# Patient Record
Sex: MALE | Race: WHITE | NOT HISPANIC OR LATINO | Employment: FULL TIME | URBAN - METROPOLITAN AREA
[De-identification: names, ages, dates, MRNs, and addresses within clinical notes are randomized per-mention and may not be internally consistent; named-entity substitution may affect disease eponyms.]

---

## 2020-08-17 RX ORDER — EPINEPHRINE 0.3 MG/.3ML
0.3 INJECTION SUBCUTANEOUS
COMMUNITY
Start: 2020-07-14 | End: 2021-02-11

## 2020-08-17 RX ORDER — METHYLPHENIDATE HYDROCHLORIDE 10 MG/1
TABLET ORAL
COMMUNITY
Start: 2020-09-12 | End: 2020-09-01 | Stop reason: SDUPTHER

## 2020-08-17 RX ORDER — ZALEPLON 10 MG/1
10 CAPSULE ORAL
COMMUNITY
Start: 2020-07-17 | End: 2020-09-01 | Stop reason: SDUPTHER

## 2020-08-17 RX ORDER — CLOBETASOL PROPIONATE 0.5 MG/G
0.05 CREAM TOPICAL 2 TIMES DAILY PRN
COMMUNITY

## 2020-08-17 RX ORDER — ATORVASTATIN CALCIUM 40 MG/1
40 TABLET, FILM COATED ORAL DAILY
COMMUNITY
Start: 2020-05-28 | End: 2020-09-01 | Stop reason: SDUPTHER

## 2020-08-17 RX ORDER — ASPIRIN 81 MG/1
81 TABLET ORAL DAILY
COMMUNITY

## 2020-08-19 ENCOUNTER — OFFICE VISIT (OUTPATIENT)
Dept: INTERNAL MEDICINE CLINIC | Facility: CLINIC | Age: 60
End: 2020-08-19
Payer: COMMERCIAL

## 2020-08-19 VITALS
OXYGEN SATURATION: 96 % | TEMPERATURE: 97.5 F | WEIGHT: 186.6 LBS | HEART RATE: 93 BPM | DIASTOLIC BLOOD PRESSURE: 82 MMHG | HEIGHT: 71 IN | SYSTOLIC BLOOD PRESSURE: 144 MMHG | BODY MASS INDEX: 26.12 KG/M2

## 2020-08-19 DIAGNOSIS — E78.2 MIXED HYPERLIPIDEMIA: Primary | ICD-10-CM

## 2020-08-19 DIAGNOSIS — Z12.11 SCREENING FOR COLON CANCER: ICD-10-CM

## 2020-08-19 DIAGNOSIS — F51.01 PRIMARY INSOMNIA: ICD-10-CM

## 2020-08-19 DIAGNOSIS — M54.9 BACK PAIN, UNSPECIFIED BACK LOCATION, UNSPECIFIED BACK PAIN LATERALITY, UNSPECIFIED CHRONICITY: ICD-10-CM

## 2020-08-19 DIAGNOSIS — L40.50 PSORIATIC ARTHRITIS (HCC): ICD-10-CM

## 2020-08-19 DIAGNOSIS — Z82.49 FAMILY HISTORY OF EARLY CAD: ICD-10-CM

## 2020-08-19 DIAGNOSIS — J45.990 EXERCISE-INDUCED ASTHMA: ICD-10-CM

## 2020-08-19 DIAGNOSIS — Z11.59 NEED FOR HEPATITIS C SCREENING TEST: ICD-10-CM

## 2020-08-19 DIAGNOSIS — F90.2 ATTENTION DEFICIT HYPERACTIVITY DISORDER (ADHD), COMBINED TYPE: ICD-10-CM

## 2020-08-19 PROCEDURE — 3008F BODY MASS INDEX DOCD: CPT | Performed by: INTERNAL MEDICINE

## 2020-08-19 PROCEDURE — 99204 OFFICE O/P NEW MOD 45 MIN: CPT | Performed by: INTERNAL MEDICINE

## 2020-08-19 RX ORDER — CLOTRIMAZOLE AND BETAMETHASONE DIPROPIONATE 10; .64 MG/G; MG/G
CREAM TOPICAL 2 TIMES DAILY
COMMUNITY
End: 2020-12-16 | Stop reason: SDUPTHER

## 2020-08-19 RX ORDER — KETOCONAZOLE 20 MG/G
CREAM TOPICAL DAILY PRN
COMMUNITY
End: 2021-11-05 | Stop reason: SDUPTHER

## 2020-08-19 RX ORDER — FEXOFENADINE HYDROCHLORIDE 60 MG/1
60 TABLET, FILM COATED ORAL DAILY PRN
COMMUNITY

## 2020-08-19 RX ORDER — SECUKINUMAB 150 MG/ML
INJECTION SUBCUTANEOUS
COMMUNITY
Start: 2020-06-11 | End: 2020-12-15 | Stop reason: SDUPTHER

## 2020-08-19 RX ORDER — SECUKINUMAB 150 MG/ML
INJECTION SUBCUTANEOUS
COMMUNITY
Start: 2020-08-12 | End: 2020-12-16 | Stop reason: SDUPTHER

## 2020-08-19 RX ORDER — TOBRAMYCIN AND DEXAMETHASONE 3; 1 MG/ML; MG/ML
SUSPENSION/ DROPS OPHTHALMIC
COMMUNITY
Start: 2020-07-06 | End: 2021-02-11 | Stop reason: ALTCHOICE

## 2020-08-19 NOTE — ASSESSMENT & PLAN NOTE
Continue atorvastatin 40 mg daily, discussed that if he were to have some slight aches he could restart Co Q10 100 mg daily  Patient also understands the importance of healthy diet and exercise

## 2020-08-19 NOTE — PROGRESS NOTES
Assessment/Plan:    Mixed hyperlipidemia  Continue atorvastatin 40 mg daily, discussed that if he were to have some slight aches he could restart Co Q10 100 mg daily  Patient also understands the importance of healthy diet and exercise  Primary insomnia  Continue Sonata as needed along with good sleep hygiene    Psoriatic arthritis ([facility] )  Follow-up Rheumatology    Exercise-induced asthma  Continue inhaler as needed  Pt doing well  Attention deficit hyperactivity disorder (ADHD), combined type  I recommend eval with Ritalin  Diagnoses and all orders for this visit:    Mixed hyperlipidemia    Primary insomnia    Psoriatic arthritis ([facility] )  -     Ambulatory referral to Rheumatology; Future    Exercise-induced asthma    Need for hepatitis C screening test  -     Hepatitis C antibody; Future    Screening for colon cancer  -     Ambulatory referral to Colorectal Surgery; Future    Attention deficit hyperactivity disorder (ADHD), combined type  -     Ambulatory referral to Psychiatry; Future    Family history of early CAD  -     Ambulatory referral to Cardiology; Future    Other orders  -     zaleplon (SONATA) 10 MG capsule; Take 10 mg by mouth  -     methylphenidate (RITALIN) 10 mg tablet; Take 2 tablets by mouth three times a day  -     EPINEPHrine (EPIPEN) 0 3 mg/0 3 mL SOAJ; Inject 0 3 mg into a muscle  -     clobetasol (TEMOVATE) 0 05 % cream; Apply 9 61 application topically 2 (two) times a day  -     atorvastatin (LIPITOR) 40 mg tablet; Take 40 mg by mouth daily  -     aspirin (ECOTRIN LOW STRENGTH) 81 mg EC tablet;  Take 81 mg by mouth daily  -     Cosentyx Sensoready Pen 150 MG/ML SOAJ injection  -     Cosentyx Sensoready, 300 MG, 150 MG/ML SOAJ injection  -     tobramycin-dexamethasone (TOBRADEX) ophthalmic suspension; instill 1 drop into right eye four times a day  -     ketoconazole (NIZORAL) 2 % cream; Apply topically daily  -     clotrimazole-betamethasone (LOTRISONE) 1-0 05 % cream; Apply topically 2 (two) times a day  -     Albuterol Sulfate 108 (90 Base) MCG/ACT AEPB; Inhale  -     fexofenadine (Allegra Allergy) 60 MG tablet; Take 60 mg by mouth daily  -     diclofenac sodium (Voltaren) 1 %; Apply 2 g topically 4 (four) times a day          Subjective:      Patient ID: Rios Jones is a 63 y o  male  HTN: pt's BP has been good at home in the past, it is sometimes elevated when he goes to see the doctor  ADHD: pt on Ritalin for this  Hyperlipidemia:  Patient is on 40 mg of atorvastatin, he was titrated up to 80 mg in the past, but had muscle aches in the legs, so he went back to 40 mg daily and he is currently tolerating this dose  Psoriasic arthritis: pt is getting a good response to Consentyx with less swollen fingers, and less pain  Asthma exercised induced: pt reports having difficulty with mask at times  Family history of CAD: father had first MI age 39, pt has been seeing cardiology, and has had stress tests, and has been maximizing his cardiac risk reduction      The following portions of the patient's history were reviewed and updated as appropriate: allergies, current medications, past family history, past medical history, past social history, past surgical history and problem list     Review of Systems   Constitutional: Negative for chills, fatigue and fever  HENT: Negative for congestion, nosebleeds, postnasal drip, sore throat and trouble swallowing  Eyes: Negative for pain  Respiratory: Negative for cough, chest tightness, shortness of breath and wheezing  Cardiovascular: Negative for chest pain, palpitations and leg swelling  Gastrointestinal: Negative for abdominal pain, constipation, diarrhea, nausea and vomiting  Endocrine: Negative for polydipsia and polyuria  Genitourinary: Negative for dysuria, flank pain and hematuria  Musculoskeletal: Positive for arthralgias, back pain and neck pain  Skin: Negative for rash     Neurological: Negative for dizziness, tremors, light-headedness and headaches  Hematological: Does not bruise/bleed easily  Psychiatric/Behavioral: Negative for confusion and dysphoric mood  The patient is not nervous/anxious  Objective:      /82   Pulse 93   Temp 97 5 °F (36 4 °C)   Ht 5' 11" (1 803 m)   Wt 84 6 kg (186 lb 9 6 oz)   SpO2 96%   BMI 26 03 kg/m²          Physical Exam  Vitals signs reviewed  Constitutional:       General: He is not in acute distress  Appearance: He is well-developed  HENT:      Head: Normocephalic and atraumatic  Right Ear: External ear normal       Left Ear: External ear normal    Eyes:      General: No scleral icterus  Conjunctiva/sclera: Conjunctivae normal    Neck:      Musculoskeletal: Normal range of motion and neck supple  Thyroid: No thyromegaly  Trachea: No tracheal deviation  Cardiovascular:      Rate and Rhythm: Normal rate and regular rhythm  Heart sounds: Normal heart sounds  No murmur  Pulmonary:      Effort: Pulmonary effort is normal  No respiratory distress  Breath sounds: Normal breath sounds  No wheezing or rales  Abdominal:      General: Bowel sounds are normal       Palpations: Abdomen is soft  Tenderness: There is no abdominal tenderness  There is no guarding or rebound  Musculoskeletal:      Right lower leg: No edema  Left lower leg: No edema  Lymphadenopathy:      Cervical: No cervical adenopathy  Neurological:      Mental Status: He is alert and oriented to person, place, and time  Psychiatric:         Behavior: Behavior normal          Thought Content: Thought content normal          Judgment: Judgment normal        BMI Counseling: Body mass index is 26 03 kg/m²  The BMI is above normal  Nutrition recommendations include encouraging healthy choices of fruits and vegetables and moderation in carbohydrate intake  Exercise recommendations include exercising 3-5 times per week

## 2020-08-19 NOTE — PATIENT INSTRUCTIONS
Problem List Items Addressed This Visit        Respiratory    Exercise-induced asthma     Continue inhaler as needed  Pt doing well  Relevant Medications    Albuterol Sulfate 108 (90 Base) MCG/ACT AEPB       Musculoskeletal and Integument    Psoriatic arthritis (HCC)     Follow-up Rheumatology         Relevant Medications    clobetasol (TEMOVATE) 0 05 % cream    Cosentyx Sensoready Pen 150 MG/ML SOAJ injection    Cosentyx Sensoready, 300 MG, 150 MG/ML SOAJ injection    ketoconazole (NIZORAL) 2 % cream    clotrimazole-betamethasone (LOTRISONE) 1-0 05 % cream    diclofenac sodium (Voltaren) 1 %    Other Relevant Orders    Ambulatory referral to Rheumatology       Other    Attention deficit hyperactivity disorder (ADHD), combined type     I recommend eval with Ritalin  Relevant Medications    zaleplon (SONATA) 10 MG capsule    methylphenidate (RITALIN) 10 mg tablet (Start on 9/12/2020)    Other Relevant Orders    Ambulatory referral to Psychiatry    Mixed hyperlipidemia - Primary     Continue atorvastatin 40 mg daily, discussed that if he were to have some slight aches he could restart Co Q10 100 mg daily  Patient also understands the importance of healthy diet and exercise           Relevant Medications    atorvastatin (LIPITOR) 40 mg tablet    Primary insomnia     Continue Sonata as needed along with good sleep hygiene           Other Visit Diagnoses     Need for hepatitis C screening test        Relevant Orders    Hepatitis C antibody    Screening for colon cancer        Relevant Orders    Ambulatory referral to Colorectal Surgery    Family history of early CAD        Relevant Orders    Ambulatory referral to Cardiology

## 2020-08-20 ENCOUNTER — NURSE TRIAGE (OUTPATIENT)
Dept: PHYSICAL THERAPY | Facility: OTHER | Age: 60
End: 2020-08-20

## 2020-08-20 DIAGNOSIS — G89.29 CHRONIC BILATERAL LOW BACK PAIN WITHOUT SCIATICA: Primary | ICD-10-CM

## 2020-08-20 DIAGNOSIS — M54.50 CHRONIC BILATERAL LOW BACK PAIN WITHOUT SCIATICA: Primary | ICD-10-CM

## 2020-08-20 NOTE — TELEPHONE ENCOUNTER
Additional Information   Negative: Is this related to a work injury?  Negative: Is this related to an MVA?  Negative: Are you currently recieving homecare services?  Negative: Does the patient have a fever?  Negative: Has the patient had unexplained weight loss?  Negative: Has the patient experienced major trauma? (fall from height, high speed collision, direct blow to spine) and is also experiencing nausea, light-headedness, or loss of consciousness?  Negative: Is the patient experiencing urine retention?  Negative: Is the patient experiencing blood in sputum?  Negative: Is the patient experiencing acute drop foot or paralysis?  Affirmative: Is this a chronic condition? Background - Initial Assessment  Clinical complaint: bilateral low back pain  Date of onset: Hx of back pain for  5years- worsening over last year- Neck pain for 3 years and also worsening  Frequency of pain: constant  Quality of pain: dull ache, sharp,    Protocols used: [facility] PRO[facility] PROTOCOL    Patient seen by provider yesterday where referral for chronic back pain was entered  Nurse discussed CSP and patient would like to have evaluation at [facility] to move forward w/i  network  Triaged and NO RF s/s present  Referral entered and contact info given to him as well  Patient very appreciative of call  Patient also informed of behavioral call d/t score

## 2020-08-21 ENCOUNTER — TELEPHONE (OUTPATIENT)
Dept: PAIN MEDICINE | Facility: CLINIC | Age: 60
End: 2020-08-21

## 2020-08-21 NOTE — TELEPHONE ENCOUNTER
Patient called returning Monica's calls; please call him back with 15 minutes he requested at # [phone]

## 2020-08-27 ENCOUNTER — OFFICE VISIT (OUTPATIENT)
Dept: PAIN MEDICINE | Facility: CLINIC | Age: 60
End: 2020-08-27
Payer: COMMERCIAL

## 2020-08-27 VITALS
SYSTOLIC BLOOD PRESSURE: 140 MMHG | TEMPERATURE: 98.7 F | BODY MASS INDEX: 26.18 KG/M2 | DIASTOLIC BLOOD PRESSURE: 90 MMHG | HEIGHT: 71 IN | HEART RATE: 72 BPM | WEIGHT: 187 LBS

## 2020-08-27 DIAGNOSIS — M47.816 LUMBAR SPONDYLOSIS: ICD-10-CM

## 2020-08-27 DIAGNOSIS — G89.29 CHRONIC NECK AND BACK PAIN: Primary | ICD-10-CM

## 2020-08-27 DIAGNOSIS — M47.812 CERVICAL SPONDYLOSIS: ICD-10-CM

## 2020-08-27 DIAGNOSIS — M54.9 CHRONIC NECK AND BACK PAIN: Primary | ICD-10-CM

## 2020-08-27 DIAGNOSIS — M54.2 CHRONIC NECK AND BACK PAIN: Primary | ICD-10-CM

## 2020-08-27 PROCEDURE — 3008F BODY MASS INDEX DOCD: CPT | Performed by: PHYSICIAN ASSISTANT

## 2020-08-27 PROCEDURE — 99203 OFFICE O/P NEW LOW 30 MIN: CPT | Performed by: PHYSICIAN ASSISTANT

## 2020-08-27 NOTE — PATIENT INSTRUCTIONS
Lumbar Radiofrequency Ablation   WHAT YOU NEED TO KNOW:   What do I need to know about lumbar radiofrequency ablation? Lumbar radiofrequency ablation (RFA) is a procedure used to treat facet joint pain in your lower back  Facet joints are found at the back of each vertebra  A needle electrode is used to send electrical currents to the nerves in your facet joint  The electrical currents create heat that damages the nerve so it cannot send pain signals  How do I prepare for lumbar RFA? Your healthcare provider will talk to you about how to prepare for this procedure  He may tell you not to eat or drink anything after midnight on the day of your procedure  He will tell you what medicines to take or not take on the day of your procedure  What will happen during lumbar RFA? · You will lie on your stomach  You will be given local anesthesia to numb the area of your back where the needle electrode will be inserted  You may be given a sedative to help keep you relaxed  You may still feel pressure or pushing during the procedure, but you should not feel any pain  Your healthcare provider will use fluoroscopy (a type of x-ray) to guide the needle electrode to the nerves near your facet joint  · Your healthcare provider may touch the affected nerve to make sure the needle electrode is in the right place  You will feel tingling or pressure when he does this  He will then apply local anesthesia to the nerve to numb it  This will prevent you from feeling pain when he applies heat to the nerve  Your healthcare provider will then apply heat to the nerve using the needle electrode  He may need to apply heat to more than one nerve  He will remove the needle electrode and apply a bandage over the area  What are the risks of lumbar RFA? You may have pain, numbness, tingling, or burning in the area where the lumbar RFA was done  These normally go away within 6 weeks  The needle electrode may injure your spinal nerves   This may cause permanent leg weakness or nerve pain  CARE AGREEMENT:   You have the right to help plan your care  Learn about your health condition and how it may be treated  Discuss treatment options with your caregivers to decide what care you want to receive  You always have the right to refuse treatment  The above information is an  only  It is not intended as medical advice for individual conditions or treatments  Talk to your doctor, nurse or pharmacist before following any medical regimen to see if it is safe and effective for you  © 2017 [employer]  Information is for End User's use only and may not be sold, redistributed or otherwise used for commercial purposes  All illustrations and images included in CareNotes® are the copyrighted property of [employer]  or [employer]

## 2020-08-27 NOTE — PROGRESS NOTES
Assessment/Plan:      Diagnoses and all orders for this visit:    Chronic neck and back pain    Cervical spondylosis    Lumbar spondylosis          Discussion:  Pleasant 23-year-old male presenting for chronic neck and low back pain secondary to moderate to severe cervical and lumbar spondylosis  I had a lengthy discussion with patient today regarding his chronic pain syndrome symptoms and treatment plan options  At this time he utilizes ibuprofen as needed once to twice daily which he feels does provide him with adequate pain relief however he is concerned for long-term side effects  We did review possible interventional procedures such as facet joint injections versus medial branch blocks with possible rhizotomy however patient states that if these will not provide him with actual treatment of his arthritis but only pain relief he would like to hold for now but will consider in the future should his pain increase or worsen  He may therefore follow up with pain management office as needed  Subjective:     Patient ID: Ovidio Unger is a 63 y o  male  HPI referral from [facility] for chronic neck and low back pain secondary to spondylosis  Neck pain present for the past 3 years and low back pain present for the past 5 years  Pain does not radiate into upper lower extremities  He denies any numbness tingling or weakness  He describes this pain as an aching throbbing pain  Pain on average rated as a 5 to 7/10 on pain scale  Pain is exacerbated with physical activities such as bending and lifting  He states he has completed physical therapy in the past which he feels exacerbated his pain  He denies any previous surgeries for this issue  He was considering possible interventions through [facility] but did not complete  He is worried that in the future his ADLs may become more limited specifically his outdoor activities such as biking/hiking    Overall he is able to complete his normal activities however he does typically rely on ibuprofen 1-2 tabs prn daily after severe strenuous activity  Ibuprofen does provide him with adequate pain relief  Patient does have a past medical history of psoriatic arthritis for which he is on biologics through rheumatology  PAST MEDICAL HISTORY  History reviewed  No pertinent past medical history  PAST SURGICAL HISTORY  History reviewed  No pertinent surgical history  FAMILY HISTORY  Family History   Problem Relation Age of Onset    Coronary artery disease Father      HOME MEDICATIONS  Current Outpatient Medications   Medication Sig Dispense Refill    Albuterol Sulfate 108 (90 Base) MCG/ACT AEPB Inhale      aspirin (ECOTRIN LOW STRENGTH) 81 mg EC tablet Take 81 mg by mouth daily      atorvastatin (LIPITOR) 40 mg tablet Take 40 mg by mouth daily      clobetasol (TEMOVATE) 0 05 % cream Apply 3 73 application topically 2 (two) times a day      clotrimazole-betamethasone (LOTRISONE) 1-0 05 % cream Apply topically 2 (two) times a day      Cosentyx Sensoready Pen 150 MG/ML SOAJ injection       Cosentyx Sensoready, 300 MG, 150 MG/ML SOAJ injection       diclofenac sodium (Voltaren) 1 % Apply 2 g topically 4 (four) times a day      EPINEPHrine (EPIPEN) 0 3 mg/0 3 mL SOAJ Inject 0 3 mg into a muscle      fexofenadine (Allegra Allergy) 60 MG tablet Take 60 mg by mouth daily      ketoconazole (NIZORAL) 2 % cream Apply topically daily      [START ON 9/12/2020] methylphenidate (RITALIN) 10 mg tablet Take 2 tablets by mouth three times a day      tobramycin-dexamethasone (TOBRADEX) ophthalmic suspension instill 1 drop into right eye four times a day      zaleplon (SONATA) 10 MG capsule Take 10 mg by mouth       No current facility-administered medications for this visit           ALLERGIES  Other      SOCIAL HISTORY  Social History     Substance and Sexual Activity   Alcohol Use None     Social History     Substance and Sexual Activity   Drug Use Not on file     Social History     Tobacco Use   Smoking Status Not on file       Review of Systems   Constitutional: Positive for activity change  Negative for chills, fever and unexpected weight change  HENT: Negative for trouble swallowing  Eyes: Negative for visual disturbance  Respiratory: Negative for shortness of breath  Cardiovascular: Negative for chest pain and leg swelling  Gastrointestinal: Negative for constipation, diarrhea, nausea and vomiting  Endocrine: Negative for polydipsia, polyphagia and polyuria  Genitourinary: Negative for decreased urine volume, difficulty urinating and urgency  Musculoskeletal: Positive for back pain, neck pain and neck stiffness  Skin: Negative for color change and pallor  Allergic/Immunologic: Negative for immunocompromised state  Neurological: Positive for headaches  Negative for dizziness, syncope, weakness, light-headedness and numbness  Hematological: Does not bruise/bleed easily  Psychiatric/Behavioral: Negative for sleep disturbance  Objective:  Vitals:    08/27/20 1114   BP: 140/90   Pulse: 72   Temp: 98 7 °F (37 1 °C)       Imaging:  No images are attached to the encounter  Labs:  No results found for any previous visit  Physical Exam  Vitals signs reviewed  Constitutional:       Appearance: He is well-developed  HENT:      Head: Normocephalic and atraumatic  Eyes:      Conjunctiva/sclera: Conjunctivae normal       Pupils: Pupils are equal, round, and reactive to light  Neck:      Musculoskeletal: Neck supple  Pulmonary:      Effort: Pulmonary effort is normal  No respiratory distress  Musculoskeletal:      Cervical back: He exhibits decreased range of motion  He exhibits no tenderness and no bony tenderness  Lumbar back: He exhibits decreased range of motion and tenderness  He exhibits no bony tenderness  Skin:     General: Skin is warm and dry  Coloration: Skin is not pale        Findings: No rash    Neurological:      Mental Status: He is alert and oriented to person, place, and time  Cranial Nerves: Cranial nerves are intact  Sensory: Sensation is intact  Motor: Motor function is intact  Coordination: Coordination is intact  Deep Tendon Reflexes: Reflexes normal    Psychiatric:         Behavior: Behavior normal          Thought Content:  Thought content normal          Judgment: Judgment normal

## 2020-08-31 ENCOUNTER — TELEPHONE (OUTPATIENT)
Dept: PSYCHIATRY | Facility: CLINIC | Age: 60
End: 2020-08-31

## 2020-09-01 ENCOUNTER — TELEPHONE (OUTPATIENT)
Dept: PSYCHIATRY | Facility: CLINIC | Age: 60
End: 2020-09-01

## 2020-09-01 DIAGNOSIS — F51.01 PRIMARY INSOMNIA: ICD-10-CM

## 2020-09-01 DIAGNOSIS — E78.2 MIXED HYPERLIPIDEMIA: ICD-10-CM

## 2020-09-01 DIAGNOSIS — F90.2 ATTENTION DEFICIT HYPERACTIVITY DISORDER (ADHD), COMBINED TYPE: Primary | ICD-10-CM

## 2020-09-01 RX ORDER — ZALEPLON 10 MG/1
10 CAPSULE ORAL
Qty: 30 CAPSULE | Refills: 1 | Status: SHIPPED | OUTPATIENT
Start: 2020-09-01 | End: 2020-12-11

## 2020-09-01 RX ORDER — METHYLPHENIDATE HYDROCHLORIDE 10 MG/1
20 TABLET ORAL 3 TIMES DAILY
Qty: 180 TABLET | Refills: 0 | Status: SHIPPED | OUTPATIENT
Start: 2020-09-12 | End: 2020-10-16 | Stop reason: SDUPTHER

## 2020-09-01 RX ORDER — ATORVASTATIN CALCIUM 40 MG/1
40 TABLET, FILM COATED ORAL DAILY
Qty: 90 TABLET | Refills: 3 | Status: SHIPPED | OUTPATIENT
Start: 2020-09-01 | End: 2021-08-20 | Stop reason: SDUPTHER

## 2020-09-18 ENCOUNTER — TELEMEDICINE (OUTPATIENT)
Dept: PSYCHIATRY | Facility: CLINIC | Age: 60
End: 2020-09-18
Payer: COMMERCIAL

## 2020-09-18 VITALS — HEIGHT: 71 IN | WEIGHT: 179 LBS | BODY MASS INDEX: 25.06 KG/M2

## 2020-09-18 DIAGNOSIS — F90.2 ATTENTION DEFICIT HYPERACTIVITY DISORDER (ADHD), COMBINED TYPE: Primary | ICD-10-CM

## 2020-09-18 PROBLEM — Z91.018 FOOD ALLERGY: Status: ACTIVE | Noted: 2020-07-14

## 2020-09-18 PROBLEM — F41.1 GENERALIZED ANXIETY DISORDER: Status: ACTIVE | Noted: 2018-05-14

## 2020-09-18 PROBLEM — Z91.14 NONCOMPLIANCE WITH MEDICATION REGIMEN: Status: ACTIVE | Noted: 2019-04-28

## 2020-09-18 PROBLEM — R10.11 RIGHT UPPER QUADRANT ABDOMINAL PAIN: Status: ACTIVE | Noted: 2020-07-14

## 2020-09-18 PROBLEM — M25.522 ELBOW PAIN, LEFT: Status: ACTIVE | Noted: 2019-04-16

## 2020-09-18 PROBLEM — Z91.148 NONCOMPLIANCE WITH MEDICATION REGIMEN: Status: ACTIVE | Noted: 2019-04-28

## 2020-09-18 PROBLEM — Z79.899 CONTROLLED SUBSTANCE AGREEMENT SIGNED: Status: ACTIVE | Noted: 2020-07-19

## 2020-09-18 PROBLEM — M62.838 MUSCLE SPASMS OF BOTH LOWER EXTREMITIES: Status: ACTIVE | Noted: 2019-07-30

## 2020-09-18 PROBLEM — R05.9 COUGH IN ADULT PATIENT: Status: ACTIVE | Noted: 2020-07-14

## 2020-09-18 PROCEDURE — 96127 BRIEF EMOTIONAL/BEHAV ASSMT: CPT | Performed by: STUDENT IN AN ORGANIZED HEALTH CARE EDUCATION/TRAINING PROGRAM

## 2020-09-18 PROCEDURE — 90792 PSYCH DIAG EVAL W/MED SRVCS: CPT | Performed by: STUDENT IN AN ORGANIZED HEALTH CARE EDUCATION/TRAINING PROGRAM

## 2020-09-18 NOTE — BH TREATMENT PLAN
Treatment Plan done but not signed at time of office visit due to:  Plan reviewed by phone or in person  and verbal consent given due to COVID social distancing    TREATMENT PLAN (Medication Management Only)        [facility]    Name and Date of Birth:  Vladimir Vernon 63 y o  1960  Date of Treatment Plan: September 18, 2020  Diagnosis/Diagnoses:  No diagnosis found  Strengths/Personal Resources for Self-Care: ""supportive family, hobbies, drumming and moutain biking""  Area/Areas of need (in own words): ADHD symptoms  1  Long Term Goal: maintain improvement in ADHD symptoms  Target Date: 2 months - 11/18/2020  Person/Persons responsible for completion of goal: Nga Bustamante  2  Short Term Objective (s) - How will we reach this goal?:   A  Provider new recommended medication/dosage changes and/or continue medication(s): continue current medications as prescribed  B  Individual therapy  C  N/A  Target Date: 4 weeks - 10/16/2020  Person/Persons Responsible for Completion of Goal: Nga Bustamante  Progress Towards Goals: stable  Treatment Modality: medication management every 4 months  Review due 90 to 120 days from date of this plan: 4 months - 1/18/2021  Expected length of service: maintenance  My Physician/PA/NP and I have developed this plan together and I agree to work on the goals and objectives  I understand the treatment goals that were developed for my treatment

## 2020-09-18 NOTE — PSYCH
Virtual Regular Visit      Assessment/Plan:    Problem List Items Addressed This Visit        Other    Attention deficit hyperactivity disorder (ADHD), combined type - Primary               Reason for visit is   Chief Complaint   Patient presents with    Psychiatric Evaluation    Medication Management        Encounter provider Nallely Negro MD    Provider located at [address] [place] [address] [address]  [facility] [id number]    Recent Visits  No visits were found meeting these conditions  Showing recent visits within past 7 days and meeting all other requirements     Today's Visits  Date Type Provider Dept   09/18/20 Telemedicine Nallely Negro MD [facility] today's visits and meeting all other requirements     Future Appointments  No visits were found meeting these conditions  Showing future appointments within next 150 days and meeting all other requirements        The patient was identified by name and date of birth  La Mariscal was informed that this is a telemedicine visit and that the visit is being conducted through [facility] and patient was informed that this is a secure, HIPAA-compliant platform  He agrees to proceed     My office door was closed  No one else was in the room  He acknowledged consent and understanding of privacy and security of the video platform  The patient has agreed to participate and understands they can discontinue the visit at any time  Patient is aware this is a billable service  [address] [facility] [place]    Name and Date of Birth:  La Mariscal 63 y o  1960 MRN: [id number]    Date of Visit: September 18, 2020    Reason for visit:   Chief Complaint   Patient presents with    Psychiatric Evaluation    Medication Management       HPI:     La Mariscal is a 63 y o    male, , domiciled w/ wife, 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 exercise induced asthma, HLD, psoriatic arthritis, cervical and lumbar DDD and PPH of ADHD, insomnia and ?WALTER, no prior psychiatric admissions, no prior SA, no  h/o self-injurious behavior, currently on Ritalin 20 mg TID and Sonata 10 mg nightly prescribed by his PCP who presented to the mental health clinic for initial intake and psychiatric evaluation  The pt was visited virtually in the clinic; chart reviewed  Presented calm, cooperative and well related, casually dressed w/ good hygiene, good eye contact, euthymic mood, constricted affect, talking in normal tone, volume and amount, w/ linear thought process, good insight and judgement  He reported that he was diagnosed with ADHD in 1999 since he had very difficult time focusing and concentrating and went to [facility], and saw a psychiatrist for several sessions, and was diagnosed with ADHD and he has been on Ritalin since then  He noted that his focus and function improved significantly and dramatically  He stated that later his PCP also wanted him to be evaluated again, and Dr Med Brody in 2005 did neuropsych test, and also diagnosed him with ADHD  He mentioned that in 2016 his PCP wanted him to be re-evaluated be Dr Darcy Smith, psychiatrist to be off Ritalin due to concerns regarding the [place] of cardiac disease in father, and Dr Darcy Smith started him on a medication for a period of time which he could not recall the name, and he felt "like a Zombie" and he stopped it after a couple of weeks  He noted that he has been in theray for past two years with Ms Jane Muhammad, and it has been very helpful  He reported that he changed his insurance to [facility] and his PCP requested referral      He noted that 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, and although it has been a stressful time for him, but he has been coping well, and even less stresed out   He has been able to pursue his hobbies as mountain biking and playing drums, and noted that he is financially stable and is not worried about the business anymore, and actually has been more stable and relaxed  Endorsed good mood, appetite and energy level  Reported sleeping fine while having exercise, but may need to take Sonata 1-2 times per week, and sleeps 7-7 5h nightly; feels refreshed in the morning  Denied anhedonia, hopelessness, or worthlessness  He noted that he is an "over-thinker", but he is "able to turn off the worry"  No specific phobia or panic attacks reported  Denied A/VH  No manic sxs, paranoid ideations or fixed delusions were elicited  Vehemently denied SI/HI, intent or plan upon direct inquiry at this time  Drinks alcohol occasionally (1-2 drinks per month)  Denied smoking cigarettes or other illicit substance use  Denied any prior h/o self-injurious behavior or SA  No known FH of psychiatric problems or suicide  Denied h/o physical or sexual abuse  Denied any history of eating disorder or obsessive/compulsive sxs  The patient was educated about the diagnosis of ADHD and the course of ADHD in adults which may present with decreased intensity of sxs, onesimo after age 48, and particularly after age 72  Psycho-education regarding Ritalin indications, benefits, risks, side effects, and alternative options provided to the patient, and the importance of the compliance with psychiatric treatment reiterated  The patient was educated about the potential long-term side effects of ritalin in older age, onesimo cardiac side effects (given the FH of heart attak in his father at age 39), and was educated about the possibility of tapering off or if becoming symptomatic, cross-taping to different treatment options  He verbalized understanding and agreed to the proposed regimen  Ritalin dose was decreased from 20mg TID (60mg total), to 20/20/10mg (50 mg total); to be tapered off gradually       The patient was educated about the Bio-Psycho-Social Spiritual and Environmental Approach to the mental health  Also, was educated about the importance of sleep hygiene, meditation and breathing techniques, and recommended to use CBT-i  application for insomnia  The patient was receptive to the education         Prescriptions  Total Prescriptions: 27    Total Private Pay: 0    Fill Date ID   Written Drug Qty Days Prescriber Rx # Pharmacy Refill   Daily Dose* Pymt Type      09/12/2020  1   09/01/2020  Methylphenidate 10 MG Tablet  180 00  30 Da Sonny Manifold   [id number]   St  (4270)   [place]   09/01/2020  1   09/01/2020  Zaleplon 10 MG Capsule  30 00  30 Da Sonny Manifold   [id number]   St  (4270)   [place]   07/17/2020  1   07/17/2020  Zaleplon 10 MG Capsule  30 00  30 Ja n   [id number]   Thr (7607)   [place]   07/16/2020  1   07/14/2020  Methylphenidate 10 MG Tablet  180 00  30 Ja n   [id number]   Thr (7607)   [place]   04/08/2020  1   04/07/2020  Methylphenidate 10 MG Tablet  180 00  30 Ja n   [id number]   Thr (7607)   [place]   04/07/2020  1   04/07/2020  Zaleplon 10 MG Capsule  30 00  30 Ja n   [id number]   Thr (7607)   [place]   03/10/2020  1   03/06/2020  Methylphenidate 10 MG Tablet  180 00  30 Ja n   [id number]   Thr (7607)   [place]   01/24/2020  1   01/20/2020  Methylphenidate 10 MG Tablet  180 00  30 Ja n   [id number]   Thr (7607)   [place]   12/18/2019  1   10/03/2019  Zaleplon 10 MG Capsule  30 00  30 Ja n   [id number]   Thr (3551)   1   [place]   12/10/2019  2   10/02/2019  Methylphenidate 10 MG Tablet  180 00  30 Ja n   [id number]   Cvs (0061)   [place]   11/22/2019  2   10/02/2019  Methylphenidate 10 MG Tablet  180 00  30 Ja n   [id number]   Cvs (0061)   0   [place]   11/04/2019  1   08/09/2019  Methylphenidate 10 MG Tablet  180 00  30 Ja n   [id number]   Thr (7607)   [place]   10/07/2019  2   10/02/2019  Methylphenidate 10 MG Tablet  180 00  30 Loyde Pencil   [id number]   [facility]vs (0061)   0   [place]   10/03/2019  1   10/03/2019  Zaleplon 10 MG Capsule  30 00  30 [facility]   [id number]   Thr (8665)   [place]   08/19/2019  1   08/09/2019  Methylphenidate 10 MG Tablet  180 00  30 [facility]   [id number]   Thr (3807)   [place]   07/30/2019  1   07/30/2019  Zaleplon 10 MG Capsule  15 00  15 [facility]   [id number]   Thr (7607)   0   [place]   07/06/2019  1   07/02/2019  Methylphenidate 10 MG Tablet  180 00  30 [facility]   [id number]   Thr (7607)   0   [place]   05/30/2019  1   05/30/2019  Methylphenidate 10 MG Tablet  180 00  30 [facility]   [id number]   Thr (4373)   [place]   03/25/2019  1   01/15/2019  Methylphenidate 10 MG Tablet  180 00  30 [facility]   [id number]   Thr (3343)   [place]   02/23/2019  1   01/15/2019  Methylphenidate 10 MG Tablet  180 00  30 [facility]   [id number]   Thr (5861)   [place]   01/25/2019  1   01/15/2019  Methylphenidate 10 MG Tablet  180 00  30 [facility]   [id number]   Thr (7667)   0   [place]   12/29/2018  1   10/18/2018  Zaleplon 10 MG Capsule  30 00  300 [facility]   [id number]   Thr (2275)   1   [place]   12/26/2018  1   12/20/2018  Methylphenidate 10 MG Tablet  180 00  30 [place]   [id number]   Thr (7607)   0   [place]   12/17/2018  1   12/17/2018  Guaiatussin Ac Liquid  100 00  10 [facility]   [id number]   Thr (7607)   0  3 00 MME  [place]   11/15/2018  1   11/15/2018  Methylphenidate 10 MG Tablet  180 00  30 [place]   [id number]   Thr (4646)   [place]   [place]   10/18/2018  1   10/18/2018  Zaleplon 10 MG Capsule  30 00  300 [facility]   [id number]   Thr (0991)   [place]   10/08/2018  1   10/08/2018  Methylphenidate 10 MG Tablet  180 00  300 [facility]   [id number]   Thr (7607)   0   [place]   *Per CDC guidance, the MME conversion factors prescribed or provided as part of the medication-assisted treatment for opioid use disorder should not be used to benchmark against dosage thresholds meant for opioids prescribed for pain  Buprenorphine products have no agreed upon morphine equivalency, and as partial opioid agonists, are not expected to be associated with overdose risk in the same dose-dependent manner as doses for full agonist opioids  MME = morphine milligram equivalents  LME = Lorazepam milligram equivalents  MG = dose in milligrams  Providers  Total Providers: 4   Name Adi Barrios Phone   Jemima Guajardo [place] [address] [facility],[address], [address] [facility] [address] [facility] [address] [facility], [address] [facility] [address] [facility],  [phone] [facility] [address] [place] [address] [place] [id number]        Review Of Systems:  Pertinent items are noted in HPI; all others are negative; no recent changes in medications or health status reported  WALTER-7 Flowsheet Screening      Most Recent Value   Over the last two weeks, how often have you been bothered by the following problems? Feeling nervous, anxious, or on edge  1   Not being able to stop or control worrying  0   Worrying too much about different things  1   Trouble relaxing   1   Being so restless that it's hard to sit still  0   Becoming easily annoyed or irritable   1   Feeling afraid as if something awful might happen  0   How difficult have these problems made it for you to do your work, take care of things at home, or get along with other people?    Somewhat difficult   WALTER Score   4          Past Psychiatric History:     Past Inpatient Psychiatric Treatment:   No history of past inpatient psychiatric admissions  Past Outpatient Psychiatric Treatment:    Not in outpatient treatment recently  Past Suicide Attempts: no  Past Violent Behavior: no  Past Psychiatric Medication Trials: Ritalin and Sonata    Traumatic History:     Abuse: no history of physical or sexual abuse  Other Traumatic Events: none     Family Psychiatric History:     Family History   Problem Relation Age of Onset    Coronary artery disease Father        Substance Use History:    Social History     Substance and Sexual Activity   Alcohol Use Not on file     Social History     Substance and Sexual Activity   Drug Use Not on file       Social History:  Developmental:  Education: college graduate  Marital history:   Children: two adult children  Living arrangement, social support: wife  Occupational History: Employed  Access to firearms: yes (locked and in safe place)    Social History     Socioeconomic History    Marital status: /Civil Union     Spouse name: Not on file    Number of children: Not on file    Years of education: Not on file    Highest education level: Not on file   Occupational History    Not on file   Social Needs    Financial resource strain: Not on file    Food insecurity     Worry: Not on file     Inability: Not on file   [employer] needs     Medical: Not on file     Non-medical: Not on file   Tobacco Use    Smoking status: Never Smoker    Smokeless tobacco: Never Used   Substance and Sexual Activity    Alcohol use: Not on file    Drug use: Not on file    Sexual activity: Not on file   Lifestyle    Physical activity     Days per week: Not on file     Minutes per session: Not on file    Stress: Not on file   Relationships    Social connections     Talks on phone: Not on file     Gets together: Not on file     Attends Caodaism service: Not on file     Active member of club or organization: Not on file     Attends meetings of clubs or organizations: Not on file     Relationship status: Not on file    Intimate partner violence     Fear of current or ex partner: Not on file     Emotionally abused: Not on file     Physically abused: Not on file     Forced sexual activity: Not on file   Other Topics Concern    Not on file   Social History Narrative    Not on file       Past Medical History:    No past medical history on file  No past medical history pertinent negatives    No past surgical history on file  Allergies   Allergen Reactions    Other Anaphylaxis     Unknown food allergy       History Review: The following portions of the patient's history were reviewed and updated as appropriate: allergies, current medications, past family history, past medical history, past social history, past surgical history and problem list     OBJECTIVE:    Vital signs in last 24 hours:    Vitals:    20 0847   Weight: 81 2 kg (179 lb)   Height: 5' 11" (1 803 m)       Mental Status Evaluation:  Appearance and attitude: appeared as stated age, pleasant, cooperative and attentive, casually dressed with good hygiene  Eye contact: good  Motor Function: within normal limits, No PMA/PMR  Gait/station: Not observed  Speech: normal for rate, rhythm, volume, latency, amount  Language: Able to name objects, Able to repeat phrases and No overt abnormality  Mood/affect: euthymic / Affect was euthymic, reactive, in full range, normal intensity and mood congruent  Thought Processes: sequential and goal-directed  Thought content: denies suicidal ideation or homicidal ideation; no delusions or first rank symptoms  Associations: intact associations  Perceptual disturbances: denies Auditory/Visual/Tactile Hallucinations  Orientation: oriented to time, person, place and to the situational context  Cognitive Function: intact  Memory: intact short-term and long-term  Intellect: average  Fund of knowledge: aware of current events, Aware of past history and vocabulary Average  Impulse control: good  Insight/judgment: good/good    Pain: reported having pain in joints at times  Pain scale: could be between 5-7    Lab Results: I have personally reviewed pertinent lab results        2020: CBC, TSH, Lipid Profile (except T) and CMP unremarkable    Imaging Studies:    No orders to display       EKG, Pathology, and Other Studies: none on the chart    Suicide/Homicide Risk Assessment:    Risk of Harm to Self:  The following ratings are based on assessment at the time of the interview  Demographic risk factors include:   Historical Risk Factors include: none  Recent Specific Risk Factors include: mental illness diagnosis  Protective Factors: no current suicidal ideation  Weapons: gun  The following steps have been taken to ensure weapons are properly secured: locked  Based on today's assessment, Yariel Sarmiento presents the following risk of harm to self: low    Risk of Harm to Others: The following ratings are based on assessment at the time of the interview  Demographic Risk Factors include: male  Historical Risk Factors include: none  Recent Specific Risk Factors include: none  Protective Factors: no current homicidal ideation  Weapons: gun  The following steps have been taken to ensure weapons are properly secured: locked  Based on today's assessment, Yariel Sarmiento presents the following risk of harm to others: minimal    The following interventions are recommended: no intervention changes needed    Assessment/Plan:   In summary, the patient is a 63 y o   male, , domiciled w/ wife, 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 exercise induced asthma, HLD, psoriatic arthritis, cervical and lumbar DDD and PPH of ADHD, insomnia and ?WALTER, no prior psychiatric admissions, no prior SA, no  h/o self-injurious behavior, currently on Ritalin 20 mg TID and Sonata 10 mg nightly prescribed by his PCP who presented to the mental health clinic for initial intake and psychiatric evaluation as referred by his PCP for re-evaluation of ADHD treatment with Ritalin  The patient presented w/ h/o ADHD since 1999, being on Ritalin 60mg daily since then, w/ marked improvement of attention, concentration and his function since being treated  Denied any excessive anxiety, depressed/manic sxs, A/VH or other psychotic sxs  No h/o substance abuse  Denied SI/HI, intent or plan upon direct inquiry at this time  WALTER-7: 4  His current presentation meets criteria for ADHD, by history  Currently he is not at risk for suicide, homicide, self-injury, aggressive behaviors, self-neglect, or neglect of dependents or children  Given this presentation, the patient will benefit from further outpatient follow up for management of his symptoms  The patient was educated about the diagnosis of ADHD and the course of ADHD in adults which may present with decreased intensity of sxs, onesimo after age 48, and particularly after age 72  Psycho-education regarding Ritalin indications, benefits, risks, side effects, and alternative options provided to the patient, and the importance of the compliance with psychiatric treatment reiterated  The patient was educated about the potential long-term side effects of ritalin in older age, onesimo cardiac side effects (given the FH of heart attak in his father at age 39), and was educated about the possibility of tapering off or if becoming symptomatic, cross-taping to different treatment options  He verbalized understanding and agreed to the proposed regimen  Ritalin dose was decreased from 20mg TID (60mg total), to 20/20/10mg (50 mg total); to be tapered off gradually  Diagnoses and all orders for this visit:    Attention deficit hyperactivity disorder (ADHD), combined type    Other orders  -     secukinumab (COSENTYX) 150 mg/mL SOAJ injection; Inject 300 mg under the skin every 30 (thirty) days          PROVISIONAL DIAGNOSIS :   - ADHD by history    TREATMENT AND RECOMMENDATIONS:  - Decreased Ritaline from 20 mg TID to 20/20/10mg; to be tapered off slowly/or crass-tapered to non-stimulants if tolerated, and as clinically indicated  - Continue Sonata 10 mg PRN for insomnia  - Psychoeducation regarding medication benefits and risks, side effects, indications  and alternatives provided to the patient and the importance of compliance with psychiatric medication reiterated   The pt verbalized understanding and agreed with the plan  - The patient was educated about the [address]  and Environmental Approach to the mental health  Also, was educated about the importance of sleep hygiene, meditation and breathing techniques, and recommended to use CBT-i  application for insomnia  The patient was receptive to the education    - Continue individual psychotherapy  - The patient was educated about 24 hour and weekend coverage for urgent situations accessed by calling [facility] main practice number  - Patient was educated to call [address] [facility] ([phone] [[phone]]) for behavioral crisis at anytime or 911 for any safety concerns, or go to nearest ER if his symptoms become overwhelming or unmanageable  Medications Risks/Benefits:      Risks, Benefits And Possible Side Effects Of Medications:    Risks, benefits, and possible side effects of medications explained to Kai Sprague and he verbalizes understanding and agreement for treatment      Controlled Medication Discussion:     Kai Sprague has been filling controlled prescriptions on time as prescribed according to [place] Prescription Drug Monitoring Program    Treatment Plan:    Completed and signed during the session: Yes - with Mik Shi MD 09/18/20

## 2020-10-08 ENCOUNTER — TELEPHONE (OUTPATIENT)
Dept: CARDIOLOGY CLINIC | Facility: CLINIC | Age: 60
End: 2020-10-08

## 2020-10-09 ENCOUNTER — CONSULT (OUTPATIENT)
Dept: CARDIOLOGY CLINIC | Facility: CLINIC | Age: 60
End: 2020-10-09
Payer: COMMERCIAL

## 2020-10-09 VITALS
HEART RATE: 82 BPM | BODY MASS INDEX: 26.6 KG/M2 | HEIGHT: 71 IN | DIASTOLIC BLOOD PRESSURE: 88 MMHG | OXYGEN SATURATION: 98 % | SYSTOLIC BLOOD PRESSURE: 130 MMHG | WEIGHT: 190 LBS

## 2020-10-09 DIAGNOSIS — Z82.49 FAMILY HISTORY OF EARLY CAD: ICD-10-CM

## 2020-10-09 DIAGNOSIS — E78.2 MIXED HYPERLIPIDEMIA: Primary | ICD-10-CM

## 2020-10-09 DIAGNOSIS — I10 ESSENTIAL HYPERTENSION: ICD-10-CM

## 2020-10-09 PROCEDURE — 93000 ELECTROCARDIOGRAM COMPLETE: CPT | Performed by: INTERNAL MEDICINE

## 2020-10-09 PROCEDURE — 99243 OFF/OP CNSLTJ NEW/EST LOW 30: CPT | Performed by: INTERNAL MEDICINE

## 2020-10-16 ENCOUNTER — OFFICE VISIT (OUTPATIENT)
Dept: PSYCHIATRY | Facility: CLINIC | Age: 60
End: 2020-10-16
Payer: COMMERCIAL

## 2020-10-16 VITALS — WEIGHT: 189 LBS | BODY MASS INDEX: 26.46 KG/M2 | HEIGHT: 71 IN

## 2020-10-16 DIAGNOSIS — F90.2 ATTENTION DEFICIT HYPERACTIVITY DISORDER (ADHD), COMBINED TYPE: ICD-10-CM

## 2020-10-16 PROBLEM — Z91.14 NONCOMPLIANCE WITH MEDICATION REGIMEN: Status: RESOLVED | Noted: 2019-04-28 | Resolved: 2020-10-16

## 2020-10-16 PROBLEM — Z91.148 NONCOMPLIANCE WITH MEDICATION REGIMEN: Status: RESOLVED | Noted: 2019-04-28 | Resolved: 2020-10-16

## 2020-10-16 PROCEDURE — 99213 OFFICE O/P EST LOW 20 MIN: CPT | Performed by: STUDENT IN AN ORGANIZED HEALTH CARE EDUCATION/TRAINING PROGRAM

## 2020-10-16 PROCEDURE — 90833 PSYTX W PT W E/M 30 MIN: CPT | Performed by: STUDENT IN AN ORGANIZED HEALTH CARE EDUCATION/TRAINING PROGRAM

## 2020-10-16 RX ORDER — METHYLPHENIDATE HYDROCHLORIDE 10 MG/1
20 TABLET ORAL
Qty: 150 TABLET | Refills: 0 | Status: SHIPPED | OUTPATIENT
Start: 2020-10-16 | End: 2020-10-16 | Stop reason: SDUPTHER

## 2020-10-16 RX ORDER — METHYLPHENIDATE HYDROCHLORIDE 10 MG/1
TABLET ORAL
Qty: 150 TABLET | Refills: 0 | Status: SHIPPED | OUTPATIENT
Start: 2020-10-16 | End: 2020-11-24 | Stop reason: SDUPTHER

## 2020-10-27 ENCOUNTER — TRANSCRIBE ORDERS (OUTPATIENT)
Dept: ADMINISTRATIVE | Facility: HOSPITAL | Age: 60
End: 2020-10-27

## 2020-10-27 ENCOUNTER — LAB (OUTPATIENT)
Dept: LAB | Facility: HOSPITAL | Age: 60
End: 2020-10-27
Payer: COMMERCIAL

## 2020-10-27 DIAGNOSIS — L40.50 PSORIATIC ARTHRITIS (HCC): Primary | ICD-10-CM

## 2020-10-27 DIAGNOSIS — Z51.81 THERAPEUTIC DRUG MONITORING: ICD-10-CM

## 2020-10-27 DIAGNOSIS — Z11.59 NEED FOR HEPATITIS C SCREENING TEST: ICD-10-CM

## 2020-10-27 DIAGNOSIS — L40.50 PSORIATIC ARTHRITIS (HCC): ICD-10-CM

## 2020-10-27 LAB
ALBUMIN SERPL BCP-MCNC: 4.1 G/DL (ref 3.4–4.8)
ALP SERPL-CCNC: 53.9 U/L (ref 10–129)
ALT SERPL W P-5'-P-CCNC: 33 U/L (ref 5–63)
ANION GAP SERPL CALCULATED.3IONS-SCNC: 7 MMOL/L (ref 4–13)
AST SERPL W P-5'-P-CCNC: 25 U/L (ref 15–41)
BASOPHILS # BLD AUTO: 0.03 THOUSANDS/ΜL (ref 0–0.1)
BASOPHILS NFR BLD AUTO: 1 % (ref 0–1)
BILIRUB SERPL-MCNC: 0.79 MG/DL (ref 0.3–1.2)
BUN SERPL-MCNC: 21 MG/DL (ref 6–20)
CALCIUM SERPL-MCNC: 9.3 MG/DL (ref 8.4–10.2)
CHLORIDE SERPL-SCNC: 105 MMOL/L (ref 96–108)
CHOLEST SERPL-MCNC: 168 MG/DL
CO2 SERPL-SCNC: 29 MMOL/L (ref 22–33)
CREAT SERPL-MCNC: 0.94 MG/DL (ref 0.5–1.2)
CRP SERPL QL: <0.1 MG/L (ref 0–1)
EOSINOPHIL # BLD AUTO: 0.08 THOUSAND/ΜL (ref 0–0.61)
EOSINOPHIL NFR BLD AUTO: 2 % (ref 0–6)
ERYTHROCYTE [DISTWIDTH] IN BLOOD BY AUTOMATED COUNT: 12.5 % (ref 11.6–15.1)
ERYTHROCYTE [SEDIMENTATION RATE] IN BLOOD: 14 MM/HOUR (ref 0–20)
GFR SERPL CREATININE-BSD FRML MDRD: 87 ML/MIN/1.73SQ M
GLUCOSE P FAST SERPL-MCNC: 95 MG/DL (ref 70–105)
HCT VFR BLD AUTO: 46.7 % (ref 36.5–49.3)
HCV AB SER QL: NORMAL
HDLC SERPL-MCNC: 39 MG/DL
HGB BLD-MCNC: 16.2 G/DL (ref 12–17)
IMM GRANULOCYTES # BLD AUTO: 0.01 THOUSAND/UL (ref 0–0.2)
IMM GRANULOCYTES NFR BLD AUTO: 0 % (ref 0–2)
LDLC SERPL CALC-MCNC: 95 MG/DL (ref 0–100)
LYMPHOCYTES # BLD AUTO: 2.06 THOUSANDS/ΜL (ref 0.6–4.47)
LYMPHOCYTES NFR BLD AUTO: 41 % (ref 14–44)
MCH RBC QN AUTO: 30.9 PG (ref 26.8–34.3)
MCHC RBC AUTO-ENTMCNC: 34.7 G/DL (ref 31.4–37.4)
MCV RBC AUTO: 89 FL (ref 82–98)
MONOCYTES # BLD AUTO: 0.46 THOUSAND/ΜL (ref 0.17–1.22)
MONOCYTES NFR BLD AUTO: 9 % (ref 4–12)
NEUTROPHILS # BLD AUTO: 2.45 THOUSANDS/ΜL (ref 1.85–7.62)
NEUTS SEG NFR BLD AUTO: 47 % (ref 43–75)
NONHDLC SERPL-MCNC: 129 MG/DL
PLATELET # BLD AUTO: 220 THOUSANDS/UL (ref 149–390)
PMV BLD AUTO: 10.2 FL (ref 8.9–12.7)
POTASSIUM SERPL-SCNC: 4.4 MMOL/L (ref 3.5–5)
PROT SERPL-MCNC: 6.9 G/DL (ref 6.4–8.3)
RBC # BLD AUTO: 5.24 MILLION/UL (ref 3.88–5.62)
SODIUM SERPL-SCNC: 141 MMOL/L (ref 133–145)
TRIGL SERPL-MCNC: 169.3 MG/DL
WBC # BLD AUTO: 5.09 THOUSAND/UL (ref 4.31–10.16)

## 2020-10-27 PROCEDURE — 85652 RBC SED RATE AUTOMATED: CPT

## 2020-10-27 PROCEDURE — 86140 C-REACTIVE PROTEIN: CPT

## 2020-10-27 PROCEDURE — 86803 HEPATITIS C AB TEST: CPT

## 2020-10-27 PROCEDURE — 80061 LIPID PANEL: CPT | Performed by: INTERNAL MEDICINE

## 2020-10-27 PROCEDURE — 36415 COLL VENOUS BLD VENIPUNCTURE: CPT

## 2020-10-27 PROCEDURE — 85025 COMPLETE CBC W/AUTO DIFF WBC: CPT

## 2020-10-27 PROCEDURE — 80053 COMPREHEN METABOLIC PANEL: CPT

## 2020-11-24 ENCOUNTER — OFFICE VISIT (OUTPATIENT)
Dept: PSYCHIATRY | Facility: CLINIC | Age: 60
End: 2020-11-24
Payer: COMMERCIAL

## 2020-11-24 DIAGNOSIS — F90.2 ATTENTION DEFICIT HYPERACTIVITY DISORDER (ADHD), COMBINED TYPE: ICD-10-CM

## 2020-11-24 PROCEDURE — 99213 OFFICE O/P EST LOW 20 MIN: CPT | Performed by: STUDENT IN AN ORGANIZED HEALTH CARE EDUCATION/TRAINING PROGRAM

## 2020-11-24 PROCEDURE — 90833 PSYTX W PT W E/M 30 MIN: CPT | Performed by: STUDENT IN AN ORGANIZED HEALTH CARE EDUCATION/TRAINING PROGRAM

## 2020-11-24 RX ORDER — EPINEPHRINE 0.3 MG/.3ML
0.3 INJECTION SUBCUTANEOUS
COMMUNITY
Start: 2020-07-14 | End: 2021-11-05 | Stop reason: SDUPTHER

## 2020-11-24 RX ORDER — PENICILLIN V POTASSIUM 500 MG/1
TABLET ORAL
COMMUNITY
Start: 2020-10-28 | End: 2020-12-16 | Stop reason: ALTCHOICE

## 2020-11-24 RX ORDER — METHYLPHENIDATE HYDROCHLORIDE 10 MG/1
TABLET ORAL
Qty: 150 TABLET | Refills: 0 | Status: SHIPPED | OUTPATIENT
Start: 2020-11-24 | End: 2020-12-23 | Stop reason: SDUPTHER

## 2020-12-11 DIAGNOSIS — F51.01 PRIMARY INSOMNIA: ICD-10-CM

## 2020-12-11 RX ORDER — ZALEPLON 10 MG/1
CAPSULE ORAL
Qty: 30 CAPSULE | Refills: 1 | Status: SHIPPED | OUTPATIENT
Start: 2020-12-11 | End: 2021-08-20 | Stop reason: SDUPTHER

## 2020-12-16 ENCOUNTER — TELEMEDICINE (OUTPATIENT)
Dept: INTERNAL MEDICINE CLINIC | Facility: CLINIC | Age: 60
End: 2020-12-16
Payer: COMMERCIAL

## 2020-12-16 DIAGNOSIS — F51.01 PRIMARY INSOMNIA: ICD-10-CM

## 2020-12-16 DIAGNOSIS — J32.0 CHRONIC MAXILLARY SINUSITIS: Primary | ICD-10-CM

## 2020-12-16 DIAGNOSIS — J45.990 EXERCISE-INDUCED ASTHMA: ICD-10-CM

## 2020-12-16 DIAGNOSIS — F90.2 ATTENTION DEFICIT HYPERACTIVITY DISORDER (ADHD), COMBINED TYPE: ICD-10-CM

## 2020-12-16 DIAGNOSIS — L40.50 PSORIATIC ARTHRITIS (HCC): ICD-10-CM

## 2020-12-16 PROCEDURE — 99214 OFFICE O/P EST MOD 30 MIN: CPT | Performed by: INTERNAL MEDICINE

## 2020-12-16 RX ORDER — CLOTRIMAZOLE AND BETAMETHASONE DIPROPIONATE 10; .64 MG/G; MG/G
CREAM TOPICAL 2 TIMES DAILY
Qty: 30 G | Refills: 5 | Status: SHIPPED | OUTPATIENT
Start: 2020-12-16 | End: 2021-11-05 | Stop reason: SDUPTHER

## 2020-12-23 ENCOUNTER — TELEMEDICINE (OUTPATIENT)
Dept: PSYCHIATRY | Facility: CLINIC | Age: 60
End: 2020-12-23
Payer: COMMERCIAL

## 2020-12-23 DIAGNOSIS — F90.2 ATTENTION DEFICIT HYPERACTIVITY DISORDER (ADHD), COMBINED TYPE: Primary | ICD-10-CM

## 2020-12-23 PROCEDURE — 90833 PSYTX W PT W E/M 30 MIN: CPT | Performed by: STUDENT IN AN ORGANIZED HEALTH CARE EDUCATION/TRAINING PROGRAM

## 2020-12-23 PROCEDURE — 99213 OFFICE O/P EST LOW 20 MIN: CPT | Performed by: STUDENT IN AN ORGANIZED HEALTH CARE EDUCATION/TRAINING PROGRAM

## 2020-12-23 RX ORDER — METHYLPHENIDATE HYDROCHLORIDE 10 MG/1
TABLET ORAL
Qty: 150 TABLET | Refills: 0 | Status: SHIPPED | OUTPATIENT
Start: 2020-12-23 | End: 2021-01-12 | Stop reason: SDUPTHER

## 2021-01-11 DIAGNOSIS — F90.2 ATTENTION DEFICIT HYPERACTIVITY DISORDER (ADHD), COMBINED TYPE: ICD-10-CM

## 2021-01-12 RX ORDER — METHYLPHENIDATE HYDROCHLORIDE 10 MG/1
TABLET ORAL
Qty: 150 TABLET | Refills: 0 | Status: SHIPPED | OUTPATIENT
Start: 2021-01-12 | End: 2021-02-18 | Stop reason: SDUPTHER

## 2021-01-12 NOTE — PROGRESS NOTES
I re-sent the generic to the [facility] and verified with the pharmacist  I called the patient and notified him

## 2021-01-29 ENCOUNTER — TRANSCRIBE ORDERS (OUTPATIENT)
Dept: ADMINISTRATIVE | Facility: HOSPITAL | Age: 61
End: 2021-01-29

## 2021-01-29 ENCOUNTER — APPOINTMENT (OUTPATIENT)
Dept: LAB | Facility: HOSPITAL | Age: 61
End: 2021-01-29
Payer: COMMERCIAL

## 2021-01-29 DIAGNOSIS — M35.00 SICCA SYNDROME (HCC): Primary | ICD-10-CM

## 2021-01-29 DIAGNOSIS — M35.00 SICCA SYNDROME (HCC): ICD-10-CM

## 2021-01-29 LAB
ALBUMIN SERPL BCP-MCNC: 4.3 G/DL (ref 3.4–4.8)
ALP SERPL-CCNC: 76.7 U/L (ref 10–129)
ALT SERPL W P-5'-P-CCNC: 29 U/L (ref 5–63)
ANION GAP SERPL CALCULATED.3IONS-SCNC: 5 MMOL/L (ref 4–13)
AST SERPL W P-5'-P-CCNC: 24 U/L (ref 15–41)
BASOPHILS # BLD AUTO: 0.03 THOUSANDS/ΜL (ref 0–0.1)
BASOPHILS NFR BLD AUTO: 1 % (ref 0–1)
BILIRUB SERPL-MCNC: 0.68 MG/DL (ref 0.3–1.2)
BILIRUB UR QL STRIP: NEGATIVE
BUN SERPL-MCNC: 15 MG/DL (ref 6–20)
CALCIUM SERPL-MCNC: 9.4 MG/DL (ref 8.4–10.2)
CHLORIDE SERPL-SCNC: 104 MMOL/L (ref 96–108)
CLARITY UR: CLEAR
CO2 SERPL-SCNC: 29 MMOL/L (ref 22–33)
COLOR UR: YELLOW
CREAT SERPL-MCNC: 0.91 MG/DL (ref 0.5–1.2)
CRP SERPL QL: 0.3 MG/L (ref 0–1)
EOSINOPHIL # BLD AUTO: 0.18 THOUSAND/ΜL (ref 0–0.61)
EOSINOPHIL NFR BLD AUTO: 3 % (ref 0–6)
ERYTHROCYTE [DISTWIDTH] IN BLOOD BY AUTOMATED COUNT: 12.7 % (ref 11.6–15.1)
ERYTHROCYTE [SEDIMENTATION RATE] IN BLOOD: 18 MM/HOUR (ref 0–20)
GFR SERPL CREATININE-BSD FRML MDRD: 91 ML/MIN/1.73SQ M
GLUCOSE P FAST SERPL-MCNC: 99 MG/DL (ref 70–105)
GLUCOSE UR STRIP-MCNC: NEGATIVE MG/DL
HCT VFR BLD AUTO: 48.6 % (ref 36.5–49.3)
HGB BLD-MCNC: 16.6 G/DL (ref 12–17)
HGB UR QL STRIP.AUTO: NEGATIVE
IMM GRANULOCYTES # BLD AUTO: 0.02 THOUSAND/UL (ref 0–0.2)
IMM GRANULOCYTES NFR BLD AUTO: 0 % (ref 0–2)
KETONES UR STRIP-MCNC: NEGATIVE MG/DL
LEUKOCYTE ESTERASE UR QL STRIP: NEGATIVE
LYMPHOCYTES # BLD AUTO: 1.62 THOUSANDS/ΜL (ref 0.6–4.47)
LYMPHOCYTES NFR BLD AUTO: 26 % (ref 14–44)
MCH RBC QN AUTO: 30.3 PG (ref 26.8–34.3)
MCHC RBC AUTO-ENTMCNC: 34.2 G/DL (ref 31.4–37.4)
MCV RBC AUTO: 89 FL (ref 82–98)
MONOCYTES # BLD AUTO: 0.67 THOUSAND/ΜL (ref 0.17–1.22)
MONOCYTES NFR BLD AUTO: 11 % (ref 4–12)
NEUTROPHILS # BLD AUTO: 3.78 THOUSANDS/ΜL (ref 1.85–7.62)
NEUTS SEG NFR BLD AUTO: 59 % (ref 43–75)
NITRITE UR QL STRIP: NEGATIVE
PH UR STRIP.AUTO: 6 [PH]
PLATELET # BLD AUTO: 247 THOUSANDS/UL (ref 149–390)
PMV BLD AUTO: 10.1 FL (ref 8.9–12.7)
POTASSIUM SERPL-SCNC: 4.5 MMOL/L (ref 3.5–5)
PROT SERPL-MCNC: 7.9 G/DL (ref 6.4–8.3)
PROT UR STRIP-MCNC: NEGATIVE MG/DL
RBC # BLD AUTO: 5.48 MILLION/UL (ref 3.88–5.62)
SODIUM SERPL-SCNC: 138 MMOL/L (ref 133–145)
SP GR UR STRIP.AUTO: 1.02 (ref 1–1.03)
UROBILINOGEN UR QL STRIP.AUTO: 0.2 E.U./DL
WBC # BLD AUTO: 6.3 THOUSAND/UL (ref 4.31–10.16)

## 2021-01-29 PROCEDURE — 80053 COMPREHEN METABOLIC PANEL: CPT

## 2021-01-29 PROCEDURE — 86038 ANTINUCLEAR ANTIBODIES: CPT

## 2021-01-29 PROCEDURE — 84156 ASSAY OF PROTEIN URINE: CPT | Performed by: INTERNAL MEDICINE

## 2021-01-29 PROCEDURE — 82570 ASSAY OF URINE CREATININE: CPT | Performed by: INTERNAL MEDICINE

## 2021-01-29 PROCEDURE — 85025 COMPLETE CBC W/AUTO DIFF WBC: CPT

## 2021-01-29 PROCEDURE — 81003 URINALYSIS AUTO W/O SCOPE: CPT | Performed by: INTERNAL MEDICINE

## 2021-01-29 PROCEDURE — 36415 COLL VENOUS BLD VENIPUNCTURE: CPT

## 2021-01-29 PROCEDURE — 85652 RBC SED RATE AUTOMATED: CPT

## 2021-01-29 PROCEDURE — 86140 C-REACTIVE PROTEIN: CPT

## 2021-01-30 LAB
CREAT UR-MCNC: 187 MG/DL
PROT UR-MCNC: 12 MG/DL
PROT/CREAT UR: 0.06 MG/G{CREAT} (ref 0–0.1)

## 2021-02-02 LAB — RYE IGE QN: NEGATIVE

## 2021-02-11 ENCOUNTER — TELEMEDICINE (OUTPATIENT)
Dept: INTERNAL MEDICINE CLINIC | Facility: CLINIC | Age: 61
End: 2021-02-11
Payer: COMMERCIAL

## 2021-02-11 DIAGNOSIS — R68.2 DRY MOUTH: Primary | ICD-10-CM

## 2021-02-11 DIAGNOSIS — F90.2 ATTENTION DEFICIT HYPERACTIVITY DISORDER (ADHD), COMBINED TYPE: ICD-10-CM

## 2021-02-11 DIAGNOSIS — R10.11 RIGHT UPPER QUADRANT ABDOMINAL PAIN: ICD-10-CM

## 2021-02-11 DIAGNOSIS — R41.3 MEMORY DIFFICULTIES: ICD-10-CM

## 2021-02-11 DIAGNOSIS — L40.50 PSORIATIC ARTHRITIS (HCC): ICD-10-CM

## 2021-02-11 DIAGNOSIS — R45.89 DYSPHORIC MOOD: ICD-10-CM

## 2021-02-11 DIAGNOSIS — H04.129 DRY EYE: ICD-10-CM

## 2021-02-11 PROCEDURE — 99214 OFFICE O/P EST MOD 30 MIN: CPT | Performed by: INTERNAL MEDICINE

## 2021-02-11 NOTE — ASSESSMENT & PLAN NOTE
Discussed that this could be a side effect of ritalin, but he has been on this for a while    Will check for Sjogrens

## 2021-02-11 NOTE — PATIENT INSTRUCTIONS
Problem List Items Addressed This Visit        Digestive    Dry mouth - Primary     Discussed that this could be a side effect of ritalin, but he has been on this for a while  Will check for Sjogrens         Relevant Orders    Sjogren's Antibodies       Musculoskeletal and Integument    Psoriatic arthritis ([facility] [address] )     Follow up rheum, labs from Jan 29 were ok  Other    Right upper quadrant abdominal pain      Liver function test January 29th were normal   With patient having symptoms associated with nausea and right upper quadrant pain after fatty meals, I recommend ultrasound of the right upper quadrant  If ultrasound is normal, can consider HIDA scan         Relevant Orders    US right upper quadrant    Dysphoric mood     Depression Screening Follow-up Plan: Patient's depression screening was positive with a PHQ-2 score of 4  Their PHQ-9 score was 13  Clinically patient does not have depression  No treatment is required  Dry eye     Will get Sjogren antibodies  Relevant Orders    Sjogren's Antibodies    Memory difficulties      Discussed keeping active mentally, physically, and socially    Discussed doing a trial off atorvastatin for a few months to see if it could potentially be related to this medication

## 2021-02-11 NOTE — ASSESSMENT & PLAN NOTE
Liver function test January 29th were normal   With patient having symptoms associated with nausea and right upper quadrant pain after fatty meals, I recommend ultrasound of the right upper quadrant    If ultrasound is normal, can consider HIDA scan

## 2021-02-11 NOTE — ASSESSMENT & PLAN NOTE
Depression Screening Follow-up Plan: Patient's depression screening was positive with a PHQ-2 score of 4  Their PHQ-9 score was 13  Clinically patient does not have depression  No treatment is required

## 2021-02-11 NOTE — ASSESSMENT & PLAN NOTE
Discussed keeping active mentally, physically, and socially    Discussed doing a trial off atorvastatin for a few months to see if it could potentially be related to this medication

## 2021-02-11 NOTE — PROGRESS NOTES
Virtual Regular Visit      Assessment/Plan:    Problem List Items Addressed This Visit        Digestive    Dry mouth - Primary     Discussed that this could be a side effect of ritalin, but he has been on this for a while  Will check for Sjogrens         Relevant Orders    Sjogren's Antibodies       Musculoskeletal and Integument    Psoriatic arthritis ([facility] [address] )     Follow up rheum, labs from Jan 29 were ok  Other    Attention deficit hyperactivity disorder (ADHD), combined type      Follow-up Psychiatry         Right upper quadrant abdominal pain      Liver function test January 29th were normal   With patient having symptoms associated with nausea and right upper quadrant pain after fatty meals, I recommend ultrasound of the right upper quadrant  If ultrasound is normal, can consider HIDA scan         Relevant Orders    US right upper quadrant    Dysphoric mood     Depression Screening Follow-up Plan: Patient's depression screening was positive with a PHQ-2 score of 4  Their PHQ-9 score was 13  Clinically patient does not have depression  No treatment is required  Dry eye     Will get Sjogren antibodies  Relevant Orders    Sjogren's Antibodies    Memory difficulties      Discussed keeping active mentally, physically, and socially  Discussed doing a trial off atorvastatin for a few months to see if it could potentially be related to this medication               BMI Counseling: There is no height or weight on file to calculate BMI  The BMI is above normal  Nutrition recommendations include encouraging healthy choices of fruits and vegetables and moderation in carbohydrate intake  Exercise recommendations include exercising 3-5 times per week             Reason for visit is   Chief Complaint   Patient presents with    Virtual Regular Visit        Encounter provider Reina Ames MD    Provider located at [address] [facility]  [address] [facility] [address]      Recent Visits  No visits were found meeting these conditions  Showing recent visits within past 7 days and meeting all other requirements     Today's Visits  Date Type Provider Dept   02/11/21 Telemedicine MD Les Richardson Diego today's visits and meeting all other requirements     Future Appointments  No visits were found meeting these conditions  Showing future appointments within next 150 days and meeting all other requirements        The patient was identified by name and date of birth  Daisy Liang was informed that this is a telemedicine visit and that the visit is being conducted through [employer] and patient was informed that this is a secure, HIPAA-compliant platform  He agrees to proceed     My office door was closed  No one else was in the room  He acknowledged consent and understanding of privacy and security of the video platform  The patient has agreed to participate and understands they can discontinue the visit at any time  Patient is aware this is a billable service  Subjective  Daisy Liang is a 63 y o  male    Onset 4-6 weeks ago of dry mouth and dry eyes  He started using biotene  Psoriatic arthritis: pt reports this has been flaring a lot lately    Pt reports having gallbladder issues since his mid 25s  These come on after fatty foods  When this happens he drinks a large glass of water mixed with apple cider vinegar    Hypercholesterolemia: pt tolerating statin  History reviewed  No pertinent past medical history  History reviewed  No pertinent surgical history      Current Outpatient Medications   Medication Sig Dispense Refill    Albuterol Sulfate 108 (90 Base) MCG/ACT AEPB Inhale      aspirin (ECOTRIN LOW STRENGTH) 81 mg EC tablet Take 81 mg by mouth daily      atorvastatin (LIPITOR) 40 mg tablet Take 1 tablet (40 mg total) by mouth daily 90 tablet 3    clobetasol (TEMOVATE) 0 05 % cream Apply 2 76 application topically 2 (two) times a day      clotrimazole-betamethasone (LOTRISONE) 1-0 05 % cream Apply topically 2 (two) times a day 30 g 5    EPINEPHrine (EPIPEN) 0 3 mg/0 3 mL SOAJ Inject 0 3 mg into a muscle      fexofenadine (Allegra Allergy) 60 MG tablet Take 60 mg by mouth daily      ketoconazole (NIZORAL) 2 % cream Apply topically daily      methylphenidate (RITALIN) 10 mg tablet Take 20 mg in the morning, 20 mg mid-day and 10 mg in the evening 150 tablet 0    secukinumab (COSENTYX) 150 mg/mL SOAJ injection Inject 300 mg under the skin every 30 (thirty) days      zaleplon (SONATA) 10 MG capsule TAKE ONE CAPSULE BY MOUTH AT BEDTIME 30 capsule 1     No current facility-administered medications for this visit  Allergies   Allergen Reactions    Other Anaphylaxis     Unknown food allergy       Review of Systems   Constitutional: Negative for chills, fatigue and fever  HENT: Negative for congestion, nosebleeds, postnasal drip, sore throat and trouble swallowing  Dry mouth   Eyes: Negative for pain  Dry eyes   Respiratory: Negative for cough, chest tightness, shortness of breath and wheezing  Cardiovascular: Negative for chest pain, palpitations and leg swelling  Gastrointestinal: Negative for abdominal pain, constipation, diarrhea, nausea and vomiting  Endocrine: Negative for polydipsia and polyuria  Genitourinary: Negative for dysuria, flank pain and hematuria  Musculoskeletal: Positive for arthralgias  Skin: Negative for rash  Neurological: Negative for dizziness, tremors and headaches  Hematological: Does not bruise/bleed easily  Psychiatric/Behavioral: Negative for confusion (but some memory problems) and dysphoric mood  The patient is not nervous/anxious  Video Exam    There were no vitals filed for this visit  Physical Exam  Constitutional:       General: He is not in acute distress  Appearance: Normal appearance   He is well-developed  He is not diaphoretic  HENT:      Head: Normocephalic and atraumatic  Right Ear: External ear normal       Left Ear: External ear normal       Nose: Nose normal    Eyes:      General: No scleral icterus  Right eye: No discharge  Left eye: No discharge  Conjunctiva/sclera: Conjunctivae normal    Neck:      Musculoskeletal: Normal range of motion and neck supple  Trachea: No tracheal deviation  Pulmonary:      Effort: Pulmonary effort is normal  No respiratory distress  Abdominal:      Palpations: Abdomen is soft  Tenderness: There is no abdominal tenderness  Musculoskeletal: Normal range of motion  Right lower leg: No edema  Left lower leg: No edema  Skin:     Coloration: Skin is not pale  Findings: No erythema  Neurological:      Mental Status: He is alert and oriented to person, place, and time  Cranial Nerves: No cranial nerve deficit  Coordination: Coordination normal    Psychiatric:         Behavior: Behavior normal          Thought Content: Thought content normal          Judgment: Judgment normal           I spent 25 minutes with patient today in which greater than 50% of the time was spent in counseling/coordination of care regarding     chronic conditions, acute problems, and diagnostic workup plan      VIRTUAL VISIT DISCLAIMER    Clovis Sifuentes acknowledges that he has consented to an online visit or consultation  He understands that the online visit is based solely on information provided by him, and that, in the absence of a face-to-face physical evaluation by the physician, the diagnosis he receives is both limited and provisional in terms of accuracy and completeness  This is not intended to replace a full medical face-to-face evaluation by the physician  Clovis Sifuentes understands and accepts these terms

## 2021-02-18 ENCOUNTER — TELEMEDICINE (OUTPATIENT)
Dept: PSYCHIATRY | Facility: CLINIC | Age: 61
End: 2021-02-18
Payer: COMMERCIAL

## 2021-02-18 DIAGNOSIS — F90.2 ATTENTION DEFICIT HYPERACTIVITY DISORDER (ADHD), COMBINED TYPE: ICD-10-CM

## 2021-02-18 PROCEDURE — 99213 OFFICE O/P EST LOW 20 MIN: CPT | Performed by: STUDENT IN AN ORGANIZED HEALTH CARE EDUCATION/TRAINING PROGRAM

## 2021-02-18 PROCEDURE — 90833 PSYTX W PT W E/M 30 MIN: CPT | Performed by: STUDENT IN AN ORGANIZED HEALTH CARE EDUCATION/TRAINING PROGRAM

## 2021-02-18 RX ORDER — PILOCARPINE HYDROCHLORIDE 5 MG/1
5 TABLET, FILM COATED ORAL 3 TIMES DAILY
COMMUNITY
Start: 2021-02-03 | End: 2021-06-21 | Stop reason: ALTCHOICE

## 2021-02-18 RX ORDER — METHYLPHENIDATE HYDROCHLORIDE 10 MG/1
TABLET ORAL
Qty: 150 TABLET | Refills: 0 | Status: SHIPPED | OUTPATIENT
Start: 2021-02-18 | End: 2021-05-13 | Stop reason: SDUPTHER

## 2021-02-18 NOTE — PSYCH
Virtual Regular Visit    Assessment/Plan:    Problem List Items Addressed This Visit        Other    Attention deficit hyperactivity disorder (ADHD), combined type    Relevant Medications    methylphenidate (RITALIN) 10 mg tablet               Reason for visit is   Chief Complaint   Patient presents with    Medication Management    ADHD        Encounter provider Amy Sky MD    Provider located at: [address]    Recent Visits  Date Type Provider Dept   02/11/21 Telemedicine Alexi Barroso MD [address] [place] [address] recent visits within past 7 days and meeting all other requirements     Today's Visits  Date Type Provider Dept   02/18/21 Telemedicine Amy Sky MD [facility] today's visits and meeting all other requirements     Future Appointments  No visits were found meeting these conditions  Showing future appointments within next 150 days and meeting all other requirements        The patient was identified by name and date of birth  Jeison Fischer was informed that this is a telemedicine visit and that the visit is being conducted through [employer] and patient was informed that this is a secure, HIPAA-compliant platform  He agrees to proceed     My office door was closed  No one else was in the room  He acknowledged consent and understanding of privacy and security of the video platform  The patient has agreed to participate and understands they can discontinue the visit at any time  Patient is aware this is a billable service       MEDICATION MANAGEMENT NOTE        [facility]      Name and Date of Birth:  Jeison Fischer 63 y o  1960 MRN: [id number]    Date of Visit: February 18, 2021    Reason for Visit:   Chief Complaint   Patient presents with    Medication Management    ADHD SUBJECTIVE:  The patient was visited virtually for medication management and follow up visit for ADHD  Presented calm, and cooperative  He stated that he wish he was in a warmer place, and noted that he really miss outdoor activities  Endorsed feeling stable in general despite increased work-load and having more responsibilities at home  Denied any changes in sleep, appetite, concentration, energy level, or daily activities  Denied feelings of anhedonia, hopelessness, helplessness, worthlessness or guilt and appeared to be future oriented  There was no thought constriction related to death  Denied SI/HI, intent or plan upon direct inquiry at this time  Denied AV/H  No anxiety sxs, specific phobia or panic attacks reported  No manic sxs, paranoid ideations or fixed delusions were elicited  Endorsed good compliance with the medications and denied any side effects  Denied smoking cigarettes, drinking alcohol or other illicit substance use  Given this presentation, medications are maintained at the same dosage, recommended to consider tapering down the dose of his Ritalin as tolerated  Will continue individual therapy  The patient was educated to call 911 or go to the nearest emergency room if the symptoms become overwhelming or unable to remain in control  Verbalized understanding and agreed to seek help in case of distress or concern for safety  Review Of Systems:  Pertinent items are noted in HPI; all others are negative; no recent changes in medications or health status reported  Past Psychiatric History Update:   - No inpatient psychiatric admission since last encounter  - No SA or SIB since last encounter  - No incidence of violent behavior since last encounter    Past Trauma History Update:    - No new onset of abuse or traumatic events since last encounter     Past Medical History:    No past medical history on file  No past medical history pertinent negatives    No past surgical history on file   Allergies   Allergen Reactions    Other Anaphylaxis     Unknown food allergy       Substance Abuse History:    Social History     Substance and Sexual Activity   Alcohol Use Not on file     Social History     Substance and Sexual Activity   Drug Use Not on file       Social History:    Social History     Socioeconomic History    Marital status: /Civil Union     Spouse name: Not on file    Number of children: Not on file    Years of education: Not on file    Highest education level: Not on file   Occupational History    Not on file   Social Needs    Financial resource strain: Not on file    Food insecurity     Worry: Not on file     Inability: Not on file   [employer] needs     Medical: Not on file     Non-medical: Not on file   Tobacco Use    Smoking status: Never Smoker    Smokeless tobacco: Never Used   Substance and Sexual Activity    Alcohol use: Not on file    Drug use: Not on file    Sexual activity: Not on file   Lifestyle    Physical activity     Days per week: Not on file     Minutes per session: Not on file    Stress: Not on file   Relationships    Social connections     Talks on phone: Not on file     Gets together: Not on file     Attends Religion service: Not on file     Active member of club or organization: Not on file     Attends meetings of clubs or organizations: Not on file     Relationship status: Not on file    Intimate partner violence     Fear of current or ex partner: Not on file     Emotionally abused: Not on file     Physically abused: Not on file     Forced sexual activity: Not on file   Other Topics Concern    Not on file   Social History Narrative    Not on file       Family Psychiatric History:     Family History   Problem Relation Age of Onset    Coronary artery disease Father        History Review:  The following portions of the patient's history were reviewed and updated as appropriate: allergies, current medications, past family history, past medical history, past social history, past surgical history and problem list        OBJECTIVE:     Vital signs in last 24 hours:    Vitals:       Mental Status Evaluation:  Appearance and attitude: appeared as stated age, cooperative and attentive, casually dressed with good hygiene  Eye contact: good  Motor Function: within normal limits, No PMA/PMR  Gait/station: Not observed  Speech: normal for rate, rhythm, volume, latency, amount  Language: No overt abnormality  Mood/affect: euthymic / Affect was euthymic, reactive, in full range, normal intensity and mood congruent  Thought Processes: sequential and goal-directed  Thought content: denies suicidal ideation or homicidal ideation; no delusions or first rank symptoms  Associations: intact associations  Perceptual disturbances: denies Auditory/Visual/Tactile Hallucinations  Orientation: oriented to time, person, place and to the situational context  Cognitive Function: intact  Memory: intact short-term and long-term  Intellect: average  Fund of knowledge: aware of current events, aware of past history and vocabulary average  Impulse control: good  Insight/judgment: good/good    Laboratory Results: I have personally reviewed all pertinent laboratory/tests results    Recent Labs (last 2 months):   Appointment on 01/29/2021   Component Date Value    CHERYL 01/29/2021 Negative     WBC 01/29/2021 6 30     RBC 01/29/2021 5 48     Hemoglobin 01/29/2021 16 6     Hematocrit 01/29/2021 48 6     MCV 01/29/2021 89     MCH 01/29/2021 30 3     MCHC 01/29/2021 34 2     RDW 01/29/2021 12 7     MPV 01/29/2021 10 1     Platelets 61/28/2943 247     Neutrophils Relative 01/29/2021 59     Immat GRANS % 01/29/2021 0     Lymphocytes Relative 01/29/2021 26     Monocytes Relative 01/29/2021 11     Eosinophils Relative 01/29/2021 3     Basophils Relative 01/29/2021 1     Neutrophils Absolute 01/29/2021 3 78     Immature Grans Absolute 01/29/2021 0 02     Lymphocytes Absolute 01/29/2021 1 62     Monocytes Absolute 01/29/2021 0 67     Eosinophils Absolute 01/29/2021 0 18     Basophils Absolute 01/29/2021 0 03     Sodium 01/29/2021 138     Potassium 01/29/2021 4 5     Chloride 01/29/2021 104     CO2 01/29/2021 29     ANION GAP 01/29/2021 5     BUN 01/29/2021 15     Creatinine 01/29/2021 0 91     Glucose, Fasting 01/29/2021 99     Calcium 01/29/2021 9 4     AST 01/29/2021 24     ALT 01/29/2021 29     Alkaline Phosphatase 01/29/2021 76 7     Total Protein 01/29/2021 7 9     Albumin 01/29/2021 4 3     Total Bilirubin 01/29/2021 0 68     eGFR 01/29/2021 91     Sed Rate 01/29/2021 18     CRP 01/29/2021 0 3    Transcribe Orders on 01/29/2021   Component Date Value    Color, UA 01/29/2021 Yellow     Clarity, UA 01/29/2021 Clear     Specific Gravity, UA 01/29/2021 1 025     pH, UA 01/29/2021 6 0     Leukocytes, UA 01/29/2021 Negative     Nitrite, UA 01/29/2021 Negative     Protein, UA 01/29/2021 Negative     Glucose, UA 01/29/2021 Negative     Ketones, UA 01/29/2021 Negative     Urobilinogen, UA 01/29/2021 0 2     Bilirubin, UA 01/29/2021 Negative     Blood, UA 01/29/2021 Negative     Creatinine, Ur 01/29/2021 187 0     Protein Urine Random 01/29/2021 12     Prot/Creat Ratio, Ur 01/29/2021 0 06          Assessment/Plan:   A 60 y o   male, , domiciled w/ wife, 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 w/ PMH of exercise induced asthma, HLD, psoriatic arthritis, cervical and lumbar DDD and PPH of ADHD, insomnia and ?WALTER, no prior psychiatric admissions, no prior SA, no  h/o self-injurious behavior, currently on Ritalin 20 mg TID and Sonata 10 mg nightly prescribed by his Gina Degroot presented to the mental health clinic for initial intake and psychiatric evaluation as referred by his PCP for re-evaluation of ADHD treatment with Ritalin on 9/18/2020   The patient presented w/ h/o ADHD since 1999, being on Ritalin 60mg daily since then, w/ marked improvement of attention, concentration and his function since being treated  Denied any excessive anxiety, depressed/manic sxs, A/VH or other psychotic sxs  No h/o substance abuse  Denied SI/HI, intent or plan upon direct inquiry at this time   WALTER-7: 4  His current presentation meets criteria for ADHD, by history  The patient was educated about the diagnosis of ADHD and the course of ADHD in adults which may present with decreased intensity of sxs, onesimo after age 48, and particularly after age 65  Psycho-education regarding Ritalin indications, benefits, risks, side effects, and alternative options provided to the patient, and the importance of the compliance with psychiatric treatment reiterated  The patient was educated about the potential long-term side effects of ritalin in older age, onesimo cardiac side effects (given the FH of heart attack in his father at age 39), and was educated about the possibility of tapering off or if becoming symptomatic, cross-tapering to different treatment options  He verbalized understanding and agreed to the proposed regimen  Ritalin dose was decreased from 20mg TID (60mg total), to 20/20/10mg (50 mg total); to be tapered off gradually  Diagnoses and all orders for this visit:    Attention deficit hyperactivity disorder (ADHD), combined type  -     methylphenidate (RITALIN) 10 mg tablet; Take 20 mg in the morning, 20 mg mid-day and 10 mg in the evening    Other orders  -     pilocarpine (SALAGEN) 5 mg tablet; Take 5 mg by mouth Three times a day          Impression:  1   Attention deficit hyperactivity disorder (ADHD), combined type  methylphenidate (RITALIN) 10 mg tablet       Treatment Recommendations/Precautions:  - Continue Ritalin 20/20/10 mg for ADHD; dose to be tapered down as tolerated  - Continue Sonata 10 mg po nightly PRN for insomnia as per PCP  - Medications sent to patient's pharmacy for 30 day supply  - RTC in 12 weeks  - Continue individual therapy  - Psychoeducation provided to the patient and benefits, potential risks and side effects discussed; importance of compliance with the psychiatric treatment reiterated, and the patient verbalized understanding of the matter     - Educated about healthy life style, risk of falls/sedation and addiction  Patient was receptive to education   - The patient was educated about 24 hour and weekend coverage for urgent situations accessed by calling [address] main practice number  - Patient was educated to [facility] ([phone] [[phone]]) for behavioral crisis at anytime or 911 for any safety concerns, or go to nearest ER if his symptoms become overwhelming or unmanageable  Current Outpatient Medications   Medication Sig Dispense Refill    pilocarpine (SALAGEN) 5 mg tablet Take 5 mg by mouth Three times a day      Albuterol Sulfate 108 (90 Base) MCG/ACT AEPB Inhale      aspirin (ECOTRIN LOW STRENGTH) 81 mg EC tablet Take 81 mg by mouth daily      atorvastatin (LIPITOR) 40 mg tablet Take 1 tablet (40 mg total) by mouth daily 90 tablet 3    clobetasol (TEMOVATE) 0 05 % cream Apply 9 96 application topically 2 (two) times a day      clotrimazole-betamethasone (LOTRISONE) 1-0 05 % cream Apply topically 2 (two) times a day 30 g 5    EPINEPHrine (EPIPEN) 0 3 mg/0 3 mL SOAJ Inject 0 3 mg into a muscle      fexofenadine (Allegra Allergy) 60 MG tablet Take 60 mg by mouth daily      ketoconazole (NIZORAL) 2 % cream Apply topically daily      methylphenidate (RITALIN) 10 mg tablet Take 20 mg in the morning, 20 mg mid-day and 10 mg in the evening 150 tablet 0    secukinumab (COSENTYX) 150 mg/mL SOAJ injection Inject 300 mg under the skin every 30 (thirty) days      zaleplon (SONATA) 10 MG capsule TAKE ONE CAPSULE BY MOUTH AT BEDTIME 30 capsule 1     No current facility-administered medications for this visit            Medications Risks/Benefits      Risks, Benefits And Possible Side Effects Of Medications:    Risks, benefits, and possible side effects of medications explained to Juana Botello and he verbalizes understanding and agreement for treatment  Controlled Medication Discussion:     Juana Botello has been filling controlled prescriptions on time as prescribed according to [place] Prescription Drug Monitoring Program    Psychotherapy Provided:     Individual psychotherapy provided: Yes  Counseling was provided during the session today for 16 minutes  Psychoeducation provided to the patient and was educated about the importance of compliance with the medications and psychiatric treatment  Supportive psychotherapy provided to the patient  Patient's emotions were validated and specific labeled praise provided  [place] suggestions were offered in a supportive non-critical way       Treatment Plan:    Completed and signed during the session: Yes - with Valeriy Butler MD 02/18/21

## 2021-02-18 NOTE — BH TREATMENT PLAN
Treatment Plan done but not signed at time of office visit due to:  Plan reviewed by phone or in person  and verbal consent given due to [place] social distancing / VIRTUAL VISIT    TREATMENT PLAN (Medication Management Only)        Mikael Gray    Name and Date of Birth:  Daisy Liang 63 y o  1960  Date of Treatment Plan: February 18, 2021  Diagnosis/Diagnoses:    1  Attention deficit hyperactivity disorder (ADHD), combined type      Strengths/Personal Resources for Self-Care: "supportive wife and friends; hobbies (outdoor activities)"  Area/Areas of need (in own words): ADHD symptoms  1  Long Term Goal: maintain control of ADHD symptoms  Target Date:4 months - 6/18/2021  Person/Persons responsible for completion of goal: Jessica Mallory  2  Short Term Objective (s) - How will we reach this goal?:   A  Provider new recommended medication/dosage changes and/or continue medication(s): continue current medications as prescribed  B  continue individual therapy  C  N/A  Target Date:4 months - 6/18/2021  Person/Persons Responsible for Completion of Goal: Jessica Mallory  Progress Towards Goals: continuing treatment  Treatment Modality: medication management every 4 months, continue psychotherapy with own therapist  Review due 180 days from date of this plan: 4 months - 6/18/2021  Expected length of service: maintenance  My Physician/PA/NP and I have developed this plan together and I agree to work on the goals and objectives  I understand the treatment goals that were developed for my treatment

## 2021-02-25 ENCOUNTER — LAB (OUTPATIENT)
Dept: LAB | Facility: HOSPITAL | Age: 61
End: 2021-02-25
Payer: COMMERCIAL

## 2021-02-25 DIAGNOSIS — R68.2 DRY MOUTH: ICD-10-CM

## 2021-02-25 DIAGNOSIS — H04.129 DRY EYE: ICD-10-CM

## 2021-02-25 PROCEDURE — 86235 NUCLEAR ANTIGEN ANTIBODY: CPT

## 2021-02-25 PROCEDURE — 36415 COLL VENOUS BLD VENIPUNCTURE: CPT

## 2021-02-26 LAB
ENA SS-A AB SER-ACNC: <0.2 AI (ref 0–0.9)
ENA SS-B AB SER-ACNC: <0.2 AI (ref 0–0.9)

## 2021-03-10 DIAGNOSIS — Z23 ENCOUNTER FOR IMMUNIZATION: ICD-10-CM

## 2021-05-12 NOTE — PSYCH
Virtual Regular Visit    Assessment/Plan:    Problem List Items Addressed This Visit     None               Reason for visit is   Chief Complaint   Patient presents with    Medication Management    ADHD        Encounter provider Brunilda Bloch, MD    Provider located at: [address]  Danis Roberts 3    Recent Visits  No visits were found meeting these conditions  Showing recent visits within past 7 days and meeting all other requirements     Today's Visits  Date Type Provider Dept   05/13/21 Telemedicine Brunilda Bloch, MD [facility] 18 today's visits and meeting all other requirements     Future Appointments  No visits were found meeting these conditions  Showing future appointments within next 150 days and meeting all other requirements        The patient was identified by name and date of birth  Jose Rojas was informed that this is a telemedicine visit and that the visit is being conducted through [address] [place] [address] Road [facility] and patient was informed that this is a secure, HIPAA-compliant platform  He agrees to proceed  My office door was closed  No one else was in the room  He acknowledged consent and understanding of privacy and security of the video platform  The patient has agreed to participate and understands they can discontinue the visit at any time  Patient is aware this is a billable service  MEDICATION MANAGEMENT NOTE        [facility]      Name and Date of Birth:  Jose Rojas 63 y o  1960 MRN: [id number]    Date of Visit: May 13, 2021    Reason for Visit:   Chief Complaint   Patient presents with    Medication Management    ADHD       SUBJECTIVE:  The patient was visited virtually for medication management and follow up visit for ADHD  Presented calm, and cooperative  Reported feeling better    He has 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, and took the position at [employer], and has been working from home; however, his responsibilities increased as the person who recruited him left for a better position, and his workload has been more, and he is considering if he wanted to stay with the company or not, onesimo "since the rules of the game has changed" and they wanted him to come to the company two days per week, and feels resentful about the position  He is going to visit his son for a trip next week and then with his wife in October  He gained 10 lbs since last time he visited his son, and he wanted to lose weight before going to his trip to [facility], and has started exercise regularly, and has lost 5 lbs recently  Her daughter is due to deliver in a couple of weeks and will move out  Denied any changes in sleep, appetite, concentration, energy level, or daily activities  Denied feelings of anhedonia, hopelessness, helplessness, worthlessness or guilt and appeared to be future oriented  There was no thought constriction related to death  Denied SI/HI, intent or plan upon direct inquiry at this time  Denied AV/H  No anxiety sxs, specific phobia or panic attacks reported  No manic sxs, paranoid ideations or fixed delusions were elicited  Endorsed good compliance with the medications and denied any side effects  Denied smoking cigarettes, drinking alcohol or other illicit substance use  Given this presentation, medications are maintained at the same dosage  Will continue individual therapy w/ Ms Ozzy Denson  The patient was educated to call 911 or go to the nearest emergency room if the symptoms become overwhelming or unable to remain in control  Verbalized understanding and agreed to seek help in case of distress or concern for safety  Review Of Systems:  Pertinent items are noted in HPI; all others are negative; no recent changes in medications or health status reported        Past Psychiatric History Update:   - No inpatient psychiatric admission since last encounter  - No SA or SIB since last encounter  - No incidence of violent behavior since last encounter    Past Trauma History Update:    - No new onset of abuse or traumatic events since last encounter     Past Medical History:    No past medical history on file  No past medical history pertinent negatives  No past surgical history on file    Allergies   Allergen Reactions    Other Anaphylaxis     Unknown food allergy       Substance Abuse History:    Social History     Substance and Sexual Activity   Alcohol Use Not on file     Social History     Substance and Sexual Activity   Drug Use Not on file       Social History:    Social History     Socioeconomic History    Marital status: /Civil Union     Spouse name: Not on file    Number of children: Not on file    Years of education: Not on file    Highest education level: Not on file   Occupational History    Not on file   Social Needs    Financial resource strain: Not on file    Food insecurity     Worry: Not on file     Inability: Not on file   [employer] needs     Medical: Not on file     Non-medical: Not on file   Tobacco Use    Smoking status: Never Smoker    Smokeless tobacco: Never Used   Substance and Sexual Activity    Alcohol use: Not on file    Drug use: Not on file    Sexual activity: Not on file   Lifestyle    Physical activity     Days per week: Not on file     Minutes per session: Not on file    Stress: Not on file   Relationships    Social connections     Talks on phone: Not on file     Gets together: Not on file     Attends Yarsanism service: Not on file     Active member of club or organization: Not on file     Attends meetings of clubs or organizations: Not on file     Relationship status: Not on file    Intimate partner violence     Fear of current or ex partner: Not on file     Emotionally abused: Not on file     Physically abused: Not on file     Forced sexual activity: Not on file   Other Topics Concern    Not on file   Social History Narrative    Not on file       Family Psychiatric History:     Family History   Problem Relation Age of Onset    Coronary artery disease Father        History Review: The following portions of the patient's history were reviewed and updated as appropriate: allergies, current medications, past family history, past medical history, past social history, past surgical history and problem list        OBJECTIVE:     Vital signs in last 24 hours: There were no vitals filed for this visit  Mental Status Evaluation:  Appearance and attitude: appeared as stated age, cooperative and attentive, casually dressed with good hygiene  Eye contact: good  Motor Function: within normal limits, No PMA/PMR  Gait/station: Not observed  Speech: normal for rate, rhythm, volume, latency, amount  Language: No overt abnormality  Mood/affect: euthymic / Affect was euthymic, reactive, in full range, normal intensity and mood congruent  Thought Processes: sequential and goal-directed  Thought content: denies suicidal ideation or homicidal ideation; no delusions or first rank symptoms  Associations: intact associations  Perceptual disturbances: denies Auditory/Visual/Tactile Hallucinations  Orientation: oriented to time, person, place and to the situational context  Cognitive Function: intact  Memory: intact short-term and long-term  Intellect: average  Fund of knowledge: aware of current events, aware of past history and vocabulary average  Impulse control: good  Insight/judgment: good/good    Pain: denied  Pain scale: 0    Laboratory Results: I have personally reviewed all pertinent laboratory/tests results    Recent Labs (last 2 months):   No visits with results within 2 Month(s) from this visit     Latest known visit with results is:   Lab on 02/25/2021   Component Date Value    SS-A (RO) Ab 02/25/2021 <0 2     SS-B (LA) Ab 02/25/2021 <0 2 Assessment/Plan:   A 60 y o   male, , domiciled w/ wife, 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 w/ PMH of exercise induced asthma, HLD, psoriatic arthritis, cervical and lumbar DDD and PPH of ADHD, insomnia and ?WALTER, no prior psychiatric admissions, no prior SA, no  h/o self-injurious behavior, currently on Ritalin 20 mg TID and Sonata 10 mg nightly prescribed by his Yong Perdue presented to the mental health clinic for initial intake and psychiatric evaluation as referred by his PCP for re-evaluation of ADHD treatment with Ritalin on 9/18/2020  The patient presented w/ h/o ADHD since 1999, being on Ritalin 60mg daily since then, w/ marked improvement of attention, concentration and his function since being treated  Denied any excessive anxiety, depressed/manic sxs, A/VH or other psychotic sxs  No h/o substance abuse  Denied SI/HI, intent or plan upon direct inquiry at this time   WALTER-7: 4  His current presentation meets criteria for ADHD, by history  The patient was educated about the diagnosis of ADHD and the course of ADHD in adults which may present with decreased intensity of sxs, onesimo after age 48, and particularly after age 65  Psycho-education regarding Ritalin indications, benefits, risks, side effects, and alternative options provided to the patient, and the importance of the compliance with psychiatric treatment reiterated  The patient was educated about the potential long-term side effects of ritalin in older age, onesimo cardiac side effects (given the FH of heart attack in his father at age 39), and was educated about the possibility of tapering off or if becoming symptomatic, cross-tapering to different treatment options  He verbalized understanding and agreed to the proposed regimen  Ritalin dose was decreased from 20mg TID (60mg total), to 20/20/10mg (50 mg total); to be tapered off gradually  There are no diagnoses linked to this encounter        Impression:  No diagnosis found  Treatment Recommendations/Precautions:  - Continue Ritalin 20/20/10 mg for ADHD; dose to be tapered down as tolerated  - Continue Sonata 10 mg po nightly PRN for insomnia as per PCP  - Continue individual therapy  - Medications sent to patient's pharmacy for 30 day supply    - Psychoeducation provided to the patient and benefits, potential risks and side effects discussed; importance of compliance with the psychiatric treatment reiterated, and the patient verbalized understanding of the matter     - RTC in 12 weeks  - Educated about healthy life style, risk of falls/sedation and addiction  Patient was receptive to education   - The patient was educated about 24 hour and weekend coverage for urgent situations accessed by calling [address] main practice number  - Patient was educated to [facility] [address] [facility] ([phone] [[phone]]) for behavioral crisis at anytime or [phone] for any safety concerns, or go to nearest ER if his symptoms become overwhelming or unmanageable      Current Outpatient Medications   Medication Sig Dispense Refill    Albuterol Sulfate 108 (90 Base) MCG/ACT AEPB Inhale      aspirin (ECOTRIN LOW STRENGTH) 81 mg EC tablet Take 81 mg by mouth daily      atorvastatin (LIPITOR) 40 mg tablet Take 1 tablet (40 mg total) by mouth daily 90 tablet 3    clobetasol (TEMOVATE) 0 05 % cream Apply 2 84 application topically 2 (two) times a day      clotrimazole-betamethasone (LOTRISONE) 1-0 05 % cream Apply topically 2 (two) times a day 30 g 5    EPINEPHrine (EPIPEN) 0 3 mg/0 3 mL SOAJ Inject 0 3 mg into a muscle      fexofenadine (Allegra Allergy) 60 MG tablet Take 60 mg by mouth daily      ketoconazole (NIZORAL) 2 % cream Apply topically daily      methylphenidate (RITALIN) 10 mg tablet Take 20 mg in the morning, 20 mg mid-day and 10 mg in the evening 150 tablet 0    pilocarpine (SALAGEN) 5 mg tablet Take 5 mg by mouth Three times a day  secukinumab (COSENTYX) 150 mg/mL SOAJ injection Inject 300 mg under the skin every 30 (thirty) days      zaleplon (SONATA) 10 MG capsule TAKE ONE CAPSULE BY MOUTH AT BEDTIME 30 capsule 1     No current facility-administered medications for this visit  Medications Risks/Benefits      Risks, Benefits And Possible Side Effects Of Medications:    Risks, benefits, and possible side effects of medications explained to Alfred Nicole and he verbalizes understanding and agreement for treatment  Controlled Medication Discussion:     Alfred Nicole has been filling controlled prescriptions on time as prescribed according to [place] Prescription Drug Monitoring Program    Psychotherapy Provided:     Individual psychotherapy provided: Yes  Counseling was provided during the session today for 16 minutes  Psychoeducation provided to the patient and was educated about the importance of compliance with the medications and psychiatric treatment  Supportive psychotherapy provided to the patient  Solution Focused Brief Therapy (SFBT) provided  Patient's emotions were validated and specific labeled praise provided  [place] suggestions were offered in a supportive non-critical way       Treatment Plan:    Completed and signed during the session: Yes - with Mack Su MD 05/13/21

## 2021-05-13 ENCOUNTER — TELEMEDICINE (OUTPATIENT)
Dept: PSYCHIATRY | Facility: CLINIC | Age: 61
End: 2021-05-13
Payer: COMMERCIAL

## 2021-05-13 DIAGNOSIS — F90.2 ATTENTION DEFICIT HYPERACTIVITY DISORDER (ADHD), COMBINED TYPE: ICD-10-CM

## 2021-05-13 PROCEDURE — 90833 PSYTX W PT W E/M 30 MIN: CPT | Performed by: STUDENT IN AN ORGANIZED HEALTH CARE EDUCATION/TRAINING PROGRAM

## 2021-05-13 PROCEDURE — 99213 OFFICE O/P EST LOW 20 MIN: CPT | Performed by: STUDENT IN AN ORGANIZED HEALTH CARE EDUCATION/TRAINING PROGRAM

## 2021-05-13 RX ORDER — METHYLPHENIDATE HYDROCHLORIDE 10 MG/1
TABLET ORAL
Qty: 150 TABLET | Refills: 0 | Status: SHIPPED | OUTPATIENT
Start: 2021-05-13 | End: 2021-06-09 | Stop reason: SDUPTHER

## 2021-05-13 NOTE — BH TREATMENT PLAN
Treatment Plan done but not signed at time of office visit due to:  Plan reviewed by phone or in person  and verbal consent given due to COVID social distancing    TREATMENT PLAN (Medication Management Only)        [facility]    Name and Date of Birth:  Sandy Norman 63 y o  1960  Date of Treatment Plan: May 13, 2021  Diagnosis/Diagnoses:    1  Attention deficit hyperactivity disorder (ADHD), combined type      Strengths/Personal Resources for Self-Care: supportive family  Area/Areas of need (in own words): ADHD symptoms  1  Long Term Goal: maintain control of ADHD symptoms  Target Date:4 months - 9/13/2021  Person/Persons responsible for completion of goal: Shannon Ramirez  2  Short Term Objective (s) - How will we reach this goal?:   A  Provider new recommended medication/dosage changes and/or continue medication(s): continue current medications as prescribed  B  Continue individual therapy  C  N/A  Target Date:4 months - 9/13/2021  Person/Persons Responsible for Completion of Goal: Shannon Ramirez  Progress Towards Goals: continuing treatment  Treatment Modality: medication management every 4 month, continue psychotherapy with own therapist  Review due 180 days from date of this plan: 4 months - 9/13/2021  Expected length of service: maintenance  My Physician/PA/NP and I have developed this plan together and I agree to work on the goals and objectives  I understand the treatment goals that were developed for my treatment

## 2021-05-23 ENCOUNTER — OFFICE VISIT (OUTPATIENT)
Dept: URGENT CARE | Age: 61
End: 2021-05-23
Payer: COMMERCIAL

## 2021-05-23 VITALS
HEART RATE: 91 BPM | OXYGEN SATURATION: 96 % | RESPIRATION RATE: 20 BRPM | SYSTOLIC BLOOD PRESSURE: 135 MMHG | DIASTOLIC BLOOD PRESSURE: 89 MMHG | TEMPERATURE: 97.5 F

## 2021-05-23 DIAGNOSIS — K40.90 REDUCIBLE RIGHT INGUINAL HERNIA: Primary | ICD-10-CM

## 2021-05-23 PROCEDURE — G0382 LEV 3 HOSP TYPE B ED VISIT: HCPCS | Performed by: PHYSICIAN ASSISTANT

## 2021-05-23 NOTE — PATIENT INSTRUCTIONS
Use a hernia truss as directed     avoid lifting over 20 lb     follow-up with general surgeon for further evaluation and definitive treatment     go to emergency room for any worsening symptoms like increasing pain and inability to reduce

## 2021-05-23 NOTE — PROGRESS NOTES
[facility]        NAME: Wero Colunga is a 63 y o  male  : 1960    MRN: [id number]  DATE: May 23, 2021  TIME: 4:13 PM    Assessment and Plan   Reducible right inguinal hernia [K40 90]  1  Reducible right inguinal hernia           Patient Instructions       Follow up with PCP in 3-5 days  Proceed to  ER if symptoms worsen  Chief Complaint     Chief Complaint   Patient presents with    Inguinal Hernia     pt states noticed last night swelling right groin, states he can push it back in and it goes flat when he lies down, hx of inguinal hernia repair in  but he can't remember which side it was on  History of Present Illness         Patient for evaluation of swelling in his right groin  Patient was moving equipment last night and when he got home showering notice swelling in the right inguinal region  He states he did feel some discomfort earlier that night  He does have a history of prior hernia on the left  Review of Systems   Review of Systems   Constitutional: Negative  Gastrointestinal: Negative for abdominal pain, constipation, diarrhea, nausea and vomiting           Current Medications       Current Outpatient Medications:     Albuterol Sulfate 108 (90 Base) MCG/ACT AEPB, Inhale, Disp: , Rfl:     aspirin (ECOTRIN LOW STRENGTH) 81 mg EC tablet, Take 81 mg by mouth daily, Disp: , Rfl:     atorvastatin (LIPITOR) 40 mg tablet, Take 1 tablet (40 mg total) by mouth daily, Disp: 90 tablet, Rfl: 3    clobetasol (TEMOVATE) 0 05 % cream, Apply 7 36 application topically 2 (two) times a day, Disp: , Rfl:     clotrimazole-betamethasone (LOTRISONE) 1-0 05 % cream, Apply topically 2 (two) times a day, Disp: 30 g, Rfl: 5    EPINEPHrine (EPIPEN) 0 3 mg/0 3 mL SOAJ, Inject 0 3 mg into a muscle, Disp: , Rfl:     fexofenadine (Allegra Allergy) 60 MG tablet, Take 60 mg by mouth daily, Disp: , Rfl:     ketoconazole (NIZORAL) 2 % cream, Apply topically daily, Disp: , Rfl:    methylphenidate (RITALIN) 10 mg tablet, Take 20 mg in the morning, 20 mg mid-day and 10 mg in the evening, Disp: 150 tablet, Rfl: 0    pilocarpine (SALAGEN) 5 mg tablet, Take 5 mg by mouth Three times a day, Disp: , Rfl:     secukinumab (COSENTYX) 150 mg/mL SOAJ injection, Inject 300 mg under the skin every 30 (thirty) days, Disp: , Rfl:     zaleplon (SONATA) 10 MG capsule, TAKE ONE CAPSULE BY MOUTH AT BEDTIME, Disp: 30 capsule, Rfl: 1    Current Allergies     Allergies as of 05/23/2021 - Reviewed 05/23/2021   Allergen Reaction Noted    Other Anaphylaxis 04/23/2019            The following portions of the patient's history were reviewed and updated as appropriate: allergies, current medications, past family history, past medical history, past social history, past surgical history and problem list      History reviewed  No pertinent past medical history  History reviewed  No pertinent surgical history  Family History   Problem Relation Age of Onset    Coronary artery disease Father          Medications have been verified  Objective   /89   Pulse 91   Temp 97 5 °F (36 4 °C)   Resp 20   SpO2 96%   No LMP for male patient  Physical Exam     Physical Exam  Vitals signs and nursing note reviewed  Constitutional:       General: He is not in acute distress  Appearance: Normal appearance  He is well-developed  He is not ill-appearing, toxic-appearing or diaphoretic  HENT:      Head: Normocephalic and atraumatic  Eyes:      Extraocular Movements: Extraocular movements intact  Conjunctiva/sclera: Conjunctivae normal       Pupils: Pupils are equal, round, and reactive to light  Abdominal:      General: Abdomen is flat  Bowel sounds are normal  There is no distension  Tenderness: There is no abdominal tenderness  There is no guarding or rebound  Hernia: A hernia (Reducible right inguinal   No significant tenderness ) is present     Skin:     General: Skin is warm and dry    Neurological:      General: No focal deficit present  Mental Status: He is alert and oriented to person, place, and time  Psychiatric:         Mood and Affect: Mood normal          Behavior: Behavior normal          Thought Content: Thought content normal          Judgment: Judgment normal        Patient to limit extremities activity and avoid lifting over 20 lb an use a hernia truss until evaluated by general surgery

## 2021-06-03 ENCOUNTER — CONSULT (OUTPATIENT)
Dept: SURGERY | Facility: CLINIC | Age: 61
End: 2021-06-03
Payer: COMMERCIAL

## 2021-06-03 VITALS
SYSTOLIC BLOOD PRESSURE: 132 MMHG | HEIGHT: 71 IN | WEIGHT: 187 LBS | BODY MASS INDEX: 26.18 KG/M2 | HEART RATE: 86 BPM | TEMPERATURE: 97 F | RESPIRATION RATE: 18 BRPM | DIASTOLIC BLOOD PRESSURE: 80 MMHG

## 2021-06-03 DIAGNOSIS — K40.90 RIGHT INGUINAL HERNIA: Primary | ICD-10-CM

## 2021-06-03 PROCEDURE — 3008F BODY MASS INDEX DOCD: CPT | Performed by: SURGERY

## 2021-06-03 PROCEDURE — 99204 OFFICE O/P NEW MOD 45 MIN: CPT | Performed by: SURGERY

## 2021-06-03 PROCEDURE — 1036F TOBACCO NON-USER: CPT | Performed by: SURGERY

## 2021-06-03 NOTE — H&P (VIEW-ONLY)
Assessment/Plan: symptomatic right inguinal hernia  Repair is scheduled for the 22nd to fit into his schedule    No problem-specific Assessment & Plan notes found for this encounter  Diagnoses and all orders for this visit:    Right inguinal hernia  -     Comprehensive metabolic panel; Future  -     CBC and Platelet; Future  -     EKG 12 lead; Future  -     Case request operating room: REPAIR HERNIA INGUINAL; Standing  -     Case request operating room: REPAIR HERNIA INGUINAL    Other orders  -     Incentive spirometry; Standing  -     Insert and maintain IV line; Standing  -     Void On-Call to O R ; Standing  -     Clip surgical area; Standing  -     ceFAZolin (ANCEF) 2,000 mg in dextrose 5 % 100 mL IVPB          Subjective:      Patient ID: Nathan Foreman is a 57 y o  male  58-year-old male who noticed a protrusion in his right groin which is somewhat painful  He has a prior history of a left inguinal hernia so he was relatively sure that was the problem here  He is also asking about a cholecystectomy given the fact that he has had intermittent right upper quadrant pain for over 30 years and known gallstones  He also is due for a surveillance colonoscopy      The following portions of the patient's history were reviewed and updated as appropriate: allergies, current medications, past family history, past medical history, past social history, past surgical history and problem list     Review of Systems   Constitutional: Negative  HENT:        High frequency hearing loss   Eyes:        Styes   Respiratory:        Exercise induced asthma   Cardiovascular:        Hyperlipidemia   Gastrointestinal:        Known gallstones for 30+ years  No hx colon ca in family  DU in 19's plus colitis   Endocrine: Negative  Genitourinary: Negative  Nocturia x many   Musculoskeletal: Positive for arthralgias and back pain  Psoriatric and osteo   Skin:        psoriasis   Neurological: Negative  Carpal tunnel   Hematological: Negative  Psychiatric/Behavioral: Negative  Adhd         Objective:      /80 (BP Location: Right arm, Patient Position: Sitting, Cuff Size: Adult)   Pulse 86   Temp (!) 97 °F (36 1 °C)   Resp 18   Ht 5' 11" (1 803 m)   Wt 84 8 kg (187 lb)   BMI 26 08 kg/m²          Physical Exam  Constitutional:       General: He is not in acute distress  Appearance: Normal appearance  He is normal weight  He is not ill-appearing  HENT:      Head: Normocephalic and atraumatic  Eyes:      General: No scleral icterus  Conjunctiva/sclera: Conjunctivae normal    Neck:      Musculoskeletal: Normal range of motion and neck supple  No neck rigidity  Cardiovascular:      Rate and Rhythm: Normal rate and regular rhythm  Heart sounds: Normal heart sounds  No murmur  Pulmonary:      Effort: Pulmonary effort is normal  No respiratory distress  Breath sounds: Normal breath sounds  No stridor  No wheezing, rhonchi or rales  Abdominal:      General: Abdomen is flat  There is no distension  Palpations: Abdomen is soft  There is no mass  Hernia: A hernia is present  Comments: He has a palpable and reducible right inguinal hernia  He has a scar from a left-sided repair without recurrence   Genitourinary:     Penis: Normal        Scrotum/Testes: Normal    Musculoskeletal: Normal range of motion  Lymphadenopathy:      Cervical: No cervical adenopathy  Skin:     General: Skin is warm and dry  Coloration: Skin is not jaundiced  Neurological:      Mental Status: He is alert and oriented to person, place, and time  Psychiatric:         Mood and Affect: Mood normal          Behavior: Behavior normal          Thought Content:  Thought content normal          Judgment: Judgment normal

## 2021-06-03 NOTE — PROGRESS NOTES
Assessment/Plan: symptomatic right inguinal hernia  Repair is scheduled for the 22nd to fit into his schedule    No problem-specific Assessment & Plan notes found for this encounter  Diagnoses and all orders for this visit:    Right inguinal hernia  -     Comprehensive metabolic panel; Future  -     CBC and Platelet; Future  -     EKG 12 lead; Future  -     Case request operating room: REPAIR HERNIA INGUINAL; Standing  -     Case request operating room: REPAIR HERNIA INGUINAL    Other orders  -     Incentive spirometry; Standing  -     Insert and maintain IV line; Standing  -     Void On-Call to O R ; Standing  -     Clip surgical area; Standing  -     ceFAZolin (ANCEF) 2,000 mg in dextrose 5 % 100 mL IVPB          Subjective:      Patient ID: Charlynn Mcburney is a 57 y o  male  19-year-old male who noticed a protrusion in his right groin which is somewhat painful  He has a prior history of a left inguinal hernia so he was relatively sure that was the problem here  He is also asking about a cholecystectomy given the fact that he has had intermittent right upper quadrant pain for over 30 years and known gallstones  He also is due for a surveillance colonoscopy      The following portions of the patient's history were reviewed and updated as appropriate: allergies, current medications, past family history, past medical history, past social history, past surgical history and problem list     Review of Systems   Constitutional: Negative  HENT:        High frequency hearing loss   Eyes:        Styes   Respiratory:        Exercise induced asthma   Cardiovascular:        Hyperlipidemia   Gastrointestinal:        Known gallstones for 30+ years  No hx colon ca in family  DU in 19's plus colitis   Endocrine: Negative  Genitourinary: Negative  Nocturia x many   Musculoskeletal: Positive for arthralgias and back pain  Psoriatric and osteo   Skin:        psoriasis   Neurological: Negative  Carpal tunnel   Hematological: Negative  Psychiatric/Behavioral: Negative  Adhd         Objective:      /80 (BP Location: Right arm, Patient Position: Sitting, Cuff Size: Adult)   Pulse 86   Temp (!) 97 °F (36 1 °C)   Resp 18   Ht 5' 11" (1 803 m)   Wt 84 8 kg (187 lb)   BMI 26 08 kg/m²          Physical Exam  Constitutional:       General: He is not in acute distress  Appearance: Normal appearance  He is normal weight  He is not ill-appearing  HENT:      Head: Normocephalic and atraumatic  Eyes:      General: No scleral icterus  Conjunctiva/sclera: Conjunctivae normal    Neck:      Musculoskeletal: Normal range of motion and neck supple  No neck rigidity  Cardiovascular:      Rate and Rhythm: Normal rate and regular rhythm  Heart sounds: Normal heart sounds  No murmur  Pulmonary:      Effort: Pulmonary effort is normal  No respiratory distress  Breath sounds: Normal breath sounds  No stridor  No wheezing, rhonchi or rales  Abdominal:      General: Abdomen is flat  There is no distension  Palpations: Abdomen is soft  There is no mass  Hernia: A hernia is present  Comments: He has a palpable and reducible right inguinal hernia  He has a scar from a left-sided repair without recurrence   Genitourinary:     Penis: Normal        Scrotum/Testes: Normal    Musculoskeletal: Normal range of motion  Lymphadenopathy:      Cervical: No cervical adenopathy  Skin:     General: Skin is warm and dry  Coloration: Skin is not jaundiced  Neurological:      Mental Status: He is alert and oriented to person, place, and time  Psychiatric:         Mood and Affect: Mood normal          Behavior: Behavior normal          Thought Content:  Thought content normal          Judgment: Judgment normal

## 2021-06-09 DIAGNOSIS — F90.2 ATTENTION DEFICIT HYPERACTIVITY DISORDER (ADHD), COMBINED TYPE: ICD-10-CM

## 2021-06-09 RX ORDER — METHYLPHENIDATE HYDROCHLORIDE 10 MG/1
TABLET ORAL
Qty: 150 TABLET | Refills: 0 | Status: SHIPPED | OUTPATIENT
Start: 2021-06-09 | End: 2021-06-15 | Stop reason: ALTCHOICE

## 2021-06-09 NOTE — TELEPHONE ENCOUNTER
6/9 @ 9:22  Returned patients call and l/m  to inquire about which medication he is speaking of and what pharmacy

## 2021-06-09 NOTE — TELEPHONE ENCOUNTER
PMDP reviewed  30 day supply of Ritalin was sent to the patient's preferred pharmacy ([address] [place] [address])

## 2021-06-15 ENCOUNTER — TELEPHONE (OUTPATIENT)
Dept: PSYCHIATRY | Facility: CLINIC | Age: 61
End: 2021-06-15

## 2021-06-15 ENCOUNTER — LAB (OUTPATIENT)
Dept: LAB | Facility: HOSPITAL | Age: 61
End: 2021-06-15
Attending: SURGERY
Payer: COMMERCIAL

## 2021-06-15 DIAGNOSIS — K40.90 RIGHT INGUINAL HERNIA: ICD-10-CM

## 2021-06-15 DIAGNOSIS — F90.2 ATTENTION DEFICIT HYPERACTIVITY DISORDER (ADHD), COMBINED TYPE: Primary | ICD-10-CM

## 2021-06-15 LAB
ALBUMIN SERPL BCP-MCNC: 4 G/DL (ref 3.4–4.8)
ALP SERPL-CCNC: 64.1 U/L (ref 10–129)
ALT SERPL W P-5'-P-CCNC: 22 U/L (ref 5–63)
ANION GAP SERPL CALCULATED.3IONS-SCNC: 9 MMOL/L (ref 4–13)
AST SERPL W P-5'-P-CCNC: 19 U/L (ref 15–41)
BILIRUB SERPL-MCNC: 0.68 MG/DL (ref 0.3–1.2)
BUN SERPL-MCNC: 16 MG/DL (ref 6–20)
CALCIUM SERPL-MCNC: 9.4 MG/DL (ref 8.4–10.2)
CHLORIDE SERPL-SCNC: 104 MMOL/L (ref 96–108)
CO2 SERPL-SCNC: 27 MMOL/L (ref 22–33)
CREAT SERPL-MCNC: 0.9 MG/DL (ref 0.5–1.2)
ERYTHROCYTE [DISTWIDTH] IN BLOOD BY AUTOMATED COUNT: 12.6 % (ref 11.6–15.1)
GFR SERPL CREATININE-BSD FRML MDRD: 91 ML/MIN/1.73SQ M
GLUCOSE P FAST SERPL-MCNC: 94 MG/DL (ref 70–105)
HCT VFR BLD AUTO: 46.9 % (ref 36.5–49.3)
HGB BLD-MCNC: 16.1 G/DL (ref 12–17)
MCH RBC QN AUTO: 30.4 PG (ref 26.8–34.3)
MCHC RBC AUTO-ENTMCNC: 34.3 G/DL (ref 31.4–37.4)
MCV RBC AUTO: 89 FL (ref 82–98)
PLATELET # BLD AUTO: 222 THOUSANDS/UL (ref 149–390)
PMV BLD AUTO: 10 FL (ref 8.9–12.7)
POTASSIUM SERPL-SCNC: 3.7 MMOL/L (ref 3.5–5)
PROT SERPL-MCNC: 7.5 G/DL (ref 6.4–8.3)
RBC # BLD AUTO: 5.3 MILLION/UL (ref 3.88–5.62)
SODIUM SERPL-SCNC: 140 MMOL/L (ref 133–145)
WBC # BLD AUTO: 5.66 THOUSAND/UL (ref 4.31–10.16)

## 2021-06-15 PROCEDURE — 85027 COMPLETE CBC AUTOMATED: CPT

## 2021-06-15 PROCEDURE — 80053 COMPREHEN METABOLIC PANEL: CPT

## 2021-06-15 PROCEDURE — 93005 ELECTROCARDIOGRAM TRACING: CPT

## 2021-06-15 PROCEDURE — 36415 COLL VENOUS BLD VENIPUNCTURE: CPT

## 2021-06-15 RX ORDER — METHYLPHENIDATE HYDROCHLORIDE 20 MG/1
TABLET ORAL
Qty: 75 TABLET | Refills: 0 | Status: SHIPPED | OUTPATIENT
Start: 2021-06-15 | End: 2021-08-05 | Stop reason: ALTCHOICE

## 2021-06-15 NOTE — TELEPHONE ENCOUNTER
Ritalin needs a PA  Insurance will only cover a 90 day supply of 10 mg tab   Will cover 20 mg and patient would have to split evening dose for 10mg

## 2021-06-15 NOTE — TELEPHONE ENCOUNTER
I called the pharmacy and spoke with the pharmacist; PMDP checked  The medication was re-sent as 75 tablets of Ritalin 20 mg to be taken as 1 tablet in the morning, 1 tablet mid-day and 1/2 tablet evening  I called the patient and left him a VM about the new changes with detailed instructions

## 2021-06-16 LAB
ATRIAL RATE: 91 BPM
P AXIS: 54 DEGREES
PR INTERVAL: 168 MS
QRS AXIS: 13 DEGREES
QRSD INTERVAL: 102 MS
QT INTERVAL: 384 MS
QTC INTERVAL: 472 MS
T WAVE AXIS: 60 DEGREES
VENTRICULAR RATE: 91 BPM

## 2021-06-16 PROCEDURE — 93010 ELECTROCARDIOGRAM REPORT: CPT | Performed by: INTERNAL MEDICINE

## 2021-06-21 ENCOUNTER — ANESTHESIA EVENT (OUTPATIENT)
Dept: PERIOP | Facility: HOSPITAL | Age: 61
End: 2021-06-21
Payer: COMMERCIAL

## 2021-06-21 NOTE — PRE-PROCEDURE INSTRUCTIONS
Pre-Surgery Instructions:   Medication Instructions    Albuterol Sulfate 108 (90 Base) MCG/ACT AEPB Instructed patient per Anesthesia Guidelines  prn, may take    atorvastatin (LIPITOR) 40 mg tablet Instructed patient per Anesthesia Guidelines  takes in pm    clobetasol (TEMOVATE) 0 05 % cream Instructed patient per Anesthesia Guidelines  na    clotrimazole-betamethasone (LOTRISONE) 1-0 05 % cream Instructed patient per Anesthesia Guidelines  na    fexofenadine (Allegra Allergy) 60 MG tablet Instructed patient per Anesthesia Guidelines  prn    ketoconazole (NIZORAL) 2 % cream Instructed patient per Anesthesia Guidelines  na    methylphenidate (RITALIN) 20 MG tablet Instructed patient per Anesthesia Guidelines  hold am of sx    secukinumab (COSENTYX) 150 mg/mL SOAJ injection Instructed patient per Anesthesia Guidelines  q 2 weeks    zaleplon (SONATA) 10 MG capsule Instructed patient per Anesthesia Guidelines  takes pm    You will receive a phone call from hospital for arrival time  Please call surgeons office if any changes in your condition  Wear easy on/off clothing; consider type of surgery;  Valuables, jewelry, piercing's please keep at home  **COVID-19  education/surgical guidelines      Please: No contact lenses or eye make up, artificial eyelashes    Please secure transportation     Follow pre surgery showering or cleaning instructions as  Reviewed by nurse or surgeons office      Questions answered and concerns addressed

## 2021-06-22 ENCOUNTER — HOSPITAL ENCOUNTER (OUTPATIENT)
Facility: HOSPITAL | Age: 61
Setting detail: OUTPATIENT SURGERY
Discharge: HOME | End: 2021-06-22
Attending: SURGERY | Admitting: SURGERY
Payer: COMMERCIAL

## 2021-06-22 ENCOUNTER — ANESTHESIA (OUTPATIENT)
Dept: PERIOP | Facility: HOSPITAL | Age: 61
End: 2021-06-22
Payer: COMMERCIAL

## 2021-06-22 VITALS
RESPIRATION RATE: 20 BRPM | TEMPERATURE: 97.2 F | BODY MASS INDEX: 26.18 KG/M2 | WEIGHT: 187 LBS | HEART RATE: 76 BPM | SYSTOLIC BLOOD PRESSURE: 137 MMHG | HEIGHT: 71 IN | DIASTOLIC BLOOD PRESSURE: 84 MMHG | OXYGEN SATURATION: 98 %

## 2021-06-22 DIAGNOSIS — K40.90 RIGHT INGUINAL HERNIA: Primary | ICD-10-CM

## 2021-06-22 PROCEDURE — C1781 MESH (IMPLANTABLE): HCPCS | Performed by: SURGERY

## 2021-06-22 PROCEDURE — 49505 PRP I/HERN INIT REDUC >5 YR: CPT | Performed by: SURGERY

## 2021-06-22 PROCEDURE — 49505 PRP I/HERN INIT REDUC >5 YR: CPT | Performed by: PHYSICIAN ASSISTANT

## 2021-06-22 DEVICE — POLYPROPYLENE NONABSORBABLE SYNTHETIC SURGICAL MESH
Type: IMPLANTABLE DEVICE | Site: INGUINAL | Status: FUNCTIONAL
Brand: PROLENE

## 2021-06-22 RX ORDER — OXYCODONE HYDROCHLORIDE AND ACETAMINOPHEN 5; 325 MG/1; MG/1
1 TABLET ORAL EVERY 4 HOURS PRN
Qty: 15 TABLET | Refills: 0 | Status: SHIPPED | OUTPATIENT
Start: 2021-06-22 | End: 2022-08-04

## 2021-06-22 RX ORDER — FENTANYL CITRATE 50 UG/ML
INJECTION, SOLUTION INTRAMUSCULAR; INTRAVENOUS AS NEEDED
Status: DISCONTINUED | OUTPATIENT
Start: 2021-06-22 | End: 2021-06-22

## 2021-06-22 RX ORDER — PROPOFOL 10 MG/ML
INJECTION, EMULSION INTRAVENOUS AS NEEDED
Status: DISCONTINUED | OUTPATIENT
Start: 2021-06-22 | End: 2021-06-22

## 2021-06-22 RX ORDER — BUPIVACAINE HYDROCHLORIDE AND EPINEPHRINE 2.5; 5 MG/ML; UG/ML
INJECTION, SOLUTION EPIDURAL; INFILTRATION; INTRACAUDAL; PERINEURAL AS NEEDED
Status: DISCONTINUED | OUTPATIENT
Start: 2021-06-22 | End: 2021-06-22 | Stop reason: HOSPADM

## 2021-06-22 RX ORDER — DEXAMETHASONE SODIUM PHOSPHATE 4 MG/ML
INJECTION, SOLUTION INTRA-ARTICULAR; INTRALESIONAL; INTRAMUSCULAR; INTRAVENOUS; SOFT TISSUE AS NEEDED
Status: DISCONTINUED | OUTPATIENT
Start: 2021-06-22 | End: 2021-06-22

## 2021-06-22 RX ORDER — OXYCODONE HYDROCHLORIDE AND ACETAMINOPHEN 5; 325 MG/1; MG/1
1 TABLET ORAL ONCE
Status: COMPLETED | OUTPATIENT
Start: 2021-06-22 | End: 2021-06-22

## 2021-06-22 RX ORDER — ONDANSETRON 2 MG/ML
4 INJECTION INTRAMUSCULAR; INTRAVENOUS EVERY 6 HOURS PRN
Status: DISCONTINUED | OUTPATIENT
Start: 2021-06-22 | End: 2021-06-22 | Stop reason: HOSPADM

## 2021-06-22 RX ORDER — SODIUM CHLORIDE 9 MG/ML
125 INJECTION, SOLUTION INTRAVENOUS CONTINUOUS
Status: DISCONTINUED | OUTPATIENT
Start: 2021-06-22 | End: 2021-06-22 | Stop reason: HOSPADM

## 2021-06-22 RX ORDER — SODIUM CHLORIDE, SODIUM LACTATE, POTASSIUM CHLORIDE, CALCIUM CHLORIDE 600; 310; 30; 20 MG/100ML; MG/100ML; MG/100ML; MG/100ML
INJECTION, SOLUTION INTRAVENOUS CONTINUOUS PRN
Status: DISCONTINUED | OUTPATIENT
Start: 2021-06-22 | End: 2021-06-22

## 2021-06-22 RX ORDER — LIDOCAINE HYDROCHLORIDE 10 MG/ML
INJECTION, SOLUTION EPIDURAL; INFILTRATION; INTRACAUDAL; PERINEURAL AS NEEDED
Status: DISCONTINUED | OUTPATIENT
Start: 2021-06-22 | End: 2021-06-22

## 2021-06-22 RX ORDER — SODIUM CHLORIDE, SODIUM LACTATE, POTASSIUM CHLORIDE, CALCIUM CHLORIDE 600; 310; 30; 20 MG/100ML; MG/100ML; MG/100ML; MG/100ML
125 INJECTION, SOLUTION INTRAVENOUS CONTINUOUS
Status: DISCONTINUED | OUTPATIENT
Start: 2021-06-22 | End: 2021-06-22 | Stop reason: HOSPADM

## 2021-06-22 RX ORDER — MORPHINE SULFATE 4 MG/ML
2 INJECTION, SOLUTION INTRAMUSCULAR; INTRAVENOUS
Status: DISCONTINUED | OUTPATIENT
Start: 2021-06-22 | End: 2021-06-22 | Stop reason: HOSPADM

## 2021-06-22 RX ORDER — CEFAZOLIN SODIUM 2 G/50ML
2000 SOLUTION INTRAVENOUS ONCE
Status: COMPLETED | OUTPATIENT
Start: 2021-06-22 | End: 2021-06-22

## 2021-06-22 RX ORDER — ONDANSETRON 2 MG/ML
INJECTION INTRAMUSCULAR; INTRAVENOUS AS NEEDED
Status: DISCONTINUED | OUTPATIENT
Start: 2021-06-22 | End: 2021-06-22

## 2021-06-22 RX ORDER — OXYCODONE HYDROCHLORIDE AND ACETAMINOPHEN 5; 325 MG/1; MG/1
1 TABLET ORAL EVERY 4 HOURS PRN
Status: CANCELLED | OUTPATIENT
Start: 2021-06-22

## 2021-06-22 RX ORDER — ONDANSETRON 2 MG/ML
4 INJECTION INTRAMUSCULAR; INTRAVENOUS ONCE AS NEEDED
Status: COMPLETED | OUTPATIENT
Start: 2021-06-22 | End: 2021-06-22

## 2021-06-22 RX ORDER — DIPHENHYDRAMINE HYDROCHLORIDE 50 MG/ML
12.5 INJECTION INTRAMUSCULAR; INTRAVENOUS ONCE AS NEEDED
Status: DISCONTINUED | OUTPATIENT
Start: 2021-06-22 | End: 2021-06-22 | Stop reason: HOSPADM

## 2021-06-22 RX ORDER — FENTANYL CITRATE/PF 50 MCG/ML
50 SYRINGE (ML) INJECTION
Status: DISCONTINUED | OUTPATIENT
Start: 2021-06-22 | End: 2021-06-22 | Stop reason: HOSPADM

## 2021-06-22 RX ADMIN — FENTANYL CITRATE 50 MCG: 50 INJECTION, SOLUTION INTRAMUSCULAR; INTRAVENOUS at 13:04

## 2021-06-22 RX ADMIN — OXYCODONE HYDROCHLORIDE AND ACETAMINOPHEN 1 TABLET: 5; 325 TABLET ORAL at 14:25

## 2021-06-22 RX ADMIN — ONDANSETRON 4 MG: 2 INJECTION INTRAMUSCULAR; INTRAVENOUS at 11:56

## 2021-06-22 RX ADMIN — DEXAMETHASONE SODIUM PHOSPHATE 4 MG: 4 INJECTION, SOLUTION INTRAMUSCULAR; INTRAVENOUS at 11:54

## 2021-06-22 RX ADMIN — CEFAZOLIN SODIUM 2000 MG: 2 SOLUTION INTRAVENOUS at 11:51

## 2021-06-22 RX ADMIN — FENTANYL CITRATE 25 MCG: 50 INJECTION, SOLUTION INTRAMUSCULAR; INTRAVENOUS at 12:14

## 2021-06-22 RX ADMIN — PROPOFOL 200 MG: 10 INJECTION, EMULSION INTRAVENOUS at 11:54

## 2021-06-22 RX ADMIN — ONDANSETRON 4 MG: 2 INJECTION INTRAMUSCULAR; INTRAVENOUS at 13:09

## 2021-06-22 RX ADMIN — SODIUM CHLORIDE, SODIUM LACTATE, POTASSIUM CHLORIDE, AND CALCIUM CHLORIDE: .6; .31; .03; .02 INJECTION, SOLUTION INTRAVENOUS at 11:51

## 2021-06-22 RX ADMIN — FENTANYL CITRATE 25 MCG: 50 INJECTION, SOLUTION INTRAMUSCULAR; INTRAVENOUS at 12:07

## 2021-06-22 RX ADMIN — FENTANYL CITRATE 50 MCG: 50 INJECTION, SOLUTION INTRAMUSCULAR; INTRAVENOUS at 11:56

## 2021-06-22 RX ADMIN — LIDOCAINE HYDROCHLORIDE 50 MG: 10 INJECTION, SOLUTION EPIDURAL; INFILTRATION; INTRACAUDAL; PERINEURAL at 11:54

## 2021-06-22 RX ADMIN — FENTANYL CITRATE 50 MCG: 50 INJECTION, SOLUTION INTRAMUSCULAR; INTRAVENOUS at 13:18

## 2021-06-22 NOTE — ANESTHESIA PREPROCEDURE EVALUATION
Procedure:  Inguinal hernia repair (Right Groin)    Relevant Problems   CARDIO   (+) Essential hypertension   (+) Mixed hyperlipidemia      MUSCULOSKELETAL   (+) Cervical spondylosis   (+) DDD (degenerative disc disease), lumbar   (+) Degenerative disc disease, cervical   (+) Psoriatic arthritis (HCC)      PULMONARY   (+) Exercise-induced asthma        Physical Exam    Airway    Mallampati score: II  TM Distance: >3 FB  Neck ROM: full     Dental   No notable dental hx     Cardiovascular  Rhythm: regular, Rate: normal, Cardiovascular exam normal    Pulmonary  Pulmonary exam normal Breath sounds clear to auscultation,     Other Findings        Anesthesia Plan  ASA Score- 2     Anesthesia Type- general with ASA Monitors  Additional Monitors:   Airway Plan: LMA  Plan Factors-Exercise tolerance (METS): >4 METS  Chart reviewed  EKG reviewed  Existing labs reviewed  Patient is not a current smoker  Patient instructed to abstain from smoking on day of procedure  Patient did not smoke on day of surgery  There is medical exclusion for perioperative obstructive sleep apnea risk education  Induction- intravenous  Postoperative Plan- Plan for postoperative opioid use  Planned trial extubation    Informed Consent- Anesthetic plan and risks discussed with patient  I personally reviewed this patient with the CRNA  Discussed and agreed on the Anesthesia Plan with the CRNA  Aurelio Díaz

## 2021-06-22 NOTE — INTERVAL H&P NOTE
H&P reviewed  After examining the patient I find no changes in the patients condition since the H&P had been written      Vitals:    06/22/21 1033   BP: 148/90   Pulse: 80   Resp: 20   Temp: 98 °F (36 7 °C)   SpO2: 99%

## 2021-06-22 NOTE — ANESTHESIA POSTPROCEDURE EVALUATION
Post-Op Assessment Note    CV Status:  Stable  Pain Score: 0    Pain management: adequate     Mental Status:  Arousable   Hydration Status:  Stable   PONV Controlled:  None   Airway Patency:  Patent      Post Op Vitals Reviewed: Yes      Staff: Anesthesiologist, CRNA         No complications documented      /76 (06/22/21 1253)    Temp 98 °F (36 7 °C) (06/22/21 1253)    Pulse 86 (06/22/21 1253)   Resp 16 (06/22/21 1253)    SpO2 98 % (06/22/21 1253)

## 2021-06-22 NOTE — OP NOTE
OPERATIVE REPORT  PATIENT NAME: Minor Argueta    :  1960  MRN: [id number]  Pt Location: [facility] OR ROOM 01    SURGERY DATE: 2021    Surgeon(s) and Role:     * Becky Navarro MD - Primary     * Leila Ross PA-C - Assisting    Preop Diagnosis:  Right inguinal hernia [K40 90]    Post-Op Diagnosis Codes:     * Right inguinal hernia [K40 90]    Procedure(s) (LRB):  Inguinal hernia repair (Right)    Specimen(s):  * No specimens in log *    Estimated Blood Loss:   Minimal    Drains:  * No LDAs found *    Anesthesia Type:   General    Operative Indications:  Right inguinal hernia [K40 90]      Operative Findings:  Same    Complications:   None    Procedure and Technique:  The patient was identified by me and placed in the supine position upon the operating room table  General anesthesia was induced and an LMA was placed  Patient's lower abdomen and groin were prepped and draped in a normal surgical manner  Time-out was done  The incision as well as the pubic tubercle and the anterior superior iliac spine were marked  Local block is infiltrated with 1/4 Marcaine with epinephrine  I also did a deep block of the ilioinguinal nerve  Slightly oblique skin incision about 5 cm is made and carried down through subcutaneous tissue with the Bovie  Subcu and superficial fascia are cleaned down to the external oblique aponeurosis  Local was infiltrated underneath the external oblique and a nick was made  This was extended superior medially and inferior laterally and a wheat Loco Robbie was introduced  Dissection around the cord was now done and a Penrose drain was placed around the cord  There was minimal weakness of the floor noted  Dissection now commenced in the cord and I freed a fairly large lipoma of the cord  This was now pushed back underneath the internal ring and I put my finger in and freed enough to get a hernia systems mesh plug in place    The plug was soaked in antibiotics and then placed  The larger portion was opened underneath and the midportion was at the internal ring  The outer portion was opened and a nick placed laterally  This was now sewn in to the pubic tubercle to the Poupart's ligament and to the conjoined tendon/transversus  The slit was closed with 1 of the sutures  Wounds were irrigated and then closed external oblique was closed with running Vicryl followed by a few interrupted Vicryl in subcu followed by Monocryl in the skin  Exofin was then applied  Patient tolerated the procedure well    There was no qualified resident available, hence Shirlene VELA was the 1st assistant this was necessary for help in the dissection and retraction   I was present for the entire procedure    Patient Disposition:  PACU     SIGNATURE: Parmjit Jaeger MD  DATE: June 22, 2021  TIME: 12:41 PM

## 2021-06-29 RX ORDER — TOBRAMYCIN AND DEXAMETHASONE 3; 1 MG/ML; MG/ML
SUSPENSION/ DROPS OPHTHALMIC
COMMUNITY
Start: 2021-06-08 | End: 2022-08-04

## 2021-07-01 ENCOUNTER — OFFICE VISIT (OUTPATIENT)
Dept: SURGERY | Facility: CLINIC | Age: 61
End: 2021-07-01

## 2021-07-01 VITALS — WEIGHT: 185 LBS | BODY MASS INDEX: 25.9 KG/M2 | HEIGHT: 71 IN | RESPIRATION RATE: 18 BRPM

## 2021-07-01 DIAGNOSIS — K40.90 RIGHT INGUINAL HERNIA: ICD-10-CM

## 2021-07-01 DIAGNOSIS — Z12.11 ENCOUNTER FOR SCREENING COLONOSCOPY: Primary | ICD-10-CM

## 2021-07-01 PROCEDURE — 3008F BODY MASS INDEX DOCD: CPT | Performed by: SURGERY

## 2021-07-01 PROCEDURE — 99024 POSTOP FOLLOW-UP VISIT: CPT | Performed by: SURGERY

## 2021-07-01 NOTE — PATIENT INSTRUCTIONS
He is to increase his activity over the next month    He will then return and I will schedule colonoscopy and possibly a laparoscopic cholecystectomy

## 2021-07-01 NOTE — PROGRESS NOTES
First postoperative visit from a right inguinal hernia repair  The wound looks great and has no evidence of hematoma, seroma, infection or reherniation    I gave him the okay for increased activity

## 2021-07-22 ENCOUNTER — OFFICE VISIT (OUTPATIENT)
Dept: SURGERY | Facility: CLINIC | Age: 61
End: 2021-07-22

## 2021-07-22 VITALS
SYSTOLIC BLOOD PRESSURE: 138 MMHG | WEIGHT: 185.4 LBS | BODY MASS INDEX: 25.96 KG/M2 | HEIGHT: 71 IN | TEMPERATURE: 98.5 F | DIASTOLIC BLOOD PRESSURE: 90 MMHG

## 2021-07-22 DIAGNOSIS — K40.90 RIGHT INGUINAL HERNIA: Primary | ICD-10-CM

## 2021-07-22 PROCEDURE — 99024 POSTOP FOLLOW-UP VISIT: CPT | Performed by: SURGERY

## 2021-07-22 PROCEDURE — 3008F BODY MASS INDEX DOCD: CPT | Performed by: STUDENT IN AN ORGANIZED HEALTH CARE EDUCATION/TRAINING PROGRAM

## 2021-07-22 NOTE — PROGRESS NOTES
The patient is a 57-year-old male who has about a month out from a right inguinal hernia repair  He states that he gets occasional pain right underlying his scar  Of some note is the fact that he has been working out quite a bit since within a week after surgery  On exam the hernia repair is completely intact  There is no recurrence, hematoma, seroma or infection    I told him to take it a little easy for the next few weeks and then get back in touch with me so I can schedule his colonoscopy

## 2021-07-22 NOTE — PATIENT INSTRUCTIONS
Decreased activity for couple weeks to 1/2 to 1/3 the normal amount of exercise    Return in about 4 weeks to schedule colonoscopy
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
## 2021-08-05 ENCOUNTER — TELEMEDICINE (OUTPATIENT)
Dept: PSYCHIATRY | Facility: CLINIC | Age: 61
End: 2021-08-05
Payer: COMMERCIAL

## 2021-08-05 DIAGNOSIS — F90.2 ATTENTION DEFICIT HYPERACTIVITY DISORDER (ADHD), COMBINED TYPE: Primary | ICD-10-CM

## 2021-08-05 PROCEDURE — 1036F TOBACCO NON-USER: CPT | Performed by: STUDENT IN AN ORGANIZED HEALTH CARE EDUCATION/TRAINING PROGRAM

## 2021-08-05 PROCEDURE — 90833 PSYTX W PT W E/M 30 MIN: CPT | Performed by: STUDENT IN AN ORGANIZED HEALTH CARE EDUCATION/TRAINING PROGRAM

## 2021-08-05 PROCEDURE — 99214 OFFICE O/P EST MOD 30 MIN: CPT | Performed by: STUDENT IN AN ORGANIZED HEALTH CARE EDUCATION/TRAINING PROGRAM

## 2021-08-05 RX ORDER — METHYLPHENIDATE HYDROCHLORIDE 18 MG/1
18 TABLET ORAL DAILY
Qty: 30 TABLET | Refills: 0 | Status: SHIPPED | OUTPATIENT
Start: 2021-08-05 | End: 2021-11-05 | Stop reason: ALTCHOICE

## 2021-08-05 RX ORDER — METHYLPHENIDATE HYDROCHLORIDE 54 MG/1
54 TABLET ORAL DAILY
Qty: 30 TABLET | Refills: 0 | Status: SHIPPED | OUTPATIENT
Start: 2021-08-05 | End: 2021-11-05 | Stop reason: ALTCHOICE

## 2021-08-05 NOTE — BH TREATMENT PLAN
Treatment Plan done but not signed at time of office visit due to:  Plan reviewed with the patient during the virtual visit / in person and verbal consent given due to [address] distancing  TREATMENT PLAN (Medication Management Only)        Adi Monserrat MINOR    Name and Date of Birth:  Kimebrlee Baker 57 y o  1960  Date of Treatment Plan: August 5, 2021  Diagnosis/Diagnoses:    1  Attention deficit hyperactivity disorder (ADHD), combined type      Strengths/Personal Resources for Self-Care: supportive family  Area/Areas of need (in own words): ADHD symptoms  1  Long Term Goal: maintain control of ADHD symptoms  Target Date:4 months - 12/5/2021  Person/Persons responsible for completion of goal: Brando Vela  2  Short Term Objective (s) - How will we reach this goal?:   A  Provider new recommended medication/dosage changes and/or continue medication(s): continue current medications as prescribed  B  individual therapy  C  N/A  Target Date:4 months - 12/5/2021  Person/Persons Responsible for Completion of Goal: Brando Vela  Progress Towards Goals: continuing treatment  Treatment Modality: medication management every 4 months, continue psychotherapy with own therapist  Review due 180 days from date of this plan: 4 months - 12/5/2021  Expected length of service: maintenance  My Physician/PA/NP and I have developed this plan together and I agree to work on the goals and objectives  I understand the treatment goals that were developed for my treatment

## 2021-08-19 ENCOUNTER — TELEPHONE (OUTPATIENT)
Dept: PSYCHIATRY | Facility: CLINIC | Age: 61
End: 2021-08-19

## 2021-08-19 NOTE — TELEPHONE ENCOUNTER
8/19 @ 9:40  L/M for patient that I sent pre auth request to nurses along with his request of prior medication refill be sent so he does not run out

## 2021-08-19 NOTE — TELEPHONE ENCOUNTER
Initiated and sent P  A  request for Methylphenidate ER 18 mg (concerta) via navinet to [facility]      Will await outcome

## 2021-08-19 NOTE — TELEPHONE ENCOUNTER
8/19 @ 9:25 Patient called to inform pre- auth is needed for his  CONCERTA 18 MG and  CONCERTA 54 MG  Patient is requesting provider send another script for the medication he was on prior to the change so he does not run out of medication    Please advise

## 2021-08-19 NOTE — TELEPHONE ENCOUNTER
Initiated and sent P  A  request for Methylphenidate ER 54 mg (Concerta) via [employer] to [facility]      Received instant approval through Cover my meds        On hold with pharmacy for 20 minutes  Ended call  Luis Del Rosario aware

## 2021-08-19 NOTE — TELEPHONE ENCOUNTER
Received APPROVAL of Methylphenidate ER 18 mg in Cover my meds  Will await other dosage determination        On hold with pharmacy for 20 minutes  Ended call  Marcela Murillo aware

## 2021-08-20 DIAGNOSIS — F51.01 PRIMARY INSOMNIA: ICD-10-CM

## 2021-08-20 DIAGNOSIS — E78.2 MIXED HYPERLIPIDEMIA: ICD-10-CM

## 2021-08-20 RX ORDER — ZALEPLON 10 MG/1
10 CAPSULE ORAL
Qty: 30 CAPSULE | Refills: 1 | Status: SHIPPED | OUTPATIENT
Start: 2021-08-20 | End: 2021-11-05 | Stop reason: SDUPTHER

## 2021-08-20 RX ORDER — ATORVASTATIN CALCIUM 40 MG/1
40 TABLET, FILM COATED ORAL DAILY
Qty: 90 TABLET | Refills: 3 | Status: SHIPPED | OUTPATIENT
Start: 2021-08-20 | End: 2022-08-08 | Stop reason: SDUPTHER
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
## 2021-09-21 ENCOUNTER — TELEMEDICINE (OUTPATIENT)
Dept: PSYCHIATRY | Facility: CLINIC | Age: 61
End: 2021-09-21
Payer: COMMERCIAL

## 2021-09-21 DIAGNOSIS — F90.2 ATTENTION DEFICIT HYPERACTIVITY DISORDER (ADHD), COMBINED TYPE: Primary | ICD-10-CM

## 2021-09-21 PROCEDURE — 99213 OFFICE O/P EST LOW 20 MIN: CPT | Performed by: STUDENT IN AN ORGANIZED HEALTH CARE EDUCATION/TRAINING PROGRAM

## 2021-09-21 PROCEDURE — 1036F TOBACCO NON-USER: CPT | Performed by: STUDENT IN AN ORGANIZED HEALTH CARE EDUCATION/TRAINING PROGRAM

## 2021-09-21 PROCEDURE — 90833 PSYTX W PT W E/M 30 MIN: CPT | Performed by: STUDENT IN AN ORGANIZED HEALTH CARE EDUCATION/TRAINING PROGRAM

## 2021-10-04 ENCOUNTER — TELEPHONE (OUTPATIENT)
Dept: PSYCHIATRY | Facility: CLINIC | Age: 61
End: 2021-10-04

## 2021-11-05 ENCOUNTER — PATIENT MESSAGE (OUTPATIENT)
Dept: INTERNAL MEDICINE CLINIC | Facility: CLINIC | Age: 61
End: 2021-11-05

## 2021-11-05 ENCOUNTER — TELEPHONE (OUTPATIENT)
Dept: PSYCHIATRY | Facility: CLINIC | Age: 61
End: 2021-11-05

## 2021-11-05 DIAGNOSIS — Z12.11 SCREENING FOR COLON CANCER: Primary | ICD-10-CM

## 2021-11-05 DIAGNOSIS — L40.50 PSORIATIC ARTHRITIS (HCC): ICD-10-CM

## 2021-11-05 DIAGNOSIS — F51.01 PRIMARY INSOMNIA: ICD-10-CM

## 2021-11-05 DIAGNOSIS — F90.2 ATTENTION DEFICIT HYPERACTIVITY DISORDER (ADHD), COMBINED TYPE: Primary | ICD-10-CM

## 2021-11-05 DIAGNOSIS — R21 RASH: Primary | ICD-10-CM

## 2021-11-05 DIAGNOSIS — T78.40XS ALLERGY, SEQUELA: ICD-10-CM

## 2021-11-05 RX ORDER — EPINEPHRINE 0.3 MG/.3ML
0.3 INJECTION SUBCUTANEOUS ONCE
Qty: 1 EACH | Refills: 1 | Status: SHIPPED | OUTPATIENT
Start: 2021-11-05 | End: 2021-11-05

## 2021-11-05 RX ORDER — METHYLPHENIDATE HYDROCHLORIDE 20 MG/1
TABLET ORAL
Qty: 75 TABLET | Refills: 0 | Status: SHIPPED | OUTPATIENT
Start: 2021-11-05 | End: 2021-12-02 | Stop reason: SDUPTHER

## 2021-11-05 RX ORDER — ZALEPLON 10 MG/1
10 CAPSULE ORAL
Qty: 30 CAPSULE | Refills: 1 | Status: SHIPPED | OUTPATIENT
Start: 2021-11-05 | End: 2022-06-14 | Stop reason: SDUPTHER

## 2021-11-05 RX ORDER — CLOTRIMAZOLE AND BETAMETHASONE DIPROPIONATE 10; .64 MG/G; MG/G
CREAM TOPICAL 2 TIMES DAILY
Qty: 30 G | Refills: 5 | Status: SHIPPED | OUTPATIENT
Start: 2021-11-05

## 2021-11-05 RX ORDER — KETOCONAZOLE 20 MG/G
CREAM TOPICAL DAILY PRN
Qty: 15 G | Refills: 5 | Status: SHIPPED | OUTPATIENT
Start: 2021-11-05

## 2021-12-02 DIAGNOSIS — F90.2 ATTENTION DEFICIT HYPERACTIVITY DISORDER (ADHD), COMBINED TYPE: ICD-10-CM

## 2021-12-02 RX ORDER — METHYLPHENIDATE HYDROCHLORIDE 20 MG/1
TABLET ORAL
Qty: 75 TABLET | Refills: 0 | Status: SHIPPED | OUTPATIENT
Start: 2021-12-07 | End: 2021-12-28 | Stop reason: SDUPTHER

## 2021-12-28 ENCOUNTER — TELEMEDICINE (OUTPATIENT)
Dept: PSYCHIATRY | Facility: CLINIC | Age: 61
End: 2021-12-28
Payer: COMMERCIAL

## 2021-12-28 DIAGNOSIS — F90.2 ATTENTION DEFICIT HYPERACTIVITY DISORDER (ADHD), COMBINED TYPE: Primary | ICD-10-CM

## 2021-12-28 PROCEDURE — 1036F TOBACCO NON-USER: CPT | Performed by: STUDENT IN AN ORGANIZED HEALTH CARE EDUCATION/TRAINING PROGRAM

## 2021-12-28 PROCEDURE — 99213 OFFICE O/P EST LOW 20 MIN: CPT | Performed by: STUDENT IN AN ORGANIZED HEALTH CARE EDUCATION/TRAINING PROGRAM

## 2021-12-28 PROCEDURE — 90833 PSYTX W PT W E/M 30 MIN: CPT | Performed by: STUDENT IN AN ORGANIZED HEALTH CARE EDUCATION/TRAINING PROGRAM

## 2021-12-28 RX ORDER — METHYLPHENIDATE HYDROCHLORIDE 20 MG/1
TABLET ORAL
Qty: 75 TABLET | Refills: 0 | Status: SHIPPED | OUTPATIENT
Start: 2022-01-14 | End: 2022-02-28 | Stop reason: SDUPTHER

## 2022-02-28 ENCOUNTER — TELEPHONE (OUTPATIENT)
Dept: PSYCHIATRY | Facility: CLINIC | Age: 62
End: 2022-02-28

## 2022-02-28 DIAGNOSIS — F90.2 ATTENTION DEFICIT HYPERACTIVITY DISORDER (ADHD), COMBINED TYPE: ICD-10-CM

## 2022-02-28 RX ORDER — METHYLPHENIDATE HYDROCHLORIDE 20 MG/1
TABLET ORAL
Qty: 75 TABLET | Refills: 0 | Status: SHIPPED | OUTPATIENT
Start: 2022-02-28 | End: 2022-03-01 | Stop reason: ALTCHOICE

## 2022-02-28 NOTE — TELEPHONE ENCOUNTER
Patient called and lvm regarding medication change  Patient is requesting his "old prescription," which he indicated was a 10 mg tablet 5 times a day  Patient stated that 20 mg tablets are not breaking cleanly resulting in different dosages each time

## 2022-02-28 NOTE — TELEPHONE ENCOUNTER
Patient called and left a message regarding medication refilled but didn't clarify medication   Writerreturned Pt Phone called and phone went to [facility]

## 2022-02-28 NOTE — TELEPHONE ENCOUNTER
PMDP reviewed  30 day supply of Ritalin was sent to the patient's preferred pharmacy [facility]  on [address], [facility], [address])

## 2022-03-01 ENCOUNTER — TELEPHONE (OUTPATIENT)
Dept: PSYCHIATRY | Facility: CLINIC | Age: 62
End: 2022-03-01

## 2022-03-01 RX ORDER — METHYLPHENIDATE HYDROCHLORIDE 10 MG/1
TABLET ORAL
Qty: 150 TABLET | Refills: 0 | Status: SHIPPED | OUTPATIENT
Start: 2022-03-01 | End: 2022-05-02 | Stop reason: SDUPTHER

## 2022-03-01 NOTE — TELEPHONE ENCOUNTER
PMDP reviewed   30 day supply of Ritalin was sent to the preferred pharmacy to take 2 tablet (20 mg) in the morning, 2 tablet (20 mg) mid-day and 1 tablet (10 mg) in the afternoon

## 2022-05-02 DIAGNOSIS — F90.2 ATTENTION DEFICIT HYPERACTIVITY DISORDER (ADHD), COMBINED TYPE: ICD-10-CM

## 2022-05-03 RX ORDER — METHYLPHENIDATE HYDROCHLORIDE 10 MG/1
TABLET ORAL
Qty: 150 TABLET | Refills: 0 | Status: SHIPPED | OUTPATIENT
Start: 2022-05-03 | End: 2022-06-02 | Stop reason: SDUPTHER

## 2022-06-02 DIAGNOSIS — F90.2 ATTENTION DEFICIT HYPERACTIVITY DISORDER (ADHD), COMBINED TYPE: ICD-10-CM

## 2022-06-02 RX ORDER — METHYLPHENIDATE HYDROCHLORIDE 10 MG/1
TABLET ORAL
Qty: 150 TABLET | Refills: 0 | Status: SHIPPED | OUTPATIENT
Start: 2022-06-02 | End: 2022-06-14 | Stop reason: SDUPTHER
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
## 2022-06-14 ENCOUNTER — TELEMEDICINE (OUTPATIENT)
Dept: PSYCHIATRY | Facility: CLINIC | Age: 62
End: 2022-06-14
Payer: COMMERCIAL

## 2022-06-14 DIAGNOSIS — F90.2 ATTENTION DEFICIT HYPERACTIVITY DISORDER (ADHD), COMBINED TYPE: Primary | ICD-10-CM

## 2022-06-14 DIAGNOSIS — F51.01 PRIMARY INSOMNIA: ICD-10-CM

## 2022-06-14 PROCEDURE — 90833 PSYTX W PT W E/M 30 MIN: CPT | Performed by: STUDENT IN AN ORGANIZED HEALTH CARE EDUCATION/TRAINING PROGRAM

## 2022-06-14 PROCEDURE — 99214 OFFICE O/P EST MOD 30 MIN: CPT | Performed by: STUDENT IN AN ORGANIZED HEALTH CARE EDUCATION/TRAINING PROGRAM

## 2022-06-14 PROCEDURE — 1036F TOBACCO NON-USER: CPT | Performed by: STUDENT IN AN ORGANIZED HEALTH CARE EDUCATION/TRAINING PROGRAM

## 2022-06-14 RX ORDER — ZALEPLON 10 MG/1
10 CAPSULE ORAL
Qty: 30 CAPSULE | Refills: 1 | Status: SHIPPED | OUTPATIENT
Start: 2022-06-14

## 2022-06-14 RX ORDER — GUSELKUMAB 100 MG/ML
100 INJECTION SUBCUTANEOUS
COMMUNITY
Start: 2022-03-15

## 2022-06-14 RX ORDER — METHYLPHENIDATE HYDROCHLORIDE 10 MG/1
TABLET ORAL
Qty: 150 TABLET | Refills: 0 | Status: SHIPPED | OUTPATIENT
Start: 2022-06-14 | End: 2022-06-28 | Stop reason: SDUPTHER

## 2022-06-14 NOTE — BH TREATMENT PLAN
Treatment Plan done but not signed at time of office visit due to:  Plan reviewed with the patient during the virtual visit and verbal consent given  TREATMENT PLAN (Medication Management Only)        Adi Monserrat [facility]    Name and Date of Birth:  Reese Fam 60 y o  1960  Date of Treatment Plan: June 14, 2022  Diagnosis/Diagnoses:    1  Attention deficit hyperactivity disorder (ADHD), combined type    2  Primary insomnia      Strengths/Personal Resources for Self-Care: "supportive family, hobbies and access to therapist"  Area/Areas of need (in own words): "ADHD sxs and insomnia"  1  Long Term Goal: maintain control of ADHD symptoms and insomnia  Target Date:6 months - 12/14/2022  Person/Persons responsible for completion of goal: Shad Mcneil  2  Short Term Objective (s) - How will we reach this goal?:   A  Provider new recommended medication/dosage changes and/or continue medication(s): continue current medications as prescribed  B  individual therapy  C  Attend psychotherapy regularly  Target Date:6 months - 12/14/2022  Person/Persons Responsible for Completion of Goal: Shad Mcneil  Progress Towards Goals: continuing treatment  Treatment Modality: medication management every 6 months, continue psychotherapy with own therapist  Review due 180 days from date of this plan: 6 months - 12/14/2022  Expected length of service: maintenance  My Physician/PA/NP and I have developed this plan together and I agree to work on the goals and objectives  I understand the treatment goals that were developed for my treatment

## 2022-06-20 ENCOUNTER — APPOINTMENT (OUTPATIENT)
Dept: LAB | Facility: HOSPITAL | Age: 62
End: 2022-06-20
Payer: COMMERCIAL

## 2022-06-20 DIAGNOSIS — L40.50 PSORIATIC ARTHROPATHY (HCC): ICD-10-CM

## 2022-06-20 DIAGNOSIS — Z51.81 ENCOUNTER FOR THERAPEUTIC DRUG MONITORING: ICD-10-CM

## 2022-06-20 LAB
ALBUMIN SERPL BCP-MCNC: 4.6 G/DL (ref 3.5–5)
ALP SERPL-CCNC: 58 U/L (ref 34–104)
ALT SERPL W P-5'-P-CCNC: 27 U/L (ref 7–52)
ANION GAP SERPL CALCULATED.3IONS-SCNC: 6 MMOL/L (ref 4–13)
AST SERPL W P-5'-P-CCNC: 21 U/L (ref 13–39)
BILIRUB SERPL-MCNC: 0.9 MG/DL (ref 0.2–1)
BUN SERPL-MCNC: 16 MG/DL (ref 5–25)
CALCIUM SERPL-MCNC: 9.5 MG/DL (ref 8.4–10.2)
CHLORIDE SERPL-SCNC: 103 MMOL/L (ref 96–108)
CO2 SERPL-SCNC: 29 MMOL/L (ref 21–32)
CREAT SERPL-MCNC: 0.91 MG/DL (ref 0.6–1.3)
CRP SERPL QL: <1 MG/L
ERYTHROCYTE [SEDIMENTATION RATE] IN BLOOD: 13 MM/HOUR (ref 0–20)
GFR SERPL CREATININE-BSD FRML MDRD: 89 ML/MIN/1.73SQ M
GLUCOSE P FAST SERPL-MCNC: 89 MG/DL (ref 65–99)
POTASSIUM SERPL-SCNC: 4.2 MMOL/L (ref 3.5–5.3)
PROT SERPL-MCNC: 7.7 G/DL (ref 6.4–8.4)
SODIUM SERPL-SCNC: 138 MMOL/L (ref 135–147)

## 2022-06-20 PROCEDURE — 85652 RBC SED RATE AUTOMATED: CPT

## 2022-06-20 PROCEDURE — 80053 COMPREHEN METABOLIC PANEL: CPT

## 2022-06-20 PROCEDURE — 86480 TB TEST CELL IMMUN MEASURE: CPT

## 2022-06-20 PROCEDURE — 36415 COLL VENOUS BLD VENIPUNCTURE: CPT

## 2022-06-20 PROCEDURE — 86140 C-REACTIVE PROTEIN: CPT

## 2022-06-21 LAB
GAMMA INTERFERON BACKGROUND BLD IA-ACNC: 0.05 IU/ML
M TB IFN-G BLD-IMP: NEGATIVE
M TB IFN-G CD4+ BCKGRND COR BLD-ACNC: -0.02 IU/ML
M TB IFN-G CD4+ BCKGRND COR BLD-ACNC: -0.02 IU/ML
MITOGEN IGNF BCKGRD COR BLD-ACNC: >10 IU/ML

## 2022-06-28 DIAGNOSIS — F90.2 ATTENTION DEFICIT HYPERACTIVITY DISORDER (ADHD), COMBINED TYPE: ICD-10-CM

## 2022-06-28 RX ORDER — METHYLPHENIDATE HYDROCHLORIDE 10 MG/1
TABLET ORAL
Qty: 150 TABLET | Refills: 0 | Status: SHIPPED | OUTPATIENT
Start: 2022-06-28 | End: 2022-07-28 | Stop reason: SDUPTHER

## 2022-06-28 NOTE — TELEPHONE ENCOUNTER
Patient called and requested to re sent the script for this medication  He states the pharmacy did not able to provide him the medication last refill request because the pharmacy run out of that medication  Pharmacy told patient that they should able to have that medication starting  today 6/28  Patient added he has been skipping taking the medication to make it last until yesterday

## 2022-06-30 LAB — COLOGUARD RESULT REPORTABLE: NEGATIVE

## 2022-07-26 ENCOUNTER — TELEPHONE (OUTPATIENT)
Dept: PSYCHIATRY | Facility: CLINIC | Age: 62
End: 2022-07-26

## 2022-07-26 NOTE — TELEPHONE ENCOUNTER
LVM  Dr Kristan Hebert will be in a meeting on 11/30 at 4:00  Moved patient's appt to 12/1 at 4:00  If this is not acceptable to patient, please transfer the call to me

## 2022-07-28 DIAGNOSIS — F90.2 ATTENTION DEFICIT HYPERACTIVITY DISORDER (ADHD), COMBINED TYPE: ICD-10-CM

## 2022-07-28 RX ORDER — METHYLPHENIDATE HYDROCHLORIDE 10 MG/1
TABLET ORAL
Qty: 150 TABLET | Refills: 0 | Status: SHIPPED | OUTPATIENT
Start: 2022-07-29 | End: 2022-08-29 | Stop reason: SDUPTHER

## 2022-07-29 ENCOUNTER — TELEPHONE (OUTPATIENT)
Dept: PSYCHIATRY | Facility: CLINIC | Age: 62
End: 2022-07-29

## 2022-07-29 NOTE — TELEPHONE ENCOUNTER
Completed the already initiated Methylphenidate 10 mg tab PA via navinet, utilizing the given key, for [facility], marking it as "EXPEDITED "    Spoke to Rolf Lorenz to notify him of the PA request and Rolf Lorenz states that he utilizes [facility] for this medication, as the insurance never approves it  Rolf Lorenz asks that Dr Deedee Ríos send the prescription to the [address] at [facility] in [place]  Dr Deedee Ríos, please advise

## 2022-07-29 NOTE — TELEPHONE ENCOUNTER
Received fax from pharmacy via [employer] requesting completion of an already initiated PA for Methylphenidate 10 mg    KEY [id number]

## 2022-07-29 NOTE — TELEPHONE ENCOUNTER
Noticed in coverMyMeds that this was approved, with the message:    Approvedtoday   Request Reference Number: [id number]  METHYLPHENID TAB 10MG is approved through 07/29/2023  Your patient may now fill this prescription and it will be covered  Printed the approval letter  Spoke to the [facility] pharmacist who states that the approval will be cheaper than what Manpreet Ordonez was paying for this while utilizing [facility]  Spoke to Manpreet Ordonez to inform him of the approval and to really the information from the pharmacist   Manpreet Ordonez was very appreciative  For your review  Will scan to Media

## 2022-08-04 ENCOUNTER — OFFICE VISIT (OUTPATIENT)
Dept: INTERNAL MEDICINE CLINIC | Facility: CLINIC | Age: 62
End: 2022-08-04
Payer: COMMERCIAL

## 2022-08-04 VITALS
HEART RATE: 81 BPM | HEIGHT: 71 IN | BODY MASS INDEX: 25.51 KG/M2 | OXYGEN SATURATION: 98 % | WEIGHT: 182.2 LBS | DIASTOLIC BLOOD PRESSURE: 86 MMHG | SYSTOLIC BLOOD PRESSURE: 142 MMHG

## 2022-08-04 DIAGNOSIS — L40.50 PSORIATIC ARTHRITIS (HCC): ICD-10-CM

## 2022-08-04 DIAGNOSIS — Z71.84 ENCOUNTER FOR COUNSELING FOR TRAVEL: ICD-10-CM

## 2022-08-04 DIAGNOSIS — Z00.00 WELLNESS EXAMINATION: ICD-10-CM

## 2022-08-04 DIAGNOSIS — J45.990 EXERCISE-INDUCED ASTHMA: ICD-10-CM

## 2022-08-04 DIAGNOSIS — F90.2 ATTENTION DEFICIT HYPERACTIVITY DISORDER (ADHD), COMBINED TYPE: ICD-10-CM

## 2022-08-04 DIAGNOSIS — H91.90 HEARING LOSS, UNSPECIFIED HEARING LOSS TYPE, UNSPECIFIED LATERALITY: ICD-10-CM

## 2022-08-04 DIAGNOSIS — R25.2 MUSCLE CRAMPS: ICD-10-CM

## 2022-08-04 DIAGNOSIS — E78.2 MIXED HYPERLIPIDEMIA: Primary | ICD-10-CM

## 2022-08-04 DIAGNOSIS — Z12.5 SCREENING FOR PROSTATE CANCER: ICD-10-CM

## 2022-08-04 DIAGNOSIS — E55.9 VITAMIN D DEFICIENCY: ICD-10-CM

## 2022-08-04 DIAGNOSIS — H93.13 TINNITUS OF BOTH EARS: ICD-10-CM

## 2022-08-04 DIAGNOSIS — Z11.4 SCREENING FOR HIV (HUMAN IMMUNODEFICIENCY VIRUS): ICD-10-CM

## 2022-08-04 DIAGNOSIS — R10.11 RIGHT UPPER QUADRANT ABDOMINAL PAIN: ICD-10-CM

## 2022-08-04 DIAGNOSIS — I10 ESSENTIAL HYPERTENSION: ICD-10-CM

## 2022-08-04 PROCEDURE — 99396 PREV VISIT EST AGE 40-64: CPT | Performed by: INTERNAL MEDICINE

## 2022-08-04 PROCEDURE — 99214 OFFICE O/P EST MOD 30 MIN: CPT | Performed by: INTERNAL MEDICINE

## 2022-08-04 PROCEDURE — 3725F SCREEN DEPRESSION PERFORMED: CPT | Performed by: INTERNAL MEDICINE

## 2022-08-04 RX ORDER — AZITHROMYCIN 250 MG/1
250 TABLET, FILM COATED ORAL EVERY 24 HOURS
Qty: 6 TABLET | Refills: 0 | Status: SHIPPED | OUTPATIENT
Start: 2022-08-04 | End: 2022-08-08 | Stop reason: SDUPTHER

## 2022-08-04 NOTE — PATIENT INSTRUCTIONS
Problem List Items Addressed This Visit          Respiratory    Exercise-induced asthma     Albuterol refilled           Relevant Medications    Albuterol Sulfate 108 (90 Base) MCG/ACT AEPB       Cardiovascular and Mediastinum    Essential hypertension     Slightly elevated, continue monitoring, pt does have a lot of stress              Musculoskeletal and Integument    Psoriatic arthritis [facility])     Patient follows with Rheumatology              Other    Attention deficit hyperactivity disorder (ADHD), combined type     Follow up psych           Mixed hyperlipidemia - Primary     Discussed with patient the possibility of a trial off atorvastatin to see if his muscle cramps could be related to this medication  He will 1st try adding coenzyme Q10 100 mg daily which he was on in the past           Relevant Orders    CBC and differential    Lipid Panel with Direct LDL reflex    TSH, 3rd generation with Free T4 reflex    Right upper quadrant abdominal pain     Will get ultrasound of the abdomen which was ordered in the past, will reorder  Discussed with patient that if this is normal, we can do further workup with a HIDA scan, or recheck ultrasound during 1 of his episodes           Relevant Orders    US abdomen complete    Vitamin D deficiency    Relevant Orders    Vitamin D 25 hydroxy    Muscle cramps     Check muscle enzyme and electrolytes  Patient is careful to stay hydrated and take electrolytes when he is undergoing a lot of exertion  Discussed stretching after exertion  I also recommend patient take coenzyme Q10 to see if he gets relief    Also discussed doing a trial off atorvastatin if not improving to see if he is just having muscle cramps and aches as an adverse side effect to this medication           Relevant Orders    CK    Tinnitus of both ears     I recommend eval with ENT to include hearing test            Relevant Orders    Ambulatory Referral to Otolaryngology    Ambulatory Referral to Audiology Wellness examination     Discussed preventative health, cancer screening, immunizations, and safety issues  Patient up-to-date with colorectal cancer screening with Cologuard done June 4, 2022 with recommendations to recheck again in 3 years  Patient has had the Shingrix vaccines  Patient up-to-date with Tdap vaccination June 26, 2017  Patient's last PSA was in 2019, recommend rechecking                     Other Visit Diagnoses       Screening for HIV (human immunodeficiency virus)        Relevant Orders    HIV 1/2 Antigen/Antibody (4th Generation) w Reflex [facility]    Screening for prostate cancer        Relevant Orders    PSA, Total Screen    Hearing loss, unspecified hearing loss type, unspecified laterality        Relevant Orders    Ambulatory Referral to Otolaryngology    Ambulatory Referral to Audiology    Encounter for counseling for travel        Relevant Medications    azithromycin (ZITHROMAX) 250 mg tablet

## 2022-08-04 NOTE — ASSESSMENT & PLAN NOTE
Check muscle enzyme and electrolytes  Patient is careful to stay hydrated and take electrolytes when he is undergoing a lot of exertion  Discussed stretching after exertion  I also recommend patient take coenzyme Q10 to see if he gets relief    Also discussed doing a trial off atorvastatin if not improving to see if he is just having muscle cramps and aches as an adverse side effect to this medication

## 2022-08-04 NOTE — ASSESSMENT & PLAN NOTE
Discussed with patient the possibility of a trial off atorvastatin to see if his muscle cramps could be related to this medication    He will 1st try adding coenzyme Q10 100 mg daily which he was on in the past

## 2022-08-04 NOTE — PROGRESS NOTES
Assessment/Plan:    Muscle cramps  Check muscle enzyme and electrolytes  Patient is careful to stay hydrated and take electrolytes when he is undergoing a lot of exertion  Discussed stretching after exertion  I also recommend patient take coenzyme Q10 to see if he gets relief  Also discussed doing a trial off atorvastatin if not improving to see if he is just having muscle cramps and aches as an adverse side effect to this medication    Mixed hyperlipidemia  Discussed with patient the possibility of a trial off atorvastatin to see if his muscle cramps could be related to this medication  He will 1st try adding coenzyme Q10 100 mg daily which he was on in the past    Essential hypertension  Slightly elevated, continue monitoring, pt does have a lot of stress    Psoriatic arthritis [facility])  Patient follows with Rheumatology    Tinnitus of both ears  I recommend eval with ENT to include hearing test     Exercise-induced asthma  Albuterol refilled    Attention deficit hyperactivity disorder (ADHD), combined type  Follow up psych    Right upper quadrant abdominal pain  Will get ultrasound of the abdomen which was ordered in the past, will reorder  Discussed with patient that if this is normal, we can do further workup with a HIDA scan, or recheck ultrasound during 1 of his episodes    Wellness examination  Discussed preventative health, cancer screening, immunizations, and safety issues  Patient up-to-date with colorectal cancer screening with Cologuard done June 4, 2022 with recommendations to recheck again in 3 years  Patient has had the Shingrix vaccines  Patient up-to-date with Tdap vaccination June 26, 2017  Patient's last PSA was in 2019, recommend rechecking  BMI Counseling: Body mass index is 25 41 kg/m²  The BMI is above normal  Nutrition recommendations include encouraging healthy choices of fruits and vegetables and moderation in carbohydrate intake   Exercise recommendations include exercising 3-5 times per week  Rationale for BMI follow-up plan is due to patient being overweight or obese  Depression Screening and Follow-up Plan: Patient was screened for depression during today's encounter  They screened negative with a PHQ-2 score of 2  Diagnoses and all orders for this visit:    Mixed hyperlipidemia  -     CBC and differential; Future  -     Lipid Panel with Direct LDL reflex; Future  -     TSH, 3rd generation with Free T4 reflex; Future    Screening for HIV (human immunodeficiency virus)  -     HIV 1/2 Antigen/Antibody (4th Generation) w Reflex [facility]; Future    Muscle cramps  -     CK; Future    Essential hypertension    Psoriatic arthritis (HCC)    Tinnitus of both ears  -     Ambulatory Referral to Otolaryngology; Future  -     Ambulatory Referral to Audiology; Future    Screening for prostate cancer  -     PSA, Total Screen; Future    Vitamin D deficiency  -     Vitamin D 25 hydroxy; Future    Hearing loss, unspecified hearing loss type, unspecified laterality  -     Ambulatory Referral to Otolaryngology; Future  -     Ambulatory Referral to Audiology; Future    Right upper quadrant abdominal pain  -     US abdomen complete; Future    Exercise-induced asthma  -     Albuterol Sulfate 108 (90 Base) MCG/ACT AEPB; Inhale 1 puff every 8 (eight) hours as needed (wheezing)    Attention deficit hyperactivity disorder (ADHD), combined type    Encounter for counseling for travel  -     azithromycin (ZITHROMAX) 250 mg tablet; Take 1 tablet (250 mg total) by mouth every 24 hours for 5 days 2 tabs first day, then one tab daily    Wellness examination        Subjective:      Patient ID: Edmundo Holbrook is a 60 y o  male  Wellness:  Patient sees a dentist regularly, no smoking cigarettes, he is active, eats healthy diet    Abdominal Pain  Associated symptoms include myalgias   Pertinent negatives include no arthralgias, constipation, diarrhea, dysuria, fever, headaches, hematuria, nausea or vomiting  Elbow Pain  Associated symptoms include abdominal pain and myalgias  Pertinent negatives include no arthralgias, chest pain, chills, congestion, coughing, fatigue, fever, headaches, nausea, rash, sore throat or vomiting  The following portions of the patient's history were reviewed and updated as appropriate: allergies, current medications, past family history, past medical history, past social history, past surgical history and problem list     Review of Systems   Constitutional: Negative for chills, fatigue and fever  HENT: Positive for hearing loss and tinnitus  Negative for congestion, nosebleeds, postnasal drip, sore throat and trouble swallowing  Eyes: Negative for pain  Respiratory: Negative for cough, chest tightness, shortness of breath and wheezing  Cardiovascular: Negative for chest pain, palpitations and leg swelling  Gastrointestinal: Positive for abdominal pain  Negative for constipation, diarrhea, nausea and vomiting  Endocrine: Negative for polydipsia and polyuria  Genitourinary: Negative for dysuria, flank pain and hematuria  Musculoskeletal: Positive for myalgias  Negative for arthralgias  Skin: Negative for rash  Neurological: Negative for dizziness, tremors, light-headedness and headaches  Hematological: Does not bruise/bleed easily  Psychiatric/Behavioral: Negative for confusion and dysphoric mood  The patient is not nervous/anxious  Objective:      /86 (BP Location: Left arm, Patient Position: Sitting, Cuff Size: Standard)   Pulse 81   Ht 5' 11" (1 803 m)   Wt 82 6 kg (182 lb 3 2 oz)   SpO2 98%   BMI 25 41 kg/m²          Physical Exam  Vitals reviewed  Constitutional:       General: He is not in acute distress  Appearance: Normal appearance  He is well-developed  HENT:      Head: Normocephalic and atraumatic  Right Ear: External ear normal       Left Ear: External ear normal    Eyes:      General: No scleral icterus  Conjunctiva/sclera: Conjunctivae normal    Neck:      Thyroid: No thyromegaly  Trachea: No tracheal deviation  Cardiovascular:      Rate and Rhythm: Normal rate and regular rhythm  Heart sounds: Normal heart sounds  No murmur heard  Pulmonary:      Effort: Pulmonary effort is normal  No respiratory distress  Breath sounds: Normal breath sounds  No wheezing or rales  Abdominal:      General: Bowel sounds are normal       Palpations: Abdomen is soft  Tenderness: There is no abdominal tenderness  There is no guarding or rebound  Hernia: There is no hernia in the left inguinal area or right inguinal area  Genitourinary:     Testes: Normal    Musculoskeletal:      Cervical back: Normal range of motion and neck supple  Right lower leg: No edema  Left lower leg: No edema  Lymphadenopathy:      Cervical: No cervical adenopathy  Skin:     Coloration: Skin is not jaundiced or pale  Neurological:      General: No focal deficit present  Mental Status: He is alert and oriented to person, place, and time  Psychiatric:         Mood and Affect: Mood normal          Behavior: Behavior normal          Thought Content:  Thought content normal          Judgment: Judgment normal

## 2022-08-04 NOTE — ASSESSMENT & PLAN NOTE
Will get ultrasound of the abdomen which was ordered in the past, will reorder    Discussed with patient that if this is normal, we can do further workup with a HIDA scan, or recheck ultrasound during 1 of his episodes

## 2022-08-04 NOTE — ASSESSMENT & PLAN NOTE
Discussed preventative health, cancer screening, immunizations, and safety issues  Patient up-to-date with colorectal cancer screening with Cologuard done June 4, 2022 with recommendations to recheck again in 3 years  Patient has had the Shingrix vaccines  Patient up-to-date with Tdap vaccination June 26, 2017  Patient's last PSA was in 2019, recommend rechecking

## 2022-08-04 NOTE — PROGRESS NOTES
Assessment/Plan:    Muscle cramps  Check muscle enzyme and electrolytes  Patient is careful to stay hydrated and take electrolytes when he is undergoing a lot of exertion  Discussed stretching after exertion  I also recommend patient take coenzyme Q10 to see if he gets relief  Also discussed doing a trial off atorvastatin if not improving to see if he is just having muscle cramps and aches as an adverse side effect to this medication    Mixed hyperlipidemia  Discussed with patient the possibility of a trial off atorvastatin to see if his muscle cramps could be related to this medication  He will 1st try adding coenzyme Q10 100 mg daily which he was on in the past    Essential hypertension  Slightly elevated, continue monitoring, pt does have a lot of stress    Psoriatic arthritis [facility])  Patient follows with Rheumatology    Tinnitus of both ears  I recommend eval with ENT to include hearing test     Exercise-induced asthma  Albuterol refilled    Attention deficit hyperactivity disorder (ADHD), combined type  Follow up psych    Right upper quadrant abdominal pain  Will get ultrasound of the abdomen which was ordered in the past, will reorder  Discussed with patient that if this is normal, we can do further workup with a HIDA scan, or recheck ultrasound during 1 of his episodes    Wellness examination  Discussed preventative health, cancer screening, immunizations, and safety issues  Patient up-to-date with colorectal cancer screening with Cologuard done June 4, 2022 with recommendations to recheck again in 3 years  Patient has had the Shingrix vaccines  Patient up-to-date with Tdap vaccination June 26, 2017  Patient's last PSA was in 2019, recommend rechecking  Diagnoses and all orders for this visit:    Mixed hyperlipidemia  -     CBC and differential; Future  -     Lipid Panel with Direct LDL reflex; Future  -     TSH, 3rd generation with Free T4 reflex;  Future    Screening for HIV (human immunodeficiency virus)  -     HIV 1/2 Antigen/Antibody (4th Generation) w Reflex [facility]; Future    Muscle cramps  -     CK; Future    Essential hypertension    Psoriatic arthritis (HCC)    Tinnitus of both ears  -     Ambulatory Referral to Otolaryngology; Future  -     Ambulatory Referral to Audiology; Future    Screening for prostate cancer  -     PSA, Total Screen; Future    Vitamin D deficiency  -     Vitamin D 25 hydroxy; Future    Hearing loss, unspecified hearing loss type, unspecified laterality  -     Ambulatory Referral to Otolaryngology; Future  -     Ambulatory Referral to Audiology; Future    Right upper quadrant abdominal pain  -     US abdomen complete; Future    Exercise-induced asthma  -     Albuterol Sulfate 108 (90 Base) MCG/ACT AEPB; Inhale 1 puff every 8 (eight) hours as needed (wheezing)    Attention deficit hyperactivity disorder (ADHD), combined type    Encounter for counseling for travel  -     azithromycin (ZITHROMAX) 250 mg tablet; Take 1 tablet (250 mg total) by mouth every 24 hours for 5 days 2 tabs first day, then one tab daily    Wellness examination        Subjective:      Patient ID: Yesika Ulloa is a 60 y o  male  Pt here for multiple concerns and wellness  Pt has been having abd pain since his late 25s, located right upper quadrant, intermittent, they seem to come on after a fatty meal       Pt also has problems with cramping after he does a lot of exertion and sweating  He is very careful about hydrating and taking electrolytes with these spasms  Pt has ringing in ears  He has played in bands since age 14  Abdominal Pain  Associated symptoms include myalgias  Pertinent negatives include no arthralgias, constipation, diarrhea, dysuria, fever, headaches, hematuria, nausea or vomiting  Elbow Pain  Associated symptoms include abdominal pain and myalgias   Pertinent negatives include no arthralgias, chest pain, chills, congestion, coughing, fatigue, fever, headaches, nausea, rash, sore throat or vomiting  The following portions of the patient's history were reviewed and updated as appropriate: allergies, current medications, past family history, past medical history, past social history, past surgical history and problem list     Review of Systems   Constitutional: Negative for chills, fatigue and fever  HENT: Positive for hearing loss and tinnitus  Negative for congestion, nosebleeds, postnasal drip, sore throat and trouble swallowing  Eyes: Negative for pain  Respiratory: Negative for cough, chest tightness, shortness of breath and wheezing  Cardiovascular: Negative for chest pain, palpitations and leg swelling  Gastrointestinal: Positive for abdominal pain  Negative for constipation, diarrhea, nausea and vomiting  Endocrine: Negative for polydipsia and polyuria  Genitourinary: Negative for dysuria, flank pain and hematuria  Musculoskeletal: Positive for myalgias  Negative for arthralgias  Skin: Negative for rash  Neurological: Negative for dizziness, tremors, light-headedness and headaches  Hematological: Does not bruise/bleed easily  Psychiatric/Behavioral: Negative for confusion and dysphoric mood  The patient is not nervous/anxious  Objective:      /86 (BP Location: Left arm, Patient Position: Sitting, Cuff Size: Standard)   Pulse 81   Ht 5' 11" (1 803 m)   Wt 82 6 kg (182 lb 3 2 oz)   SpO2 98%   BMI 25 41 kg/m²          Physical Exam  Vitals reviewed  Constitutional:       General: He is not in acute distress  Appearance: Normal appearance  He is well-developed  HENT:      Head: Normocephalic and atraumatic  Right Ear: External ear normal       Left Ear: External ear normal       Nose: Nose normal    Eyes:      General: No scleral icterus  Conjunctiva/sclera: Conjunctivae normal    Neck:      Thyroid: No thyromegaly  Trachea: No tracheal deviation     Cardiovascular:      Rate and Rhythm: Normal rate and regular rhythm  Heart sounds: Normal heart sounds  No murmur heard  Pulmonary:      Effort: Pulmonary effort is normal  No respiratory distress  Breath sounds: Normal breath sounds  No wheezing or rales  Abdominal:      General: Bowel sounds are normal       Palpations: Abdomen is soft  Tenderness: There is no abdominal tenderness  There is no guarding or rebound  Hernia: There is no hernia in the left inguinal area or right inguinal area  Genitourinary:     Testes: Normal    Musculoskeletal:      Cervical back: Normal range of motion and neck supple  Right lower leg: No edema  Left lower leg: No edema  Lymphadenopathy:      Cervical: No cervical adenopathy  Skin:     Coloration: Skin is not jaundiced or pale  Neurological:      General: No focal deficit present  Mental Status: He is alert and oriented to person, place, and time  Psychiatric:         Mood and Affect: Mood normal          Behavior: Behavior normal          Thought Content:  Thought content normal          Judgment: Judgment normal

## 2022-08-05 ENCOUNTER — TELEPHONE (OUTPATIENT)
Dept: INTERNAL MEDICINE CLINIC | Facility: CLINIC | Age: 62
End: 2022-08-05

## 2022-08-05 DIAGNOSIS — J45.990 EXERCISE-INDUCED ASTHMA: Primary | ICD-10-CM

## 2022-08-05 RX ORDER — ALBUTEROL SULFATE 90 UG/1
2 AEROSOL, METERED RESPIRATORY (INHALATION) EVERY 6 HOURS PRN
Qty: 18 G | Refills: 5 | Status: SHIPPED | OUTPATIENT
Start: 2022-08-05 | End: 2022-08-08 | Stop reason: SDUPTHER

## 2022-08-05 NOTE — TELEPHONE ENCOUNTER
Prior authorization request for ProAir RespiClick [id number] Aerosol Powder received from [employer] and started

## 2022-08-08 DIAGNOSIS — J45.990 EXERCISE-INDUCED ASTHMA: ICD-10-CM

## 2022-08-08 DIAGNOSIS — Z71.84 ENCOUNTER FOR COUNSELING FOR TRAVEL: ICD-10-CM

## 2022-08-08 DIAGNOSIS — E78.2 MIXED HYPERLIPIDEMIA: ICD-10-CM

## 2022-08-08 RX ORDER — ATORVASTATIN CALCIUM 40 MG/1
40 TABLET, FILM COATED ORAL DAILY
Qty: 90 TABLET | Refills: 3 | Status: SHIPPED | OUTPATIENT
Start: 2022-08-08

## 2022-08-08 RX ORDER — AZITHROMYCIN 250 MG/1
250 TABLET, FILM COATED ORAL EVERY 24 HOURS
Qty: 6 TABLET | Refills: 0 | Status: SHIPPED | OUTPATIENT
Start: 2022-08-08 | End: 2022-08-13

## 2022-08-08 RX ORDER — ALBUTEROL SULFATE 90 UG/1
2 AEROSOL, METERED RESPIRATORY (INHALATION) EVERY 6 HOURS PRN
Qty: 18 G | Refills: 5 | Status: SHIPPED | OUTPATIENT
Start: 2022-08-08 | End: 2022-09-07

## 2022-08-13 ENCOUNTER — APPOINTMENT (OUTPATIENT)
Dept: LAB | Facility: HOSPITAL | Age: 62
End: 2022-08-13
Payer: COMMERCIAL

## 2022-08-13 ENCOUNTER — HOSPITAL ENCOUNTER (OUTPATIENT)
Dept: ULTRASOUND IMAGING | Facility: HOSPITAL | Age: 62
Discharge: HOME | End: 2022-08-13
Payer: COMMERCIAL

## 2022-08-13 DIAGNOSIS — Z51.81 ENCOUNTER FOR THERAPEUTIC DRUG MONITORING: ICD-10-CM

## 2022-08-13 DIAGNOSIS — R10.11 RIGHT UPPER QUADRANT ABDOMINAL PAIN: ICD-10-CM

## 2022-08-13 DIAGNOSIS — E78.2 MIXED HYPERLIPIDEMIA: ICD-10-CM

## 2022-08-13 DIAGNOSIS — Z11.4 SCREENING FOR HIV (HUMAN IMMUNODEFICIENCY VIRUS): ICD-10-CM

## 2022-08-13 DIAGNOSIS — E55.9 VITAMIN D DEFICIENCY: ICD-10-CM

## 2022-08-13 DIAGNOSIS — L40.50 PSORIATIC ARTHROPATHY (HCC): ICD-10-CM

## 2022-08-13 DIAGNOSIS — Z12.5 SCREENING FOR PROSTATE CANCER: ICD-10-CM

## 2022-08-13 DIAGNOSIS — R25.2 MUSCLE CRAMPS: ICD-10-CM

## 2022-08-13 LAB
25(OH)D3 SERPL-MCNC: 27.9 NG/ML (ref 30–100)
ALBUMIN SERPL BCP-MCNC: 4.1 G/DL (ref 3.5–5)
ALP SERPL-CCNC: 58 U/L (ref 34–104)
ALT SERPL W P-5'-P-CCNC: 23 U/L (ref 7–52)
ANION GAP SERPL CALCULATED.3IONS-SCNC: 8 MMOL/L (ref 4–13)
AST SERPL W P-5'-P-CCNC: 21 U/L (ref 13–39)
BASOPHILS # BLD AUTO: 0.02 THOUSANDS/ΜL (ref 0–0.1)
BASOPHILS NFR BLD AUTO: 0 % (ref 0–1)
BILIRUB SERPL-MCNC: 0.73 MG/DL (ref 0.2–1)
BUN SERPL-MCNC: 17 MG/DL (ref 5–25)
CALCIUM SERPL-MCNC: 9.2 MG/DL (ref 8.4–10.2)
CHLORIDE SERPL-SCNC: 104 MMOL/L (ref 96–108)
CHOLEST SERPL-MCNC: 176 MG/DL
CK SERPL-CCNC: 160 U/L (ref 39–308)
CO2 SERPL-SCNC: 28 MMOL/L (ref 21–32)
CREAT SERPL-MCNC: 0.93 MG/DL (ref 0.6–1.3)
CRP SERPL QL: 2.8 MG/L
EOSINOPHIL # BLD AUTO: 0.05 THOUSAND/ΜL (ref 0–0.61)
EOSINOPHIL NFR BLD AUTO: 1 % (ref 0–6)
ERYTHROCYTE [DISTWIDTH] IN BLOOD BY AUTOMATED COUNT: 12.4 % (ref 11.6–15.1)
ERYTHROCYTE [SEDIMENTATION RATE] IN BLOOD: 15 MM/HOUR (ref 0–20)
GFR SERPL CREATININE-BSD FRML MDRD: 87 ML/MIN/1.73SQ M
GLUCOSE P FAST SERPL-MCNC: 90 MG/DL (ref 65–99)
HCT VFR BLD AUTO: 46.3 % (ref 36.5–49.3)
HDLC SERPL-MCNC: 38 MG/DL
HGB BLD-MCNC: 16 G/DL (ref 12–17)
IMM GRANULOCYTES # BLD AUTO: 0.01 THOUSAND/UL (ref 0–0.2)
IMM GRANULOCYTES NFR BLD AUTO: 0 % (ref 0–2)
LDLC SERPL CALC-MCNC: 111 MG/DL (ref 0–100)
LYMPHOCYTES # BLD AUTO: 1.92 THOUSANDS/ΜL (ref 0.6–4.47)
LYMPHOCYTES NFR BLD AUTO: 34 % (ref 14–44)
MCH RBC QN AUTO: 31.2 PG (ref 26.8–34.3)
MCHC RBC AUTO-ENTMCNC: 34.6 G/DL (ref 31.4–37.4)
MCV RBC AUTO: 90 FL (ref 82–98)
MONOCYTES # BLD AUTO: 0.51 THOUSAND/ΜL (ref 0.17–1.22)
MONOCYTES NFR BLD AUTO: 9 % (ref 4–12)
NEUTROPHILS # BLD AUTO: 3.13 THOUSANDS/ΜL (ref 1.85–7.62)
NEUTS SEG NFR BLD AUTO: 56 % (ref 43–75)
NRBC BLD AUTO-RTO: 0 /100 WBCS
PLATELET # BLD AUTO: 215 THOUSANDS/UL (ref 149–390)
PMV BLD AUTO: 10.3 FL (ref 8.9–12.7)
POTASSIUM SERPL-SCNC: 4.1 MMOL/L (ref 3.5–5.3)
PROT SERPL-MCNC: 7.2 G/DL (ref 6.4–8.4)
PSA SERPL-MCNC: 0.6 NG/ML (ref 0–4)
RBC # BLD AUTO: 5.13 MILLION/UL (ref 3.88–5.62)
SODIUM SERPL-SCNC: 140 MMOL/L (ref 135–147)
TRIGL SERPL-MCNC: 136 MG/DL
TSH SERPL DL<=0.05 MIU/L-ACNC: 2.11 UIU/ML (ref 0.45–4.5)
WBC # BLD AUTO: 5.64 THOUSAND/UL (ref 4.31–10.16)

## 2022-08-13 PROCEDURE — G0103 PSA SCREENING: HCPCS

## 2022-08-13 PROCEDURE — 82306 VITAMIN D 25 HYDROXY: CPT

## 2022-08-13 PROCEDURE — 80061 LIPID PANEL: CPT

## 2022-08-13 PROCEDURE — 85025 COMPLETE CBC W/AUTO DIFF WBC: CPT

## 2022-08-13 PROCEDURE — 84443 ASSAY THYROID STIM HORMONE: CPT

## 2022-08-13 PROCEDURE — 86235 NUCLEAR ANTIGEN ANTIBODY: CPT

## 2022-08-13 PROCEDURE — 87389 HIV-1 AG W/HIV-1&-2 AB AG IA: CPT

## 2022-08-13 PROCEDURE — 76700 US EXAM ABDOM COMPLETE: CPT

## 2022-08-13 PROCEDURE — 82550 ASSAY OF CK (CPK): CPT

## 2022-08-13 PROCEDURE — 80053 COMPREHEN METABOLIC PANEL: CPT

## 2022-08-13 PROCEDURE — 86140 C-REACTIVE PROTEIN: CPT

## 2022-08-13 PROCEDURE — 85652 RBC SED RATE AUTOMATED: CPT

## 2022-08-13 PROCEDURE — 36415 COLL VENOUS BLD VENIPUNCTURE: CPT

## 2022-08-14 LAB — HIV 1+2 AB+HIV1 P24 AG SERPL QL IA: NORMAL

## 2022-08-15 LAB — ENA JO1 AB SER-ACNC: <0.2 AI (ref 0–0.9)

## 2022-08-17 DIAGNOSIS — N28.1 CYST OF LEFT KIDNEY: Primary | ICD-10-CM

## 2022-08-26 DIAGNOSIS — M54.9 BACK PAIN, UNSPECIFIED BACK LOCATION, UNSPECIFIED BACK PAIN LATERALITY, UNSPECIFIED CHRONICITY: Primary | ICD-10-CM

## 2022-08-29 ENCOUNTER — TELEPHONE (OUTPATIENT)
Dept: PSYCHIATRY | Facility: CLINIC | Age: 62
End: 2022-08-29

## 2022-08-29 DIAGNOSIS — F90.2 ATTENTION DEFICIT HYPERACTIVITY DISORDER (ADHD), COMBINED TYPE: ICD-10-CM

## 2022-08-29 RX ORDER — METHYLPHENIDATE HYDROCHLORIDE 10 MG/1
TABLET ORAL
Qty: 150 TABLET | Refills: 0 | Status: SHIPPED | OUTPATIENT
Start: 2022-08-29 | End: 2022-08-29 | Stop reason: SDUPTHER

## 2022-08-29 RX ORDER — METHYLPHENIDATE HYDROCHLORIDE 10 MG/1
TABLET ORAL
Qty: 150 TABLET | Refills: 0 | Status: SHIPPED | OUTPATIENT
Start: 2022-08-29 | End: 2022-09-26 | Stop reason: SDUPTHER

## 2022-08-29 NOTE — TELEPHONE ENCOUNTER
Daylin Coyne called and requested a refill of his Methylphenidate 10 mg be sent to [facility] on [address] in [place]  Will ask covering provider to review in Dr Kem Rodrigues  absence   Next appointment 12/2022

## 2022-08-29 NOTE — TELEPHONE ENCOUNTER
Elie Almeida called the clinic today and appropriately requested refill of controlled psychotropic medications (Ritalin)  Review of PDMP website reveals no concerns for abuse or misuse  As such, will refill script today for 30-days as I am covering for patient's primary provider  Rio Ley has a scheduled follow-up visit for medication management in 12/2022

## 2022-09-12 DIAGNOSIS — J06.9 UPPER RESPIRATORY TRACT INFECTION, UNSPECIFIED TYPE: Primary | ICD-10-CM

## 2022-09-12 RX ORDER — AZITHROMYCIN 250 MG/1
250 TABLET, FILM COATED ORAL EVERY 24 HOURS
Qty: 6 TABLET | Refills: 0 | Status: SHIPPED | OUTPATIENT
Start: 2022-09-12 | End: 2022-09-15 | Stop reason: SDUPTHER

## 2022-09-14 ENCOUNTER — HOSPITAL ENCOUNTER (OUTPATIENT)
Dept: MRI IMAGING | Facility: HOSPITAL | Age: 62
Discharge: HOME | End: 2022-09-14
Payer: COMMERCIAL

## 2022-09-14 DIAGNOSIS — N28.1 CYST OF LEFT KIDNEY: ICD-10-CM

## 2022-09-14 PROCEDURE — A9585 GADOBUTROL INJECTION: HCPCS | Performed by: INTERNAL MEDICINE

## 2022-09-14 PROCEDURE — 74183 MRI ABD W/O CNTR FLWD CNTR: CPT

## 2022-09-14 PROCEDURE — G1004 CDSM NDSC: HCPCS

## 2022-09-14 RX ADMIN — GADOBUTROL 8 ML: 604.72 INJECTION INTRAVENOUS at 12:03

## 2022-09-15 DIAGNOSIS — J06.9 UPPER RESPIRATORY TRACT INFECTION, UNSPECIFIED TYPE: ICD-10-CM

## 2022-09-15 RX ORDER — AZITHROMYCIN 250 MG/1
250 TABLET, FILM COATED ORAL EVERY 24 HOURS
Qty: 6 TABLET | Refills: 0 | Status: SHIPPED | OUTPATIENT
Start: 2022-09-15 | End: 2022-09-20

## 2022-09-19 ENCOUNTER — TELEPHONE (OUTPATIENT)
Dept: INTERNAL MEDICINE CLINIC | Facility: CLINIC | Age: 62
End: 2022-09-19

## 2022-09-19 ENCOUNTER — TELEPHONE (OUTPATIENT)
Dept: UROLOGY | Facility: AMBULATORY SURGERY CENTER | Age: 62
End: 2022-09-19

## 2022-09-19 NOTE — TELEPHONE ENCOUNTER
Patient called stating he read his MRI results and stated there is a growth on his kidneys  He has made an appointment with urology    No need to call him  This is a FYI

## 2022-09-19 NOTE — TELEPHONE ENCOUNTER
Called and spoke with patient at this time  Reviewed we have availability for 9/23 at 730am  He was very grateful for this appt  He knows to arrive 15 mins early and be able to give a urine sample if needed

## 2022-09-19 NOTE — TELEPHONE ENCOUNTER
New Patient    What is the reason for the patients appointment? What office location does the patient prefer? 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 Insurance Provider: Yes [facility]   Plan Type/Number:  Member ID#: Has the patient had any previous Urologist(s)? No    Have patient records been requested? If not are records showing in Epic:     Has the patient had any outside testing done? MRI and PSA    Does the patient have a personal history of cancer? No      Please advise for appropriate time frame since there are no upcoming appointments   Also if MD or AP    Patient can be reached at [phone]

## 2022-09-21 PROBLEM — Z71.2 ENCOUNTER TO DISCUSS TEST RESULTS: Status: ACTIVE | Noted: 2022-09-21

## 2022-09-21 PROBLEM — N28.89 LEFT RENAL MASS: Status: ACTIVE | Noted: 2022-09-21

## 2022-09-21 NOTE — PROGRESS NOTES
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  This is mostly exophytic, does appear to have some neovascularity around the cystic renal lesion  I do recommend partial nephrectomy with robot assistance, possible open edema possible radical nephrectomy  We talked about this major urologic surgery with risk with the risks being delayed pseudo aneurysm, arteriovenous malformation, delayed urine leak, and the risks of infection, bleeding, pain, damage to surrounding structures, need for additional procedures, risk of failure of the procedure, risk of anesthesia, risk of positioning complications including neurapraxia, chronic pain, and paralysis as well as risk of rhabdomyolysis and compartment syndrome  Risk of deep venous thrombosis and venous thromboembolism as well as pneumonia and other potential unpredictable complications were also discussed with the patient  He is unusually healthy and functional for his age, he appears roughly 8 years younger than his stated age  He has taken biologic agents for psoriasis previously  I did tell him I have seen some patients develop renal cell carcinoma in this setting  He does have secondhand smoke exposure as 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  He denies a family history of urologic malignancy and malady  His performance status is good as stated above, accomplishing all activities of daily living      We reviewed his imaging in realtime, we talked about the pre, clarke, postoperative care for robot assisted laparoscopic partial nephrectomy, possible open, and the likely outcome of hospitalization overnight with a postop day 1 trial of void, advancement of diet, as well as mobilization  Postoperatively we will see him 2-3 weeks after surgery to ensure that he is healing and to review his pathology  My prognosis for him is good after complete surgical removal of this lesion  I did tell him he may require additional therapies pending final pathology  We did talk about the alternative of active surveillance, this is not a good option for him given his excellent performance status and his anxiety regarding his new diagnosis  Informed consent was signed for left partial nephrectomy  He has had some inguinal hernia repairs before without easy bruising or bleeding  He denies surgery within the left upper quadrant  Specific to his anatomy there is some prominence of the pancreas over the upper pole of the kidney and some pain down of his spleen over the area of the renal mass  I did tell him that this may result in damage to the structures although great care will be taken, of course, to avoid damage to any surrounding structures  He will see me back for surgery  Assessment and plan:       Please see problem oriented charting for the assessment plan of today's urological complaints    Shankar Whittaker MD      Chief Complaint     Chief Complaint   Patient presents with    renal mass         History of Present Illness     Rd Guzman is a 60 y o  man referred to me in consultation after undergoing workup for right upper quadrant pain having a incidental left renal mass be found  Extensive discussion with him as above  Lots of anxiety regarding his diagnosis  He does not have a urologic history  He does have some frequency and nocturia  His LUTS can be addressed at a later date      The following portions of the patient's history were reviewed and updated as appropriate: allergies, current medications, past family history, past medical history, past social history, past surgical history and problem list     Detailed Urologic History     - please refer to HPI    Review of Systems     Review of Systems   Constitutional: Negative  HENT: Negative  Eyes: Negative  Respiratory: Negative  Cardiovascular: Negative  Gastrointestinal: Positive for abdominal pain (Right upper quadrant)  Endocrine: Negative  Genitourinary: Negative  Musculoskeletal: Negative  Skin: Negative  Allergic/Immunologic: Negative  Neurological: Negative  Hematological: Negative  Psychiatric/Behavioral: The patient is nervous/anxious  AUA SYMPTOM SCORE    Flowsheet Row Most Recent Value   AUA SYMPTOM SCORE    How often have you had a sensation of not emptying your bladder completely after you finished urinating? 5 (P)     How often have you had to urinate again less than two hours after you finished urinating? 4 (P)     How often have you found you stopped and started again several times when you urinate? 4 (P)     How often have you found it difficult to postpone urination? 5 (P)     How often have you had a weak urinary stream? 4 (P)     How often have you had to push or strain to begin urination? 1 (P)     How many times did you most typically get up to urinate from the time you went to bed at night until the time you got up in the morning? 5 (P)     Quality of Life: If you were to spend the rest of your life with your urinary condition just the way it is now, how would you feel about that? 3 (P)     AUA SYMPTOM SCORE 28 (P)             Allergies     Allergies   Allergen Reactions    Other Anaphylaxis     Unknown food allergy    Erythromycin GI Intolerance       Physical Exam     Physical Exam  Vitals reviewed  Constitutional:       General: He is not in acute distress  Appearance: Normal appearance  He is not ill-appearing, toxic-appearing or diaphoretic  HENT:      Head: Normocephalic and atraumatic     Eyes: General: No scleral icterus  Right eye: No discharge  Left eye: No discharge  Cardiovascular:      Pulses: Normal pulses  Pulmonary:      Effort: Pulmonary effort is normal    Abdominal:      General: There is no distension  Palpations: There is no mass  Musculoskeletal:         General: No swelling  Skin:     General: Skin is warm  Neurological:      General: No focal deficit present  Mental Status: He is alert and oriented to person, place, and time  Cranial Nerves: No cranial nerve deficit  Psychiatric:         Mood and Affect: Mood normal          Behavior: Behavior normal          Thought Content:  Thought content normal          Judgment: Judgment normal              Vital Signs  Vitals:    09/23/22 0726   BP: 162/90   Pulse: (!) 107   SpO2: 97%   Weight: 83 9 kg (185 lb)   Height: 5' 11" (1 803 m)         Current Medications       Current Outpatient Medications:     aspirin (ECOTRIN LOW STRENGTH) 81 mg EC tablet, Take 81 mg by mouth daily, Disp: , Rfl:     atorvastatin (LIPITOR) 40 mg tablet, Take 1 tablet (40 mg total) by mouth daily, Disp: 90 tablet, Rfl: 3    clobetasol (TEMOVATE) 0 05 % cream, Apply 5 35 application topically 2 (two) times a day as needed , Disp: , Rfl:     clotrimazole-betamethasone (LOTRISONE) 1-0 05 % cream, Apply topically 2 (two) times a day, Disp: 30 g, Rfl: 5    fexofenadine (ALLEGRA) 60 MG tablet, Take 60 mg by mouth daily as needed , Disp: , Rfl:     ketoconazole (NIZORAL) 2 % cream, Apply topically daily as needed for rash, Disp: 15 g, Rfl: 5    methylphenidate (Ritalin) 10 mg tablet, Take 2 tablet (20 mg) in the morning, 2 tablet (20 mg) mid-day and 1 tablet (10 mg) late afternoon, Disp: 150 tablet, Rfl: 0    Taltz 80 MG/ML SOAJ, , Disp: , Rfl:     zaleplon (SONATA) 10 MG capsule, Take 1 capsule (10 mg total) by mouth daily at bedtime, Disp: 30 capsule, Rfl: 1    EPINEPHrine (EPIPEN) 0 3 mg/0 3 mL SOAJ, Inject 0 3 mL (0 3 mg total) into a muscle once for 1 dose, Disp: 1 each, Rfl: 1      Active Problems     Patient Active Problem List   Diagnosis    Exercise-induced asthma    Attention deficit hyperactivity disorder (ADHD), combined type    Psoriatic arthritis ([address] )    Mixed hyperlipidemia    Primary insomnia    Cervical spondylosis    Controlled substance agreement signed    Cough in adult patient    DDD (degenerative disc disease), lumbar    Degenerative disc disease, cervical    Elbow pain, left    Food allergy    Muscle spasms of both lower extremities    Right upper quadrant abdominal pain    Vitamin D deficiency    Essential hypertension    Chronic maxillary sinusitis    Dysphoric mood    Dry eye    Dry mouth    Memory difficulties    Right inguinal hernia    Muscle cramps    Tinnitus of both ears    Wellness examination    Left renal mass    Encounter to discuss test results         Past Medical History     Past Medical History:   Diagnosis Date    Arthritis     Asthma     High blood pressure     Psoriatic arthritis ([address] )     Septicemia ([address]     Stomach problems          Surgical History     Past Surgical History:   Procedure Laterality Date    COLONOSCOPY      DENTAL IMPLANT      FRACTURE SURGERY      HERNIA REPAIR      INCISION AND DRAINAGE ABSCESS / HEMATOMA OF BURSA / KNEE / THIGH Left     thigh    SC REPAIR ING HERNIA,5+Y/O,REDUCIBL Right 06/22/2021    Procedure: Inguinal hernia repair with mesh;  Surgeon: Estrella Del Toro MD;  Location:  MAIN OR;  Service: General         Family History     Family History   Problem Relation Age of Onset    Coronary artery disease Father          Social History     Social History     Social History     Tobacco Use   Smoking Status Never Smoker   Smokeless Tobacco Never Used         Pertinent Lab Values     Lab Results   Component Value Date    CREATININE 0 93 08/13/2022       Lab Results   Component Value Date    PSA 0 6 08/13/2022 Pertinent Imaging       The patient's images were reviewed by me personally and also in real time with them in the examination room using our PACS imaging system  The imaging findings are significant for a left renal lesion, Bosniak 4, amenable to partial nephrectomy

## 2022-09-23 ENCOUNTER — OFFICE VISIT (OUTPATIENT)
Dept: UROLOGY | Facility: CLINIC | Age: 62
End: 2022-09-23
Payer: COMMERCIAL

## 2022-09-23 VITALS
HEART RATE: 107 BPM | OXYGEN SATURATION: 97 % | SYSTOLIC BLOOD PRESSURE: 162 MMHG | DIASTOLIC BLOOD PRESSURE: 90 MMHG | HEIGHT: 71 IN | WEIGHT: 185 LBS | BODY MASS INDEX: 25.9 KG/M2

## 2022-09-23 DIAGNOSIS — N28.89 LEFT RENAL MASS: Primary | ICD-10-CM

## 2022-09-23 DIAGNOSIS — Z71.2 ENCOUNTER TO DISCUSS TEST RESULTS: ICD-10-CM

## 2022-09-23 LAB
SL AMB  POCT GLUCOSE, UA: NORMAL
SL AMB LEUKOCYTE ESTERASE,UA: NORMAL
SL AMB POCT BILIRUBIN,UA: NORMAL
SL AMB POCT BLOOD,UA: NORMAL
SL AMB POCT CLARITY,UA: CLEAR
SL AMB POCT COLOR,UA: YELLOW
SL AMB POCT KETONES,UA: NORMAL
SL AMB POCT NITRITE,UA: NORMAL
SL AMB POCT PH,UA: 5
SL AMB POCT SPECIFIC GRAVITY,UA: 1.02
SL AMB POCT URINE PROTEIN: NORMAL
SL AMB POCT UROBILINOGEN: 0.2

## 2022-09-23 PROCEDURE — 81002 URINALYSIS NONAUTO W/O SCOPE: CPT | Performed by: UROLOGY

## 2022-09-23 PROCEDURE — 99205 OFFICE O/P NEW HI 60 MIN: CPT | Performed by: UROLOGY

## 2022-09-23 RX ORDER — GABAPENTIN 100 MG/1
300 CAPSULE ORAL ONCE
OUTPATIENT
Start: 2022-09-23 | End: 2022-09-23

## 2022-09-23 RX ORDER — IXEKIZUMAB 80 MG/ML
INJECTION, SOLUTION SUBCUTANEOUS
COMMUNITY
Start: 2022-09-16

## 2022-09-23 RX ORDER — ACETAMINOPHEN 325 MG/1
975 TABLET ORAL ONCE
OUTPATIENT
Start: 2022-09-23 | End: 2022-09-23

## 2022-09-26 ENCOUNTER — TELEPHONE (OUTPATIENT)
Dept: PSYCHIATRY | Facility: CLINIC | Age: 62
End: 2022-09-26

## 2022-09-26 ENCOUNTER — TELEPHONE (OUTPATIENT)
Dept: UROLOGY | Facility: MEDICAL CENTER | Age: 62
End: 2022-09-26

## 2022-09-26 DIAGNOSIS — F90.2 ATTENTION DEFICIT HYPERACTIVITY DISORDER (ADHD), COMBINED TYPE: ICD-10-CM

## 2022-09-26 RX ORDER — METHYLPHENIDATE HYDROCHLORIDE 10 MG/1
TABLET ORAL
Qty: 150 TABLET | Refills: 0 | Status: SHIPPED | OUTPATIENT
Start: 2022-09-26

## 2022-09-26 NOTE — TELEPHONE ENCOUNTER
Rios Jones called the clinic today and appropriately requested refill of controlled psychotropic medications (Ritalin)  Review of PDMP website reveals no concerns for abuse or misuse  As such, will refill script today for 30-days as I am covering for patient's primary provider  Shelby Huang has a scheduled follow-up visit for medication management in 12/2022

## 2022-09-26 NOTE — TELEPHONE ENCOUNTER
I spoke to the patient to schedule his Robotic partial Nephrectomy for 10/31/2022 at [facility] with Dr Vern Cordero  Patient has questions regarding a planned trip and recovery time  Returned Email to Dr Saeid Moran to check

## 2022-09-26 NOTE — TELEPHONE ENCOUNTER
Shannon Ramirez called and lm requesting refill of Methylphenidate be sent to [facility]  Will ask covering provider to review in Dr Victoria's  absence   Next appointment 12/1/22      Shannon Ramirez- [phone]

## 2022-09-27 NOTE — TELEPHONE ENCOUNTER
Confirmed surgery date 11/28/22 at [facility] with Patient by Email this morning  Confirmed Dr Rachael Solis assist by Email this afternoon  Reviewed instruction with patient       11/28/2022 at [facility] Dr Barbosa/Dr Rachael Solis  -instructions mailed  -CBC, CMP, T&S, T&C , PT/PTT, Urine C&S and EKG  3 weeks prior  -patient will avoid any potentially blood thinning medications 7 days prior  -[employer] - On auth tracker 9/27/2022

## 2022-10-10 DIAGNOSIS — F51.01 PRIMARY INSOMNIA: ICD-10-CM

## 2022-10-10 RX ORDER — ZALEPLON 10 MG/1
10 CAPSULE ORAL
Qty: 30 CAPSULE | Refills: 1 | Status: SHIPPED | OUTPATIENT
Start: 2022-10-10

## 2022-10-17 ENCOUNTER — TELEPHONE (OUTPATIENT)
Dept: PSYCHIATRY | Facility: CLINIC | Age: 62
End: 2022-10-17

## 2022-10-17 NOTE — TELEPHONE ENCOUNTER
Received a VM from Shannon Ramirez  He said he left a message earlier to day, but calling nursing as he didn't hear back  Mica Ty has concerns about the methylphenidate - his surgeon recommended he speak with Dr Brenden Oleary regarding his medication in relation to an upcoming surgical procedure he is having  He is requesting a virtual appointment as soon as possible or even another provider is he is not available  Spoke with Mica Ty  Reviewed Dr Top[facility] and that his message was received  Mica Ty will be having surgery for renal cancer in November and his blood pressure is elevated  He would like to review with Dr Brenden Oleary

## 2022-10-18 NOTE — PSYCH
Virtual Regular Visit    Verification of [place] location: [place]    Patient is located in the following state in which I hold an active license: [place]    Assessment/Plan:    Problem List Items Addressed This Visit        Other    Attention deficit hyperactivity disorder (ADHD), combined type - Primary    Primary insomnia               Reason for visit is   Chief Complaint   Patient presents with   • Medication Management   • ADHD   • Insomnia        Visit Time    Visit Start Time: 12:54 PM  Visit Stop Time: 1:23 PM  Total Visit Duration: 29 minutes    Encounter provider Ania Yañez MD    Provider located at: [place] [address]  Danis Garcia [address]    Recent Visits  Date Type Provider Dept   10/17/22 Telephone Peggy Smith [place] recent visits within past 7 days and meeting all other requirements  Today's Visits  Date Type Provider Dept   10/19/22 Telemedicine Ania Yañez  [place] today's visits and meeting all other requirements  Future Appointments  No visits were found meeting these conditions  Showing future appointments within next 150 days and meeting all other requirements       The patient was identified by name and date of birth  Minor Argueta was informed that this is a telemedicine visit and that the visit is being conducted through [address] [place] [address] Now and patient was informed that this is a secure, HIPAA-compliant platform  He agrees to proceed  My office door was closed  No one else was in the room  He acknowledged consent and understanding of privacy and security of the video platform  The patient has agreed to participate and understands they can discontinue the visit at any time  Patient is aware this is a billable service         MEDICATION MANAGEMENT NOTE        [facility]      Name and Date of Birth:  Cory Diane Sarwat 60 y o  1960 MRN: [id number]    Date of Visit: October 19, 2022    Reason for Visit:   Chief Complaint   Patient presents with   • Medication Management   • ADHD   • Insomnia       SUBJECTIVE:  The patient was visited virtually for medication management and follow up visit for ADHD and anxiety sxs  The patient called the clinic and requested to discuss alternative treatment options for his ADHD given the upcoming surgery for renal mass on 11/28 and elevated BP  A complex cyst was found in L kidney and recommended nephrectomy concerning for possible malignancy  He noted that her BP has been elevated and his average BP was elevated as he has been monitoring  He noted that his internist recommended to be off Ritalin onesimo prior the surgery and also preventing cardiac side effects  Presented calm, and cooperative  Reported he has been able to process the information about his mass as well as HTN and psoriatic arthritis  His daughter and grandson are living with them, and calls him "dad" and he has been busy with family  He has been adjusting to his diagnosis and acknowledge the moments with his family even more  He is going on a trip with his family to [facility], and will come back prior to surgery  Denied any changes in sleep, appetite, concentration, energy level, or daily activities  Denied feelings of anhedonia, hopelessness, helplessness, worthlessness or guilt and appeared to be future oriented  There was no thought constriction related to death  Denied SI/HI, intent or plan upon direct inquiry at this time  Denied AV/H  No intense anxiety sxs, specific phobia or panic attacks reported  No manic sxs, paranoid ideations or fixed delusions were elicited  Endorsed good compliance with the medications and denied any side effects  Denied smoking cigarettes, binge drinking alcohol or other illicit substance use      The patient presented w/ a persistent pattern of impaired concentration and difficulty sustaining attention, failure to finish duties on time, having difficulty organizing tasks and activities, losing things, and getting easily distracted by external stimuli, figeting with hands, tapping feet or squirming in seat, and talking fast, interrupting others during the conversation and at times moving around as well as having difficulties to wait, but his sxs has been well controlled by Ritalin (total dose: 40-50 mg daily)  Given this presentation and high risk for HTN and cardiac issues based on FH, and also patient's preference, Ritalin would be tapered 0ff gradually by decreasing 10 mg weekly  The patient was started on Strattera 40 mg po daily for 1 week and then 40 mg BID for ADHD sxs; doses may require further adjustments  Psycho-education regarding indications, benefits, risks, side effects, and alternative options provided to the patient, and the importance of the compliance with psychiatric treatment reiterated  The patient verbalized understanding and agreed to the proposed regimen  The patient was educated to call 911 or go to the nearest emergency room if the symptoms become overwhelming or unable to remain in control  Verbalized understanding and agreed to seek help in case of distress or concern for safety  Review Of Systems:  Pertinent items are noted in HPI; all others are negative; no recent changes in medications or health status reported        Past Psychiatric History Update:   - No inpatient psychiatric admission since last encounter  - No SA or SIB since last encounter  - No incidence of violent behavior since last encounter    Past Trauma History Update:    - No new onset of abuse or traumatic events since last encounter     Past Medical History:    Past Medical History:   Diagnosis Date   • Arthritis    • Asthma    • High blood pressure    • Psoriatic arthritis ([facility] [address] )    • [facility] )    • Stomach problems         Past Surgical History:   Procedure Laterality Date   • COLONOSCOPY     • DENTAL IMPLANT     • FRACTURE SURGERY     • HERNIA REPAIR     • INCISION AND DRAINAGE ABSCESS / HEMATOMA OF BURSA / KNEE / THIGH Left     thigh   • ND REPAIR ING HERNIA,5+Y/O,REDUCIBL Right 06/22/2021    Procedure: Inguinal hernia repair with mesh;  Surgeon: Jem Trejo MD;  Location:  MAIN OR;  Service: General     Allergies   Allergen Reactions   • Other Anaphylaxis     Unknown food allergy   • Erythromycin GI Intolerance       Substance Abuse History:    Social History     Substance and Sexual Activity   Alcohol Use Yes   • Alcohol/week: 1 0 standard drink   • Types: 1 Standard drinks or equivalent per week     Social History     Substance and Sexual Activity   Drug Use Not Currently       Social History:    Social History     Socioeconomic History   • Marital status: /Civil Union     Spouse name: Not on file   • Number of children: Not on file   • Years of education: Not on file   • Highest education level: Not on file   Occupational History   • Not on file   Tobacco Use   • Smoking status: Never Smoker   • Smokeless tobacco: Never Used   Vaping Use   • Vaping Use: Never used   Substance and Sexual Activity   • Alcohol use: Yes     Alcohol/week: 1 0 standard drink     Types: 1 Standard drinks or equivalent per week   • Drug use: Not Currently   • Sexual activity: Not on file   Other Topics Concern   • Not on file   Social History Narrative   • Not on file     Social Determinants of Health     Financial Resource Strain: Not on file   Food Insecurity: Not on file   Transportation Needs: Not on file   Physical Activity: Not on file   Stress: Not on file   Social Connections: Not on file   Intimate Partner Violence: Not on file   Housing Stability: Not on file       Family Psychiatric History:     Family History   Problem Relation Age of Onset   • Coronary artery disease Father        History Review:  The following portions of the patient's history were reviewed and updated as appropriate: allergies, current medications, past family history, past medical history, past social history, past surgical history and problem list        OBJECTIVE:     Vital signs in last 24 hours:    Vitals:       Mental Status Evaluation:  Appearance and attitude: appeared as stated age, cooperative and attentive, bearded, wearing eyeglasses, casually dressed with good hygiene  Eye contact: good  Motor Function: within normal limits, No PMA/PMR  Gait/station: Not observed  Speech: normal for rate, rhythm, volume, latency, amount  Language: No overt abnormality  Mood/affect: euthymic / Affect was euthymic, reactive, in full range, normal intensity and mood congruent  Thought Processes: sequential and goal-directed  Thought content: denies suicidal ideation or homicidal ideation; no delusions or first rank symptoms  Associations: intact associations  Perceptual disturbances: denies Auditory/Visual/Tactile Hallucinations  Orientation: oriented to time, person, place and to the situational context  Cognitive Function: intact  Memory: recent and remote memory grossly intact  Intellect: average  Fund of knowledge: aware of current events, aware of past history and vocabulary average  Impulse control: good  Insight/judgment: good/good    Laboratory Results: I have personally reviewed all pertinent laboratory/tests results    Recent Labs (last 2 months):   Office Visit on 09/23/2022   Component Date Value   • LEUKOCYTE ESTERASE,UA 09/23/2022 -    • Nida Brill 09/23/2022 -    • SL AMB POCT UROBILINOGEN 09/23/2022 0 2    • POCT URINE PROTEIN 09/23/2022 -    •  PH,UA 09/23/2022 5 0    • BLOOD,UA 09/23/2022 -    • SPECIFIC GRAVITY,UA 09/23/2022 1 025    • Allen Pereyrav 09/23/2022 -    • BILIRUBIN,UA 09/23/2022 -    • GLUCOSE, UA 09/23/2022 -    •  COLOR,UA 09/23/2022 yellow    • CLARITY,UA 09/23/2022 clear          Assessment/Plan:   A 62 y o   male, , domiciled w/ wife, 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 w/ PMH of exercise induced asthma, HLD, psoriatic arthritis, cervical and lumbar DDD and PPH of ADHD, insomnia and ?WALTER, no prior psychiatric admissions, no prior SA, no  h/o self-injurious behavior, currently on Ritalin 20 mg TID and Sonata 10 mg nightly prescribed by his PCP who presented to the mental health clinic for initial intake and psychiatric evaluation as referred by his PCP for re-evaluation of ADHD treatment with Ritalin on 9/18/2020  The patient presented w/ h/o ADHD since 1999, being on Ritalin 60mg daily since then, w/ marked improvement of attention, concentration and his function since being treated  Denied any excessive anxiety, depressed/manic sxs, A/VH or other psychotic sxs  No h/o substance abuse  Denied SI/HI, intent or plan upon direct inquiry at this time   WALTER-7: 4  His current presentation meets criteria for ADHD, by history  The patient was educated about the diagnosis of ADHD and the course of ADHD in adults which may present with decreased intensity of sxs, onesimo after age 48, and particularly after age 65  Psycho-education regarding Ritalin indications, benefits, risks, side effects, and alternative options provided to the patient, and the importance of the compliance with psychiatric treatment reiterated  The patient was educated about the potential long-term side effects of ritalin in older age, onesimo cardiac side effects (given the FH of heart attack in his father at age 39), and was educated about the possibility of tapering off or if becoming symptomatic, cross-tapering to different treatment options   He verbalized understanding and agreed to the proposed regimen  Ritalin dose was decreased from 20mg TID (60mg total), to 20/20/10mg (50 mg total); and then switched to equivalent dose of Concerta 72 mg po daily to be tapered down as tolerated  The patient did not tolerate switching Ritalin to Concerta due to high BP, and on 11/5/2021 requested to be switched back to Ritalin 20mg/20mg/10mg as last prescribed (tapered down from 20 mg TID)  Upon f/u on 12/28/2021, presented w/ well-controlled ADHD sxs, and stable mood and anxiety  The patient was diagnosed with complex cyst in L kidney and is going to have partial nephrectomy concerning for possible malignancy on 11/28/22, and also his internist recommended to discontinue Ritalin for HTN and high risk of cardia events given the positive FH and age  Ritalin is being tapered off by 10 mg weekly and was started on Strattera 40 mg po daily for 1 week and then 40 mg po BID for ADHD; dose to be adjusted as indicated  Will continue individual therapy        Diagnoses and all orders for this visit:    Attention deficit hyperactivity disorder (ADHD), combined type    Primary insomnia          Impression:  1  Attention deficit hyperactivity disorder (ADHD), combined type     2  Primary insomnia         Treatment Recommendations/Precautions:  - Taper off Ritalin 20mg/20mg/10mg by 10 mg weekly due to elevated BP and as recommended prior to renal surgery  - Start Strattera 40 mg po daily for 1 week and then continue 40 mg BID for ADHD; doses to be adjusted as indicated  - Continue Sonata 10 mg po nightly PRN for insomnia    - Continue individual therapy     - Medications sent to patient's pharmacy for 30 day supply     - Psychoeducation provided to the patient and benefits, potential risks and side effects discussed; importance of compliance with the psychiatric treatment reiterated, and the patient verbalized understanding of the matter     - RTC in 4 weeks  - Educated about healthy life style, risk of falls/sedation and addiction   Patient was receptive to education   - The patient was educated about 24 hour and weekend coverage for urgent situations accessed by calling [facility] [address] main practice number  - Patient was educated to call [address] [facility] ([phone] [[phone]]) for behavioral crisis at anytime or 911 for any safety concerns, or go to nearest ER if his symptoms become overwhelming or unmanageable  Current Outpatient Medications   Medication Sig Dispense Refill   • aspirin (ECOTRIN LOW STRENGTH) 81 mg EC tablet Take 81 mg by mouth daily     • atorvastatin (LIPITOR) 40 mg tablet Take 1 tablet (40 mg total) by mouth daily 90 tablet 3   • clobetasol (TEMOVATE) 0 05 % cream Apply 2 36 application topically 2 (two) times a day as needed      • clotrimazole-betamethasone (LOTRISONE) 1-0 05 % cream Apply topically 2 (two) times a day 30 g 5   • EPINEPHrine (EPIPEN) 0 3 mg/0 3 mL SOAJ Inject 0 3 mL (0 3 mg total) into a muscle once for 1 dose 1 each 1   • fexofenadine (ALLEGRA) 60 MG tablet Take 60 mg by mouth daily as needed      • ketoconazole (NIZORAL) 2 % cream Apply topically daily as needed for rash 15 g 5   • methylphenidate (Ritalin) 10 mg tablet Take 2 tablet (20 mg) in the morning, 2 tablet (20 mg) mid-day and 1 tablet (10 mg) late afternoon 150 tablet 0   • Taltz 80 MG/ML SOAJ      • zaleplon (SONATA) 10 MG capsule Take 1 capsule (10 mg total) by mouth daily at bedtime 30 capsule 1     No current facility-administered medications for this visit  Medications Risks/Benefits      Risks, Benefits And Possible Side Effects Of Medications:    Risks, benefits, and possible side effects of medications explained to Rosalee Michaels and he verbalizes understanding and agreement for treatment  Controlled Medication Discussion:     Rosalee Michaels has been filling controlled prescriptions on time as prescribed according to [address] reviewed    Psychotherapy Provided:     Individual psychotherapy provided: Yes  Counseling was provided during the session today for 16 minutes     Psychoeducation provided to the patient and was educated about the importance of compliance with the medications and psychiatric treatment  Supportive psychotherapy provided to the patient  Solution Focused Brief Therapy (SFBT) provided  Patient's emotions were validated and specific labeled praise provided  Anthon suggestions were offered in a supportive non-critical way       Treatment Plan:    Completed and signed during the session: Not applicable - Treatment Plan not due at this session    Laura Cary MD 10/19/22

## 2022-10-18 NOTE — TELEPHONE ENCOUNTER
LVM for patient relaying Dr Emi Duvall  Requested that patient call me back to schedule an appt tomorrow  Please transfer the call to me

## 2022-10-18 NOTE — TELEPHONE ENCOUNTER
Elie Almeida requested a call back to discuss potentially starting to take Strattera  Patient stated that this would be a safe medication to take with his upcoming cancer surgery per another provider  Patient would like to discuss this with provider if possible  Patient stressed that this is time sensitive  They can be reached at P# [phone]  Thank you

## 2022-10-18 NOTE — TELEPHONE ENCOUNTER
I called the patient and left him a voicemail  The patient was advised to taper off his Ritalin by 10 mg every 7 days to be discontinued prior to his upcoming surgery on 11/28  The patient was recommended to call the clinic and schedule an earlier appointment if needed, and to call 911 or go to the ED in case of crisis or safety concerns

## 2022-10-19 ENCOUNTER — TELEMEDICINE (OUTPATIENT)
Dept: PSYCHIATRY | Facility: CLINIC | Age: 62
End: 2022-10-19
Payer: COMMERCIAL

## 2022-10-19 ENCOUNTER — TELEPHONE (OUTPATIENT)
Dept: OTHER | Facility: OTHER | Age: 62
End: 2022-10-19

## 2022-10-19 DIAGNOSIS — F90.2 ATTENTION DEFICIT HYPERACTIVITY DISORDER (ADHD), COMBINED TYPE: Primary | ICD-10-CM

## 2022-10-19 DIAGNOSIS — F51.01 PRIMARY INSOMNIA: ICD-10-CM

## 2022-10-19 PROBLEM — E78.9 LIPID DISORDER: Status: ACTIVE | Noted: 2022-09-07

## 2022-10-19 PROBLEM — I25.10 CORONARY ARTERY DISEASE INVOLVING NATIVE CORONARY ARTERY OF NATIVE HEART WITHOUT ANGINA PECTORIS: Status: ACTIVE | Noted: 2022-09-07

## 2022-10-19 PROCEDURE — 90833 PSYTX W PT W E/M 30 MIN: CPT | Performed by: STUDENT IN AN ORGANIZED HEALTH CARE EDUCATION/TRAINING PROGRAM

## 2022-10-19 PROCEDURE — 99214 OFFICE O/P EST MOD 30 MIN: CPT | Performed by: STUDENT IN AN ORGANIZED HEALTH CARE EDUCATION/TRAINING PROGRAM

## 2022-10-19 RX ORDER — ATOMOXETINE 40 MG/1
CAPSULE ORAL
Qty: 60 CAPSULE | Refills: 0 | Status: SHIPPED | OUTPATIENT
Start: 2022-10-19

## 2022-10-19 NOTE — TELEPHONE ENCOUNTER
I called the patient today at his request by Email  I reached his voice mail but advised he can call back or send his list of questions by Email

## 2022-10-19 NOTE — TELEPHONE ENCOUNTER
I spoke to the patient, answered his pre surgical questions  Patient will reach out by phone or Email if he thinks of any other questions prior to surgery

## 2022-10-21 ENCOUNTER — TELEPHONE (OUTPATIENT)
Dept: PSYCHIATRY | Facility: CLINIC | Age: 62
End: 2022-10-21

## 2022-10-21 NOTE — TELEPHONE ENCOUNTER
Received a fax from [employer] approving the Atomoxetine 40 mg cap from 10/21/22-10/21/23  Reviewed the PA approval with the [facility] pharmacist and he received a paid claim  [facility]  Nurse line number provided  For your review  Will scan to Media

## 2022-10-27 ENCOUNTER — NURSE TRIAGE (OUTPATIENT)
Dept: OTHER | Facility: OTHER | Age: 62
End: 2022-10-27

## 2022-10-27 ENCOUNTER — TELEMEDICINE (OUTPATIENT)
Dept: INTERNAL MEDICINE CLINIC | Facility: CLINIC | Age: 62
End: 2022-10-27

## 2022-10-27 DIAGNOSIS — C64.2 RENAL CELL CARCINOMA OF LEFT KIDNEY (HCC): ICD-10-CM

## 2022-10-27 DIAGNOSIS — U07.1 COVID-19: Primary | ICD-10-CM

## 2022-10-27 DIAGNOSIS — D84.9 IMMUNOCOMPROMISED (HCC): ICD-10-CM

## 2022-10-27 DIAGNOSIS — I25.10 CORONARY ARTERY DISEASE INVOLVING NATIVE CORONARY ARTERY OF NATIVE HEART WITHOUT ANGINA PECTORIS: ICD-10-CM

## 2022-10-27 RX ORDER — BEBTELOVIMAB 87.5 MG/ML
175 INJECTION, SOLUTION INTRAVENOUS ONCE
Status: CANCELLED | OUTPATIENT
Start: 2022-10-29

## 2022-10-27 RX ORDER — ACETAMINOPHEN 325 MG/1
650 TABLET ORAL ONCE AS NEEDED
Status: CANCELLED | OUTPATIENT
Start: 2022-10-29

## 2022-10-27 RX ORDER — ONDANSETRON 2 MG/ML
4 INJECTION INTRAMUSCULAR; INTRAVENOUS ONCE AS NEEDED
Status: CANCELLED | OUTPATIENT
Start: 2022-10-29

## 2022-10-27 RX ORDER — SODIUM CHLORIDE 9 MG/ML
20 INJECTION, SOLUTION INTRAVENOUS ONCE
Status: CANCELLED | OUTPATIENT
Start: 2022-10-29

## 2022-10-27 RX ORDER — ALBUTEROL SULFATE 90 UG/1
3 AEROSOL, METERED RESPIRATORY (INHALATION) ONCE AS NEEDED
Status: CANCELLED | OUTPATIENT
Start: 2022-10-29

## 2022-10-27 NOTE — TELEPHONE ENCOUNTER
Regarding: Positive for Covid  ----- Message from Yesica Ramos sent at 10/27/2022  9:40 AM EDT -----  "I tested positive for Covid last night and I feel increasingly worse   I feel very tired as well "

## 2022-10-27 NOTE — PROGRESS NOTES
COVID-19 Outpatient Progress Note    Assessment/Plan:    Problem List Items Addressed This Visit        Cardiovascular and Mediastinum    Coronary artery disease involving native coronary artery of native heart without angina pectoris       Genitourinary    Renal cell carcinoma of left kidney ([facility] [address] )       Other    COVID-19 - Primary    Immunocompromised ([facility] [address] )         Disposition:     Discussed symptom directed medication options with patient  Discussed vitamin D, vitamin C, and/or zinc supplementation with patient  60 with new onset covid  Newly diagnosed kidney cancer-planned for nephrectomy 11/2022  Has CAD  Arranged for monoclonal antibodies  Will follow up Monday  Advised ED for worsening s/s chest pain, shortness of breath      Patient meets criteria for Bebtelovimab infusion  They were counseled in regards to risks, benefits, and side effects of this infusion  Budd  is an investigational medicine used to treat mild-to-moderate symptoms of COVID-19 in adults and children (15years of age and older weighing at least 80 pounds (40 kg)) with positive results of direct SARS-CoV-2 viral testing, and who are at high risk of progression to severe COVID-19, including hospitalization or death, and for whom other COVID-19 treatment options approved or authorized by FDA are not available or clinically appropriate  Bebtelovimab is investigational because it is still being studied  There is limited information about the safety and effectiveness of using bebtelovimab to treat people with mild-to-moderate COVID-19  The FDA has authorized the emergency use of bebtelovimab for the treatment of COVID-19 under an Emergency Use Authorization (EUA)       Budd  is not authorized for use in people who:  - are likely to be infected with a SARS-CoV-2 variant that is not able to be treated by bebtelovimab based on the circulating variants in your area (ask your health care provider about FDA and CDC’s latest information on circulating variants by geographic area), or  - are hospitalized due to COVID-19, or  - require oxygen therapy and/or respiratory support due to COVID-19, or  - require an increase in baseline oxygen flow rate and/or respiratory support due to [address] and are on chronic oxygen therapy and/or respiratory support due to underlying nonCOVID-19 related comorbidity  How will I receive Bebtelovimab? Arvid Amos will be given as an injection through a vein (intravenously or IV) over at least 30 seconds  You will be observed by your healthcare provider for at least 1 hour after you receive bebtelovimab  Possible side effects of Bebtelovimab: Allergic reactions can happen during and after infusion with bebtelovimab  Possible reactions include: fever, chills, nausea, headache, shortness of breath, low or high blood pressure, rapid or slow heart rate, chest discomfort or pain, weakness, confusion, feeling tired, wheezing, swelling of your lips, face, or throat, rash including hives, itching, muscle aches, dizziness, and sweating  These reactions may be severe or life threatening  Worsening symptoms after treatment: You may experience new or worsening symptoms after infusion, including fever, difficulty breathing, rapid or slow heart rate, tiredness, weakness or confusion  If these occur, contact your healthcare provider or seek immediate medical attention as some of these events have required hospitalization  It is unknown if these events are related to treatment or are due to the progression of COVID19  The side effects of getting any medicine by vein may include brief pain, bleeding, bruising of the skin, soreness, swelling, and possible infection at the infusion site  These are not all the possible side effects of bebtelovimab  Not a lot of people have been given bebtelovimab  Serious and unexpected side effects may happen   Bebtelovimab are still being studied so it is possible that all of the risks are not known at this time  It is possible that bebtelovimab could interfere with your body's own ability to fight off a future infection of SARS-CoV-2  Similarly, bebtelovimab may reduce your body’s immune response to a vaccine for SARS-CoV-2  Specific studies have not been conducted to address these possible risks  Talk to your healthcare provider if you have any questions  Emergency Use Authorization:    The [employer] [facility] FDA has made bebtelovimab available under an emergency access mechanism called an EUA  The EUA is supported by a  of Health and Human Service ([facility]) declaration that circumstances exist to justify the emergency use of drugs and biological products during the COVID-19 pandemic  Bebtelovimab have not undergone the same type of review as an FDA-approved or cleared product  The FDA may issue an EUA when certain criteria are met, which includes that there are no adequate, approved, and available alternatives  In addition, the FDA decision is based on the totality of scientific evidence available showing that it is reasonable to believe that the product meets certain criteria for safety, performance, and labeling and may be effective in treatment of patients during the COVID-19 pandemic  All of these criteria must be met to allow for the product to be used in the treatment of patients during the COVID-19 pandemic  The EUA for bebtelovimab together is in effect for the duration of the COVID-19 declaration justifying emergency use of these products, unless terminated or revoked (after which the product may no longer be used)  What if I am pregnant or breastfeeding? There is no experience treating pregnant women or breastfeeding mothers with bebtelovimab  For a mother and unborn baby, the benefit of receiving bebtelovimab may be greater than the risk from the treatment   If you are pregnant or breastfeeding, discuss your options and specific situation with your healthcare provider  How do I report side effects with Bebtelovimab? Contact your healthcare provider if you have any side effects that bother you or do not go away  Report side effects to FDA MedWatch at www fda gov/medwatch, or call 8-165-ZNG-9943 or to [address]  as shown below  Email: [email or web address] [email or web address]  com   Fax number: [phone]   Telephone number: 0-508-PVCXLB69 ([phone])    Full fact sheet document for patients can be found at: http://[employer]/    The patient consents to proceed with bebtelovimab infusion  I have spent 10 minutes directly with the patient  Greater than 50% of this time was spent in counseling/coordination of care regarding: diagnostic results, prognosis, risks and benefits of treatment options, instructions for management, patient and family education, importance of treatment compliance, risk factor reductions and impressions  Encounter provider: Emma Her     Provider located at: [address] [place], [place] [address]     Recent Visits  No visits were found meeting these conditions  Showing recent visits within past 7 days and meeting all other requirements  Today's Visits  Date Type Provider Dept   10/27/22 Telemedicine Jefferson Her today's visits and meeting all other requirements  Future Appointments  No visits were found meeting these conditions  Showing future appointments within next 150 days and meeting all other requirements     This virtual check-in was done via [employer] and patient was informed that this is a secure, HIPAA-compliant platform  He agrees to proceed  Patient agrees to participate in a virtual check in via telephone or video visit instead of presenting to the office to address urgent/immediate medical needs  Patient is aware this is a billable service   He acknowledged consent and understanding of privacy and security of the video platform  The patient has agreed to participate and understands they can discontinue the visit at any time  After connecting through [facility], the patient was identified by name and date of birth  Griselda Bence was informed that this was a telemedicine visit and that the exam was being conducted confidentially over secure lines  My office door was closed  No one else was in the room  Griselda Bence acknowledged consent and understanding of privacy and security of the telemedicine visit  I informed the patient that I have reviewed his record in Epic and presented the opportunity for him to ask any questions regarding the visit today  The patient agreed to participate  Verification of patient location:  Patient is located in the following state in which I hold an active license: [place]    Subjective:   Griselda Bence is a 60 y o  male who is concerned about COVID-19  Patient's symptoms include fever, chills, fatigue, malaise, nasal congestion, rhinorrhea, sore throat, cough, myalgias and headache  Patient denies shortness of breath, chest tightness, abdominal pain and diarrhea       - Date of symptom onset: 10/26/2022      COVID-19 vaccination status: Partially vaccinated    Exposure:   Contact with a person who is under investigation (PUI) for or who is positive for COVID-19 within the last 14 days?: Yes    Hospitalized recently for fever and/or lower respiratory symptoms?: No      Currently a healthcare worker that is involved in direct patient care?: No      Works in a special setting where the risk of COVID-19 transmission may be high? (this may include long-term care, correctional and half-way facilities; homeless shelters; assisted-living facilities and group homes ): No      Resident in a special setting where the risk of COVID-19 transmission may be high? (this may include long-term care, correctional and half-way facilities; homeless shelters; assisted-living facilities and group homes ): No      Lab Results   Component Value Date    SARSCOV2 NOT DETECTED 05/23/2021       Review of Systems   Constitutional: Positive for chills, fatigue and fever  HENT: Positive for congestion, rhinorrhea and sore throat  Respiratory: Positive for cough  Negative for chest tightness and shortness of breath  Gastrointestinal: Negative for abdominal pain and diarrhea  Musculoskeletal: Positive for myalgias  Neurological: Positive for weakness and headaches  Negative for dizziness  Current Outpatient Medications on File Prior to Visit   Medication Sig   • aspirin (ECOTRIN LOW STRENGTH) 81 mg EC tablet Take 81 mg by mouth daily   • atorvastatin (LIPITOR) 40 mg tablet Take 1 tablet (40 mg total) by mouth daily   • clobetasol (TEMOVATE) 0 05 % cream Apply 9 23 application topically 2 (two) times a day as needed    • clotrimazole-betamethasone (LOTRISONE) 1-0 05 % cream Apply topically 2 (two) times a day   • fexofenadine (ALLEGRA) 60 MG tablet Take 60 mg by mouth daily as needed    • ketoconazole (NIZORAL) 2 % cream Apply topically daily as needed for rash   • methylphenidate (Ritalin) 10 mg tablet Take 2 tablet (20 mg) in the morning, 2 tablet (20 mg) mid-day and 1 tablet (10 mg) late afternoon   • Taltz 80 MG/ML SOAJ    • zaleplon (SONATA) 10 MG capsule Take 1 capsule (10 mg total) by mouth daily at bedtime   • atoMOXetine (STRATTERA) 40 mg capsule Start 40 mg daily for one week and then increase to 40 mg two times a day (Patient not taking: Reported on 10/27/2022)   • EPINEPHrine (EPIPEN) 0 3 mg/0 3 mL SOAJ Inject 0 3 mL (0 3 mg total) into a muscle once for 1 dose (Patient not taking: Reported on 10/27/2022)       Objective: There were no vitals taken for this visit  Physical Exam  Constitutional:       General: He is not in acute distress  Appearance: Normal appearance  He is ill-appearing     Neurological:      Mental Status: He is alert         David Morales, [address]

## 2022-10-27 NOTE — TELEPHONE ENCOUNTER
Patient called in to report home test positive for Covid 10/26/22  Symptoms include fatigue, cough, headaches, body aches, fever, and chills  Medical history of asthma, psoriatic arthritis, and kidney cancer with upcoming surgery 11/28  Patient is interested in [address] or anti-viral medication  PCP not available today  VV scheduled with KIESHA at 4:00 PM today  Please follow up with patient prior to VV to inform patient how office will connect  Reason for Disposition  • [1] COVID-19 diagnosed by positive lab test (e g , PCR, rapid self-test kit) AND [2] mild symptoms (e g , cough, fever, others) AND [5] no complications or SOB    Answer Assessment - Initial Assessment Questions  Were you within 6 feet or less, for up to 15 minutes or more with a person that has a confirmed COVID-19 test? Unknown    What was the date of your exposure? Home test positive 10/26/22    Are you experiencing any symptoms attributed to the virus?  (Assess for SOB, cough, fever, difficulty breathing) Cough, headache, fever, chills, fatigue, body aches, no taste    HIGH RISK: Do you have any history heart or lung conditions, weakened immune system, diabetes, Asthma, CHF, HIV, COPD, Chemo, renal failure, sickle cell, etc? Kidney cancer with partial nephrectomy; elevated BP, psoriatic arthritis, Asthma    PREGNANCY: Are you pregnant or did you recently give birth?  N/a    VACCINE: "Have you gotten the COVID-19 vaccine?" If Yes ask: "Which one, how many shots, when did you get it?" J&J and Moderna booster    Protocols used: CORONAVIRUS (COVID-19) DIAGNOSED OR SUSPECTED-ADULT-[place]

## 2022-10-29 ENCOUNTER — HOSPITAL ENCOUNTER (OUTPATIENT)
Dept: INFUSION CENTER | Facility: HOSPITAL | Age: 62
Discharge: HOME | End: 2022-10-29

## 2022-10-29 VITALS
SYSTOLIC BLOOD PRESSURE: 132 MMHG | OXYGEN SATURATION: 99 % | RESPIRATION RATE: 18 BRPM | HEART RATE: 80 BPM | DIASTOLIC BLOOD PRESSURE: 80 MMHG | TEMPERATURE: 97.8 F

## 2022-10-29 DIAGNOSIS — C64.2 RENAL CELL CARCINOMA OF LEFT KIDNEY (HCC): ICD-10-CM

## 2022-10-29 DIAGNOSIS — U07.1 COVID-19: ICD-10-CM

## 2022-10-29 DIAGNOSIS — I25.10 CORONARY ARTERY DISEASE INVOLVING NATIVE CORONARY ARTERY OF NATIVE HEART WITHOUT ANGINA PECTORIS: ICD-10-CM

## 2022-10-29 DIAGNOSIS — D84.9 IMMUNOCOMPROMISED (HCC): Primary | ICD-10-CM

## 2022-10-29 RX ORDER — ONDANSETRON 2 MG/ML
4 INJECTION INTRAMUSCULAR; INTRAVENOUS ONCE AS NEEDED
Status: CANCELLED | OUTPATIENT
Start: 2022-10-29

## 2022-10-29 RX ORDER — ACETAMINOPHEN 325 MG/1
650 TABLET ORAL ONCE AS NEEDED
Status: DISCONTINUED | OUTPATIENT
Start: 2022-10-29 | End: 2022-11-01 | Stop reason: HOSPADM

## 2022-10-29 RX ORDER — ALBUTEROL SULFATE 90 UG/1
3 AEROSOL, METERED RESPIRATORY (INHALATION) ONCE AS NEEDED
Status: DISCONTINUED | OUTPATIENT
Start: 2022-10-29 | End: 2022-11-01 | Stop reason: HOSPADM

## 2022-10-29 RX ORDER — ONDANSETRON 2 MG/ML
4 INJECTION INTRAMUSCULAR; INTRAVENOUS ONCE AS NEEDED
Status: DISCONTINUED | OUTPATIENT
Start: 2022-10-29 | End: 2022-11-01 | Stop reason: HOSPADM

## 2022-10-29 RX ORDER — BEBTELOVIMAB 87.5 MG/ML
175 INJECTION, SOLUTION INTRAVENOUS ONCE
Status: CANCELLED | OUTPATIENT
Start: 2022-10-29

## 2022-10-29 RX ORDER — SODIUM CHLORIDE 9 MG/ML
20 INJECTION, SOLUTION INTRAVENOUS ONCE
Status: CANCELLED | OUTPATIENT
Start: 2022-10-29

## 2022-10-29 RX ORDER — BEBTELOVIMAB 87.5 MG/ML
175 INJECTION, SOLUTION INTRAVENOUS ONCE
Status: COMPLETED | OUTPATIENT
Start: 2022-10-29 | End: 2022-10-29

## 2022-10-29 RX ORDER — ALBUTEROL SULFATE 90 UG/1
3 AEROSOL, METERED RESPIRATORY (INHALATION) ONCE AS NEEDED
Status: CANCELLED | OUTPATIENT
Start: 2022-10-29

## 2022-10-29 RX ORDER — SODIUM CHLORIDE 9 MG/ML
20 INJECTION, SOLUTION INTRAVENOUS ONCE
Status: DISCONTINUED | OUTPATIENT
Start: 2022-10-29 | End: 2022-11-01 | Stop reason: HOSPADM

## 2022-10-29 RX ORDER — ACETAMINOPHEN 325 MG/1
650 TABLET ORAL ONCE AS NEEDED
Status: CANCELLED | OUTPATIENT
Start: 2022-10-29

## 2022-10-29 RX ADMIN — BEBTELOVIMAB 175 MG: 87.5 INJECTION, SOLUTION INTRAVENOUS at 08:02

## 2022-10-29 NOTE — NURSING NOTE
Pt here for Bebtelovimab  EUA reviewed  Pt gives verbal consent to proceed with treatment  IV started with no issue  Vital signs stable  Pt resting comfortably and has no further questions or concerns  Call bell with in reach

## 2022-11-01 ENCOUNTER — APPOINTMENT (EMERGENCY)
Dept: CT IMAGING | Facility: HOSPITAL | Age: 62
End: 2022-11-01

## 2022-11-01 ENCOUNTER — NURSE TRIAGE (OUTPATIENT)
Dept: OTHER | Facility: OTHER | Age: 62
End: 2022-11-01

## 2022-11-01 ENCOUNTER — HOSPITAL ENCOUNTER (EMERGENCY)
Facility: HOSPITAL | Age: 62
Discharge: HOME | End: 2022-11-01
Attending: INTERNAL MEDICINE

## 2022-11-01 ENCOUNTER — TELEPHONE (OUTPATIENT)
Dept: UROLOGY | Facility: MEDICAL CENTER | Age: 62
End: 2022-11-01

## 2022-11-01 VITALS
TEMPERATURE: 98.1 F | WEIGHT: 178 LBS | HEIGHT: 71 IN | HEART RATE: 81 BPM | RESPIRATION RATE: 18 BRPM | OXYGEN SATURATION: 100 % | DIASTOLIC BLOOD PRESSURE: 119 MMHG | BODY MASS INDEX: 24.92 KG/M2 | SYSTOLIC BLOOD PRESSURE: 155 MMHG

## 2022-11-01 DIAGNOSIS — R51.9 NONINTRACTABLE HEADACHE, UNSPECIFIED CHRONICITY PATTERN, UNSPECIFIED HEADACHE TYPE: Primary | ICD-10-CM

## 2022-11-01 RX ORDER — SODIUM CHLORIDE, SODIUM LACTATE, POTASSIUM CHLORIDE, CALCIUM CHLORIDE 600; 310; 30; 20 MG/100ML; MG/100ML; MG/100ML; MG/100ML
125 INJECTION, SOLUTION INTRAVENOUS CONTINUOUS
Status: DISCONTINUED | OUTPATIENT
Start: 2022-11-01 | End: 2022-11-01 | Stop reason: HOSPADM

## 2022-11-01 RX ORDER — DIPHENHYDRAMINE HYDROCHLORIDE 50 MG/ML
50 INJECTION INTRAMUSCULAR; INTRAVENOUS ONCE
Status: COMPLETED | OUTPATIENT
Start: 2022-11-01 | End: 2022-11-01

## 2022-11-01 RX ORDER — METOCLOPRAMIDE HYDROCHLORIDE 5 MG/ML
10 INJECTION INTRAMUSCULAR; INTRAVENOUS ONCE
Status: COMPLETED | OUTPATIENT
Start: 2022-11-01 | End: 2022-11-01

## 2022-11-01 RX ORDER — BUTALBITAL, ASPIRIN, AND CAFFEINE 325; 50; 40 MG/1; MG/1; MG/1
1 CAPSULE ORAL EVERY 8 HOURS PRN
Qty: 30 CAPSULE | Refills: 0 | Status: SHIPPED | OUTPATIENT
Start: 2022-11-01 | End: 2022-11-11

## 2022-11-01 RX ADMIN — SODIUM CHLORIDE, SODIUM LACTATE, POTASSIUM CHLORIDE, AND CALCIUM CHLORIDE 125 ML/HR: .6; .31; .03; .02 INJECTION, SOLUTION INTRAVENOUS at 16:00

## 2022-11-01 RX ADMIN — METOCLOPRAMIDE 10 MG: 5 INJECTION, SOLUTION INTRAMUSCULAR; INTRAVENOUS at 16:01

## 2022-11-01 RX ADMIN — DIPHENHYDRAMINE HYDROCHLORIDE 50 MG: 50 INJECTION, SOLUTION INTRAMUSCULAR; INTRAVENOUS at 16:01

## 2022-11-01 NOTE — TELEPHONE ENCOUNTER
Reason for Disposition  • SEVERE headache, states 'worst headache' of life    Answer Assessment - Initial Assessment Questions  1  LOCATION: "Where does it hurt?"       Back of neck to front of head  2  ONSET: "When did the headache start?" (Minutes, hours or days)       Started on Sunday   3  PATTERN: "Does the pain come and go, or has it been constant since it started?"      Constant   4  SEVERITY: "How bad is the pain?" and "What does it keep you from doing?"  (e g , Scale 1-10; mild, moderate, or severe)    - MILD (1-3): doesn't interfere with normal activities     - MODERATE (4-7): interferes with normal activities or awakens from sleep     - SEVERE (8-10): excruciating pain, unable to do any normal activities        9/10  5  RECURRENT SYMPTOM: "Have you ever had headaches before?" If Yes, ask: "When was the last time?" and "What happened that time?"       never  6  CAUSE: "What do you think is causing the headache?"      Unsure if its covid or monoclonal antibody treatment  7  MIGRAINE: "Have you been diagnosed with migraine headaches?" If Yes, ask: "Is this headache similar?"       No   8  HEAD INJURY: "Has there been any recent injury to the head?"       No   9  OTHER SYMPTOMS: "Do you have any other symptoms?" (fever, stiff neck, eye pain, sore throat, cold symptoms)      Chills   10   PREGNANCY: "Is there any chance you are pregnant?" "When was your last menstrual period?"        No    Protocols used: HEADACHE-ADULT-[place]

## 2022-11-01 NOTE — DISCHARGE INSTRUCTIONS
Take medications as prescribed  Follow up with your cardiologist  Follow up with your urologist   CT head wo contrast   Final Result      No acute intracranial abnormality                    Workstation performed: [id number]

## 2022-11-01 NOTE — ED PROVIDER NOTES
History  Chief Complaint   Patient presents with   • Headache     + COVID 10/26, had MAB infusion on Sunday  Has had SEVERE headache since and has been unable to get relief from OTC meds  Pt has renal CA and is scheduled for partial nephrectomy 11/28  Known HTN, as per pt, his nephrologist does not want him on any meds for HTN at this time     A this is a 60years old came for having headache since 2 days  Patient recently have COVID 19 on 10/26 and on 10/29 he received monoclonal antibodies  Patient next day he started to feel better by bed the night of some day he started to have headache the headache did not go away from Sunday until now  Patient keep take acetaminophen 1000 mg with ibuprofen 400 mg but this does not relieve the headache  Patient denies any dizziness, blurred vision, vomiting  Patient denies history of migraine headache  Patient has no neck pain or neck stiffness  Patient has history of left kidney cancer with supposed to be removed on 11/28  Patient does not need chemo or radiation for it  Patient went to his cardiologist and he told him because of the surgery he need he does not want to keep him medication for hypertension  On the arrival to the ER patient have /119  Patient denies any CP, SOB, abdominal pain  Patient has history of ADHD and he was on Ritalin which was tab out by his psychiatrist from 8 mg, down to 6 down to for on currently take 2 mg daily  The psychiatrist advised him to start on Strettera once he reached to 2 mg of Ritalin  Patient did not started yet  Patient has a no other complaints        History provided by:  Patient   used: No    Headache  Pain location:  Generalized  Radiates to:  Does not radiate  Severity currently:  6/10  Severity at highest:  9/10  Onset quality:  Sudden  Timing:  Constant  Progression:  Worsening  Similar to prior headaches: no    Context: not activity    Worsened by:  Nothing  Ineffective treatments:  None tried  Associated symptoms: no abdominal pain, no back pain, no cough, no diarrhea, no dizziness, no eye pain, no fatigue, no fever, no nausea, no neck pain, no neck stiffness, no photophobia, no seizures, no sinus pressure, no sore throat, no vomiting and no weakness        Prior to Admission Medications   Prescriptions Last Dose Informant Patient Reported? Taking?    EPINEPHrine (EPIPEN) 0 3 mg/0 3 mL SOAJ   No No   Sig: Inject 0 3 mL (0 3 mg total) into a muscle once for 1 dose   Patient not taking: Reported on 10/27/2022   Taltz 80 MG/ML SOAJ   Yes No   aspirin (ECOTRIN LOW STRENGTH) 81 mg EC tablet  Self Yes No   Sig: Take 81 mg by mouth daily   atoMOXetine (STRATTERA) 40 mg capsule   No No   Sig: Start 40 mg daily for one week and then increase to 40 mg two times a day   Patient not taking: Reported on 10/27/2022   atorvastatin (LIPITOR) 40 mg tablet   No No   Sig: Take 1 tablet (40 mg total) by mouth daily   clobetasol (TEMOVATE) 0 05 % cream  Self Yes No   Sig: Apply 4 19 application topically 2 (two) times a day as needed    clotrimazole-betamethasone (LOTRISONE) 1-0 05 % cream   No No   Sig: Apply topically 2 (two) times a day   fexofenadine (ALLEGRA) 60 MG tablet  Self Yes No   Sig: Take 60 mg by mouth daily as needed    ketoconazole (NIZORAL) 2 % cream   No No   Sig: Apply topically daily as needed for rash   methylphenidate (Ritalin) 10 mg tablet   No No   Sig: Take 2 tablet (20 mg) in the morning, 2 tablet (20 mg) mid-day and 1 tablet (10 mg) late afternoon   zaleplon (SONATA) 10 MG capsule   No No   Sig: Take 1 capsule (10 mg total) by mouth daily at bedtime      Facility-Administered Medications: None       Past Medical History:   Diagnosis Date   • Arthritis    • Asthma    • COPD (chronic obstructive pulmonary disease) ([facility] [address] ) 1990   • Headache(784 0) 2019    Significant osteoarthrities in neck   • High blood pressure    • HL (hearing loss) 2019    mild   • Hypertension 2020   • Pneumonia 2019   • Psoriatic arthritis ([facility] [address] )    • Septicemia ([facility] )    • Stomach problems        Past Surgical History:   Procedure Laterality Date   • COLONOSCOPY     • DENTAL IMPLANT     • FRACTURE SURGERY     • HERNIA REPAIR     • INCISION AND DRAINAGE ABSCESS / HEMATOMA OF BURSA / KNEE / THIGH Left     thigh   • TN REPAIR ING HERNIA,5+Y/O,REDUCIBL Right 06/22/2021    Procedure: Inguinal hernia repair with mesh;  Surgeon: Kayley Tate MD;  Location:  MAIN OR;  Service: General       Family History   Problem Relation Age of Onset   • Coronary artery disease Father    • Heart disease Father    • Arthritis Mother    • Hearing loss Mother    • Heart disease Maternal Grandfather    • Dementia Maternal Grandmother    • Heart disease Paternal Grandfather    • Dementia Maternal Aunt      I have reviewed and agree with the history as documented  E-Cigarette/Vaping   • E-Cigarette Use Never User      E-Cigarette/Vaping Substances     Social History     Tobacco Use   • Smoking status: Never Smoker   • Smokeless tobacco: Never Used   Vaping Use   • Vaping Use: Never used   Substance Use Topics   • Alcohol use: Yes     Alcohol/week: 1 0 standard drink     Types: 1 Glasses of wine per week     Comment: More like one glass per month   • Drug use: Never       Review of Systems   Constitutional: Negative for diaphoresis, fatigue and fever  HENT: Negative for sinus pressure, sinus pain, sneezing, sore throat, tinnitus and trouble swallowing  Eyes: Negative for photophobia, pain, discharge, redness, itching and visual disturbance  Respiratory: Negative for cough, chest tightness, shortness of breath and wheezing  Cardiovascular: Negative for chest pain, palpitations and leg swelling  Gastrointestinal: Negative for abdominal pain, diarrhea, nausea and vomiting  Endocrine: Negative for polydipsia, polyphagia and polyuria  Genitourinary: Negative for difficulty urinating, dysuria, flank pain and hematuria     Musculoskeletal: Negative for arthralgias, back pain, gait problem, neck pain and neck stiffness  Skin: Negative for color change, pallor and rash  Neurological: Positive for headaches  Negative for dizziness, tremors, seizures, syncope, speech difficulty, weakness and light-headedness  Hematological: Negative for adenopathy  Does not bruise/bleed easily  Psychiatric/Behavioral: Negative for agitation and behavioral problems  Physical Exam  Physical Exam  Vitals and nursing note reviewed  Constitutional:       General: He is not in acute distress  Appearance: He is well-developed  He is not ill-appearing, toxic-appearing or diaphoretic  HENT:      Head: Normocephalic and atraumatic  Right Ear: Ear canal normal       Left Ear: Ear canal normal       Nose: Nose normal  No congestion or rhinorrhea  Mouth/Throat:      Pharynx: No oropharyngeal exudate or posterior oropharyngeal erythema  Eyes:      General: No scleral icterus  Right eye: No discharge  Left eye: No discharge  Extraocular Movements: Extraocular movements intact  Conjunctiva/sclera: Conjunctivae normal       Pupils: Pupils are equal, round, and reactive to light  Neck:      Vascular: No carotid bruit  Cardiovascular:      Rate and Rhythm: Normal rate and regular rhythm  Heart sounds: Normal heart sounds  No murmur heard  No friction rub  Pulmonary:      Effort: Pulmonary effort is normal  No respiratory distress  Breath sounds: Normal breath sounds  No wheezing, rhonchi or rales  Chest:      Chest wall: No tenderness  Abdominal:      General: Bowel sounds are normal  There is no distension  Palpations: Abdomen is soft  There is no mass  Tenderness: There is no abdominal tenderness  There is no guarding  Musculoskeletal:         General: No swelling, tenderness, deformity or signs of injury  Normal range of motion  Cervical back: Normal range of motion and neck supple   No rigidity or tenderness  Right lower leg: No edema  Left lower leg: No edema  Lymphadenopathy:      Cervical: No cervical adenopathy  Skin:     General: Skin is warm and dry  Capillary Refill: Capillary refill takes less than 2 seconds  Coloration: Skin is not jaundiced or pale  Findings: No bruising, erythema, lesion or rash  Neurological:      General: No focal deficit present  Mental Status: He is alert and oriented to person, place, and time  Cranial Nerves: No cranial nerve deficit  Sensory: No sensory deficit  Motor: No weakness  Coordination: Coordination normal       Deep Tendon Reflexes: Reflexes normal    Psychiatric:         Mood and Affect: Mood normal          Behavior: Behavior normal          Vital Signs  ED Triage Vitals [11/01/22 1504]   Temperature Pulse Respirations Blood Pressure SpO2   98 1 °F (36 7 °C) 81 18 (!) 155/119 100 %      Temp Source Heart Rate Source Patient Position - Orthostatic VS BP Location FiO2 (%)   Oral -- -- -- --      Pain Score       9           Vitals:    11/01/22 1504   BP: (!) 155/119   Pulse: 81         Visual Acuity  Visual Acuity    Flowsheet Row Most Recent Value   L Pupil Size (mm) 3   R Pupil Size (mm) 3          ED Medications  Medications   diphenhydrAMINE (BENADRYL) injection 50 mg (50 mg Intravenous Given 11/1/22 1601)   metoclopramide (REGLAN) injection 10 mg (10 mg Intravenous Given 11/1/22 1601)       Diagnostic Studies  Results Reviewed     None                 CT head wo contrast   Final Result by Karlo Burton MD (11/01 1603)      No acute intracranial abnormality  Workstation performed: [id number]                    Procedures  Procedures         ED Course                                             MDM  Number of Diagnoses or Management Options  Diagnosis management comments: This is a 60years old came for having headache  Patient was tested positive for COVID-19 on 10/26  Then patient went of for monoclonal antibodies patient felt better on that night Sunday which was 2 days ago it started have headache the head continue until today  Patient stated headache is 5/10 at the ER at home it was 9/10  Patient giving IV fluids Benadryl and reglan and the headache is gone  Pt CT head is negative , pt felt better  Will discharge home and follow up with his cardiologist and urologist       Amount and/or Complexity of Data Reviewed  Tests in the radiology section of CPT®: ordered and reviewed    Risk of Complications, Morbidity, and/or Mortality  Presenting problems: moderate  Diagnostic procedures: moderate  Management options: moderate    Patient Progress  Patient progress: improved      Disposition  Final diagnoses:   Nonintractable headache, unspecified chronicity pattern, unspecified headache type     Time reflects when diagnosis was documented in both MDM as applicable and the Disposition within this note     Time User Action Codes Description Comment    11/1/2022  4:33 PM Gabi Mendez Add [R51 9] Nonintractable headache, unspecified chronicity pattern, unspecified headache type       ED Disposition     ED Disposition   Discharge    Condition   Stable    Date/Time   Tue Nov 1, 2022  4:33 PM    Comment   [address] discharge to home/self care                 Follow-up Information     Follow up With Specialties Details Why Contact Info    Josue Mcclellan MD Internal Medicine In 3 days  [address]  [phone]            Discharge Medication List as of 11/1/2022  4:36 PM      START taking these medications    Details   butalbital-aspirin-caffeine [facility]) -40 mg capsule Take 1 capsule by mouth every 8 (eight) hours as needed for headaches for up to 10 days, Starting Tue 11/1/2022, Until Fri 11/11/2022 at 2359, Normal         CONTINUE these medications which have NOT CHANGED    Details   aspirin (ECOTRIN LOW STRENGTH) 81 mg EC tablet Take 81 mg by mouth daily, Historical Med      atoMOXetine (STRATTERA) 40 mg capsule Start 40 mg daily for one week and then increase to 40 mg two times a day, Normal      atorvastatin (LIPITOR) 40 mg tablet Take 1 tablet (40 mg total) by mouth daily, Starting Mon 8/8/2022, Normal      clobetasol (TEMOVATE) 0 05 % cream Apply 9 90 application topically 2 (two) times a day as needed , Historical Med      clotrimazole-betamethasone (LOTRISONE) 1-0 05 % cream Apply topically 2 (two) times a day, Starting Fri 11/5/2021, Normal      EPINEPHrine (EPIPEN) 0 3 mg/0 3 mL SOAJ Inject 0 3 mL (0 3 mg total) into a muscle once for 1 dose, Starting Fri 11/5/2021, Normal      fexofenadine (ALLEGRA) 60 MG tablet Take 60 mg by mouth daily as needed , Historical Med      ketoconazole (NIZORAL) 2 % cream Apply topically daily as needed for rash, Starting Fri 11/5/2021, Normal      methylphenidate (Ritalin) 10 mg tablet Take 2 tablet (20 mg) in the morning, 2 tablet (20 mg) mid-day and 1 tablet (10 mg) late afternoon, Normal      Taltz 80 MG/ML SOAJ Starting Fri 9/16/2022, Historical Med      zaleplon (SONATA) 10 MG capsule Take 1 capsule (10 mg total) by mouth daily at bedtime, Starting Mon 10/10/2022, Normal             No discharge procedures on file      PDMP Review       Value Time User    PDMP Reviewed  Yes 10/10/2022 10:55 AM Crissy Toure MD          ED Provider  Electronically Signed by           Madhav Rivas MD  11/02/22 1257

## 2022-11-01 NOTE — TELEPHONE ENCOUNTER
Pt states he has the worst headache of his life, since Sunday  He is unable to get any relief despite taking medication  Patient recently was given monoclonal antibodies advised to go to ED

## 2022-11-01 NOTE — TELEPHONE ENCOUNTER
LM for pt advising we agree with the healthcall nurse and he should go to the ER  Advised to CB to confirm he is going

## 2022-11-01 NOTE — TELEPHONE ENCOUNTER
Regarding: Severe headache  ----- Message from Olya Mims sent at 11/1/2022  1:38 PM EDT -----  "I was covid positive and I had the infusion done on 10/29  Since Sunday night I've had a relentless headache/migraine  It's almost incapacitating  Nothing is working  I can barely walk   Is this a normal side effect from the infusion or covid?"

## 2022-11-09 ENCOUNTER — APPOINTMENT (OUTPATIENT)
Dept: LAB | Facility: HOSPITAL | Age: 62
End: 2022-11-09
Attending: UROLOGY

## 2022-11-09 ENCOUNTER — LAB (OUTPATIENT)
Dept: LAB | Facility: HOSPITAL | Age: 62
End: 2022-11-09
Attending: UROLOGY

## 2022-11-09 DIAGNOSIS — N28.89 LEFT RENAL MASS: ICD-10-CM

## 2022-11-09 LAB
ABO GROUP BLD: NORMAL
ALBUMIN SERPL BCP-MCNC: 3.8 G/DL (ref 3.5–5)
ALP SERPL-CCNC: 49 U/L (ref 34–104)
ALT SERPL W P-5'-P-CCNC: 26 U/L (ref 7–52)
ANION GAP SERPL CALCULATED.3IONS-SCNC: 8 MMOL/L (ref 4–13)
APTT PPP: 25 SECONDS (ref 23–37)
AST SERPL W P-5'-P-CCNC: 19 U/L (ref 13–39)
BASOPHILS # BLD AUTO: 0.01 THOUSANDS/ÂΜL (ref 0–0.1)
BASOPHILS NFR BLD AUTO: 0 % (ref 0–1)
BILIRUB SERPL-MCNC: 0.56 MG/DL (ref 0.2–1)
BLD GP AB SCN SERPL QL: NEGATIVE
BUN SERPL-MCNC: 18 MG/DL (ref 5–25)
CALCIUM SERPL-MCNC: 8.9 MG/DL (ref 8.4–10.2)
CHLORIDE SERPL-SCNC: 104 MMOL/L (ref 96–108)
CO2 SERPL-SCNC: 27 MMOL/L (ref 21–32)
CREAT SERPL-MCNC: 0.93 MG/DL (ref 0.6–1.3)
EOSINOPHIL # BLD AUTO: 0.01 THOUSAND/ÂΜL (ref 0–0.61)
EOSINOPHIL NFR BLD AUTO: 0 % (ref 0–6)
ERYTHROCYTE [DISTWIDTH] IN BLOOD BY AUTOMATED COUNT: 12.6 % (ref 11.6–15.1)
GFR SERPL CREATININE-BSD FRML MDRD: 87 ML/MIN/1.73SQ M
GLUCOSE P FAST SERPL-MCNC: 88 MG/DL (ref 65–99)
HCT VFR BLD AUTO: 42.2 % (ref 36.5–49.3)
HGB BLD-MCNC: 14.6 G/DL (ref 12–17)
IMM GRANULOCYTES # BLD AUTO: 0.02 THOUSAND/UL (ref 0–0.2)
IMM GRANULOCYTES NFR BLD AUTO: 0 % (ref 0–2)
INR PPP: 0.99 (ref 0.84–1.19)
LYMPHOCYTES # BLD AUTO: 2.23 THOUSANDS/ÂΜL (ref 0.6–4.47)
LYMPHOCYTES NFR BLD AUTO: 26 % (ref 14–44)
MCH RBC QN AUTO: 31.6 PG (ref 26.8–34.3)
MCHC RBC AUTO-ENTMCNC: 34.6 G/DL (ref 31.4–37.4)
MCV RBC AUTO: 91 FL (ref 82–98)
MONOCYTES # BLD AUTO: 0.92 THOUSAND/ÂΜL (ref 0.17–1.22)
MONOCYTES NFR BLD AUTO: 11 % (ref 4–12)
NEUTROPHILS # BLD AUTO: 5.48 THOUSANDS/ÂΜL (ref 1.85–7.62)
NEUTS SEG NFR BLD AUTO: 63 % (ref 43–75)
NRBC BLD AUTO-RTO: 0 /100 WBCS
PLATELET # BLD AUTO: 225 THOUSANDS/UL (ref 149–390)
PMV BLD AUTO: 9.6 FL (ref 8.9–12.7)
POTASSIUM SERPL-SCNC: 3.9 MMOL/L (ref 3.5–5.3)
PROT SERPL-MCNC: 6.6 G/DL (ref 6.4–8.4)
PROTHROMBIN TIME: 13.4 SECONDS (ref 11.6–14.5)
RBC # BLD AUTO: 4.62 MILLION/UL (ref 3.88–5.62)
RH BLD: POSITIVE
SODIUM SERPL-SCNC: 139 MMOL/L (ref 135–147)
SPECIMEN EXPIRATION DATE: NORMAL
WBC # BLD AUTO: 8.67 THOUSAND/UL (ref 4.31–10.16)

## 2022-11-10 LAB — BACTERIA UR CULT: NORMAL

## 2022-11-11 LAB
ATRIAL RATE: 79 BPM
P AXIS: 63 DEGREES
PR INTERVAL: 168 MS
QRS AXIS: 22 DEGREES
QRSD INTERVAL: 108 MS
QT INTERVAL: 400 MS
QTC INTERVAL: 458 MS
T WAVE AXIS: 52 DEGREES
VENTRICULAR RATE: 79 BPM

## 2022-11-20 NOTE — PSYCH
Virtual Regular Visit    Verification of [place] location: [place]    Patient is located in the following state in which I hold an active license: [place]    Assessment/Plan:    Problem List Items Addressed This Visit        Other    Attention deficit hyperactivity disorder (ADHD), combined type - Primary            Reason for visit is   Chief Complaint   Patient presents with   • Medication Management   • ADHD        Visit Time  Visit Start Time: 8:25 AM  Visit Stop Time: 8:54 AM  Total Visit Duration: 29 minutes    Encounter provider Jose L Cardoso MD    Provider located at: [address]  [facility] 3    Recent Visits  No visits were found meeting these conditions  Showing recent visits within past 7 days and meeting all other requirements  Today's Visits  Date Type Provider Dept   11/21/22 Telemedicine Jose L Cardoso MD [facility] 18 today's visits and meeting all other requirements  Future Appointments  No visits were found meeting these conditions  Showing future appointments within next 150 days and meeting all other requirements       The patient was identified by name and date of birth  Halley Damon was informed that this is a telemedicine visit and that the visit is being conducted through the [address] [place] [address] Now platform  He agrees to proceed  My office door was closed  No one else was in the room  He acknowledged consent and understanding of privacy and security of the video platform  The patient has agreed to participate and understands they can discontinue the visit at any time  Patient is aware this is a billable service         MEDICATION MANAGEMENT NOTE        [facility]      Name and Date of Birth:  Erma Fam 60 y o  1960 MRN: [id number]    Date of Visit: November 21, 2022    Reason for Visit:   Chief Complaint   Patient presents with   • Medication Management   • ADHD       SUBJECTIVE:  The patient was visited virtually for medication management and follow up visit for ADHD  Presented calm, and cooperative  He stated that he got COVID-19 last month and received IVIg and then had severe headache subsequently, and has been very sick over past couple of months with uncontrolled BP  He just came back from [facility] a couple of days ago, and he is concerned about his upcoming surgery  He reported having brain fog after the COVID with fatigue  He tapered off Ritalin and has been off completely for past three weeks, and started Strattera, and has been taking 40 mg daily as he started taking the medication around 11/7  He noted that he first had some worsening of ADHD sxs, but his ADHD has been relatively well controlled and is going to increase Strattera to 40 mg BID as instructed  Denied any changes in sleep, appetite, or daily activities  Denied feelings of anhedonia, hopelessness, helplessness, worthlessness or guilt and appeared to be future oriented  There was no thought constriction related to death  Denied SI/HI, intent or plan upon direct inquiry at this time  Denied AV/H  No intense anxiety sxs, specific phobia or panic attacks reported  No manic sxs, paranoid ideations or fixed delusions were elicited  Endorsed good compliance with the medications and denied any side effects  Denied smoking cigarettes, binge drinking alcohol or other illicit substance use  Given this presentation, medications are maintained at the same dosage  Will continue individual therapy  The patient was educated to call 911 or go to the nearest emergency room if the symptoms become overwhelming or unable to remain in control  Verbalized understanding and agreed to seek help in case of distress or concern for safety  Review Of Systems:  Pertinent items are noted in HPI; all others are negative; no recent changes in medications or health status reported        Past Psychiatric History Update:   - No inpatient psychiatric admission since last encounter  - No SA or SIB since last encounter  - No incidence of violent behavior since last encounter    Past Trauma History Update:    - No new onset of abuse or traumatic events since last encounter     Past Medical History:    Past Medical History:   Diagnosis Date   • Arthritis    • Asthma    • COPD (chronic obstructive pulmonary disease) ([facility] ) 1990   • Headache(784 0) 2019    Significant osteoarthrities in neck   • High blood pressure    • HL (hearing loss) 2019    mild   • Hypertension 2020   • Pneumonia 2019   • Psoriatic arthritis ([facility] )    • Septicemia ([facility] )    • Stomach problems         Past Surgical History:   Procedure Laterality Date   • COLONOSCOPY     • DENTAL IMPLANT     • FRACTURE SURGERY     • HERNIA REPAIR     • INCISION AND DRAINAGE ABSCESS / HEMATOMA OF BURSA / KNEE / THIGH Left     thigh   • OH REPAIR ING HERNIA,5+Y/O,REDUCIBL Right 06/22/2021    Procedure: Inguinal hernia repair with mesh;  Surgeon: Vilma Ng MD;  Location:  MAIN OR;  Service: General     Allergies   Allergen Reactions   • Other Anaphylaxis     Unknown food allergy   • Erythromycin GI Intolerance       Substance Abuse History:    Social History     Substance and Sexual Activity   Alcohol Use Yes   • Alcohol/week: 1 0 standard drink   • Types: 1 Glasses of wine per week    Comment: More like one glass per month     Social History     Substance and Sexual Activity   Drug Use Never       Social History:    Social History     Socioeconomic History   • Marital status: /Civil Union     Spouse name: Not on file   • Number of children: Not on file   • Years of education: Not on file   • Highest education level: Not on file   Occupational History   • Not on file   Tobacco Use   • Smoking status: Never   • Smokeless tobacco: Never   Vaping Use   • Vaping Use: Never used   Substance and Sexual Activity   • Alcohol use:  Yes     Alcohol/week: 1 0 standard drink     Types: 1 Glasses of wine per week     Comment: More like one glass per month   • Drug use: Never   • Sexual activity: Yes     Partners: Female     Birth control/protection: None     Comment: Monogamous,     Other Topics Concern   • Not on file   Social History Narrative   • Not on file     Social Determinants of Health     Financial Resource Strain: Not on file   Food Insecurity: Not on file   Transportation Needs: Not on file   Physical Activity: Not on file   Stress: Not on file   Social Connections: Not on file   Intimate Partner Violence: Not on file   Housing Stability: Not on file       Family Psychiatric History:     Family History   Problem Relation Age of Onset   • Coronary artery disease Father    • Heart disease Father    • Arthritis Mother    • Hearing loss Mother    • Heart disease Maternal Grandfather    • Dementia Maternal Grandmother    • Heart disease Paternal Grandfather    • Dementia Maternal Aunt        History Review:  The following portions of the patient's history were reviewed and updated as appropriate: allergies, current medications, past family history, past medical history, past social history, past surgical history and problem list        OBJECTIVE:     Vital signs in last 24 hours:    Vitals:       Mental Status Evaluation:  Appearance and attitude: appeared as stated age, cooperative and attentive, casually dressed with good hygiene  Eye contact: good  Motor Function: within normal limits, No PMA/PMR  Gait/station: Not observed  Speech: normal for rate, rhythm, volume, latency, amount  Language: No overt abnormality  Mood/affect: euthymic / Affect was euthymic, reactive, in full range, normal intensity and mood congruent  Thought Processes: sequential and goal-directed  Thought content: denies suicidal ideation or homicidal ideation; no delusions or first rank symptoms  Associations: intact associations  Perceptual disturbances: denies Auditory/Visual/Tactile Hallucinations  Orientation: oriented to time, person, place and to the situational context  Cognitive Function: intact  Memory: recent and remote memory grossly intact  Intellect: average  Fund of knowledge: aware of current events, aware of past history and vocabulary average  Impulse control: good  Insight/judgment: good/good    Laboratory Results: I have personally reviewed all pertinent laboratory/tests results    Recent Labs (last 2 months):   Lab on 11/09/2022   Component Date Value   • Urine Culture 11/09/2022 No Growth <1000 cfu/mL    • ABO Grouping 11/09/2022 A    • Rh Factor 11/09/2022 Positive    • Antibody Screen 11/09/2022 Negative    • Specimen Expiration Date 11/09/2022 [id number]    • Sodium 11/09/2022 139    • Potassium 11/09/2022 3 9    • Chloride 11/09/2022 104    • CO2 11/09/2022 27    • ANION GAP 11/09/2022 8    • BUN 11/09/2022 18    • Creatinine 11/09/2022 0 93    • Glucose, Fasting 11/09/2022 88    • Calcium 11/09/2022 8 9    • AST 11/09/2022 19    • ALT 11/09/2022 26    • Alkaline Phosphatase 11/09/2022 49    • Total Protein 11/09/2022 6 6    • Albumin 11/09/2022 3 8    • Total Bilirubin 11/09/2022 0 56    • eGFR 11/09/2022 87    • WBC 11/09/2022 8 67    • RBC 11/09/2022 4 62    • Hemoglobin 11/09/2022 14 6    • Hematocrit 11/09/2022 42 2    • MCV 11/09/2022 91    • MCH 11/09/2022 31 6    • MCHC 11/09/2022 34 6    • RDW 11/09/2022 12 6    • MPV 11/09/2022 9 6    • Platelets 77/67/0844 225    • nRBC 11/09/2022 0    • Neutrophils Relative 11/09/2022 63    • Immat GRANS % 11/09/2022 0    • Lymphocytes Relative 11/09/2022 26    • Monocytes Relative 11/09/2022 11    • Eosinophils Relative 11/09/2022 0    • Basophils Relative 11/09/2022 0    • Neutrophils Absolute 11/09/2022 5 48    • Immature Grans Absolute 11/09/2022 0 02    • Lymphocytes Absolute 11/09/2022 2 23    • Monocytes Absolute 11/09/2022 0 92    • Eosinophils Absolute 11/09/2022 0 01    • Basophils Absolute 11/09/2022 0 01    • PTT 11/09/2022 25    • Protime 11/09/2022 13 4    • INR 11/09/2022 0 99    • Ventricular Rate 11/09/2022 79    • Atrial Rate 11/09/2022 79    • WY Interval 11/09/2022 168    • QRSD Interval 11/09/2022 108    • QT Interval 11/09/2022 400    • QTC Interval 11/09/2022 458    • P Axis 11/09/2022 63    • QRS Axis 11/09/2022 22    • T Wave Axis 11/09/2022 52    Office Visit on 09/23/2022   Component Date Value   • LEUKOCYTE ESTERASE,UA 09/23/2022 -    • Therisa Miyamoto 09/23/2022 -    • SL AMB POCT UROBILINOGEN 09/23/2022 0 2    • POCT URINE PROTEIN 09/23/2022 -    •  PH,UA 09/23/2022 5 0    • BLOOD,UA 09/23/2022 -    • SPECIFIC GRAVITY,UA 09/23/2022 1 025    • KETONES,UA 09/23/2022 -    • BILIRUBIN,UA 09/23/2022 -    • GLUCOSE, UA 09/23/2022 -    •  COLOR,UA 09/23/2022 yellow    • CLARITY,UA 09/23/2022 clear          Assessment/Plan:   A 62 y o   male, , domiciled w/ wife, employed for [employer] as [employer] w/ PMH of HTN, exercise induced asthma, HLD, psoriatic arthritis, cervical and lumbar DDD, renal cancer and COVID-19 infection and PPH of ADHD, insomnia and ?WALTER, no prior psychiatric admissions, no prior SA, no  h/o self-injurious behavior, on Ritalin 20 mg TID and Sonata 10 mg nightly prescribed by his PCP who presented to the mental health clinic for initial intake and psychiatric evaluation as referred by his PCP for re-evaluation of ADHD treatment with Ritalin on 9/18/2020  The patient presented w/ h/o ADHD since 1999, being on Ritalin 60mg daily since then, w/ marked improvement of attention, concentration and his function since being treated  Denied any excessive anxiety, depressed/manic sxs, A/VH or other psychotic sxs  No h/o substance abuse  Denied SI/HI, intent or plan upon direct inquiry at this time   WALTER-7: 4  His current presentation meets criteria for ADHD, by history   The patient was educated about the diagnosis of ADHD and the course of ADHD in adults which may present with decreased intensity of sxs, onesimo after age 48, and particularly after age 65  Psycho-education regarding Ritalin indications, benefits, risks, side effects, and alternative options provided to the patient, and the importance of the compliance with psychiatric treatment reiterated  The patient was educated about the potential long-term side effects of ritalin in older age, onesimo cardiac side effects (given the FH of heart attack in his father at age 39), and was educated about the possibility of tapering off or if becoming symptomatic, cross-tapering to different treatment options  He verbalized understanding and agreed to the proposed regimen  Ritalin dose was decreased from 20mg TID (60mg total), to 20/20/10mg (50 mg total); and then switched to equivalent dose of Concerta 72 mg po daily to be tapered down as tolerated  The patient did not tolerate switching Ritalin to Concerta due to high BP, and on 11/5/2021 requested to be switched back to Ritalin 20mg/20mg/10mg as last prescribed (tapered down from 20 mg TID)  Upon f/u on 12/28/2021, presented w/ well-controlled ADHD sxs, and stable mood and anxiety  The patient was diagnosed with complex cyst in L kidney and is going to have partial nephrectomy concerning for possible malignancy on 11/28/22, and also his internist recommended to discontinue Ritalin for HTN and high risk of cardia events given the positive FH and age  Ritalin is being tapered off by 10 mg weekly and was started on Strattera 40 mg po daily for 1 week and then 40 mg po BID for ADHD; dose to be adjusted as indicated  Will continue individual therapy        Diagnoses and all orders for this visit:    Attention deficit hyperactivity disorder (ADHD), combined type          Impression:  1   Attention deficit hyperactivity disorder (ADHD), combined type            Treatment Recommendations/Precautions:  - Ritalin tapered off successfully  - Continue Strattera 40 mg BID for ADHD; doses to be adjusted as indicated  - Continue Sonata 10 mg po nightly PRN for insomnia    - Continue individual therapy     - Medications sent to patient's pharmacy for 30 day supply x2 refills     - Psychoeducation provided to the patient and benefits, potential risks and side effects discussed; importance of compliance with the psychiatric treatment reiterated, and the patient verbalized understanding of the matter     - RTC in 7 weeks  - Educated about healthy life style, risk of falls/sedation and addiction  Patient was receptive to education   - The patient was educated about 24 hour and weekend coverage for urgent situations accessed by calling [facility] [address] main practice number  - Patient was educated to call [address] [facility] ([phone] [[phone]]) for behavioral crisis at anytime or 911 for any safety concerns, or go to nearest ER if his symptoms become overwhelming or unmanageable      Current Outpatient Medications   Medication Sig Dispense Refill   • aspirin (ECOTRIN LOW STRENGTH) 81 mg EC tablet Take 81 mg by mouth daily     • atoMOXetine (STRATTERA) 40 mg capsule Start 40 mg daily for one week and then increase to 40 mg two times a day (Patient not taking: Reported on 10/27/2022) 60 capsule 0   • atorvastatin (LIPITOR) 40 mg tablet Take 1 tablet (40 mg total) by mouth daily 90 tablet 3   • clobetasol (TEMOVATE) 0 05 % cream Apply 4 53 application topically 2 (two) times a day as needed      • clotrimazole-betamethasone (LOTRISONE) 1-0 05 % cream Apply topically 2 (two) times a day 30 g 5   • EPINEPHrine (EPIPEN) 0 3 mg/0 3 mL SOAJ Inject 0 3 mL (0 3 mg total) into a muscle once for 1 dose (Patient not taking: Reported on 10/27/2022) 1 each 1   • fexofenadine (ALLEGRA) 60 MG tablet Take 60 mg by mouth daily as needed      • ketoconazole (NIZORAL) 2 % cream Apply topically daily as needed for rash 15 g 5   • methylphenidate (Ritalin) 10 mg tablet Take 2 tablet (20 mg) in the morning, 2 tablet (20 mg) mid-day and 1 tablet (10 mg) late afternoon 150 tablet 0   • Taltz 80 MG/ML SOAJ      • zaleplon (SONATA) 10 MG capsule Take 1 capsule (10 mg total) by mouth daily at bedtime 30 capsule 1     No current facility-administered medications for this visit  Medications Risks/Benefits      Risks, Benefits And Possible Side Effects Of Medications:    Risks, benefits, and possible side effects of medications explained to Filomena Mari and he verbalizes understanding and agreement for treatment  Controlled Medication Discussion:     Not applicable    Psychotherapy Provided:     Individual psychotherapy provided: Yes  Counseling was provided during the session today for 16 minutes  Psychoeducation provided to the patient and was educated about the importance of compliance with the medications and psychiatric treatment  Supportive psychotherapy provided to the patient  Solution Focused Brief Therapy (SFBT) provided  Patient's emotions were validated and specific labeled praise provided  [place] suggestions were offered in a supportive non-critical way       Treatment Plan:    Completed and signed during the session: Yes - with Mg Patton MD 11/21/22

## 2022-11-21 ENCOUNTER — TELEMEDICINE (OUTPATIENT)
Dept: PSYCHIATRY | Facility: CLINIC | Age: 62
End: 2022-11-21

## 2022-11-21 DIAGNOSIS — F90.2 ATTENTION DEFICIT HYPERACTIVITY DISORDER (ADHD), COMBINED TYPE: Primary | ICD-10-CM

## 2022-11-21 RX ORDER — ALBUTEROL SULFATE 90 UG/1
2 AEROSOL, METERED RESPIRATORY (INHALATION) EVERY 6 HOURS PRN
COMMUNITY

## 2022-11-21 RX ORDER — ATOMOXETINE 40 MG/1
40 CAPSULE ORAL 2 TIMES DAILY
Qty: 60 CAPSULE | Refills: 2 | Status: SHIPPED | OUTPATIENT
Start: 2022-11-21 | End: 2023-02-19

## 2022-11-21 NOTE — BH TREATMENT PLAN
Treatment Plan done but not signed at time of office visit due to:  Plan reviewed with the patient during the virtual visit and verbal consent given  TREATMENT PLAN (Medication Management Only)        [facility]    Name and Date of Birth:  Daisy Liang 60 y o  1960  Date of Treatment Plan: November 21, 2022  Diagnosis/Diagnoses:    1  Attention deficit hyperactivity disorder (ADHD), combined type      Strengths/Personal Resources for Self-Care: "supportive family, access to therapy and his hobbies"  Area/Areas of need (in own words): ADHD symptoms  1  Long Term Goal: maintain control of ADHD symptoms  Target Date:6 months - 5/21/2023  Person/Persons responsible for completion of goal: Jessica Medicodette  2  Short Term Objective (s) - How will we reach this goal?:   A  Provider new recommended medication/dosage changes and/or continue medication(s): continue current medications as prescribed  B  Attend psychotherapy regularly  C  N/A  Target Date:6 months - 5/21/2023  Person/Persons Responsible for Completion of Goal: Jessica Medicodette  Progress Towards Goals: continuing treatment  Treatment Modality: medication management every 6 months, continue psychotherapy with own therapist  Review due 180 days from date of this plan: 6 months - 5/21/2023  Expected length of service: maintenance  My Physician/PA/NP and I have developed this plan together and I agree to work on the goals and objectives  I understand the treatment goals that were developed for my treatment

## 2022-11-21 NOTE — PRE-PROCEDURE INSTRUCTIONS
Pre-Surgery Instructions:   Medication Instructions   • albuterol (PROVENTIL HFA,VENTOLIN HFA) 90 mcg/act inhaler Uses PRN- OK to take day of surgery   • aspirin (ECOTRIN LOW STRENGTH) 81 mg EC tablet Stop taking 7 days prior to surgery  • atoMOXetine (STRATTERA) 40 mg capsule Take day of surgery  • atorvastatin (LIPITOR) 40 mg tablet Take night before surgery   • clobetasol (TEMOVATE) 0 05 % cream Hold day of surgery  • clotrimazole-betamethasone (LOTRISONE) 1-0 05 % cream Hold day of surgery  • EPINEPHrine (EPIPEN) 0 3 mg/0 3 mL SOAJ Uses PRN- OK to take day of surgery   • fexofenadine (ALLEGRA) 60 MG tablet Uses PRN- OK to take day of surgery   • ketoconazole (NIZORAL) 2 % cream Hold day of surgery  • zaleplon (SONATA) 10 MG capsule Take night before surgery    Pre op instructions per My Surgical Experience booklet, medications per anesthesia guidelines and showering instructions per Viky Damon protocol reviewed  Patient getting CHG  Pt  Verbalized understanding of current visitor restrictions  Instructed to call surgeon with any illness,change in health or covid exposure now till DOS  All medications instructed to take DOS are with sips water only  No Ensure drinks given by surgeon's office  Bowel prep reviewed per Dr Pancho Marcus to avoid all ASA and OTC Vit/Supp 1 week prior to surgery and to avoid NSAIDs 3 days prior to surgery per anesthesia instructions  Tylenol ok to take prn  Pt  Verbalized an understanding of all instructions reviewed and offers no concerns at this time

## 2022-11-28 ENCOUNTER — ANESTHESIA EVENT (OUTPATIENT)
Dept: PERIOP | Facility: HOSPITAL | Age: 62
End: 2022-11-28

## 2022-11-28 ENCOUNTER — ANESTHESIA (OUTPATIENT)
Dept: PERIOP | Facility: HOSPITAL | Age: 62
End: 2022-11-28

## 2022-11-28 ENCOUNTER — HOSPITAL ENCOUNTER (OUTPATIENT)
Facility: HOSPITAL | Age: 62
Setting detail: OUTPATIENT SURGERY
Discharge: HOME | End: 2022-11-29
Attending: UROLOGY | Admitting: UROLOGY

## 2022-11-28 DIAGNOSIS — I10 HYPERTENSION, UNSPECIFIED TYPE: ICD-10-CM

## 2022-11-28 DIAGNOSIS — R06.02 SHORTNESS OF BREATH: ICD-10-CM

## 2022-11-28 DIAGNOSIS — N28.89 LEFT RENAL MASS: ICD-10-CM

## 2022-11-28 DIAGNOSIS — C64.2 RENAL CELL CARCINOMA OF LEFT KIDNEY (HCC): Primary | ICD-10-CM

## 2022-11-28 LAB
ABO GROUP BLD: NORMAL
ANION GAP SERPL CALCULATED.3IONS-SCNC: 6 MMOL/L (ref 4–13)
BUN SERPL-MCNC: 12 MG/DL (ref 5–25)
CALCIUM SERPL-MCNC: 7.8 MG/DL (ref 8.4–10.2)
CHLORIDE SERPL-SCNC: 108 MMOL/L (ref 96–108)
CO2 SERPL-SCNC: 23 MMOL/L (ref 21–32)
CREAT SERPL-MCNC: 0.98 MG/DL (ref 0.6–1.3)
ERYTHROCYTE [DISTWIDTH] IN BLOOD BY AUTOMATED COUNT: 12.8 % (ref 11.6–15.1)
GFR SERPL CREATININE-BSD FRML MDRD: 81 ML/MIN/1.73SQ M
GLUCOSE P FAST SERPL-MCNC: 143 MG/DL (ref 65–99)
GLUCOSE SERPL-MCNC: 143 MG/DL (ref 65–140)
GLUCOSE SERPL-MCNC: 75 MG/DL (ref 65–140)
HCT VFR BLD AUTO: 43.3 % (ref 36.5–49.3)
HGB BLD-MCNC: 14.8 G/DL (ref 12–17)
MCH RBC QN AUTO: 31.6 PG (ref 26.8–34.3)
MCHC RBC AUTO-ENTMCNC: 34.2 G/DL (ref 31.4–37.4)
MCV RBC AUTO: 93 FL (ref 82–98)
PLATELET # BLD AUTO: 168 THOUSANDS/UL (ref 149–390)
PMV BLD AUTO: 9.8 FL (ref 8.9–12.7)
POTASSIUM SERPL-SCNC: 4.5 MMOL/L (ref 3.5–5.3)
RBC # BLD AUTO: 4.68 MILLION/UL (ref 3.88–5.62)
RH BLD: POSITIVE
SODIUM SERPL-SCNC: 137 MMOL/L (ref 135–147)
WBC # BLD AUTO: 11.35 THOUSAND/UL (ref 4.31–10.16)

## 2022-11-28 RX ORDER — DIPHENHYDRAMINE HCL 25 MG
12.5 TABLET ORAL EVERY 6 HOURS PRN
Status: DISCONTINUED | OUTPATIENT
Start: 2022-11-28 | End: 2022-11-29 | Stop reason: HOSPADM

## 2022-11-28 RX ORDER — ATORVASTATIN CALCIUM 40 MG/1
40 TABLET, FILM COATED ORAL
Status: DISCONTINUED | OUTPATIENT
Start: 2022-11-28 | End: 2022-11-29 | Stop reason: HOSPADM

## 2022-11-28 RX ORDER — LIDOCAINE HYDROCHLORIDE 10 MG/ML
INJECTION, SOLUTION EPIDURAL; INFILTRATION; INTRACAUDAL; PERINEURAL AS NEEDED
Status: DISCONTINUED | OUTPATIENT
Start: 2022-11-28 | End: 2022-11-28

## 2022-11-28 RX ORDER — OXYCODONE HYDROCHLORIDE 5 MG/1
5 TABLET ORAL EVERY 4 HOURS PRN
Qty: 8 TABLET | Refills: 0 | Status: SHIPPED | OUTPATIENT
Start: 2022-11-28 | End: 2022-12-01 | Stop reason: SDUPTHER

## 2022-11-28 RX ORDER — SODIUM CHLORIDE 9 MG/ML
INJECTION, SOLUTION INTRAVENOUS CONTINUOUS PRN
Status: DISCONTINUED | OUTPATIENT
Start: 2022-11-28 | End: 2022-11-28

## 2022-11-28 RX ORDER — DEXAMETHASONE SODIUM PHOSPHATE 10 MG/ML
INJECTION, SOLUTION INTRAMUSCULAR; INTRAVENOUS AS NEEDED
Status: DISCONTINUED | OUTPATIENT
Start: 2022-11-28 | End: 2022-11-28

## 2022-11-28 RX ORDER — DICLOFENAC POTASSIUM 50 MG/1
50 TABLET, FILM COATED ORAL 2 TIMES DAILY
Qty: 10 TABLET | Refills: 0 | Status: SHIPPED | OUTPATIENT
Start: 2022-11-28 | End: 2022-12-12

## 2022-11-28 RX ORDER — NEOSTIGMINE METHYLSULFATE 1 MG/ML
INJECTION INTRAVENOUS AS NEEDED
Status: DISCONTINUED | OUTPATIENT
Start: 2022-11-28 | End: 2022-11-28

## 2022-11-28 RX ORDER — GABAPENTIN 300 MG/1
300 CAPSULE ORAL ONCE
Status: COMPLETED | OUTPATIENT
Start: 2022-11-28 | End: 2022-11-28

## 2022-11-28 RX ORDER — HYDROMORPHONE HCL/PF 1 MG/ML
0.5 SYRINGE (ML) INJECTION
Status: DISCONTINUED | OUTPATIENT
Start: 2022-11-28 | End: 2022-11-28 | Stop reason: HOSPADM

## 2022-11-28 RX ORDER — ACETAMINOPHEN 325 MG/1
975 TABLET ORAL ONCE
Status: COMPLETED | OUTPATIENT
Start: 2022-11-28 | End: 2022-11-28

## 2022-11-28 RX ORDER — HEPARIN SODIUM 5000 [USP'U]/ML
5000 INJECTION, SOLUTION INTRAVENOUS; SUBCUTANEOUS EVERY 8 HOURS SCHEDULED
Status: DISCONTINUED | OUTPATIENT
Start: 2022-11-29 | End: 2022-11-29 | Stop reason: HOSPADM

## 2022-11-28 RX ORDER — OXYCODONE HYDROCHLORIDE 10 MG/1
10 TABLET ORAL EVERY 4 HOURS PRN
Status: DISCONTINUED | OUTPATIENT
Start: 2022-11-28 | End: 2022-11-29 | Stop reason: HOSPADM

## 2022-11-28 RX ORDER — CEFAZOLIN SODIUM 2 G/50ML
2000 SOLUTION INTRAVENOUS ONCE
Status: COMPLETED | OUTPATIENT
Start: 2022-11-28 | End: 2022-11-28

## 2022-11-28 RX ORDER — HYDROMORPHONE HCL/PF 1 MG/ML
0.5 SYRINGE (ML) INJECTION EVERY 2 HOUR PRN
Status: DISCONTINUED | OUTPATIENT
Start: 2022-11-28 | End: 2022-11-29 | Stop reason: HOSPADM

## 2022-11-28 RX ORDER — FENTANYL CITRATE/PF 50 MCG/ML
50 SYRINGE (ML) INJECTION
Status: DISCONTINUED | OUTPATIENT
Start: 2022-11-28 | End: 2022-11-28 | Stop reason: HOSPADM

## 2022-11-28 RX ORDER — ACETAMINOPHEN 325 MG/1
650 TABLET ORAL EVERY 6 HOURS SCHEDULED
Status: DISCONTINUED | OUTPATIENT
Start: 2022-11-28 | End: 2022-11-29 | Stop reason: HOSPADM

## 2022-11-28 RX ORDER — PROPOFOL 10 MG/ML
INJECTION, EMULSION INTRAVENOUS AS NEEDED
Status: DISCONTINUED | OUTPATIENT
Start: 2022-11-28 | End: 2022-11-28

## 2022-11-28 RX ORDER — SODIUM CHLORIDE, SODIUM LACTATE, POTASSIUM CHLORIDE, CALCIUM CHLORIDE 600; 310; 30; 20 MG/100ML; MG/100ML; MG/100ML; MG/100ML
50 INJECTION, SOLUTION INTRAVENOUS CONTINUOUS
Status: CANCELLED | OUTPATIENT
Start: 2022-11-28

## 2022-11-28 RX ORDER — MAGNESIUM HYDROXIDE/ALUMINUM HYDROXICE/SIMETHICONE 120; 1200; 1200 MG/30ML; MG/30ML; MG/30ML
30 SUSPENSION ORAL EVERY 6 HOURS PRN
Status: DISCONTINUED | OUTPATIENT
Start: 2022-11-28 | End: 2022-11-29 | Stop reason: HOSPADM

## 2022-11-28 RX ORDER — HYDROMORPHONE HCL/PF 1 MG/ML
SYRINGE (ML) INJECTION AS NEEDED
Status: DISCONTINUED | OUTPATIENT
Start: 2022-11-28 | End: 2022-11-28

## 2022-11-28 RX ORDER — ONDANSETRON 2 MG/ML
4 INJECTION INTRAMUSCULAR; INTRAVENOUS ONCE AS NEEDED
Status: DISCONTINUED | OUTPATIENT
Start: 2022-11-28 | End: 2022-11-28 | Stop reason: HOSPADM

## 2022-11-28 RX ORDER — CEFAZOLIN SODIUM 1 G/50ML
1000 SOLUTION INTRAVENOUS EVERY 8 HOURS
Status: COMPLETED | OUTPATIENT
Start: 2022-11-28 | End: 2022-11-28

## 2022-11-28 RX ORDER — OXYCODONE HYDROCHLORIDE 5 MG/1
5 TABLET ORAL EVERY 4 HOURS PRN
Status: DISCONTINUED | OUTPATIENT
Start: 2022-11-28 | End: 2022-11-29 | Stop reason: HOSPADM

## 2022-11-28 RX ORDER — ONDANSETRON 2 MG/ML
4 INJECTION INTRAMUSCULAR; INTRAVENOUS EVERY 6 HOURS PRN
Status: DISCONTINUED | OUTPATIENT
Start: 2022-11-28 | End: 2022-11-29 | Stop reason: HOSPADM

## 2022-11-28 RX ORDER — ZOLPIDEM TARTRATE 5 MG/1
10 TABLET ORAL
Status: DISCONTINUED | OUTPATIENT
Start: 2022-11-28 | End: 2022-11-29 | Stop reason: HOSPADM

## 2022-11-28 RX ORDER — SODIUM CHLORIDE, SODIUM LACTATE, POTASSIUM CHLORIDE, CALCIUM CHLORIDE 600; 310; 30; 20 MG/100ML; MG/100ML; MG/100ML; MG/100ML
INJECTION, SOLUTION INTRAVENOUS CONTINUOUS PRN
Status: DISCONTINUED | OUTPATIENT
Start: 2022-11-28 | End: 2022-11-28

## 2022-11-28 RX ORDER — SENNOSIDES 8.6 MG
1 TABLET ORAL DAILY
Status: DISCONTINUED | OUTPATIENT
Start: 2022-11-28 | End: 2022-11-29 | Stop reason: HOSPADM

## 2022-11-28 RX ORDER — FENTANYL CITRATE 50 UG/ML
INJECTION, SOLUTION INTRAMUSCULAR; INTRAVENOUS AS NEEDED
Status: DISCONTINUED | OUTPATIENT
Start: 2022-11-28 | End: 2022-11-28

## 2022-11-28 RX ORDER — ONDANSETRON 2 MG/ML
INJECTION INTRAMUSCULAR; INTRAVENOUS AS NEEDED
Status: DISCONTINUED | OUTPATIENT
Start: 2022-11-28 | End: 2022-11-28

## 2022-11-28 RX ORDER — ALBUTEROL SULFATE 90 UG/1
2 AEROSOL, METERED RESPIRATORY (INHALATION) EVERY 6 HOURS PRN
Status: DISCONTINUED | OUTPATIENT
Start: 2022-11-28 | End: 2022-11-29 | Stop reason: HOSPADM

## 2022-11-28 RX ORDER — MIDAZOLAM HYDROCHLORIDE 2 MG/2ML
INJECTION, SOLUTION INTRAMUSCULAR; INTRAVENOUS AS NEEDED
Status: DISCONTINUED | OUTPATIENT
Start: 2022-11-28 | End: 2022-11-28

## 2022-11-28 RX ORDER — MAGNESIUM HYDROXIDE 1200 MG/15ML
LIQUID ORAL AS NEEDED
Status: DISCONTINUED | OUTPATIENT
Start: 2022-11-28 | End: 2022-11-28 | Stop reason: HOSPADM

## 2022-11-28 RX ORDER — LABETALOL HYDROCHLORIDE 5 MG/ML
INJECTION, SOLUTION INTRAVENOUS AS NEEDED
Status: DISCONTINUED | OUTPATIENT
Start: 2022-11-28 | End: 2022-11-28

## 2022-11-28 RX ORDER — ATOMOXETINE 40 MG/1
40 CAPSULE ORAL EVERY 12 HOURS SCHEDULED
Status: DISCONTINUED | OUTPATIENT
Start: 2022-11-28 | End: 2022-11-29 | Stop reason: HOSPADM

## 2022-11-28 RX ORDER — ROCURONIUM BROMIDE 10 MG/ML
INJECTION, SOLUTION INTRAVENOUS AS NEEDED
Status: DISCONTINUED | OUTPATIENT
Start: 2022-11-28 | End: 2022-11-28

## 2022-11-28 RX ORDER — ESMOLOL HYDROCHLORIDE 10 MG/ML
INJECTION INTRAVENOUS AS NEEDED
Status: DISCONTINUED | OUTPATIENT
Start: 2022-11-28 | End: 2022-11-28

## 2022-11-28 RX ORDER — BUPIVACAINE HYDROCHLORIDE 2.5 MG/ML
INJECTION, SOLUTION EPIDURAL; INFILTRATION; INTRACAUDAL AS NEEDED
Status: DISCONTINUED | OUTPATIENT
Start: 2022-11-28 | End: 2022-11-28 | Stop reason: HOSPADM

## 2022-11-28 RX ORDER — DOCUSATE SODIUM 100 MG/1
100 CAPSULE, LIQUID FILLED ORAL 2 TIMES DAILY
Status: DISCONTINUED | OUTPATIENT
Start: 2022-11-28 | End: 2022-11-29 | Stop reason: HOSPADM

## 2022-11-28 RX ORDER — SENNOSIDES 8.6 MG
8.6 TABLET ORAL
Qty: 5 TABLET | Refills: 0 | Status: SHIPPED | OUTPATIENT
Start: 2022-11-28 | End: 2022-12-03

## 2022-11-28 RX ORDER — GLYCOPYRROLATE 0.2 MG/ML
INJECTION INTRAMUSCULAR; INTRAVENOUS AS NEEDED
Status: DISCONTINUED | OUTPATIENT
Start: 2022-11-28 | End: 2022-11-28

## 2022-11-28 RX ORDER — ACETAMINOPHEN 500 MG
500 TABLET ORAL EVERY 6 HOURS
Qty: 20 TABLET | Refills: 0 | Status: SHIPPED | OUTPATIENT
Start: 2022-11-28 | End: 2022-12-03

## 2022-11-28 RX ORDER — SODIUM CHLORIDE, SODIUM LACTATE, POTASSIUM CHLORIDE, CALCIUM CHLORIDE 600; 310; 30; 20 MG/100ML; MG/100ML; MG/100ML; MG/100ML
125 INJECTION, SOLUTION INTRAVENOUS CONTINUOUS
Status: DISCONTINUED | OUTPATIENT
Start: 2022-11-28 | End: 2022-11-29 | Stop reason: HOSPADM

## 2022-11-28 RX ADMIN — OXYCODONE HYDROCHLORIDE 5 MG: 5 TABLET ORAL at 12:57

## 2022-11-28 RX ADMIN — LABETALOL HYDROCHLORIDE 5 MG: 5 INJECTION, SOLUTION INTRAVENOUS at 09:13

## 2022-11-28 RX ADMIN — CEFAZOLIN SODIUM 2000 MG: 2 SOLUTION INTRAVENOUS at 08:10

## 2022-11-28 RX ADMIN — FENTANYL CITRATE 50 MCG: 50 INJECTION, SOLUTION INTRAMUSCULAR; INTRAVENOUS at 07:45

## 2022-11-28 RX ADMIN — DOCUSATE SODIUM 100 MG: 100 CAPSULE, LIQUID FILLED ORAL at 18:03

## 2022-11-28 RX ADMIN — ESMOLOL HYDROCHLORIDE 20 MG: 100 INJECTION, SOLUTION INTRAVENOUS at 08:48

## 2022-11-28 RX ADMIN — CEFAZOLIN SODIUM 1000 MG: 1 SOLUTION INTRAVENOUS at 17:02

## 2022-11-28 RX ADMIN — NEOSTIGMINE METHYLSULFATE 3 MG: 1 INJECTION INTRAVENOUS at 09:59

## 2022-11-28 RX ADMIN — ATOMOXETINE 40 MG: 40 CAPSULE ORAL at 21:15

## 2022-11-28 RX ADMIN — HYDROMORPHONE HYDROCHLORIDE 0.5 MG: 1 INJECTION, SOLUTION INTRAMUSCULAR; INTRAVENOUS; SUBCUTANEOUS at 18:02

## 2022-11-28 RX ADMIN — HYDROMORPHONE HYDROCHLORIDE 0.5 MG: 1 INJECTION, SOLUTION INTRAMUSCULAR; INTRAVENOUS; SUBCUTANEOUS at 20:16

## 2022-11-28 RX ADMIN — ATORVASTATIN CALCIUM 40 MG: 40 TABLET, FILM COATED ORAL at 21:15

## 2022-11-28 RX ADMIN — PROPOFOL 160 MG: 10 INJECTION, EMULSION INTRAVENOUS at 07:38

## 2022-11-28 RX ADMIN — ONDANSETRON 4 MG: 2 INJECTION INTRAMUSCULAR; INTRAVENOUS at 20:19

## 2022-11-28 RX ADMIN — ACETAMINOPHEN 975 MG: 325 TABLET ORAL at 07:16

## 2022-11-28 RX ADMIN — ALUMINUM HYDROXIDE, MAGNESIUM HYDROXIDE, AND DIMETHICONE 30 ML: 200; 20; 200 SUSPENSION ORAL at 23:48

## 2022-11-28 RX ADMIN — MIDAZOLAM HYDROCHLORIDE 2 MG: 1 INJECTION, SOLUTION INTRAMUSCULAR; INTRAVENOUS at 07:29

## 2022-11-28 RX ADMIN — HYDROMORPHONE HYDROCHLORIDE 0.5 MG: 1 INJECTION, SOLUTION INTRAMUSCULAR; INTRAVENOUS; SUBCUTANEOUS at 09:58

## 2022-11-28 RX ADMIN — OXYCODONE HYDROCHLORIDE 10 MG: 10 TABLET ORAL at 21:53

## 2022-11-28 RX ADMIN — ROCURONIUM BROMIDE 10 MG: 10 INJECTION, SOLUTION INTRAVENOUS at 08:37

## 2022-11-28 RX ADMIN — GLYCOPYRROLATE 0.4 MG: 0.2 INJECTION, SOLUTION INTRAMUSCULAR; INTRAVENOUS at 09:59

## 2022-11-28 RX ADMIN — HYDROMORPHONE HYDROCHLORIDE 0.5 MG: 1 INJECTION, SOLUTION INTRAMUSCULAR; INTRAVENOUS; SUBCUTANEOUS at 16:00

## 2022-11-28 RX ADMIN — LIDOCAINE HYDROCHLORIDE 50 MG: 10 INJECTION, SOLUTION EPIDURAL; INFILTRATION; INTRACAUDAL at 07:37

## 2022-11-28 RX ADMIN — CEFAZOLIN SODIUM 1000 MG: 1 SOLUTION INTRAVENOUS at 23:23

## 2022-11-28 RX ADMIN — PROPOFOL 40 MG: 10 INJECTION, EMULSION INTRAVENOUS at 07:40

## 2022-11-28 RX ADMIN — ACETAMINOPHEN 650 MG: 325 TABLET, FILM COATED ORAL at 23:23

## 2022-11-28 RX ADMIN — HYDROMORPHONE HYDROCHLORIDE 0.5 MG: 1 INJECTION, SOLUTION INTRAMUSCULAR; INTRAVENOUS; SUBCUTANEOUS at 13:55

## 2022-11-28 RX ADMIN — HYDROMORPHONE HYDROCHLORIDE 0.5 MG: 1 INJECTION, SOLUTION INTRAMUSCULAR; INTRAVENOUS; SUBCUTANEOUS at 08:37

## 2022-11-28 RX ADMIN — FENTANYL CITRATE 50 MCG: 50 INJECTION, SOLUTION INTRAMUSCULAR; INTRAVENOUS at 07:37

## 2022-11-28 RX ADMIN — ESMOLOL HYDROCHLORIDE 20 MG: 100 INJECTION, SOLUTION INTRAVENOUS at 07:56

## 2022-11-28 RX ADMIN — ROCURONIUM BROMIDE 10 MG: 10 INJECTION, SOLUTION INTRAVENOUS at 08:58

## 2022-11-28 RX ADMIN — DEXAMETHASONE SODIUM PHOSPHATE 10 MG: 10 INJECTION, SOLUTION INTRAMUSCULAR; INTRAVENOUS at 07:46

## 2022-11-28 RX ADMIN — OXYCODONE HYDROCHLORIDE 10 MG: 10 TABLET ORAL at 17:01

## 2022-11-28 RX ADMIN — ROCURONIUM BROMIDE 50 MG: 10 INJECTION, SOLUTION INTRAVENOUS at 07:41

## 2022-11-28 RX ADMIN — SODIUM CHLORIDE, SODIUM LACTATE, POTASSIUM CHLORIDE, AND CALCIUM CHLORIDE 125 ML/HR: .6; .31; .03; .02 INJECTION, SOLUTION INTRAVENOUS at 19:41

## 2022-11-28 RX ADMIN — Medication 20 MG: at 09:02

## 2022-11-28 RX ADMIN — SODIUM CHLORIDE, SODIUM LACTATE, POTASSIUM CHLORIDE, AND CALCIUM CHLORIDE: .6; .31; .03; .02 INJECTION, SOLUTION INTRAVENOUS at 09:40

## 2022-11-28 RX ADMIN — SODIUM CHLORIDE: 0.9 INJECTION, SOLUTION INTRAVENOUS at 07:45

## 2022-11-28 RX ADMIN — ONDANSETRON 4 MG: 2 INJECTION INTRAMUSCULAR; INTRAVENOUS at 09:42

## 2022-11-28 RX ADMIN — SODIUM CHLORIDE, SODIUM LACTATE, POTASSIUM CHLORIDE, AND CALCIUM CHLORIDE: .6; .31; .03; .02 INJECTION, SOLUTION INTRAVENOUS at 07:15

## 2022-11-28 RX ADMIN — ACETAMINOPHEN 650 MG: 325 TABLET, FILM COATED ORAL at 18:02

## 2022-11-28 RX ADMIN — HYDROMORPHONE HYDROCHLORIDE 0.5 MG: 1 INJECTION, SOLUTION INTRAMUSCULAR; INTRAVENOUS; SUBCUTANEOUS at 08:52

## 2022-11-28 RX ADMIN — GABAPENTIN 300 MG: 300 CAPSULE ORAL at 07:15

## 2022-11-28 NOTE — H&P
H&P Exam - Urology   Geri Fam 60 y o  male MRN: [id number]  Unit/Bed#: OR [place] Encounter: [id number]    Assessment/Plan     Assessment:  60year old man with a left renal mass here for robotic left partial nephrectomy, ready for surgery    Plan:  Proceed to left robotic partial nephrectomy and all indicated procedures    History of Present Illness   HPI:  Giovanni Gooden is a 60 y o  male who presents with a left renal mass here for a partial nephrectomy, recovered fully from [facility], no chest pain or shortness of breath, no new complaints  Has been having some HTN at home    The following portions of the patient's history were reviewed and updated as appropriate: allergies, current medications, past family history, past medical history, past social history, past surgical history and problem list        Review of Systems   Constitutional: Negative  HENT: Negative  Eyes: Negative  Respiratory: Negative  Cardiovascular: Negative  HTN to 140s/90s   Gastrointestinal: Negative  Endocrine: Negative  Genitourinary: Negative  Musculoskeletal: Negative  Skin: Negative  Allergic/Immunologic: Negative  Neurological: Negative  Hematological: Negative  Psychiatric/Behavioral: The patient is nervous/anxious          Historical Information   Past Medical History:   Diagnosis Date   • Arthritis    • Asthma    • COPD (chronic obstructive pulmonary disease) ([address] ) 1990   • Headache(784 0) 2019    Significant osteoarthrities in neck   • HL (hearing loss) 2019    mild   • Hypertension 2020   • Pneumonia 2019   • Psoriatic arthritis ([address] )    • Septicemia ([facility] [address] )    • Stomach problems      Past Surgical History:   Procedure Laterality Date   • COLONOSCOPY     • DENTAL IMPLANT     • HERNIA REPAIR     • INCISION AND DRAINAGE ABSCESS / HEMATOMA OF BURSA / KNEE / THIGH Left     thigh   • WY REPAIR ING HERNIA,5+Y/O,REDUCIBL Right 06/22/2021    Procedure: Inguinal hernia repair with mesh; Surgeon: Yolie Maria MD;  Location:  MAIN OR;  Service: General     Social History   Social History     Substance and Sexual Activity   Alcohol Use Yes   • Alcohol/week: 1 0 standard drink   • Types: 1 Glasses of wine per week    Comment: More like one glass per month socially     Social History     Substance and Sexual Activity   Drug Use Never     Social History     Tobacco Use   Smoking Status Never   Smokeless Tobacco Never     E-Cigarette/Vaping   • E-Cigarette Use Never User      E-Cigarette/Vaping Substances     Family History:   Family History   Problem Relation Age of Onset   • Coronary artery disease Father    • Heart disease Father    • Arthritis Mother    • Hearing loss Mother    • Heart disease Maternal Grandfather    • Dementia Maternal Grandmother    • Heart disease Paternal Grandfather    • Dementia Maternal Aunt        Meds/Allergies   all medications and allergies reviewed  Allergies   Allergen Reactions   • Other Anaphylaxis     Unknown food allergy   • Erythromycin GI Intolerance       Objective   Vitals: Blood pressure 165/96, pulse 97, temperature 98 1 °F (36 7 °C), temperature source Temporal, resp  rate 20, height 5' 11" (1 803 m), weight 80 7 kg (178 lb), SpO2 97 %  No intake/output data recorded  Invasive Devices     Peripheral Intravenous Line  Duration           Peripheral IV 11/28/22 Right;Ventral (anterior) Hand <1 day                Physical Exam  Vitals reviewed  Constitutional:       General: He is not in acute distress  Appearance: Normal appearance  He is not ill-appearing, toxic-appearing or diaphoretic  HENT:      Head: Normocephalic and atraumatic  Eyes:      General: No scleral icterus  Right eye: No discharge  Left eye: No discharge  Cardiovascular:      Pulses: Normal pulses  Pulmonary:      Effort: Pulmonary effort is normal    Abdominal:      General: There is no distension  Palpations: There is no mass     Genitourinary: Comments: Deferred for the OR  Musculoskeletal:         General: No swelling  Skin:     General: Skin is warm  Neurological:      General: No focal deficit present  Mental Status: He is alert and oriented to person, place, and time  Cranial Nerves: No cranial nerve deficit  Psychiatric:         Mood and Affect: Mood normal          Behavior: Behavior normal          Thought Content: Thought content normal          Judgment: Judgment normal          Lab Results: I have personally reviewed pertinent reports  Imaging: I have personally reviewed pertinent reports  EKG, Pathology, and Other Studies: I have personally reviewed pertinent reports  VTE Prophylaxis: Sequential compression device Zeferino Baddeng)     Code Status: Prior  Advance Directive and Living Will:      Power of :    POLST:      Counseling / Coordination of Care  Total floor / unit time spent today 15 minutes  Greater than 50% of total time was spent with the patient and / or family counseling and / or coordination of care  A description of the counseling / coordination of care: review of today's case

## 2022-11-28 NOTE — ANESTHESIA POSTPROCEDURE EVALUATION
Post-Op Assessment Note    CV Status:  Stable  Pain Score: 0    Pain management: adequate     Mental Status:  Awake and sleepy   Hydration Status:  Euvolemic   PONV Controlled:  Controlled   Airway Patency:  Patent      Post Op Vitals Reviewed: Yes      Staff: CRNA, Anesthesiologist         No notable events documented      BP   135/83   Temp      Pulse  79   Resp   12   SpO2   96

## 2022-11-28 NOTE — ANESTHESIA PREPROCEDURE EVALUATION
Procedure:  ROBOTIC LAPAROSCOPIC PARTIAL NEPHRECTOMY (Left: Abdomen)    Relevant Problems   CARDIO   (+) Coronary artery disease involving native coronary artery of native heart without angina pectoris   (+) Essential hypertension   (+) Mixed hyperlipidemia      /RENAL   (+) Renal cell carcinoma of left kidney (HCC)      HEMATOLOGY   (+) Immunocompromised (HCC)      MUSCULOSKELETAL   (+) Cervical spondylosis   (+) DDD (degenerative disc disease), lumbar   (+) Degenerative disc disease, cervical   (+) Psoriatic arthritis (HCC)      PULMONARY   (+) Exercise-induced asthma      Other   (+) Attention deficit hyperactivity disorder (ADHD), combined type        Physical Exam    Airway    Mallampati score: II  TM Distance: <3 FB  Neck ROM: full     Dental   No notable dental hx     Cardiovascular      Pulmonary      Other Findings       Latest Reference Range & Units 11/09/22 08:41   Sodium 135 - 147 mmol/L 139   Potassium 3 5 - 5 3 mmol/L 3 9   Chloride 96 - 108 mmol/L 104   CO2 21 - 32 mmol/L 27   Anion Gap 4 - 13 mmol/L 8   BUN 5 - 25 mg/dL 18   Creatinine 0 60 - 1 30 mg/dL 0 93   GLUCOSE FASTING 65 - 99 mg/dL 88   Calcium 8 4 - 10 2 mg/dL 8 9   AST 13 - 39 U/L 19   ALT 7 - 52 U/L 26   Alkaline Phosphatase 34 - 104 U/L 49   Total Protein 6 4 - 8 4 g/dL 6 6   Albumin 3 5 - 5 0 g/dL 3 8   TOTAL BILIRUBIN 0 20 - 1 00 mg/dL 0 56   eGFR ml/min/1 73sq m 87        Latest Reference Range & Units 11/09/22 08:41   WBC 4 31 - 10 16 Thousand/uL 8 67   Red Blood Cell Count 3 88 - 5 62 Million/uL 4 62   Hemoglobin 12 0 - 17 0 g/dL 14 6   HCT 36 5 - 49 3 % 42 2   MCV 82 - 98 fL 91   MCH 26 8 - 34 3 pg 31 6   MCHC 31 4 - 37 4 g/dL 34 6   RDW 11 6 - 15 1 % 12 6   Platelet Count 520 - 390 Thousands/uL 225   MPV 8 9 - 12 7 fL 9 6   nRBC /100 WBCs 0        Latest Reference Range & Units 11/09/22 08:41   PROTIME 11 6 - 14 5 seconds 13 4   POCT INR 0 84 - 1 19  0 99   PTT 23 - 37 seconds 25       EKG (11/2022)  Normal sinus rhythm  Normal ECG  When compared with ECG of 15-GUNNAR-2021 15:32,  No significant change was found        Anesthesia Plan  ASA Score- 3     Anesthesia Type- general with ASA Monitors  Additional Monitors:   Airway Plan: ETT  Comment: Active type and cross; 2 units of PRBC  Plan Factors-Exercise tolerance (METS): >4 METS  Chart reviewed  EKG reviewed  Existing labs reviewed  Patient summary reviewed  Patient is not a current smoker  Induction- intravenous  Postoperative Plan-     Informed Consent- Anesthetic plan and risks discussed with patient  I personally reviewed this patient with the CRNA  Discussed and agreed on the Anesthesia Plan with the CRNA  [place]

## 2022-11-28 NOTE — OP NOTE
OPERATIVE REPORT  PATIENT NAME: Viktoriya Eng    :  1960  MRN: [id number]  Pt Location: AN OR ROOM 04    SURGERY DATE: 2022    Surgeon(s) and Role:     * Courtney Godoy MD - Primary     * Penelope Ybarra MD - [id number] PA-C - Assisting    Please note that a 2nd board certified urologist was necessary for this case due to the nature of the patient's mass  The physician associate was necessary for changing robotic instruments as well as successful completion of the case/suctioning and exposure of the appropriate anatomy    Preop Diagnosis:  Left renal mass [N28 89]    Post-Op Diagnosis Codes:     * Left renal mass [N28 89]    Procedure(s) (LRB):  ROBOTIC LAPAROSCOPIC PARTIAL NEPHRECTOMY  WITH INTRAOPERATIVE ULTRASOUND INTERPRETATION (Left)    Specimen(s):  ID Type Source Tests Collected by Time Destination   1 : LEFT RENAL MASS Tissue Kidney, Left TISSUE EXAM Courtnye Godoy MD 2022 0920        Estimated Blood Loss:   75 mL    Drains:  Closed/Suction Drain Left Bulb 19 Fr  (Active)   Number of days: 0       Urethral Catheter Latex 16 Fr  (Active)   Number of days: 0       Anesthesia Type:   General    Operative Indications:  Left renal mass [N28 89]      Operative Findings:  Successful partial nephrectomy of a complex cystic lesion at the lateral and mid to upper pole area of the left kidney with estimated blood loss of 75 milliliters, a robotic clamp time of 15 minutes and 30 seconds, and excellent hemostasis from the renorrhaphy  Approximately 90% of healthy renal parenchyma remains after today's procedure    Complications:   None    Procedure and Technique:  Ismael Phipps was counseled in the outpatient setting on partial versus robotic nephrectomy on the left hand side  He opted for a partial nephrectomy approach  He was optimized for surgery    He did have COVID some weeks prior to surgery but given the cancer nature of his case and the fashion received antibody therapy with full recovery and no further respiratory symptoms we made the decision to proceed today  He was seen by the perioperative nursing and anesthesia team in the holding area and deemed cleared to proceed  He was brought to the operating room and placed under general anesthesia by way of a endotracheal tube, intravenous lines were obtained, preoperative antibiotics were given, a Yeboah was placed in sterile fashion, sequential compression devices were placed  We then placed in in the right lateral decubitus position with the left side, the correct site, facing up  He was secured to the bed with surgical towels and tape taking care to pad all pressure points on muscle bellies  A axillary roll was placed  A tilt test was performed  He was then prepared and draped in usual sterile fashion  A time-out then confirmed the proper patient position and procedure  We used a Veress needle device to establish 15 millimeters of mercury of pneumoperitoneum  Ports were then placed as is customary for a left-sided retroperitoneal surgery with 4 robotic ports and a 12 millimeters system port  The robotic system was then docked  We began by medialized in the spleen taking care to avoid the pancreas and splenic vasculature  The colon was also medialized by dividing the white line of Toldt  We were able to identify the gonadal vein up to the renal vein and left-sided lumbar vein  This was encircled with a vessel loop to allow for retraction  The left-sided adrenal gland was left medially and we dissected superior to the vein to identify the singular renal artery  With those for able dog clamp were developed superior and inferior to this structure  The left kidney was then dissected free of the perinephric fat  Intraoperative ultrasound was then performed with intraoperative interpretation showing a good plane around the cystic renal mass  A margin was then marked around this      A 2nd time-out was then performed confirming that all necessary equipment was available and that all members of the surgical fever ready for partial nephrectomy and renorrhaphy  The renal vasculature was clamped with a curved bulldog clamp a stopwatch was started  We performed a wide excision of the cystic renal lesion without popping this cystic lesion and with healthy parenchyma at the base of resection  A dual layer renorrhaphy was then performed with a running V lock suture and a Stratafix suture  Following this the clamp was removed and slight oozing was noted from the renorrhaphy at the middle portion of the renorrhaphy  As such a U-stitch was placed in this area with a sliding Weck clip technique which had also been used in the renorrhaphy with excellent hemostasis  A matrix of gelatin particles and thrombin was then placed over the area of renorrhaphy and around the renal hilum  The specimen was placed in Endo-Catch bag for later retrieval   A drain was then placed  The pneumoperitoneum was decreased in the area of renorrhaphy was then observed with excellent hemostasis  The ports were then looked out without any evidence of damage to the inferior epigastric vessels  The robot was then undocked and the fascia was closed after retrieval of the specimen by lengthening in a horizontal fashion the assisted port  The specimen was passed off for routine processing as left renal mass  The incisions were then injected with the remaining local anesthesia  The skin was closed with 4-0 Monocryl  Skin glue was placed  A postoperative the brief was performed confirming the patient class, specimen, estimated blood loss, and reviewing the events of the case  There were no complications today  The patient was awakened from anesthesia and taken to the recovery room without issue         I was present for the entire procedure, A qualified resident physician was not available, A co-surgeon was required because of skills and techniques relevant to speciality and A physician assistant was required during the procedure for retraction tissue handling,dissection and suturing    Patient Disposition:  PACU      Plan:  The patient is admitted to the urology service overnight to look for acute kidney injury (this is expected given that we have removed part of the kidney and), acute blood-loss anemia, and to monitor him for any potential untoward events in the immediate postoperative time frame  His Yeboah catheter will be removed tomorrow, his drain will likely be removed prior to discharge with a goal to discharge him home tomorrow if meeting criteria    He will see me back in the office in 2-3 weeks for pathology review and long-term follow-up will be determined based on his final pathology as per the NCCN guidelines        SIGNATURE: Nida Ward MD  DATE: November 28, 2022  TIME: 10:17 AM

## 2022-11-28 NOTE — DISCHARGE INSTRUCTIONS
Corina Ling,    Today you had a robot assisted laparoscopic left-sided partial nephrectomy  This went very well  You lost roughly 75 milliliters of blood which is low for this case  We save roughly 90% of your kidney  Your mass was removed intact, this will now go to the pathologists and this can take up to 3-4 weeks for final results  What I want you to focus on in the weeks upcoming is that your mass has been removed, I also want you to focus on a uneventful postoperative course as this will make this outcome more likely  It is normal to have some itching and discomfort at your incisions as they heal, things that would not be normal would be high fevers or chills or worsening pain at your incisions or discharge from your incisions of a high volume  If you have any of these things please let me know  It is not uncommon to have some tingling in your hands or some soreness in your lower back or arms, this tends to improve with time  You may also have some pickups that occur which will also improve with time  If you have questions or concerns as you recover, please let me know  I have reviewed the above with your wife and answered her questions as well  I wish you a rapid recovery    Thank you for trusting me to take care of you today,  Dr Miriam Rdz

## 2022-11-29 ENCOUNTER — TELEPHONE (OUTPATIENT)
Dept: UROLOGY | Facility: CLINIC | Age: 62
End: 2022-11-29

## 2022-11-29 ENCOUNTER — APPOINTMENT (OUTPATIENT)
Dept: RADIOLOGY | Facility: HOSPITAL | Age: 62
End: 2022-11-29

## 2022-11-29 VITALS
HEIGHT: 71 IN | TEMPERATURE: 97.8 F | OXYGEN SATURATION: 96 % | BODY MASS INDEX: 24.92 KG/M2 | WEIGHT: 178 LBS | HEART RATE: 92 BPM | DIASTOLIC BLOOD PRESSURE: 96 MMHG | RESPIRATION RATE: 17 BRPM | SYSTOLIC BLOOD PRESSURE: 157 MMHG

## 2022-11-29 PROBLEM — R51.9 PERSISTENT HEADACHES: Status: ACTIVE | Noted: 2022-11-29

## 2022-11-29 PROBLEM — R06.02 SHORTNESS OF BREATH: Status: ACTIVE | Noted: 2022-11-29

## 2022-11-29 PROBLEM — R07.9 CHEST PAIN: Status: ACTIVE | Noted: 2022-11-29

## 2022-11-29 LAB
ABO GROUP BLD BPU: NORMAL
ABO GROUP BLD BPU: NORMAL
ANION GAP SERPL CALCULATED.3IONS-SCNC: 8 MMOL/L (ref 4–13)
BPU ID: NORMAL
BPU ID: NORMAL
BUN SERPL-MCNC: 18 MG/DL (ref 5–25)
CALCIUM SERPL-MCNC: 8.6 MG/DL (ref 8.4–10.2)
CARDIAC TROPONIN I PNL SERPL HS: 2 NG/L (ref 8–18)
CHLORIDE SERPL-SCNC: 99 MMOL/L (ref 96–108)
CO2 SERPL-SCNC: 25 MMOL/L (ref 21–32)
CREAT SERPL-MCNC: 0.98 MG/DL (ref 0.6–1.3)
CROSSMATCH: NORMAL
CROSSMATCH: NORMAL
D DIMER PPP FEU-MCNC: 1.74 UG/ML FEU
ERYTHROCYTE [DISTWIDTH] IN BLOOD BY AUTOMATED COUNT: 12.5 % (ref 11.6–15.1)
GFR SERPL CREATININE-BSD FRML MDRD: 81 ML/MIN/1.73SQ M
GLUCOSE SERPL-MCNC: 125 MG/DL (ref 65–140)
HCT VFR BLD AUTO: 41.3 % (ref 36.5–49.3)
HGB BLD-MCNC: 14.2 G/DL (ref 12–17)
MCH RBC QN AUTO: 30.9 PG (ref 26.8–34.3)
MCHC RBC AUTO-ENTMCNC: 34.4 G/DL (ref 31.4–37.4)
MCV RBC AUTO: 90 FL (ref 82–98)
PLATELET # BLD AUTO: 174 THOUSANDS/UL (ref 149–390)
PLATELET # BLD AUTO: 178 THOUSANDS/UL (ref 149–390)
PMV BLD AUTO: 9.7 FL (ref 8.9–12.7)
PMV BLD AUTO: 9.8 FL (ref 8.9–12.7)
POTASSIUM SERPL-SCNC: 4 MMOL/L (ref 3.5–5.3)
RBC # BLD AUTO: 4.59 MILLION/UL (ref 3.88–5.62)
SODIUM SERPL-SCNC: 132 MMOL/L (ref 135–147)
UNIT DISPENSE STATUS: NORMAL
UNIT DISPENSE STATUS: NORMAL
UNIT PRODUCT CODE: NORMAL
UNIT PRODUCT CODE: NORMAL
UNIT PRODUCT VOLUME: 350 ML
UNIT PRODUCT VOLUME: 350 ML
UNIT RH: NORMAL
UNIT RH: NORMAL
WBC # BLD AUTO: 12.15 THOUSAND/UL (ref 4.31–10.16)

## 2022-11-29 RX ORDER — METOPROLOL TARTRATE 5 MG/5ML
2.5 INJECTION INTRAVENOUS EVERY 6 HOURS PRN
Status: DISCONTINUED | OUTPATIENT
Start: 2022-11-29 | End: 2022-11-29 | Stop reason: HOSPADM

## 2022-11-29 RX ORDER — AMLODIPINE BESYLATE 5 MG/1
5 TABLET ORAL DAILY
Status: DISCONTINUED | OUTPATIENT
Start: 2022-11-29 | End: 2022-11-29 | Stop reason: HOSPADM

## 2022-11-29 RX ORDER — AMLODIPINE BESYLATE 5 MG/1
5 TABLET ORAL DAILY
Qty: 30 TABLET | Refills: 1 | Status: SHIPPED | OUTPATIENT
Start: 2022-11-29 | End: 2022-12-05 | Stop reason: SDUPTHER

## 2022-11-29 RX ADMIN — ATOMOXETINE 40 MG: 40 CAPSULE ORAL at 09:23

## 2022-11-29 RX ADMIN — OXYCODONE HYDROCHLORIDE 10 MG: 10 TABLET ORAL at 04:18

## 2022-11-29 RX ADMIN — HEPARIN SODIUM 5000 UNITS: 5000 INJECTION INTRAVENOUS; SUBCUTANEOUS at 13:03

## 2022-11-29 RX ADMIN — DOCUSATE SODIUM 100 MG: 100 CAPSULE, LIQUID FILLED ORAL at 09:23

## 2022-11-29 RX ADMIN — HEPARIN SODIUM 5000 UNITS: 5000 INJECTION INTRAVENOUS; SUBCUTANEOUS at 05:05

## 2022-11-29 RX ADMIN — HYDROMORPHONE HYDROCHLORIDE 0.5 MG: 1 INJECTION, SOLUTION INTRAMUSCULAR; INTRAVENOUS; SUBCUTANEOUS at 00:42

## 2022-11-29 RX ADMIN — SENNOSIDES 8.6 MG: 8.6 TABLET, FILM COATED ORAL at 09:23

## 2022-11-29 RX ADMIN — OXYCODONE HYDROCHLORIDE 10 MG: 10 TABLET ORAL at 09:26

## 2022-11-29 RX ADMIN — SODIUM CHLORIDE, SODIUM LACTATE, POTASSIUM CHLORIDE, AND CALCIUM CHLORIDE 125 ML/HR: .6; .31; .03; .02 INJECTION, SOLUTION INTRAVENOUS at 04:14

## 2022-11-29 RX ADMIN — AMLODIPINE BESYLATE 5 MG: 5 TABLET ORAL at 11:36

## 2022-11-29 RX ADMIN — ACETAMINOPHEN 650 MG: 325 TABLET, FILM COATED ORAL at 05:04

## 2022-11-29 RX ADMIN — OXYCODONE HYDROCHLORIDE 10 MG: 10 TABLET ORAL at 13:04

## 2022-11-29 RX ADMIN — ACETAMINOPHEN 650 MG: 325 TABLET, FILM COATED ORAL at 11:22

## 2022-11-29 NOTE — PROGRESS NOTES
UROLOGY PROGRESS NOTE   Patient Identifiers: Malgorzata Shetty (MRN [id number])  Date of Service: 11/29/2022    Assessment/Plan:     Renal mass (left)  · POD #1 left robotic laparoscopic partial nephrectomy by Dr Adrian Selby  · Postoperatively, patient with complaints of shortness of breath  · Chest x-ray-mild bilateral atelectasis otherwise unremarkable  · EKG- attempting to locate  · Troponin this a m  2  · D-Dimer- 1 74 (probable elevated due to post-op status)  · Incentive spirometer- discussed used persistently  Pt understands importance  · Abdominal laparoscopic sites unremarkable with no redness or drainage noted   · JOCY drain to bulb suction- serosanguineous drainage  Will plan for removal prior to tentative discharge tomorrow   · Urine output- ferrera to be removed this AM  · Labs-CBC with mild leukocytosis noted 12 15  · Probable secondary to stress reaction postoperatively  · BMP unremarkable-renal function unremarkable-creatinine 0 98 (at baseline)  · Vital signs-remains tachycardic 92 bpm, afebrile    Hypertension- Chronic  · SLIM consult for management- Appreciate input  · Discussed with Dr Zina Gibson (SLIM)    Mild bilateral atelectasis  · Postoperatively  · As seen on postoperative chest x-ray  · Continue with incentive spirometer  · OOB to chair and ambulating in room and home with assistance    RN participated in rounds with AP  Plan of care discussed with patient and RN  Discussed with Roz Barbosa and Valentino this AM     Subjective:     24 HR EVENTS:       Postoperatively, patient developed shortness of breath and chest pain overnight with negative cardiac workup resulting  Patient out of bed to chair and ambulating in room this a m  With reports of less abdominal/chest wall discomfort  He denies/shortness of breath  JOCY drain to left abdomen if to bulb suction with serosanguineous drainage  Laparoscopic sites unremarkable  He reports a slight/mild appetite  Denies nausea vomiting    He reports his pain to be well controlled  Objective:     VITALS:    Vitals:    11/29/22 1006   BP: 157/96   Pulse: 92   Resp: 17   Temp: 97 8 °F (36 6 °C)   SpO2: 96%       INS & OUTS:  I/O last 24 hours:   In: 3068 8 [I V :3068 8]  Out: 0391 [Urine:3050; Drains:268; Blood:75]    LABS:  Lab Results   Component Value Date    HGB 14 2 11/29/2022    HCT 41 3 11/29/2022    WBC 12 15 (H) 11/29/2022     11/29/2022       Lab Results   Component Value Date    K 4 0 11/29/2022    CL 99 11/29/2022    CO2 25 11/29/2022    BUN 18 11/29/2022    CREATININE 0 98 11/29/2022    CALCIUM 8 6 11/29/2022       INPATIENT MEDS:    Current Facility-Administered Medications:   •  acetaminophen (TYLENOL) tablet 650 mg, 650 mg, Oral, Q6H [facility], Megan Jones MD, 650 mg at 11/29/22 0504  •  albuterol (PROVENTIL HFA,VENTOLIN HFA) inhaler 2 puff, 2 puff, Inhalation, Q6H PRN, Megan Jones MD  •  aluminum-magnesium hydroxide-simethicone (MYLANTA) oral suspension 30 mL, 30 mL, Oral, Q6H PRN, Megan Jones MD, 30 mL at 11/28/22 2348  •  atoMOXetine (STRATTERA) capsule 40 mg, 40 mg, Oral, Q12H [facility], Megan Jones MD, 40 mg at 11/29/22 0097  •  atorvastatin (LIPITOR) tablet 40 mg, 40 mg, Oral, HS, Megan Jones MD, 40 mg at 11/28/22 2115  •  diphenhydrAMINE (BENADRYL) tablet 12 5 mg, 12 5 mg, Oral, Q6H PRN, Megan Jones MD  •  docusate sodium (COLACE) capsule 100 mg, 100 mg, Oral, BID, Megan Jones MD, 100 mg at 11/29/22 8110  •  heparin (porcine) subcutaneous injection 5,000 Units, 5,000 Units, Subcutaneous, Q8H [facility], 5,000 Units at 11/29/22 0505 **AND** [COMPLETED] Platelet count, , , Once, Megan Jones MD  •  HYDROmorphone (DILAUDID) injection 0 5 mg, 0 5 mg, Intravenous, Q2H PRN, Megan Jones MD, 0 5 mg at 11/29/22 0042  •  lactated ringers bolus 1,000 mL, 1,000 mL, Intravenous, Once PRN **AND** lactated ringers bolus 1,000 mL, 1,000 mL, Intravenous, Once PRN, Megan oJnes MD  •  lactated ringers infusion, 125 mL/hr, Intravenous, Continuous, Neisha Suazo MD, Last Rate: 125 mL/hr at 11/29/22 0414, 125 mL/hr at 11/29/22 0414  •  metoprolol (LOPRESSOR) injection 2 5 mg, 2 5 mg, Intravenous, Q6H PRN, Ewa Cruz PA-C  •  ondansetron (ZOFRAN) injection 4 mg, 4 mg, Intravenous, Q6H PRN, Neisha Suazo MD, 4 mg at 11/28/22 2019  •  oxyCODONE (ROXICODONE) immediate release tablet 10 mg, 10 mg, Oral, Q4H PRN, Neisha Suazo MD, 10 mg at 11/29/22 6167  •  oxyCODONE (ROXICODONE) IR tablet 5 mg, 5 mg, Oral, Q4H PRN, Neisha Suazo MD, 5 mg at 11/28/22 1257  •  senna (SENOKOT) tablet 8 6 mg, 1 tablet, Oral, Daily, Neisha Suazo MD, 8 6 mg at 11/29/22 9290  •  sodium chloride 0 9 % bolus 1,000 mL, 1,000 mL, Intravenous, Once PRN **AND** sodium chloride 0 9 % bolus 1,000 mL, 1,000 mL, Intravenous, Once PRN, Neisha Suazo MD  •  zolpidem (AMBIEN) tablet 10 mg, 10 mg, Oral, HS PRN, Neisha Suazo MD      Physical Exam:     GEN: alert and oriented x 3    RESP: Decreased breath sound secondary to poor effort  No crackles or wheezing noted    CARDIO: Regular rate and rhythm or S1S2 present  ABD: soft, appropriately tender to palpation, non-distended   INCISION:  Laparoscopic sites unremarkable    Well-approximated with no redness or drainage noted  EXT: no significant peripheral edema   DRAINS:  Left lower quadrant JOCY drain to bulb suction-serosanguineous drainage  GUAJARDO: in place draining clear yellow urine        KIESHA Urias

## 2022-11-29 NOTE — PROGRESS NOTES
Received page about patient having some shortness of breath overnight  Vitals were stable and patient's pulse ox was 93%  Wells criteria put patient at intermediate risk, therefore ddimer obtained resulting at 1 74  Paged again with shortness of breath, pulse ox 98%  Discussed with Dr Kwasi Carlos on call  Will obtain EKG, troponin, and CXR  Close monitoring of vital signs  Dr Kaylyn Rios then notified  Agree with workup above  SLIM consult for hypertension  Needs to walk and use incentive spirometer  High threshold for CT angio

## 2022-11-29 NOTE — CONSULTS
[address] 1960, 60 y o  male MRN: [id number]  Unit/Bed#: S -57 Encounter: [id number]  Primary Care Provider: Shweta Calhoun MD   Date and time admitted to hospital: 11/28/2022  6:21 AM    Consults    Shortness of breath  Assessment & Plan  Most likely etiology for patient's shortness of breath is secondary to pneumoperitoneum From recent surgery-laparoscopy, and diaphragmatic irritation  Even though the D-dimer is elevated, patient's symptoms are now better  Less likely PE as a suspicion  Hold off on any further imaging  Monitor closely  Monitor vitals  Patient not hypoxic  HTN (hypertension)  Assessment & Plan  Patient has a longstanding history of high blood pressure but has not been on medications  He was evaluated by Cardiology and Primary Care but not offered any antihypertensive medications  Unknown etiology  He mentioned he checks his blood pressure at home and commonly runs in the systolic 549T to 358H, and diastolic 67C to 849 sometimes  Will start the patient on amlodipine 5 mg daily starting today  May consider adding another agent or increasing dose of amlodipine tomorrow accordingly  * Renal cell carcinoma of left kidney [facility])  Assessment & Plan  Status post partial nephrectomy  Management as per Urology  Pain control as per Urology  Chest pain  Assessment & Plan  Most likely secondary to diaphragmatic irritation from pneumoperitoneum-from laparoscopy surgery  Twelve lead EKG was negative for any acute ischemic changes, troponins were negative  Persistent headaches  Assessment & Plan  Secondary to hypertension  Treat patient with Tylenol as needed  And start patient on amlodipine 5 mg daily        Inpatient Medical Consultation - [facility]    Patient Information: Shira Love Principato 60 y o  male MRN: [id number]  Unit/Bed#: S -50 Encounter: [id number]    PCP: Shweta Calhoun MD  Date of Admission:  11/28/2022  Date of Consultation: 11/29/22  Requesting Physician: Corrinne Scriver, MD    Reason For Consultation:     Medical management-hypertension  History of Present Illness:    Brenda Ramos is a 60 y o  male who is originally admitted to the urology service on 11/28/2022 due to left renal mass status post partial nephrectomy which was done laparoscopically-robotic assisted      We are consulted for medical management and hypertension  Last night patient had an episode of chest pain and shortness of breath  He describes his chest pain across his chest, and shortness of breath on deep inspiration  Patient had a 12 lead EKG negative for any acute  Ischemic changes though, troponin checked which were negative  This morning patient slightly feels better however still complains of chest pain when he takes a deep breath  Shortness of breath has improved  Denies any palpitations  No hypoxia  Discussed with Urology, and the commented that the patient had a laparoscopy surgery with pneumoperitoneum due to laparoscopic surgery and procedure, which would have been the cause of patient's diaphragmatic irritation and pain  This is expected  Mood monitor the patient closely, patient denies any cough or cold or any expectoration  No fevers or chills  Chest x-ray done showed some bilateral basilar atelectasis  Recommended incentive spirometry  Review of Systems:    Review of Systems  A thorough 10 point review of system was done which is negative other that that mentioned HPI    Past Medical and Surgical History:     Past Medical History:   Diagnosis Date   • Arthritis    • Asthma    • COPD (chronic obstructive pulmonary disease) ([facility] [address] ) 1990   • Headache(784 0) 2019    Significant osteoarthrities in neck   • HL (hearing loss) 2019    mild   • Hypertension 2020   • Pneumonia 2019   • Psoriatic arthritis ([address] )    • Septicemia ([facility] [address] )    • Stomach problems        Past Surgical History:   Procedure Laterality Date   • COLONOSCOPY     • DENTAL IMPLANT     • HERNIA REPAIR     • INCISION AND DRAINAGE ABSCESS / HEMATOMA OF BURSA / KNEE / THIGH Left     thigh   • UT REPAIR ING HERNIA,5+Y/O,REDUCIBL Right 06/22/2021    Procedure: Inguinal hernia repair with mesh;  Surgeon: Jackson Cruz MD;  Location:  MAIN OR;  Service: General       Meds/Allergies:    PTA meds:   Prior to Admission Medications   Prescriptions Last Dose Informant Patient Reported? Taking?    EPINEPHrine (EPIPEN) 0 3 mg/0 3 mL SOAJ   No Yes   Sig: Inject 0 3 mL (0 3 mg total) into a muscle once for 1 dose   Taltz 80 MG/ML SOAJ More than a month  Yes No   Sig: ON HOLD TILL POST OP   albuterol (PROVENTIL HFA,VENTOLIN HFA) 90 mcg/act inhaler Past Week  Yes Yes   Sig: Inhale 2 puffs every 6 (six) hours as needed for wheezing   aspirin (ECOTRIN LOW STRENGTH) 81 mg EC tablet Past Week  Yes Yes   Sig: Take 81 mg by mouth daily   atoMOXetine (STRATTERA) 40 mg capsule 11/28/2022 at 0600  No Yes   Sig: Take 1 capsule (40 mg total) by mouth 2 (two) times a day   atorvastatin (LIPITOR) 40 mg tablet 11/26/2022 at 2100  No Yes   Sig: Take 1 tablet (40 mg total) by mouth daily   Patient taking differently: Take 40 mg by mouth daily at bedtime   clobetasol (TEMOVATE) 0 05 % cream More than a month  Yes No   Sig: Apply 0 99 application topically 2 (two) times a day as needed    clotrimazole-betamethasone (LOTRISONE) 1-0 05 % cream More than a month  No No   Sig: Apply topically 2 (two) times a day   Patient taking differently: Apply topically if needed   fexofenadine (ALLEGRA) 60 MG tablet More than a month  Yes No   Sig: Take 60 mg by mouth daily as needed    ketoconazole (NIZORAL) 2 % cream More than a month  No No   Sig: Apply topically daily as needed for rash   Patient taking differently: Apply 1 application topically daily as needed for rash   zaleplon (SONATA) 10 MG capsule Past Week  No Yes   Sig: Take 1 capsule (10 mg total) by mouth daily at bedtime   Patient taking differently: Take 10 mg by mouth daily at bedtime as needed      Facility-Administered Medications: None       Allergies: Allergies   Allergen Reactions   • Other Anaphylaxis     Unknown food allergy   • Erythromycin GI Intolerance     History:     Marital Status: /Civil Union    Substance Use History:   Social History     Substance and Sexual Activity   Alcohol Use Yes   • Alcohol/week: 1 0 standard drink   • Types: 1 Glasses of wine per week    Comment: More like one glass per month socially     Social History     Tobacco Use   Smoking Status Never   Smokeless Tobacco Never     Social History     Substance and Sexual Activity   Drug Use Never       Family History:    Family History   Problem Relation Age of Onset   • Coronary artery disease Father    • Heart disease Father    • Arthritis Mother    • Hearing loss Mother    • Heart disease Maternal Grandfather    • Dementia Maternal Grandmother    • Heart disease Paternal Grandfather    • Dementia Maternal Aunt        Physical Exam:     Vitals:   Blood Pressure: 157/96 (11/29/22 1006)  Pulse: 92 (11/29/22 1006)  Temperature: 97 8 °F (36 6 °C) (11/29/22 1006)  Temp Source: Oral (11/28/22 2012)  Respirations: 17 (11/29/22 1006)  Height: 5' 11" (180 3 cm) (11/21/22 1200)  Weight - Scale: 80 7 kg (178 lb) (11/21/22 1200)  SpO2: 96 % (11/29/22 1006)    Physical Exam  General Exam: Alert and Oriented x 3, NAD  Eyes: LYNNETTE  Neck: Supple  CVS: S1, S2 Camp, RRR    R/S: Clear to auscultate anteriorly  Abd: Soft, NT, ND, BS+ve  Extremities: No edema noted  Skin: No acute Rash noted  CNS: No acute FND  Moves all 4 extremities  Psych: Co-operative, Not agitated  Lab and Imaging Data Results reviewed by me     Results from last 7 days   Lab Units 11/29/22  0508   WBC Thousand/uL 12 15*   HEMOGLOBIN g/dL 14 2   HEMATOCRIT % 41 3   PLATELETS Thousands/uL 178     Results from last 7 days   Lab Units 11/29/22  0508   POTASSIUM mmol/L 4 0   CHLORIDE mmol/L 99   CO2 mmol/L 25   BUN mg/dL 18   CREATININE mg/dL 0 98   CALCIUM mg/dL 8 6         XR chest portable   Final Result by Juanita Almeida MD (11/29 5026)      Mild bibasilar atelectasis  Workstation performed: [id number]             [facility] Problem List:     Principal Problem:    Renal cell carcinoma of left kidney (HCC)  Active Problems:    Shortness of breath    HTN (hypertension)    Chest pain    Persistent headaches      Assessment/Plan:    VTE Prophylaxis: RX contraindicated due to: Recent surgery  / sequential compression device     Counseling / Coordination of Care Time: 1 hour  Greater than 50% of total time spent on patient counseling and coordination of care  Collaboration of Care: Were Recommendations Directly Discussed with Primary Treatment Team? - Yes     ** Please Note: "This note has been constructed using a voice recognition system  Therefore there may be syntax, spelling, and/or grammatical errors   Please call if you have any questions  "**

## 2022-11-29 NOTE — ASSESSMENT & PLAN NOTE
Most likely etiology for patient's shortness of breath is secondary to pneumoperitoneum From recent surgery-laparoscopy, and diaphragmatic irritation  Even though the D-dimer is elevated, patient's symptoms are now better  Less likely PE as a suspicion  Hold off on any further imaging  Monitor closely  Monitor vitals  Patient not hypoxic

## 2022-11-29 NOTE — DISCHARGE SUMMARY
Discharge Summary - Lani Fam 60 y o  male MRN: [id number]    Unit/Bed#: S -15 Encounter: [id number]    Admission Date: 11/28/2022    Admitting Diagnosis: Left renal mass [N28 89]    HPI:  Mr Xiomara Faustin he is a 41-year-old male presenting to the office for incidental finding of Bosniak 4 left renal mass  After much discussion and evaluation the office and was deemed appropriate for patient to proceed with robot assisted partial nephrectomy  Procedures Performed:  Robotic laparoscopic partial nephrectomy-left    Summary of Hospital Course:  Patient electively presented to the hospital 11/28/2022 for robot assisted partial nephrectomy secondary to Bosniak for left renal mass  Surgical procedure commenced without incident  Estimated blood loss -75 mL  Patient was successfully recovered in the postanesthesia recovery unit and subsequently taken to [facility] floor for further convalescence  Overnight, patient with complaints of shortness of breath and chest discomfort  Vital signs were stable with pulse oximeter 93%  D-dimer was performed with findings of 1 74 (inconclusive due to post operative status)  EKG, troponins, chest x-ray were unremarkable  Postoperative day 1  Patient with significant reduction in shortness of breath and chest discomfort  Vital signs unremarkable  Hemodynamically stable  Patient out of bed ambulating in room and hartley with assistance of wife without difficulty  He is tolerating p o  Well without nausea vomiting  JOCY drain with small amount of serosanguineous drainage to bulb suction  Yeboah catheter draining clear yellow urine  Yeboah catheter was discontinued and patient has been able to spontaneously urinate without difficulty  JOCY drain removed and dressing applied  Patient was evaluated by Internal Medicine for recent diagnosis of hypertension  It appears a started him on p o  Norvasc    For which he will follow-up with his PCP for further management  Significant Findings, Care, Treatment and Services Provided:  Pathology report pending    Complications:  None    Discharge Diagnosis:  Left renal mass    Medical Problems     Resolved Problems  Date Reviewed: 11/29/2022   None         Condition at Discharge: good         Discharge instructions/Information to patient and family:   See after visit summary for information provided to patient and family  Provisions for Follow-Up Care:  See after visit summary for information related to follow-up care and any pertinent home health orders  PCP: Lakshmi Bolaños MD    Disposition: Home    Planned Readmission: No      Discharge Statement   I spent 20 minutes discharging the patient  This time was spent on the day of discharge  I had direct contact with the patient on the day of discharge  Additional documentation is required if more than 30 minutes were spent on discharge  Discharge Medications:  See after visit summary for reconciled discharge medications provided to patient and family         KIESHA Snider

## 2022-11-29 NOTE — ASSESSMENT & PLAN NOTE
Secondary to hypertension  Treat patient with Tylenol as needed  And start patient on amlodipine 5 mg daily

## 2022-11-29 NOTE — ASSESSMENT & PLAN NOTE
Patient has a longstanding history of high blood pressure but has not been on medications  He was evaluated by Cardiology and Primary Care but not offered any antihypertensive medications  Unknown etiology  He mentioned he checks his blood pressure at home and commonly runs in the systolic 122Q to 567B, and diastolic 79Z to 071 sometimes  Will start the patient on amlodipine 5 mg daily starting today  May consider adding another agent or increasing dose of amlodipine tomorrow accordingly

## 2022-11-29 NOTE — TELEPHONE ENCOUNTER
----- Message from Neisha Suazo MD sent at 11/27/2022  3:32 PM EST -----  Can you please spread the word that all of my patients should be getting the carbohydrate drink preop? For major cases that is      Thank you,    DV

## 2022-11-30 LAB
ATRIAL RATE: 100 BPM
P AXIS: 59 DEGREES
PR INTERVAL: 178 MS
QRS AXIS: 14 DEGREES
QRSD INTERVAL: 108 MS
QT INTERVAL: 368 MS
QTC INTERVAL: 474 MS
T WAVE AXIS: 27 DEGREES
VENTRICULAR RATE: 100 BPM

## 2022-12-01 DIAGNOSIS — C64.2 RENAL CELL CARCINOMA OF LEFT KIDNEY (HCC): ICD-10-CM

## 2022-12-01 RX ORDER — OXYCODONE HYDROCHLORIDE 5 MG/1
5 TABLET ORAL EVERY 4 HOURS PRN
Qty: 8 TABLET | Refills: 0 | Status: SHIPPED | OUTPATIENT
Start: 2022-12-01 | End: 2022-12-06

## 2022-12-01 NOTE — TELEPHONE ENCOUNTER
Medication Refill Request     Name Oxycodone  Dose/Frequency 5mg, Take 1 tablet (5 mg total) by mouth every 4 (four) hours as needed for severe pain for up to 5 days Max Daily Amount: 30 mg   Quantity   Verified pharmacy   [x]  Verified ordering Provider   [x]  Does patient have enough for the next 3 days? Yes [] No [x]    Per patient's wife phone call, patient is still in pain and going as long as he can without the pain medication  However he only has 2 pills left as he was only given 8 tabs

## 2022-12-05 DIAGNOSIS — I10 HYPERTENSION, UNSPECIFIED TYPE: ICD-10-CM

## 2022-12-05 RX ORDER — AMLODIPINE BESYLATE 5 MG/1
5 TABLET ORAL DAILY
Qty: 30 TABLET | Refills: 0 | Status: SHIPPED | OUTPATIENT
Start: 2022-12-05

## 2022-12-12 NOTE — PROGRESS NOTES
Problem List Items Addressed This Visit        Genitourinary    Renal cell carcinoma of left kidney ([facility] [address] )   Other Visit Diagnoses     Clear cell renal cell carcinoma, left (HCC)    -  Primary    Relevant Orders    CT abdomen w contrast    XR chest pa & lateral    CBC and differential    Comprehensive metabolic panel            Discussion:      Abbey Velasco has done very well status post his left-sided partial nephrectomy  He does have some pruritus at his incisions although no signs of infection  He has no hernias here  His ECOG is 0  I reviewed with him his final pathology showing grade 1 disease with negative margins, pathologic stage T1a  I will see him back in 6 months with imaging and lab work prior  Assessment and plan:       Please see problem oriented charting for the assessment plan of today's urological complaints    Yocasta Ferraro MD      Chief Complaint     As above      History of Present Illness     Maritza Sommer is a 60 y o  gentleman with a cystic renal lesion now status post a robot-assisted left partial nephrectomy 7 weeks ago  He is recovering well  He does have some fatigue and some discomfort at his incisions after doing some drumming  I suspect that this will improve as he continues to heal   No postoperative issues  I reviewed with him his pathology today  His questions and concerns were also answered and addressed  The following portions of the patient's history were reviewed and updated as appropriate: allergies, current medications, past family history, past medical history, past social history, past surgical history and problem list     Detailed Urologic History     - please refer to HPI    Review of Systems     Review of Systems   Constitutional:        As per HPI   HENT: Negative  Eyes: Negative  Respiratory: Negative  Cardiovascular: Negative  Gastrointestinal: Negative  Endocrine: Negative  Genitourinary: Negative  Musculoskeletal: Negative  Skin: Negative  Allergic/Immunologic: Negative  Neurological: Negative  Hematological: Negative  Psychiatric/Behavioral: Negative  AUA SYMPTOM SCORE    Flowsheet Row Most Recent Value   AUA SYMPTOM SCORE    How often have you had a sensation of not emptying your bladder completely after you finished urinating? 4 (P)     How often have you had to urinate again less than two hours after you finished urinating? 5 (P)     How often have you found you stopped and started again several times when you urinate? 4 (P)     How often have you found it difficult to postpone urination? 4 (P)     How often have you had a weak urinary stream? 3 (P)     How often have you had to push or strain to begin urination? 2 (P)     How many times did you most typically get up to urinate from the time you went to bed at night until the time you got up in the morning? 5 (P)     Quality of Life: If you were to spend the rest of your life with your urinary condition just the way it is now, how would you feel about that? 3 (P)     AUA SYMPTOM SCORE 27 (P)             Allergies     Allergies   Allergen Reactions   • Other Anaphylaxis     Unknown food allergy   • Erythromycin GI Intolerance       Physical Exam     Physical Exam  Vitals reviewed  Constitutional:       General: He is not in acute distress  Appearance: Normal appearance  He is not ill-appearing, toxic-appearing or diaphoretic  HENT:      Head: Normocephalic and atraumatic  Eyes:      General: No scleral icterus  Right eye: No discharge  Left eye: No discharge  Cardiovascular:      Pulses: Normal pulses  Pulmonary:      Effort: Pulmonary effort is normal    Abdominal:      General: There is no distension  Palpations: There is no mass  Musculoskeletal:         General: No swelling  Skin:     Coloration: Skin is not jaundiced  Neurological:      General: No focal deficit present  Mental Status: He is alert  Cranial Nerves: No cranial nerve deficit  Psychiatric:         Mood and Affect: Mood normal          Behavior: Behavior normal          Thought Content: Thought content normal          Judgment: Judgment normal              Vital Signs  There were no vitals filed for this visit        Current Medications       Current Outpatient Medications:   •  albuterol (PROVENTIL HFA,VENTOLIN HFA) 90 mcg/act inhaler, Inhale 2 puffs every 6 (six) hours as needed for wheezing, Disp: , Rfl:   •  amLODIPine (NORVASC) 5 mg tablet, Take 1 tablet (5 mg total) by mouth daily, Disp: 30 tablet, Rfl: 0  •  aspirin (ECOTRIN LOW STRENGTH) 81 mg EC tablet, Take 81 mg by mouth daily, Disp: , Rfl:   •  atoMOXetine (STRATTERA) 40 mg capsule, Take 1 capsule (40 mg total) by mouth 2 (two) times a day, Disp: 60 capsule, Rfl: 2  •  atorvastatin (LIPITOR) 40 mg tablet, Take 1 tablet (40 mg total) by mouth daily (Patient taking differently: Take 40 mg by mouth daily at bedtime), Disp: 90 tablet, Rfl: 3  •  clobetasol (TEMOVATE) 0 05 % cream, Apply 8 09 application topically 2 (two) times a day as needed , Disp: , Rfl:   •  clotrimazole-betamethasone (LOTRISONE) 1-0 05 % cream, Apply topically 2 (two) times a day (Patient taking differently: Apply topically if needed), Disp: 30 g, Rfl: 5  •  EPINEPHrine (EPIPEN) 0 3 mg/0 3 mL SOAJ, Inject 0 3 mL (0 3 mg total) into a muscle once for 1 dose, Disp: 1 each, Rfl: 1  •  fexofenadine (ALLEGRA) 60 MG tablet, Take 60 mg by mouth daily as needed , Disp: , Rfl:   •  ketoconazole (NIZORAL) 2 % cream, Apply topically daily as needed for rash (Patient taking differently: Apply 1 application topically daily as needed for rash), Disp: 15 g, Rfl: 5  •  senna (SENOKOT) 8 6 mg, Take 1 tablet (8 6 mg total) by mouth daily at bedtime for 5 days, Disp: 5 tablet, Rfl: 0  •  zaleplon (SONATA) 10 MG capsule, Take 1 capsule (10 mg total) by mouth daily at bedtime (Patient taking differently: Take 10 mg by mouth daily at bedtime as needed), Disp: 30 capsule, Rfl: 1      Active Problems     Patient Active Problem List   Diagnosis   • Exercise-induced asthma   • Attention deficit hyperactivity disorder (ADHD), combined type   • Psoriatic arthritis ([address] )   • Mixed hyperlipidemia   • Primary insomnia   • Cervical spondylosis   • Controlled substance agreement signed   • Cough in adult patient   • DDD (degenerative disc disease), lumbar   • Degenerative disc disease, cervical   • Elbow pain, left   • Food allergy   • Muscle spasms of both lower extremities   • Right upper quadrant abdominal pain   • Vitamin D deficiency   • HTN (hypertension)   • Chronic maxillary sinusitis   • Dysphoric mood   • Dry eye   • Dry mouth   • Memory difficulties   • Right inguinal hernia   • Muscle cramps   • Tinnitus of both ears   • Wellness examination   • Left renal mass   • Encounter to discuss test results   • Lipid disorder   • Coronary artery disease involving native coronary artery of native heart without angina pectoris   • COVID-19   • Renal cell carcinoma of left kidney (HCC)   • Immunocompromised (HCC)   • Shortness of breath   • Persistent headaches   • Chest pain         Past Medical History     Past Medical History:   Diagnosis Date   • Arthritis    • Asthma    • COPD (chronic obstructive pulmonary disease) ([address] ) 1990   • Headache(784 0) 2019    Significant osteoarthrities in neck   • HL (hearing loss) 2019    mild   • Pneumonia 2019   • Psoriatic arthritis ([address] )    • Septicemia ([address] )    • Stomach problems          Surgical History     Past Surgical History:   Procedure Laterality Date   • COLONOSCOPY     • DENTAL IMPLANT     • HERNIA REPAIR     • INCISION AND DRAINAGE ABSCESS / HEMATOMA OF BURSA / KNEE / THIGH Left     thigh   • OK LAPAROSCOPY SURG PARTIAL NEPHRECTOMY Left 11/28/2022    Procedure: ROBOTIC LAPAROSCOPIC PARTIAL NEPHRECTOMY  WITH INTRAOPERATIVE ULTRASOUND INTERPRETATION;  Surgeon: Celia Wilson MD;  Location: [facility]; Service: Urology   • MA RPR 1ST INGUN HRNA AGE 5 YRS/> REDUCIBLE Right 06/22/2021    Procedure: Inguinal hernia repair with mesh;  Surgeon: Peewee Santiago MD;  Location:  MAIN OR;  Service: General         Family History     Family History   Problem Relation Age of Onset   • Coronary artery disease Father    • Heart disease Father    • Arthritis Mother    • Hearing loss Mother    • Heart disease Maternal Grandfather    • Dementia Maternal Grandmother    • Heart disease Paternal Grandfather    • Dementia Maternal Aunt          Social History     Social History     Social History     Tobacco Use   Smoking Status Never   Smokeless Tobacco Never         Pertinent Lab Values     Lab Results   Component Value Date    CREATININE 0 98 11/29/2022       Lab Results   Component Value Date    PSA 0 6 08/13/2022       Final Diagnosis   A  Kidney, Left, partial nephrectomy:  - 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  See comment and synoptic report      Comment: Immunohistochemistry is positive in the tumor cells for carbonic anhydrase IX with focal cup-shaped pattern, diffusely positive for CK7 and negative for P504S  The staining pattern is consistent with the diagnosis  In the recent 2022 WHO classification, the name of this entity was changed from "carcinoma" to "tumor" based on studies demonstrating indolent behavior  However, as [facility] synoptic protocols have not been changed, staging will be performed  Followup as needed based on the clinical picture is recommended       (Alva H, Jeannine MB, KEVIN DM, Valeriano EM, Lucas Garcia, [address], Lise Huffman P, [address], Tsuzuki T, [place], [place], [place]  The 2022 World Health Organization Classification of Tumours of the Urinary System and Male Genital Organs-Part A: Renal, Penile, and Testicular Tumours  Eur Urol  2022 Nov;82(5):458-468  doi: 10 1016/j eururo 2022 06 016  Epub 2022 Jul 16   PMID: [id number] )     8th Ed AJCC Tumor Stage:  at least Stage I - pT1a, pNX, G1  Representative tumor block: A7   Electronically signed by Christiano Mckinnon MD on 12/7/2022 at  2:26 PM     KIDNEY: Nephrectomy  8th Edition - Protocol posted: 6/30/2021  KIDNEY: NEPHRECTOMY, PARTIAL OR RADICAL - All Specimens  SPECIMEN   Procedure  Partial nephrectomy    Specimen Laterality  Left    TUMOR   Tumor Focality  Unifocal    Tumor Size  Greatest Dimension (Centimeters): 1 8 cm   Histologic Type  Clear cell papillary renal cell carcinoma    Histologic Grade (WHO / ISUP)  G1 (nucleoli absent or inconspicuous and basophilic at 400T magnification)    Tumor Extent  Limited to kidney    Sarcomatoid Features  Not identified    Rhabdoid Features  Not identified    Tumor Necrosis  Not identified    MARGINS   Margin Status  All margins negative for invasive carcinoma    REGIONAL LYMPH NODES   Regional Lymph Node Status  Not applicable (no regional lymph nodes submitted or found)    PATHOLOGIC STAGE CLASSIFICATION (pTNM, AJCC 8th Edition)   Reporting of pT, pN, and (when applicable) pM categories is based on information available to the pathologist at the time the report is issued  As per the AJCC (Chapter 1, 8th Ed ) it is the managing physician’s responsibility to establish the final pathologic stage based upon all pertinent information, including but potentially not limited to this pathology report     Primary Tumor (pT)  pT1a    Regional Lymph Nodes (pN)  pN not assigned (no nodes submitted or found)    ADDITIONAL FINDINGS   Additional Findings in Nonneoplastic Kidney  None identified            Pertinent Imaging      no new imaging for my review No

## 2022-12-13 ENCOUNTER — OFFICE VISIT (OUTPATIENT)
Dept: UROLOGY | Facility: CLINIC | Age: 62
End: 2022-12-13

## 2022-12-13 VITALS
WEIGHT: 181.8 LBS | OXYGEN SATURATION: 98 % | BODY MASS INDEX: 25.45 KG/M2 | HEART RATE: 105 BPM | DIASTOLIC BLOOD PRESSURE: 70 MMHG | RESPIRATION RATE: 16 BRPM | SYSTOLIC BLOOD PRESSURE: 130 MMHG | HEIGHT: 71 IN

## 2022-12-13 DIAGNOSIS — C64.2 CLEAR CELL RENAL CELL CARCINOMA, LEFT (HCC): Primary | ICD-10-CM

## 2022-12-13 DIAGNOSIS — C64.2 RENAL CELL CARCINOMA OF LEFT KIDNEY (HCC): ICD-10-CM

## 2023-01-05 DIAGNOSIS — Z71.84 ENCOUNTER FOR COUNSELING FOR TRAVEL: Primary | ICD-10-CM

## 2023-01-05 RX ORDER — AZITHROMYCIN 250 MG/1
250 TABLET, FILM COATED ORAL EVERY 24 HOURS
Qty: 6 TABLET | Refills: 0 | Status: SHIPPED | OUTPATIENT
Start: 2023-01-05 | End: 2023-01-10

## 2023-01-09 DIAGNOSIS — I10 HYPERTENSION, UNSPECIFIED TYPE: ICD-10-CM

## 2023-01-09 RX ORDER — AMLODIPINE BESYLATE 5 MG/1
5 TABLET ORAL DAILY
Qty: 90 TABLET | Refills: 3 | Status: SHIPPED | OUTPATIENT
Start: 2023-01-09
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
## 2023-01-12 ENCOUNTER — TELEMEDICINE (OUTPATIENT)
Dept: PSYCHIATRY | Facility: CLINIC | Age: 63
End: 2023-01-12

## 2023-01-12 DIAGNOSIS — F51.01 PRIMARY INSOMNIA: ICD-10-CM

## 2023-01-12 DIAGNOSIS — F90.2 ATTENTION DEFICIT HYPERACTIVITY DISORDER (ADHD), COMBINED TYPE: Primary | ICD-10-CM

## 2023-01-12 RX ORDER — ATOMOXETINE 40 MG/1
40 CAPSULE ORAL 2 TIMES DAILY
Qty: 60 CAPSULE | Refills: 2 | Status: SHIPPED | OUTPATIENT
Start: 2023-01-12 | End: 2023-04-12

## 2023-01-20 ENCOUNTER — NURSE TRIAGE (OUTPATIENT)
Dept: OTHER | Facility: OTHER | Age: 63
End: 2023-01-20

## 2023-01-20 NOTE — TELEPHONE ENCOUNTER
Patient is post left partial nephrectomy on 11/28/22  Reports incision healed well until this week when he noticed some pin hole openings with serosanguinous drainage from main incision in upper abdomen  Patient is keeping site clean and applying neosporin and bandaid  Patient will take picture and upload to My Chart  Please follow up with patient  Reason for Disposition  • Clear or blood-tinged fluid draining from wound and no fever    Answer Assessment - Initial Assessment Questions  1  SYMPTOM: "What's the main symptom you're concerned about?" (e g , redness, pain, drainage)      Drainage from pinholes on main incision is clear liquid with blood  2  ONSET: "When did symptoms start?"      Past week  3  SURGERY: "What surgery was performed?"      Partial nephrectomy left  4  DATE of SURGERY: "When was surgery performed?"       11/28/22  5  INCISION SITE: "Where is the incision located?"       Above umbilicus  6  REDNESS: "Is there any redness at the incision site?" If yes, ask: "How wide across is the redness?" (Inches, centimeters)       Some redness with bumps  7  PAIN: "Is there any pain?" If Yes, ask: "How bad is it?"  (Scale 1-10; or mild, moderate, severe)      Itches; pain is improving since surgery  8  BLEEDING: "Is there any bleeding?" If Yes, ask: "How much?" and "Where?"      Small amount of drainage; serosanguinos  9  DRAINAGE: "Is there any drainage from the incision site?" If yes, ask: "What color and how much?" (e g , red, cloudy, pus; drops, teaspoon)      serosanguinous  10  FEVER: "Do you have a fever?" If Yes, ask: "What is your temperature, how was it measured, and when did it start?"        Fevers post care of grandchild in home who goes to ; resolved  11   OTHER SYMPTOMS: "Do you have any other symptoms?" (e g , shaking chills, weakness, rash elsewhere on body)        Denies    Protocols used: POST-OP INCISION SYMPTOMS AND QUESTIONS-ADULT-[place]

## 2023-01-20 NOTE — TELEPHONE ENCOUNTER
Regardin month post op incision discharge concerns  ----- Message from Gurvinder New sent at 2023  2:14 PM EST -----  "I had laparoscopic kidney surgery the end of November and I am noticing little pin holes that are leaking a watery blood discharge right over my main incision over my belly button "

## 2023-01-23 NOTE — TELEPHONE ENCOUNTER
Image reviewed  Looks overall nonconcerning, however if persisting over the weekend please bring him in for an office evaluation with myself for physical exam  Thanks

## 2023-01-23 NOTE — TELEPHONE ENCOUNTER
Left VM on patients phone  When patient calls back please relay to him Kelsey's message-  "Image reviewed   Looks overall nonconcerning, however if persisting over the weekend please bring him in for an office evaluation with myself for physical exam  Thanks  "

## 2023-01-23 NOTE — TELEPHONE ENCOUNTER
Patient called back stating he is still having some bleeding and oozing today, but he does not believe it has worsened  He stated it's just a mixture of clear liquid and blood that is oozing from the incision site  Patient states he is unavailable to be seen today and tomorrow anyway and will call the office back if it worsens  Any questions or concerns, please call patient back

## 2023-01-26 ENCOUNTER — NURSE TRIAGE (OUTPATIENT)
Dept: OTHER | Facility: OTHER | Age: 63
End: 2023-01-26

## 2023-01-26 NOTE — TELEPHONE ENCOUNTER
Regarding: Infected incision  ----- Message from Orlin Pascual RN sent at 1/26/2023 10:31 AM EST -----  "I think my incision from my partial nephrectomy is infected I sent pictures via [facility] on 1 20 23 but it is getting worse "

## 2023-01-26 NOTE — PROGRESS NOTES
Office Visit- Urology  [address]  1960 MRN: [id number]      Assessment/Discussion/Plan    60 y o  male managed by Dr Albino Mackey     1  Surgical site drainage   -Patient is status post robot-assisted laparoscopic partial nephrectomy that was performed on 11/28/2023 for management of 1 9 cm complex cyst (Bosniak IV) which pathology revealed to be clear-cell renal cell carcinoma  -Based on presentation of  transverse incisional site today I do not believe that the site is infected  There is a small pinpoint opening that drains minimal clear watery drainage when pressure is applied to surgical site  No underlying fluctuance  Erythema is clearly in the shape of Band-Aids the patient has laying overtop  No swelling or tenderness  -No Subjective fevers/chills or documented fever today in office  -At this time do not believe antibiotics are warranted  [facility] experiences the more opaque discharge again  Notifed patient that if he would experience fever, chills, pain, expanding erythema, an increased opening of the surgical site, he should notify the office and/or present to the emergency room  -Patient already has follow-up scheduled with Dr Albino Mackey in June 2023 for surveillance of renal cell carcinoma  Patient will plan to keep that appointment  Chief Complaint:   Shelley Fraser is a 60 y o  male presenting to the office for an initial evaluation regarding  Surgical surgical site drainage      Subjective    Patient is status post robot-assisted laparoscopic partial nephrectomy that was performed on 11/28/2023 by Dr Albino Mackey and Dr Donna Barroso for management of 1 9 cm complex cyst (Bosniak IV) seen on MRI imaging which pathology revealed to be clear-cell renal cell carcinoma  He has had no postoperative complications  On review of documentation, he called the office in regard to little pin size holes that was leaking serosanguineous fluid from the main incision    An image was provided by the patient which can be visualized in chart from patient message encounter on 1/20  At that time it was determined that the incisional site appeared non-concerning  On 1/23 patient reached out again to the office stating that the serosanguineous discharge has been persistent but not worsening  On 1/26, the patient called the office stating that he believed that the site was now infected  He had an appointment with a dermatologist who agreed that the incisional sites to be evaluated  He states that he feels he had a subjective fever about 2 to 3 weeks ago he has not been having any symptoms of subjective fever, chills recently  Today he states that he is having having more opaque drainage  Mild seepage throughBandage  He denies the presence of pain, subjective fever, chills, vomiting, diarrhea, dizziness, lightheadedness  Does note that he has headache, sore throat, feeling tired  Recently took a Z-Christopher for bronchitis  He has a history of psoriasis and used to take Biologics but is not taking any recently  No smoking history or history of diabetes that would increase risk of wound infection  ROS:   Review of Systems   Constitutional: Positive for fatigue  Negative for chills and fever  HENT: Negative  Eyes: Negative  Respiratory: Negative for shortness of breath  Cardiovascular: Negative for chest pain  Gastrointestinal: Negative  Endocrine: Negative  Musculoskeletal: Negative  Skin: Positive for rash (pt states where he applied bandaids )  Allergic/Immunologic: Negative  Neurological: Negative  Psychiatric/Behavioral: Negative            Past Medical History  Past Medical History:   Diagnosis Date   • Arthritis    • Asthma    • COPD (chronic obstructive pulmonary disease) ([facility] ) 1990   • Headache(784 0) 2019    Significant osteoarthrities in neck   • HL (hearing loss) 2019    mild   • Pneumonia 2019   • Psoriatic arthritis ([facility] )    • Septicemia ([facility] )    • Stomach problems        Past Surgical History  Past Surgical History:   Procedure Laterality Date   • COLONOSCOPY     • DENTAL IMPLANT     • HERNIA REPAIR     • INCISION AND DRAINAGE ABSCESS / HEMATOMA OF BURSA / KNEE / THIGH Left     thigh   • VT LAPAROSCOPY SURG PARTIAL NEPHRECTOMY Left 11/28/2022    Procedure: ROBOTIC LAPAROSCOPIC PARTIAL NEPHRECTOMY  WITH INTRAOPERATIVE ULTRASOUND INTERPRETATION;  Surgeon: Kennedy Perez MD;  Location: [facility];  Service: Urology   • VT RPR 1ST INGUN HRNA AGE 5 YRS/> REDUCIBLE Right 06/22/2021    Procedure: Inguinal hernia repair with mesh;  Surgeon: Leisa Claudio MD;  Location:  MAIN OR;  Service: General       Past Family History  Family History   Problem Relation Age of Onset   • Coronary artery disease Father    • Heart disease Father    • Arthritis Mother    • Hearing loss Mother    • Heart disease Maternal Grandfather    • Dementia Maternal Grandmother    • Heart disease Paternal Grandfather    • Dementia Maternal Aunt        Past Social history  Social History     Socioeconomic History   • Marital status: /Civil Union     Spouse name: Not on file   • Number of children: Not on file   • Years of education: Not on file   • Highest education level: Not on file   Occupational History   • Not on file   Tobacco Use   • Smoking status: Never   • Smokeless tobacco: Never   Vaping Use   • Vaping Use: Never used   Substance and Sexual Activity   • Alcohol use:  Yes     Alcohol/week: 1 0 standard drink     Types: 1 Glasses of wine per week     Comment: More like one glass per month socially   • Drug use: Never   • Sexual activity: Yes     Partners: Female     Birth control/protection: None     Comment: Monogamous,     Other Topics Concern   • Not on file   Social History Narrative   • Not on file     Social Determinants of Health     Financial Resource Strain: Not on file   Food Insecurity: Not on file   Transportation Needs: Not on file   Physical Activity: Not on file   Stress: Not on file   Social Connections: Not on file   Intimate Partner Violence: Not on file   Housing Stability: Not on file       Current Medications  Current Outpatient Medications   Medication Sig Dispense Refill   • albuterol (PROVENTIL HFA,VENTOLIN HFA) 90 mcg/act inhaler Inhale 2 puffs every 6 (six) hours as needed for wheezing     • amLODIPine (NORVASC) 5 mg tablet Take 1 tablet (5 mg total) by mouth daily 90 tablet 3   • aspirin (ECOTRIN LOW STRENGTH) 81 mg EC tablet Take 81 mg by mouth daily     • atoMOXetine (STRATTERA) 40 mg capsule Take 1 capsule (40 mg total) by mouth 2 (two) times a day 60 capsule 2   • atorvastatin (LIPITOR) 40 mg tablet Take 1 tablet (40 mg total) by mouth daily (Patient taking differently: Take 40 mg by mouth daily at bedtime) 90 tablet 3   • clobetasol (TEMOVATE) 0 05 % cream Apply 1 33 application topically 2 (two) times a day as needed      • clotrimazole-betamethasone (LOTRISONE) 1-0 05 % cream Apply topically 2 (two) times a day (Patient taking differently: Apply topically if needed) 30 g 5   • fexofenadine (ALLEGRA) 60 MG tablet Take 60 mg by mouth daily as needed      • ketoconazole (NIZORAL) 2 % cream Apply topically daily as needed for rash (Patient taking differently: Apply 1 application topically daily as needed for rash) 15 g 5   • zaleplon (SONATA) 10 MG capsule Take 1 capsule (10 mg total) by mouth daily at bedtime (Patient taking differently: Take 10 mg by mouth daily at bedtime as needed) 30 capsule 1   • EPINEPHrine (EPIPEN) 0 3 mg/0 3 mL SOAJ Inject 0 3 mL (0 3 mg total) into a muscle once for 1 dose (Patient not taking: Reported on 1/27/2023) 1 each 1     No current facility-administered medications for this visit         Allergies  Allergies   Allergen Reactions   • Other Anaphylaxis     Unknown food allergy   • Erythromycin GI Intolerance       OBJECTIVE    Vitals   Vitals:    01/27/23 1113   BP: 122/84   BP Location: Left arm   Patient Position: Sitting   Cuff Size: Adult   Pulse: 94   Resp: 18   Temp: 98 7 °F (37 1 °C)   TempSrc: Temporal   SpO2: 97%   Weight: 81 6 kg (180 lb)   Height: 5' 11" (1 803 m)           Physical Exam  Vitals reviewed  Constitutional:       Appearance: Normal appearance  He is normal weight  HENT:      Head: Normocephalic and atraumatic  Right Ear: External ear normal       Left Ear: External ear normal    Cardiovascular:      Rate and Rhythm: Normal rate  Pulmonary:      Effort: Pulmonary effort is normal  No respiratory distress  Abdominal:      Comments: 7-8 centimeter transverse surgical scar in the suprapubic region of the abdomen  Towards the left side of the scar there is a pinpoint opening (less than 1 mm) that was apparent  Erythema was noted in a cross like pattern corresponding to where adhesive bandages were placed  There is no warmth at the incision site  No tenderness to weight or deep palpation at the site  No fluctuance noted underneath the surgical site to indicate an abscess  Upon pressure to the abdominal site minimal clear watery drainage was appreciated  No pustular discharge noted at time of exam   Musculoskeletal:      Cervical back: Normal range of motion and neck supple  Neurological:      General: No focal deficit present  Mental Status: He is alert  Cranial Nerves: No cranial nerve deficit  Psychiatric:         Mood and Affect: Mood normal          Behavior: Behavior normal          Thought Content:  Thought content normal           Labs:      Lab Results   Component Value Date    PSA 0 6 08/13/2022     Lab Results   Component Value Date    CREATININE 0 98 11/29/2022      No results found for: HGBA1C  Lab Results   Component Value Date    CALCIUM 8 6 11/29/2022    K 4 0 11/29/2022    CO2 25 11/29/2022    CL 99 11/29/2022    BUN 18 11/29/2022    CREATININE 0 98 11/29/2022         John Jenkins PA-C  Date: 1/27/2023 Time: 12:11 PM  [facility]    This note was written using fluency dictation software  Please excuse any resulting minor grammatical errors

## 2023-01-26 NOTE — TELEPHONE ENCOUNTER
Spoke with patient who states the drainage is becoming more opaque now  He also states he saw his dermatologist who looked at it today and advised patient it needs to be looked at  Patient was offered sooner appts earlier this week, but he declined at that time  Explained the [facility] office does not have anything for the rest of today or tomorrow  I am able to schedule him with AP at [facility]  Patient declined the 0800 appt, but did accept the 1100 appt 1/27/23  Patient admits to being cold and waking up in sweats at night, however, has not taken his temp  He states he does feel as if something is "going on"  Recommended to patient taking temp and if it is 101 or higher orally, he should report to ER for evaluation or if his condition worsens prior to his appt scheduled tomorrow  Patient verbalized understanding and agrees to plan

## 2023-01-26 NOTE — TELEPHONE ENCOUNTER
Katie called in to report drainage from incision has changed from clear with blood to milky discharge  Per office note; patent should be scheduled to be evaluated by SHYAM Cruz PA-C  Please follow up with patient for OV  Reason for Disposition  • Patient wants to be seen    Answer Assessment - Initial Assessment Questions  1  SYMPTOM: "What's the main symptom you're concerned about?" (e g , pain, fever, vomiting)      Discharge from incision has changed; milky discharge with pink tint   2  ONSET: "When did symptoms start?"      1/16/23  3  SURGERY: "What surgery was performed?"      ROBOTIC LAPAROSCOPIC PARTIAL NEPHRECTOMY  WITH INTRAOPERATIVE ULTRASOUND INTERPRETATION (Left: Abdomen)  4  DATE of SURGERY: "When was surgery performed?"       11/28/22  5  ANESTHESIA: " What type of anesthesia did you have?" (e g , general, spinal, epidural, local)      General  6  PAIN: "Is there any pain?" If Yes, ask: "How bad is it?"  (Scale 1-10; or mild, moderate, severe)      Denies  7  FEVER: "Do you have a fever?" If Yes, ask: "What is your temperature, how was it measured, and when did it start?"      Denies  8  VOMITING: "Is there any vomiting?" If yes, ask: "How many times?"     Denies  9  BLEEDING: "Is there any bleeding?" If Yes, ask: "How much?" and "Where?"     Denies  10   OTHER SYMPTOMS: "Do you have any other symptoms?" (e g , drainage from wound, painful urination, constipation)       Drainage from wound    Protocols used: POST-OP SYMPTOMS AND QUESTIONS-ADULT-[place]

## 2023-01-26 NOTE — TELEPHONE ENCOUNTER
Patient is requesting a call back before end of business day  Patient stated he is experiencing a possible infection

## 2023-01-27 ENCOUNTER — OFFICE VISIT (OUTPATIENT)
Dept: UROLOGY | Facility: AMBULATORY SURGERY CENTER | Age: 63
End: 2023-01-27

## 2023-01-27 VITALS
RESPIRATION RATE: 18 BRPM | TEMPERATURE: 98.7 F | SYSTOLIC BLOOD PRESSURE: 122 MMHG | DIASTOLIC BLOOD PRESSURE: 84 MMHG | HEART RATE: 94 BPM | HEIGHT: 71 IN | BODY MASS INDEX: 25.2 KG/M2 | OXYGEN SATURATION: 97 % | WEIGHT: 180 LBS

## 2023-01-27 DIAGNOSIS — L24.A9 WOUND DRAINAGE: Primary | ICD-10-CM

## 2023-03-03 ENCOUNTER — APPOINTMENT (OUTPATIENT)
Dept: LAB | Facility: HOSPITAL | Age: 63
End: 2023-03-03

## 2023-03-03 DIAGNOSIS — E78.5 HYPERLIPIDEMIA, UNSPECIFIED HYPERLIPIDEMIA TYPE: ICD-10-CM

## 2023-03-03 LAB
ALBUMIN SERPL BCP-MCNC: 4.5 G/DL (ref 3.5–5)
ALP SERPL-CCNC: 60 U/L (ref 34–104)
ALT SERPL W P-5'-P-CCNC: 33 U/L (ref 7–52)
AST SERPL W P-5'-P-CCNC: 23 U/L (ref 13–39)
BILIRUB DIRECT SERPL-MCNC: 0.11 MG/DL (ref 0–0.2)
BILIRUB SERPL-MCNC: 0.83 MG/DL (ref 0.2–1)
CHOLEST SERPL-MCNC: 163 MG/DL
HDLC SERPL-MCNC: 42 MG/DL
LDLC SERPL CALC-MCNC: 98 MG/DL (ref 0–100)
NONHDLC SERPL-MCNC: 121 MG/DL
PROT SERPL-MCNC: 7.6 G/DL (ref 6.4–8.4)
TRIGL SERPL-MCNC: 114 MG/DL

## 2023-03-20 ENCOUNTER — TELEPHONE (OUTPATIENT)
Dept: PSYCHIATRY | Facility: CLINIC | Age: 63
End: 2023-03-20

## 2023-03-20 ENCOUNTER — TELEMEDICINE (OUTPATIENT)
Dept: PSYCHIATRY | Facility: CLINIC | Age: 63
End: 2023-03-20

## 2023-03-20 DIAGNOSIS — F51.01 PRIMARY INSOMNIA: ICD-10-CM

## 2023-03-20 DIAGNOSIS — F90.2 ATTENTION DEFICIT HYPERACTIVITY DISORDER (ADHD), COMBINED TYPE: Primary | ICD-10-CM

## 2023-03-20 RX ORDER — ZALEPLON 10 MG/1
10 CAPSULE ORAL
Qty: 30 CAPSULE | Refills: 2 | Status: SHIPPED | OUTPATIENT
Start: 2023-03-20 | End: 2023-06-18

## 2023-03-20 RX ORDER — ATOMOXETINE 25 MG/1
CAPSULE ORAL
Qty: 14 CAPSULE | Refills: 0 | Status: SHIPPED | OUTPATIENT
Start: 2023-03-20

## 2023-03-20 RX ORDER — METHYLPHENIDATE HYDROCHLORIDE 10 MG/1
10 TABLET ORAL
Qty: 90 TABLET | Refills: 0 | Status: SHIPPED | OUTPATIENT
Start: 2023-03-20 | End: 2023-04-19

## 2023-03-20 NOTE — TELEPHONE ENCOUNTER
Thomas Paniagua called [facility] RN line and Doug Martel requesting an earlier appointment with Dr Davion Bingham  He would like to see him about his medications effectiveness       Thomas Paniagua- [phone]
no

## 2023-03-20 NOTE — PSYCH
Virtual Regular Visit    Verification of [place] location: [place]    Patient is located in the following state in which I hold an active license: [place]    Assessment/Plan:    Problem List Items Addressed This Visit        Other    Attention deficit hyperactivity disorder (ADHD), combined type - Primary    Relevant Medications    atoMOXetine (STRATTERA) 25 mg capsule    zaleplon (SONATA) 10 MG capsule    methylphenidate (Ritalin) 10 mg tablet    Primary insomnia    Relevant Medications    zaleplon (SONATA) 10 MG capsule            Reason for visit is   Chief Complaint   Patient presents with   • Medication Management   • ADHD   • Insomnia   • Anxiety        Visit Time  Visit Start Time: 2:27 PM  Visit Stop Time: 3:02 PM  Total Visit Duration: 35 minutes    Encounter provider Alysa Doan MD    Provider located at: [address]  Danis Lundberg 3    Recent Visits  No visits were found meeting these conditions  Showing recent visits within past 7 days and meeting all other requirements  Today's Visits  Date Type Provider Dept   03/20/23 Telemedicine Alysa Doan MD [facility]   Showing today's visits and meeting all other requirements  Future Appointments  No visits were found meeting these conditions  Showing future appointments within next 150 days and meeting all other requirements       The patient was identified by name and date of birth  Kajal Narayan was informed that this is a telemedicine visit and that the visit is being conducted through the [employer]  He agrees to proceed  My office door was closed  No one else was in the room  He acknowledged consent and understanding of privacy and security of the video platform  The patient has agreed to participate and understands they can discontinue the visit at any time  Patient is aware this is a billable service         MEDICATION MANAGEMENT NOTE        ST  [address] [place] [address]      Name and Date of Birth:  Maritza Sommer 60 y o  1960 MRN: [id number]    Date of Visit: March 20, 2023    Reason for Visit:   Chief Complaint   Patient presents with   • Medication Management   • ADHD   • Insomnia   • Anxiety       SUBJECTIVE:  The patient was visited virtually for medication management and follow up visit for ADHD and insomnia after called and requested an earlier appointment  Presented calm, and cooperative  He noted that he is thinking about getting back on Ritalin, and noted that since he has been on Strattera for past few month, he has trouble sustaining focus and "weired side effects" as "terrible dry mouth" and should drink water all day and at nights it bothers him a lot  He noted that his sleep has been interrupted as trouble falling asleep and maintaining sleep  He noted that his function at work and at home is impaired, and is not accomplishing as he used to  He thinks that it's been a vicious cycle as he does not get things done and then he does not feel motivated with increased procrastination  He continues to f/u with his rheumatologist for psoriatic arthritis, and reportedly had a flare up and may consider getting back on biologic agents  They are going to [facility] to visit his son and will go with his son to [facility] on a bike tour for 225 miles  Denied any changes in appetite, concentration, energy level, or daily activities  Denied feelings of anhedonia, hopelessness, helplessness, worthlessness or guilt and appeared to be future oriented  There was no thought constriction related to death  Denied SI/HI, intent or plan upon direct inquiry at this time  Denied AV/H  No specific phobia or panic attacks reported  No manic sxs, paranoid ideations or fixed delusions were elicited  Endorsed good compliance with the medications and reported excessive dry mouth with Strattera   Denied smoking cigarettes, drinking alcohol or other illicit substance use  Given this presentation and intolerable side effects with Strattera, the patient was restarted on Ritalin (10 mg TID to be adjusted as indicated) and Strattera to be tapered off in three week as instructed (40/25 mg for 1 week, 40 mg daily for week 2, and then 25 mg daily for week 3 and then discontinue); Sonata maintained at the same dosage  PMDP reviewed  Psycho-education regarding stimulants indications, benefits, risks (including risk of addiction, onesimo if taking more than prescribed), side effects (including but not limited to palpitation/arrhythmia, weight loss and increased anxiety), and alternative options provided to the patient, and the importance of the compliance with psychiatric treatment reiterated  The patient verbalized understanding and agreed to the proposed regimen  Will continue individual therapy  The patient was educated to call 911 or go to the nearest emergency room if the symptoms become overwhelming or unable to remain in control  Verbalized understanding and agreed to seek help in case of distress or concern for safety  Review Of Systems:  Pertinent items are noted in HPI; all others are negative; no recent changes in medications or health status reported      - not enough time to complete the questionnaires in this visit    Past Psychiatric History Update:   - No inpatient psychiatric admission since last encounter  - No SA or SIB since last encounter  - No incidence of violent behavior since last encounter    Past Trauma History Update:    - No new onset of abuse or traumatic events since last encounter     Past Medical History:    Past Medical History:   Diagnosis Date   • Arthritis    • Asthma    • COPD (chronic obstructive pulmonary disease) ([facility] ) 1990   • Headache(784 0) 2019    Significant osteoarthrities in neck   • HL (hearing loss) 2019    mild   • Pneumonia 2019   • Psoriatic arthritis ([facility] )    • Septicemia ([facility] )    • Stomach problems Past Surgical History:   Procedure Laterality Date   • COLONOSCOPY     • DENTAL IMPLANT     • HERNIA REPAIR     • INCISION AND DRAINAGE ABSCESS / HEMATOMA OF BURSA / KNEE / THIGH Left     thigh   • NV LAPAROSCOPY SURG PARTIAL NEPHRECTOMY Left 11/28/2022    Procedure: ROBOTIC LAPAROSCOPIC PARTIAL NEPHRECTOMY  WITH INTRAOPERATIVE ULTRASOUND INTERPRETATION;  Surgeon: Cherelle Elmore MD;  Location: [facility];  Service: Urology   • NV RPR 1ST INGUN HRNA AGE 5 YRS/> REDUCIBLE Right 06/22/2021    Procedure: Inguinal hernia repair with mesh;  Surgeon: Jem Trejo MD;  Location:  MAIN OR;  Service: General     Allergies   Allergen Reactions   • Other Anaphylaxis     Unknown food allergy   • Erythromycin GI Intolerance       Substance Abuse History:    Social History     Substance and Sexual Activity   Alcohol Use Yes   • Alcohol/week: 1 0 standard drink   • Types: 1 Glasses of wine per week    Comment: More like one glass per month socially     Social History     Substance and Sexual Activity   Drug Use Never       Social History:    Social History     Socioeconomic History   • Marital status: /Civil Union     Spouse name: Not on file   • Number of children: Not on file   • Years of education: Not on file   • Highest education level: Not on file   Occupational History   • Not on file   Tobacco Use   • Smoking status: Never   • Smokeless tobacco: Never   Vaping Use   • Vaping Use: Never used   Substance and Sexual Activity   • Alcohol use:  Yes     Alcohol/week: 1 0 standard drink     Types: 1 Glasses of wine per week     Comment: More like one glass per month socially   • Drug use: Never   • Sexual activity: Yes     Partners: Female     Birth control/protection: None     Comment: Monogamous,     Other Topics Concern   • Not on file   Social History Narrative   • Not on file     Social Determinants of Health     Financial Resource Strain: Not on file   Food Insecurity: Not on file   Transportation Needs: Not on file   Physical Activity: Not on file   Stress: Not on file   Social Connections: Not on file   Intimate Partner Violence: Not on file   Housing Stability: Not on file       Family Psychiatric History:     Family History   Problem Relation Age of Onset   • Coronary artery disease Father    • Heart disease Father    • Arthritis Mother    • Hearing loss Mother    • Heart disease Maternal Grandfather    • Dementia Maternal Grandmother    • Heart disease Paternal Grandfather    • Dementia Maternal Aunt        History Review:  The following portions of the patient's history were reviewed and updated as appropriate: allergies, current medications, past family history, past medical history, past social history, past surgical history and problem list        OBJECTIVE:     Vital signs in last 24 hours:    Vitals:       Mental Status Evaluation:  Appearance and attitude: appeared as stated age, cooperative and attentive, casually dressed with good hygiene  Eye contact: good  Motor Function: within normal limits, No PMA/PMR  Gait/station: Not observed  Speech: normal for rate, rhythm, volume, latency, amount  Language: No overt abnormality  Mood/affect: euthymic / Affect was constricted but reactive, mood congruent  Thought Processes: sequential and goal-directed  Thought content: denies suicidal ideation or homicidal ideation; no delusions or first rank symptoms  Associations: intact associations  Perceptual disturbances: denies Auditory/Visual/Tactile Hallucinations  Orientation: oriented to time, person, place and to the situational context  Cognitive Function: intact  Memory: recent and remote memory grossly intact  Intellect: average  Fund of knowledge: aware of current events, aware of past history and vocabulary average  Impulse control: good  Insight/judgment: fair/good    Laboratory Results: I have personally reviewed all pertinent laboratory/tests results    Recent Labs (last 2 months):   Appointment on 03/03/2023   Component Date Value   • Cholesterol 03/03/2023 163    • Triglycerides 03/03/2023 114    • HDL, Direct 03/03/2023 42    • LDL Calculated 03/03/2023 98    • Non-HDL-Chol (CHOL-HDL) 03/03/2023 121    • Total Bilirubin 03/03/2023 0 83    • Bilirubin, Direct 03/03/2023 0 11    • Alkaline Phosphatase 03/03/2023 60    • AST 03/03/2023 23    • ALT 03/03/2023 33    • Total Protein 03/03/2023 7 6    • Albumin 03/03/2023 4 5          Assessment/Plan:   A 62 y o   male, , domiciled w/ wife, 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 w/ PMH of HTN, exercise induced asthma, HLD, psoriatic arthritis, cervical and lumbar DDD, renal cell carcinoma s/p L nephrectomy and COVID-19 infection and PPH of ADHD, insomnia and ?WALTER, no prior psychiatric admissions, no prior SA, no  h/o self-injurious behavior, on Ritalin 20 mg TID and Sonata 10 mg nightly prescribed by his PCP who presented to the mental health clinic for initial intake and psychiatric evaluation as referred by his PCP for re-evaluation of ADHD treatment with Ritalin on 9/18/2020  The patient presented w/ h/o ADHD since 1999, being on Ritalin 60mg daily since then, w/ marked improvement of attention, concentration and his function since being treated  Denied any excessive anxiety, depressed/manic sxs, A/VH or other psychotic sxs  No h/o substance abuse  Denied SI/HI, intent or plan upon direct inquiry at this time   WALTER-7: 4  His current presentation meets criteria for ADHD, by history  The patient was educated about the diagnosis of ADHD and the course of ADHD in adults which may present with decreased intensity of sxs, onesimo after age 48, and particularly after age 65  Psycho-education regarding Ritalin indications, benefits, risks, side effects, and alternative options provided to the patient, and the importance of the compliance with psychiatric treatment reiterated   The patient was educated about the potential long-term side effects of ritalin in older age, onesimo cardiac side effects (given the FH of heart attack in his father at age 39), and was educated about the possibility of tapering off or if becoming symptomatic, cross-tapering to different treatment options  He verbalized understanding and agreed to the proposed regimen  Ritalin dose was decreased from 20mg TID (60mg total), to 20/20/10mg (50 mg total); and then switched to equivalent dose of Concerta 72 mg po daily to be tapered down as tolerated  The patient did not tolerate switching Ritalin to Concerta due to high BP, and on 11/5/2021 requested to be switched back to Ritalin 20mg/20mg/10mg as last prescribed (tapered down from 20 mg TID)  Upon f/u on 12/28/2021, presented w/ well-controlled ADHD sxs, and stable mood and anxiety  The patient was diagnosed with complex cyst in L kidney and is going to have partial nephrectomy concerning for possible malignancy on 11/28/22, and also his internist recommended to discontinue Ritalin for HTN and high risk of cardia events given the positive FH and age  Ritalin was tapered off by 10 mg weekly and was started on Strattera 40 mg po daily for 1 week and then 40 mg po BID for ADHD  Upon f/u, the patient reported relatively controlled ADHD sxs with Strattera 40 mg BID and maintained on the same dose  However, the patient was visited on 3/20/23 as requested an earlier appointment and reported poor control of ADHD sxs with side effect of "terrible dry mouth"  Presented w/ a persistent pattern of impaired concentration and difficulty sustaining attention, failure to finish duties on time, having difficulty organizing tasks and activities, losing things, and getting easily distracted by external stimuli, figeting with hands, tapping feet or squirming in seat, and talking fast, interrupting others during the conversation and at times moving around as well as having difficulties to wait  Strattera tapered off and started on Ritalin 10 mg TID   Will continue f/u with his PCP regarding medical issues and will monitor his BP  Will continue individual therapy        Diagnoses and all orders for this visit:    Attention deficit hyperactivity disorder (ADHD), combined type  -     atoMOXetine (STRATTERA) 25 mg capsule; Take Strattera 40 mg and 25 mg daily for 1 week, then 40 mg daily for week 2 and then 25 mg daily for week 3 and then stop  -     methylphenidate (Ritalin) 10 mg tablet; Take 1 tablet (10 mg total) by mouth 3 (three) times a day with meals Max Daily Amount: 30 mg    Primary insomnia  -     zaleplon (SONATA) 10 MG capsule; Take 1 capsule (10 mg total) by mouth daily at bedtime          Impression:  1  Attention deficit hyperactivity disorder (ADHD), combined type  atoMOXetine (STRATTERA) 25 mg capsule    methylphenidate (Ritalin) 10 mg tablet      2  Primary insomnia  zaleplon (SONATA) 10 MG capsule          Treatment Recommendations/Precautions:  - Taper off Strattera in three weeks (40 mg BID to 40/25 mg for 1 week, then 40 mg daily for week 2 and then 25 mg daily for week 3 and then discontinue)  - Start Ritalin 10 mg TID for ADHD (previously was stable on 20/20/10 mg)  - Continue Sonata 10 mg po nightly PRN for insomnia   - Continue individual therapy    - Medications sent to patient's pharmacy for 30 day supply     - Psychoeducation provided to the patient and benefits, potential risks and side effects discussed; importance of compliance with the psychiatric treatment reiterated, and the patient verbalized understanding of the matter     - RTC in 5 weeks  - Educated about healthy life style, risk of falls/sedation and addiction   Patient was receptive to education   - The patient was educated about 24 hour and weekend coverage for urgent situations accessed by calling [facility] [address] main practice number  - Patient was educated to call [address] [facility] ([phone] [[phone]]) for behavioral crisis at anytime or 911 for any safety concerns, or go to nearest ER if his symptoms become overwhelming or unmanageable  Current Outpatient Medications   Medication Sig Dispense Refill   • atoMOXetine (STRATTERA) 25 mg capsule Take Strattera 40 mg and 25 mg daily for 1 week, then 40 mg daily for week 2 and then 25 mg daily for week 3 and then stop 14 capsule 0   • methylphenidate (Ritalin) 10 mg tablet Take 1 tablet (10 mg total) by mouth 3 (three) times a day with meals Max Daily Amount: 30 mg 90 tablet 0   • zaleplon (SONATA) 10 MG capsule Take 1 capsule (10 mg total) by mouth daily at bedtime 30 capsule 2   • albuterol (PROVENTIL HFA,VENTOLIN HFA) 90 mcg/act inhaler Inhale 2 puffs every 6 (six) hours as needed for wheezing     • amLODIPine (NORVASC) 5 mg tablet Take 1 tablet (5 mg total) by mouth daily 90 tablet 3   • aspirin (ECOTRIN LOW STRENGTH) 81 mg EC tablet Take 81 mg by mouth daily     • atorvastatin (LIPITOR) 40 mg tablet Take 1 tablet (40 mg total) by mouth daily (Patient taking differently: Take 40 mg by mouth daily at bedtime) 90 tablet 3   • clobetasol (TEMOVATE) 0 05 % cream Apply 6 23 application topically 2 (two) times a day as needed      • clotrimazole-betamethasone (LOTRISONE) 1-0 05 % cream Apply topically 2 (two) times a day (Patient taking differently: Apply topically if needed) 30 g 5   • EPINEPHrine (EPIPEN) 0 3 mg/0 3 mL SOAJ Inject 0 3 mL (0 3 mg total) into a muscle once for 1 dose (Patient not taking: Reported on 1/27/2023) 1 each 1   • fexofenadine (ALLEGRA) 60 MG tablet Take 60 mg by mouth daily as needed      • ketoconazole (NIZORAL) 2 % cream Apply topically daily as needed for rash (Patient taking differently: Apply 1 application topically daily as needed for rash) 15 g 5     No current facility-administered medications for this visit           Medications Risks/Benefits      Risks, Benefits And Possible Side Effects Of Medications:    Risks, benefits, and possible side effects of medications explained to Rebecca Pearson and he verbalizes understanding and agreement for treatment  Controlled Medication Discussion:     Rebecca Pearson has been filling controlled prescriptions on time as prescribed according to [facility]  Discussed with Rebecca Pearson the risks of sedation, respiratory depression, impairment of ability to drive and potential for abuse and addiction related to treatment with benzodiazepine medications  He understands risk of treatment with benzodiazepine medications, agrees to not drive if feels impaired and agrees to take medications as prescribed  Discussed with Gilberto Moran warning on concurrent use of benzodiazepines and opioid medications including sedation, respiratory depression, coma and death  He understands the risk of treatment with benzodiazepines in addition to opioids and wants to continue taking those medications  PMDP reviewed  Psycho-education regarding stimulants indications, benefits, risks (including risk of addiction, onesimo if taking more than prescribed), side effects (including but not limited to palpitation/arrhythmia, weight loss and increased anxiety), and alternative options provided to the patient, and the importance of the compliance with psychiatric treatment reiterated  The patient verbalized understanding and agreed to the proposed regimen  Psychotherapy Provided:     Individual psychotherapy provided: Yes  Counseling was provided during the session today for 16 minutes  Psychoeducation provided to the patient and was educated about the importance of compliance with the medications and psychiatric treatment  Supportive psychotherapy provided to the patient  Psycho-spiritual therapy provided to the patient, and the importance of simultaneously engaging the body, mind, and spirit in healing mental health issues, moving beyond problematic life patterns, and overcoming traumatic life experiences reiterated   The patient was educated about the breathing techniques, guided imagery meditation and healthy life-style  Solution Focused Brief Therapy (SFBT) provided  Patient's emotions were validated and specific labeled praise provided  [place] suggestions were offered in a supportive non-critical way       Treatment Plan:    Completed and signed during the session: Not applicable - Treatment Plan not due at this session    Amrita Cm MD 03/20/23

## 2023-03-23 ENCOUNTER — OFFICE VISIT (OUTPATIENT)
Dept: SURGERY | Facility: CLINIC | Age: 63
End: 2023-03-23

## 2023-03-23 VITALS
RESPIRATION RATE: 18 BRPM | WEIGHT: 185 LBS | HEART RATE: 88 BPM | SYSTOLIC BLOOD PRESSURE: 126 MMHG | BODY MASS INDEX: 25.9 KG/M2 | DIASTOLIC BLOOD PRESSURE: 82 MMHG | OXYGEN SATURATION: 98 % | HEIGHT: 71 IN | TEMPERATURE: 97.8 F

## 2023-03-23 DIAGNOSIS — K40.91 RECURRENT RIGHT INGUINAL HERNIA: Primary | ICD-10-CM

## 2023-03-23 NOTE — PROGRESS NOTES
Assessment/Plan: Right groin pain without a palpable hernia  He needs a CAT scan     Diagnoses and all orders for this visit:    Recurrent right inguinal hernia  -     CT abdomen pelvis w contrast; Future          Subjective:     Patient ID: Yesika Ulloa is a 60 y o  male  HPI the patient is a 34-year-old male whom I performed a right inguinal herniorrhaphy on about 2 years ago  At that time repair was done with a hernia systems mesh  For the past 4 months he has been having occasional pain in his right groin  He notices this especially when he coughs  When he is recumbent, he has no symptoms  This is definitely not neuropathic pain and that there is no pins-and-needles or burning  Of some note is that right before this started he underwent a robotic right partial nephrectomy    Review of Systems  History of right renal carcinoma, hypertension, hyperlipidemia, asthma, coronary artery disease, degenerative disc disease, ADHD and a few others    Objective:     Physical Exam  Vitals reviewed  Constitutional:       Appearance: Normal appearance  He is normal weight  He is not ill-appearing  HENT:      Head: Normocephalic and atraumatic  Eyes:      General: No scleral icterus  Conjunctiva/sclera: Conjunctivae normal    Abdominal:      General: There is no distension  Palpations: Abdomen is soft  Hernia: No hernia is present  Comments: He is minimally tender high up along his cord on the right side  Having said that, the closure is tight and there is no protrusion  There was no left inguinal hernia    Genitourinary:     Penis: Normal        Testes: Normal    Musculoskeletal:         General: Normal range of motion  Skin:     General: Skin is warm  Coloration: Skin is not jaundiced  Neurological:      Mental Status: He is alert and oriented to person, place, and time  Psychiatric:         Mood and Affect: Mood normal          Thought Content:  Thought content normal  Judgment: Judgment normal

## 2023-03-30 DIAGNOSIS — J06.9 UPPER RESPIRATORY TRACT INFECTION, UNSPECIFIED TYPE: Primary | ICD-10-CM

## 2023-03-30 RX ORDER — AZITHROMYCIN 250 MG/1
250 TABLET, FILM COATED ORAL EVERY 24 HOURS
Qty: 6 TABLET | Refills: 0 | Status: SHIPPED | OUTPATIENT
Start: 2023-03-30 | End: 2023-04-04

## 2023-04-04 DIAGNOSIS — Z01.818 PRE-OP TESTING: Primary | ICD-10-CM

## 2023-04-19 ENCOUNTER — APPOINTMENT (OUTPATIENT)
Dept: LAB | Facility: HOSPITAL | Age: 63
End: 2023-04-19
Attending: SURGERY

## 2023-04-19 DIAGNOSIS — Z01.818 PRE-OP TESTING: ICD-10-CM

## 2023-04-19 LAB
BUN SERPL-MCNC: 21 MG/DL (ref 5–25)
CREAT SERPL-MCNC: 0.83 MG/DL (ref 0.6–1.3)
GFR SERPL CREATININE-BSD FRML MDRD: 93 ML/MIN/1.73SQ M

## 2023-04-21 ENCOUNTER — HOSPITAL ENCOUNTER (OUTPATIENT)
Dept: CT IMAGING | Facility: HOSPITAL | Age: 63
Discharge: HOME | End: 2023-04-21
Attending: SURGERY

## 2023-04-21 DIAGNOSIS — K40.91 RECURRENT RIGHT INGUINAL HERNIA: ICD-10-CM

## 2023-04-21 RX ADMIN — IOHEXOL 100 ML: 350 INJECTION, SOLUTION INTRAVENOUS at 08:06

## 2023-04-27 ENCOUNTER — TELEMEDICINE (OUTPATIENT)
Dept: PSYCHIATRY | Facility: CLINIC | Age: 63
End: 2023-04-27

## 2023-04-27 ENCOUNTER — TELEPHONE (OUTPATIENT)
Dept: PSYCHIATRY | Facility: CLINIC | Age: 63
End: 2023-04-27

## 2023-04-27 DIAGNOSIS — F90.2 ATTENTION DEFICIT HYPERACTIVITY DISORDER (ADHD), COMBINED TYPE: Primary | ICD-10-CM

## 2023-04-27 DIAGNOSIS — F51.01 PRIMARY INSOMNIA: ICD-10-CM

## 2023-04-27 DIAGNOSIS — F43.23 ADJUSTMENT DISORDER WITH MIXED ANXIETY AND DEPRESSED MOOD: ICD-10-CM

## 2023-04-27 RX ORDER — ESCITALOPRAM OXALATE 10 MG/1
10 TABLET ORAL DAILY
Qty: 30 TABLET | Refills: 2 | Status: SHIPPED | OUTPATIENT
Start: 2023-04-27 | End: 2023-07-26

## 2023-04-27 RX ORDER — METHYLPHENIDATE HYDROCHLORIDE 10 MG/1
TABLET ORAL
Qty: 120 TABLET | Refills: 0 | Status: SHIPPED | OUTPATIENT
Start: 2023-04-27
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
## 2023-05-17 ENCOUNTER — HOSPITAL ENCOUNTER (OUTPATIENT)
Dept: RADIOLOGY | Facility: HOSPITAL | Age: 63
Discharge: HOME | End: 2023-05-17

## 2023-05-17 ENCOUNTER — APPOINTMENT (OUTPATIENT)
Dept: LAB | Facility: HOSPITAL | Age: 63
End: 2023-05-17

## 2023-05-17 DIAGNOSIS — C64.2 CLEAR CELL RENAL CELL CARCINOMA, LEFT (HCC): ICD-10-CM

## 2023-05-17 LAB
ALBUMIN SERPL BCP-MCNC: 4.4 G/DL (ref 3.5–5)
ALP SERPL-CCNC: 60 U/L (ref 34–104)
ALT SERPL W P-5'-P-CCNC: 32 U/L (ref 7–52)
ANION GAP SERPL CALCULATED.3IONS-SCNC: 4 MMOL/L (ref 4–13)
AST SERPL W P-5'-P-CCNC: 28 U/L (ref 13–39)
BASOPHILS # BLD AUTO: 0.04 THOUSANDS/ÂΜL (ref 0–0.1)
BASOPHILS NFR BLD AUTO: 1 % (ref 0–1)
BILIRUB SERPL-MCNC: 0.93 MG/DL (ref 0.2–1)
BUN SERPL-MCNC: 22 MG/DL (ref 5–25)
CALCIUM SERPL-MCNC: 9.5 MG/DL (ref 8.4–10.2)
CHLORIDE SERPL-SCNC: 105 MMOL/L (ref 96–108)
CO2 SERPL-SCNC: 29 MMOL/L (ref 21–32)
CREAT SERPL-MCNC: 0.85 MG/DL (ref 0.6–1.3)
EOSINOPHIL # BLD AUTO: 0.07 THOUSAND/ÂΜL (ref 0–0.61)
EOSINOPHIL NFR BLD AUTO: 1 % (ref 0–6)
ERYTHROCYTE [DISTWIDTH] IN BLOOD BY AUTOMATED COUNT: 12.6 % (ref 11.6–15.1)
GFR SERPL CREATININE-BSD FRML MDRD: 92 ML/MIN/1.73SQ M
GLUCOSE SERPL-MCNC: 93 MG/DL (ref 65–140)
HCT VFR BLD AUTO: 45.2 % (ref 36.5–49.3)
HGB BLD-MCNC: 15.6 G/DL (ref 12–17)
IMM GRANULOCYTES # BLD AUTO: 0.04 THOUSAND/UL (ref 0–0.2)
IMM GRANULOCYTES NFR BLD AUTO: 1 % (ref 0–2)
LYMPHOCYTES # BLD AUTO: 2.13 THOUSANDS/ÂΜL (ref 0.6–4.47)
LYMPHOCYTES NFR BLD AUTO: 37 % (ref 14–44)
MCH RBC QN AUTO: 31.3 PG (ref 26.8–34.3)
MCHC RBC AUTO-ENTMCNC: 34.5 G/DL (ref 31.4–37.4)
MCV RBC AUTO: 91 FL (ref 82–98)
MONOCYTES # BLD AUTO: 0.52 THOUSAND/ÂΜL (ref 0.17–1.22)
MONOCYTES NFR BLD AUTO: 9 % (ref 4–12)
NEUTROPHILS # BLD AUTO: 3.02 THOUSANDS/ÂΜL (ref 1.85–7.62)
NEUTS SEG NFR BLD AUTO: 51 % (ref 43–75)
NRBC BLD AUTO-RTO: 0 /100 WBCS
PLATELET # BLD AUTO: 239 THOUSANDS/UL (ref 149–390)
PMV BLD AUTO: 9.7 FL (ref 8.9–12.7)
POTASSIUM SERPL-SCNC: 4.2 MMOL/L (ref 3.5–5.3)
PROT SERPL-MCNC: 7.5 G/DL (ref 6.4–8.4)
RBC # BLD AUTO: 4.99 MILLION/UL (ref 3.88–5.62)
SODIUM SERPL-SCNC: 138 MMOL/L (ref 135–147)
WBC # BLD AUTO: 5.82 THOUSAND/UL (ref 4.31–10.16)

## 2023-05-26 DIAGNOSIS — F90.2 ATTENTION DEFICIT HYPERACTIVITY DISORDER (ADHD), COMBINED TYPE: ICD-10-CM

## 2023-05-26 RX ORDER — METHYLPHENIDATE HYDROCHLORIDE 10 MG/1
TABLET ORAL
Qty: 120 TABLET | Refills: 0 | Status: SHIPPED | OUTPATIENT
Start: 2023-05-27

## 2023-05-26 NOTE — TELEPHONE ENCOUNTER
Patient was returning  to reschedule his appt with provider, writer transferred caller to [facility] office for assistance with scheduling

## 2023-05-26 NOTE — TELEPHONE ENCOUNTER
Called patient to reschedule apt with Dr Yaritza Banda on 06/05, no response left a message requesting a call back

## 2023-05-31 ENCOUNTER — OFFICE VISIT (OUTPATIENT)
Dept: INTERNAL MEDICINE CLINIC | Facility: CLINIC | Age: 63
End: 2023-05-31

## 2023-05-31 VITALS
HEART RATE: 90 BPM | BODY MASS INDEX: 25.48 KG/M2 | WEIGHT: 182 LBS | DIASTOLIC BLOOD PRESSURE: 86 MMHG | HEIGHT: 71 IN | SYSTOLIC BLOOD PRESSURE: 132 MMHG | OXYGEN SATURATION: 99 %

## 2023-05-31 DIAGNOSIS — I10 PRIMARY HYPERTENSION: Primary | ICD-10-CM

## 2023-05-31 DIAGNOSIS — Z00.00 WELLNESS EXAMINATION: ICD-10-CM

## 2023-05-31 DIAGNOSIS — I25.10 CORONARY ARTERY DISEASE INVOLVING NATIVE CORONARY ARTERY OF NATIVE HEART WITHOUT ANGINA PECTORIS: ICD-10-CM

## 2023-05-31 DIAGNOSIS — F90.2 ATTENTION DEFICIT HYPERACTIVITY DISORDER (ADHD), COMBINED TYPE: ICD-10-CM

## 2023-05-31 DIAGNOSIS — C64.2 RENAL CELL CARCINOMA OF LEFT KIDNEY (HCC): ICD-10-CM

## 2023-05-31 DIAGNOSIS — L40.50 PSORIATIC ARTHRITIS (HCC): ICD-10-CM

## 2023-05-31 DIAGNOSIS — E78.2 MIXED HYPERLIPIDEMIA: ICD-10-CM

## 2023-05-31 DIAGNOSIS — D84.9 IMMUNOCOMPROMISED (HCC): ICD-10-CM

## 2023-05-31 DIAGNOSIS — Z23 ENCOUNTER FOR IMMUNIZATION: ICD-10-CM

## 2023-05-31 NOTE — ASSESSMENT & PLAN NOTE
Discussed preventative health, cancer screening, immunizations, and safety issues  Cologuard was done June 4, 2022 with recommendations to recheck again in 3 years  Patient had the Shingrix vaccines  Patient had Tdap vaccination June 26, 2017    I recommend Prevnar 20, patient does not want get today due to the possibility of sore arm

## 2023-05-31 NOTE — ASSESSMENT & PLAN NOTE
Pt has been following with rheum for this    Pt was on Biologic agents in the past   He uses lotrisone as needed

## 2023-05-31 NOTE — PATIENT INSTRUCTIONS
Problem List Items Addressed This Visit          Cardiovascular and Mediastinum    HTN (hypertension) - Primary     Controlled, continue amlodipine along with healthy diet         Coronary artery disease involving native coronary artery of native heart without angina pectoris     Pt wants to switch to [address] [facility]  Relevant Orders    Ambulatory Referral to Cardiology       Musculoskeletal and Integument    Psoriatic [facility] ([facility] )     Pt has been following with rheum for this  Pt was on Biologic agents in the past   He uses lotrisone as needed            Genitourinary    Renal cell carcinoma of left kidney [facility])     Patient had partial nephrectomy on left            Other    Attention deficit hyperactivity disorder (ADHD), combined type     Follow up psychiatry  Mixed hyperlipidemia     Continue atorvastatin along with healthy diet  Wellness examination     Discussed preventative health, cancer screening, immunizations, and safety issues  Cologuard was done June 4, 2022 with recommendations to recheck again in 3 years  Patient had the Shingrix vaccines  Patient had Tdap vaccination June 26, 2017    I recommend Prevnar 20, patient does not want get today due to the possibility of sore arm         Immunocompromised ([facility] )     Other Visit Diagnoses       Encounter for immunization

## 2023-05-31 NOTE — PROGRESS NOTES
Name: Grayson Zheng      : 1960      MRN: [id number]  Encounter Provider: Radha Harp MD  Encounter Date: 2023   [facility] [address] [place] [address],[address]     1  Primary hypertension  Assessment & Plan:  Controlled, continue amlodipine along with healthy diet      2  Encounter for immunization    3  Renal cell carcinoma of left kidney [facility])  Assessment & Plan:  Patient had partial nephrectomy on left      4  Attention deficit hyperactivity disorder (ADHD), combined type  Assessment & Plan:  Follow up psychiatry  5  Psoriatic arthritis ([facility] )  Assessment & Plan:  Pt has been following with rheum for this  Pt was on Biologic agents in the past   He uses lotrisone as needed      6  Mixed hyperlipidemia  Assessment & Plan:  Continue atorvastatin along with healthy diet  7  Coronary artery disease involving native coronary artery of native heart without angina pectoris  Assessment & Plan:  Pt wants to switch to [address] [facility]  Orders:  -     Ambulatory Referral to Cardiology; Future    8  Wellness examination  Assessment & Plan:  Discussed preventative health, cancer screening, immunizations, and safety issues  Cologuard was done 2022 with recommendations to recheck again in 3 years  Patient had the Shingrix vaccines  Patient had Tdap vaccination 2017  I recommend Prevnar 20, patient does not want get today due to the possibility of sore arm      9  Immunocompromised ([facility] [address] )      BMI Counseling: Body mass index is 25 38 kg/m²  The BMI is above normal  Nutrition recommendations include encouraging healthy choices of fruits and vegetables and moderation in carbohydrate intake  Exercise recommendations include exercising 3-5 times per week  Rationale for BMI follow-up plan is due to patient being overweight or obese  Depression Screening and Follow-up Plan: Patient was screened for depression during today's encounter   They screened negative with a PHQ-2 score of 1  Subjective     Wellness: Patient is active, eats a healthy diet, no smoking cigarettes, gets routine dental health    Pt also has some concerns  Pt had a stressful life with daughter having a baby without having a partner to help take care of baby, so he has stepped in  Pt dealing with elderly mother  Wife is working a lot with her job  Pt has ADHD, on Ritalin  Pt has been feeling down, and psychiatry started Lexapro which he reports helped him  He plans on tapering off Lexapro in the future  Review of Systems   Constitutional: Negative for chills, fatigue and fever  HENT: Negative for congestion, nosebleeds, postnasal drip, sore throat and trouble swallowing  Eyes: Negative for pain  Respiratory: Negative for cough, chest tightness, shortness of breath and wheezing  Cardiovascular: Negative for chest pain, palpitations and leg swelling  Gastrointestinal: Negative for abdominal pain, constipation, diarrhea, nausea and vomiting  Endocrine: Negative for polydipsia and polyuria  Genitourinary: Negative for dysuria, flank pain and hematuria  Musculoskeletal: Positive for arthralgias  Skin: Negative for rash  Neurological: Negative for dizziness, tremors, light-headedness and headaches  Hematological: Does not bruise/bleed easily  Psychiatric/Behavioral: Positive for dysphoric mood  Negative for confusion  The patient is nervous/anxious          Past Medical History:   Diagnosis Date   • Arthritis    • Asthma    • COPD (chronic obstructive pulmonary disease) ([facility] [address] ) 1990   • Headache(784 0) 2019    Significant osteoarthrities in neck   • HL (hearing loss) 2019    mild   • Pneumonia 2019   • Psoriatic arthritis ([facility] [address] )    • Septicemia ([facility] )    • Stomach problems      Past Surgical History:   Procedure Laterality Date   • COLONOSCOPY     • DENTAL IMPLANT     • HERNIA REPAIR     • INCISION AND DRAINAGE ABSCESS / HEMATOMA OF [place] / Alessio Bass / THIGH Left     thigh   • [place] LAPAROSCOPY SURG PARTIAL NEPHRECTOMY Left 11/28/2022    Procedure: ROBOTIC LAPAROSCOPIC PARTIAL NEPHRECTOMY  WITH INTRAOPERATIVE ULTRASOUND INTERPRETATION;  Surgeon: Radha Zheng MD;  [facility];  Service: Urology   • TX RPR 1ST INGUN HRNA AGE 5 YRS/> REDUCIBLE Right 06/22/2021    Procedure: Inguinal hernia repair with mesh;  Surgeon: Jose Raul Mccord MD;  Location: [facility];  Service: General     Family History   Problem Relation Age of Onset   • Coronary artery disease Father    • Heart disease Father    • Arthritis Mother    • Hearing loss Mother    • Heart disease Maternal Grandfather    • Dementia Maternal Grandmother    • Heart disease Paternal Grandfather    • Dementia Maternal Aunt      Social History     Socioeconomic History   • Marital status: /Civil Union     Spouse name: None   • Number of children: None   • Years of education: None   • Highest education level: None   Occupational History   • None   Tobacco Use   • Smoking status: Never   • Smokeless tobacco: Never   Vaping Use   • Vaping Use: Never used   Substance and Sexual Activity   • Alcohol use:  Yes     Alcohol/week: 1 0 standard drink of alcohol     Types: 1 Glasses of wine per week     Comment: More like one glass per month socially   • Drug use: Never   • Sexual activity: Yes     Partners: Female     Birth control/protection: None     Comment: Monogamous,     Other Topics Concern   • None   Social History Narrative   • None     Social Determinants of Health     Financial Resource Strain: Not on file   Food Insecurity: Not on file   Transportation Needs: Not on file   Physical Activity: Not on file   Stress: Not on file   Social Connections: Not on file   Intimate Partner Violence: Not on file   Housing Stability: Not on file     Current Outpatient Medications on File Prior to Visit   Medication Sig   • albuterol (PROVENTIL HFA,VENTOLIN HFA) 90 mcg/act inhaler Inhale 2 puffs every 6 (six) hours as needed for wheezing   • amLODIPine (NORVASC) 5 mg tablet Take 1 tablet (5 mg total) by mouth daily   • aspirin (ECOTRIN LOW STRENGTH) 81 mg EC tablet Take 81 mg by mouth daily   • atorvastatin (LIPITOR) 40 mg tablet Take 1 tablet (40 mg total) by mouth daily (Patient taking differently: Take 40 mg by mouth daily at bedtime)   • clobetasol (TEMOVATE) 0 05 % cream Apply 1 95 application topically 2 (two) times a day as needed    • clotrimazole-betamethasone (LOTRISONE) 1-0 05 % cream Apply topically 2 (two) times a day (Patient taking differently: Apply topically if needed)   • escitalopram (Lexapro) 10 mg tablet Take 1 tablet (10 mg total) by mouth daily Take 1/2 tablet (5mg) daily for 4 days and then continue 1 tablet (10 mg) daily   • fexofenadine (ALLEGRA) 60 MG tablet Take 60 mg by mouth daily as needed    • ketoconazole (NIZORAL) 2 % cream Apply topically daily as needed for rash (Patient taking differently: Apply 1 application  topically daily as needed for rash)   • methylphenidate (RITALIN) 10 mg tablet TAKE TWO TABLETS IN THE MORNING, ONE TABLET AROUND NOON AND ONE TABLET IN AFTERNOON IF NEEDED Do not start before May 27, 2023     • zaleplon (SONATA) 10 MG capsule Take 1 capsule (10 mg total) by mouth daily at bedtime   • atoMOXetine (STRATTERA) 25 mg capsule Take Strattera 40 mg and 25 mg daily for 1 week, then 40 mg daily for week 2 and then 25 mg daily for week 3 and then stop (Patient not taking: Reported on 5/31/2023)   • EPINEPHrine (EPIPEN) 0 3 mg/0 3 mL SOAJ Inject 0 3 mL (0 3 mg total) into a muscle once for 1 dose (Patient not taking: Reported on 1/27/2023)     Allergies   Allergen Reactions   • Other Anaphylaxis     Unknown food allergy   • Erythromycin GI Intolerance     Immunization History   Administered Date(s) Administered   • COVID-19 MODERNA VACC 0 5 ML IM 12/29/2021   • INFLUENZA 10/05/2007, 11/03/2013, 11/10/2015, 11/22/2016, 09/26/2017, 10/18/2018   • Pneumococcal Polysaccharide "PPV23 01/15/2019   • Tdap 09/26/2017   • Zoster Vaccine Recombinant 07/17/2019, 09/27/2019   • influenza, injectable, quadrivalent 11/04/2020       Objective     /86   Pulse 90   Ht 5' 11\" (1 803 m)   Wt 82 6 kg (182 lb)   SpO2 99%   BMI 25 38 kg/m²     Physical Exam  Vitals reviewed  Constitutional:       General: He is not in acute distress  Appearance: Normal appearance  He is well-developed  HENT:      Head: Normocephalic and atraumatic  Right Ear: External ear normal       Left Ear: External ear normal    Eyes:      General: No scleral icterus  Conjunctiva/sclera: Conjunctivae normal    Neck:      Thyroid: No thyromegaly  Trachea: No tracheal deviation  Cardiovascular:      Rate and Rhythm: Normal rate and regular rhythm  Heart sounds: Normal heart sounds  No murmur heard  Pulmonary:      Effort: Pulmonary effort is normal  No respiratory distress  Breath sounds: Normal breath sounds  No wheezing or rales  Abdominal:      General: Bowel sounds are normal       Palpations: Abdomen is soft  Tenderness: There is no abdominal tenderness  There is no guarding or rebound  Hernia: There is no hernia in the left inguinal area or right inguinal area  Genitourinary:     Testes: Normal    Musculoskeletal:      Cervical back: Normal range of motion and neck supple  Right lower leg: No edema  Left lower leg: No edema  Lymphadenopathy:      Cervical: No cervical adenopathy  Skin:     Coloration: Skin is not jaundiced or pale  Neurological:      General: No focal deficit present  Mental Status: He is alert and oriented to person, place, and time  Psychiatric:         Mood and Affect: Mood normal          Behavior: Behavior normal          Thought Content:  Thought content normal          Judgment: Judgment normal        Mary Garcia MD  "

## 2023-06-12 NOTE — PROGRESS NOTES
"     Problem List Items Addressed This Visit        Genitourinary    Renal cell carcinoma of left kidney ([address] ) - Primary    Relevant Orders    CBC and differential    Comprehensive metabolic panel    CT abdomen w contrast    XR chest pa & lateral       Other    Encounter to discuss test results                 Discussion:  Doug Kenny is doing very well  He has no evidence of disease  No new complications  He did have nocturia prior to his surgery which has since resolved which is wonderful news  His imaging looks great, I reviewed this with him in real-time using our computerized imaging software  He has no evidence of recurrence at the site of partial nephrectomy  GFR 92  HgB 15 6  Follow up 1 year with imaging Prior (CT abd with contrast), and cxr    Portions of the above record have been created with voice recognition software  Occasional wrong word or \"sound alike\" substitution may have occurred due to the inherent limitations of voice recognition software  Read the chart carefully and recognize, using context, where substitution may have occurred  Assessment and plan:       Please see problem oriented charting for the assessment plan of today's urological complaints      Nany Duncan MD      Chief Complaint     As listed above      History of Present Illness     Tylor Quesada is a 58 y o  man with a history of clear-cell renal cell carcinoma status post partial nephrectomy  No evidence of recurrence  ECOG 0  No new complaints  The following portions of the patient's history were reviewed and updated as appropriate: allergies, current medications, past family history, past medical history, past social history, past surgical history and problem list     Detailed Urologic History     - please refer to HPI    Review of Systems     Review of Systems   Constitutional: Negative  HENT: Negative  Eyes: Negative  Respiratory: Negative  Cardiovascular: Negative      Gastrointestinal: " Negative  Endocrine: Negative  Genitourinary: Negative  Musculoskeletal: Negative  Skin: Negative  Allergic/Immunologic: Negative  Neurological: Negative  Hematological: Negative  Psychiatric/Behavioral: Negative  AUA SYMPTOM SCORE    Flowsheet Row Most Recent Value   AUA SYMPTOM SCORE    How often have you had a sensation of not emptying your bladder completely after you finished urinating? 4 (P)     How often have you had to urinate again less than two hours after you finished urinating? 1 (P)     How often have you found you stopped and started again several times when you urinate? 1 (P)     How often have you found it difficult to postpone urination? 3 (P)     How often have you had a weak urinary stream? 1 (P)     How often have you had to push or strain to begin urination? 0 (P)     How many times did you most typically get up to urinate from the time you went to bed at night until the time you got up in the morning? 1 (P)     Quality of Life: If you were to spend the rest of your life with your urinary condition just the way it is now, how would you feel about that? 3 (P)     AUA SYMPTOM SCORE 11 (P)             Allergies     Allergies   Allergen Reactions   • Erythromycin GI Intolerance       Physical Exam     Physical Exam  Vitals reviewed  Constitutional:       General: He is not in acute distress  Appearance: Normal appearance  He is not ill-appearing, toxic-appearing or diaphoretic  HENT:      Head: Normocephalic and atraumatic  Eyes:      General: No scleral icterus  Right eye: No discharge  Left eye: No discharge  Cardiovascular:      Pulses: Normal pulses  Pulmonary:      Effort: Pulmonary effort is normal    Abdominal:      General: There is no distension  Palpations: There is no mass  Hernia: No hernia is present  Musculoskeletal:         General: No swelling  Skin:     Coloration: Skin is not jaundiced     Neurological: "General: No focal deficit present  Mental Status: He is alert  Cranial Nerves: No cranial nerve deficit  Psychiatric:         Mood and Affect: Mood normal          Behavior: Behavior normal          Thought Content: Thought content normal          Judgment: Judgment normal              Vital Signs  Vitals:    06/14/23 1606   BP: 122/82   BP Location: Left arm   Patient Position: Sitting   Cuff Size: Adult   Pulse: 87   SpO2: 98%   Weight: 85 3 kg (188 lb)   Height: 5' 11\" (1 803 m)         Current Medications       Current Outpatient Medications:   •  albuterol (PROVENTIL HFA,VENTOLIN HFA) 90 mcg/act inhaler, Inhale 2 puffs every 6 (six) hours as needed for wheezing, Disp: , Rfl:   •  amLODIPine (NORVASC) 5 mg tablet, Take 1 tablet (5 mg total) by mouth daily, Disp: 90 tablet, Rfl: 3  •  aspirin (ECOTRIN LOW STRENGTH) 81 mg EC tablet, Take 81 mg by mouth daily, Disp: , Rfl:   •  atorvastatin (LIPITOR) 40 mg tablet, Take 1 tablet (40 mg total) by mouth daily (Patient taking differently: Take 40 mg by mouth daily at bedtime), Disp: 90 tablet, Rfl: 3  •  clobetasol (TEMOVATE) 0 05 % cream, Apply 6 72 application topically 2 (two) times a day as needed , Disp: , Rfl:   •  clotrimazole-betamethasone (LOTRISONE) 1-0 05 % cream, Apply topically 2 (two) times a day (Patient taking differently: Apply topically if needed), Disp: 30 g, Rfl: 5  •  escitalopram (Lexapro) 10 mg tablet, Take 1 tablet (10 mg total) by mouth daily Take 1/2 tablet (5mg) daily for 4 days and then continue 1 tablet (10 mg) daily, Disp: 30 tablet, Rfl: 2  •  fexofenadine (ALLEGRA) 60 MG tablet, Take 60 mg by mouth daily as needed , Disp: , Rfl:   •  ketoconazole (NIZORAL) 2 % cream, Apply topically daily as needed for rash (Patient taking differently: Apply 1 application   topically daily as needed for rash), Disp: 15 g, Rfl: 5  •  methylphenidate (RITALIN) 10 mg tablet, TAKE TWO TABLETS IN THE MORNING, ONE TABLET AROUND NOON AND ONE TABLET " IN AFTERNOON IF NEEDED Do not start before May 27, 2023 , Disp: 120 tablet, Rfl: 0  •  zaleplon (SONATA) 10 MG capsule, Take 1 capsule (10 mg total) by mouth daily at bedtime, Disp: 30 capsule, Rfl: 2  •  EPINEPHrine (EPIPEN) 0 3 mg/0 3 mL SOAJ, Inject 0 3 mL (0 3 mg total) into a muscle once for 1 dose (Patient not taking: Reported on 1/27/2023), Disp: 1 each, Rfl: 1      Active Problems     Patient Active Problem List   Diagnosis   • Exercise-induced asthma   • Attention deficit hyperactivity disorder (ADHD), combined type   • Psoriatic arthritis ([facility] [address] )   • Mixed hyperlipidemia   • Primary insomnia   • Cervical spondylosis   • Controlled substance agreement signed   • Cough in adult patient   • DDD (degenerative disc disease), lumbar   • Degenerative disc disease, cervical   • Elbow pain, left   • Food allergy   • Muscle spasms of both lower extremities   • Right upper quadrant abdominal pain   • Vitamin D deficiency   • HTN (hypertension)   • Chronic maxillary sinusitis   • Dysphoric mood   • Dry eye   • Dry mouth   • Memory difficulties   • Recurrent right inguinal hernia   • Muscle cramps   • Tinnitus of both ears   • Wellness examination   • Left renal mass   • Encounter to discuss test results   • Lipid disorder   • Coronary artery disease involving native coronary artery of native heart without angina pectoris   • COVID-19   • Renal cell carcinoma of left kidney (HCC)   • Immunocompromised ([facility] )   • Shortness of breath   • Persistent headaches   • Chest pain         Past Medical History     Past Medical History:   Diagnosis Date   • Arthritis    • Asthma    • COPD (chronic obstructive pulmonary disease) ([facility] [address] ) 1990   • Headache(784 0) 2019    Significant osteoarthrities in neck   • HL (hearing loss) 2019    mild   • Pneumonia 2019   • Psoriatic arthritis ([facility] )    • [facility]    • Stomach problems          Surgical History     Past Surgical History:   Procedure Laterality Date   • COLONOSCOPY     • DENTAL IMPLANT     • HERNIA REPAIR     • INCISION AND DRAINAGE ABSCESS / HEMATOMA OF [place] / KNEE / THIGH Left     thigh   • [place] LAPAROSCOPY SURG PARTIAL NEPHRECTOMY Left 11/28/2022    Procedure: ROBOTIC LAPAROSCOPIC PARTIAL NEPHRECTOMY  WITH INTRAOPERATIVE ULTRASOUND INTERPRETATION;  Surgeon: Wendy Costello MD;  [facility];  Service: Urology   • [place] RPR 1ST INGUN HRNA AGE 4 YRS/> REDUCIBLE Right 06/22/2021    Procedure: Inguinal hernia repair with mesh;  Surgeon: Katyh Estevez MD;  [facility];  Service: General         Family History     Family History   Problem Relation Age of Onset   • Coronary artery disease Father    • Heart disease Father    • Arthritis Mother    • Hearing loss Mother    • Heart disease Maternal Grandfather    • Dementia Maternal Grandmother    • Heart disease Paternal Grandfather    • Dementia Maternal Aunt          Social History     Social History     Social History     Tobacco Use   Smoking Status Never   Smokeless Tobacco Never         Pertinent Lab Values     Lab Results   Component Value Date    CREATININE 0 85 05/17/2023       Lab Results   Component Value Date    PSA 0 6 08/13/2022               Pertinent Imaging       The patient's images were reviewed by me personally and also in real time with them in the examination room using our PACS imaging system  The imaging findings are significant for no recurrence of renal cell carcinoma  Well-healed kidneys, no new renal masses, no hydronephrosis, no stones

## 2023-06-14 ENCOUNTER — OFFICE VISIT (OUTPATIENT)
Dept: UROLOGY | Facility: CLINIC | Age: 63
End: 2023-06-14
Payer: COMMERCIAL

## 2023-06-14 VITALS
OXYGEN SATURATION: 98 % | BODY MASS INDEX: 26.32 KG/M2 | HEART RATE: 87 BPM | DIASTOLIC BLOOD PRESSURE: 82 MMHG | WEIGHT: 188 LBS | SYSTOLIC BLOOD PRESSURE: 122 MMHG | HEIGHT: 71 IN

## 2023-06-14 DIAGNOSIS — C64.2 RENAL CELL CARCINOMA OF LEFT KIDNEY (HCC): Primary | ICD-10-CM

## 2023-06-14 DIAGNOSIS — Z71.2 ENCOUNTER TO DISCUSS TEST RESULTS: ICD-10-CM

## 2023-06-14 PROCEDURE — 99213 OFFICE O/P EST LOW 20 MIN: CPT | Performed by: UROLOGY

## 2023-06-16 DIAGNOSIS — F43.23 ADJUSTMENT DISORDER WITH MIXED ANXIETY AND DEPRESSED MOOD: ICD-10-CM

## 2023-06-20 RX ORDER — ESCITALOPRAM OXALATE 10 MG/1
10 TABLET ORAL DAILY
Qty: 90 TABLET | Refills: 0 | Status: SHIPPED | OUTPATIENT
Start: 2023-06-20 | End: 2023-09-18

## 2023-07-03 ENCOUNTER — TELEMEDICINE (OUTPATIENT)
Dept: PSYCHIATRY | Facility: CLINIC | Age: 63
End: 2023-07-03
Payer: COMMERCIAL

## 2023-07-03 DIAGNOSIS — F90.2 ATTENTION DEFICIT HYPERACTIVITY DISORDER (ADHD), COMBINED TYPE: ICD-10-CM

## 2023-07-03 DIAGNOSIS — F51.01 PRIMARY INSOMNIA: ICD-10-CM

## 2023-07-03 DIAGNOSIS — F43.23 ADJUSTMENT DISORDER WITH MIXED ANXIETY AND DEPRESSED MOOD: Primary | ICD-10-CM

## 2023-07-03 PROCEDURE — 99214 OFFICE O/P EST MOD 30 MIN: CPT | Performed by: STUDENT IN AN ORGANIZED HEALTH CARE EDUCATION/TRAINING PROGRAM

## 2023-07-03 PROCEDURE — 90833 PSYTX W PT W E/M 30 MIN: CPT | Performed by: STUDENT IN AN ORGANIZED HEALTH CARE EDUCATION/TRAINING PROGRAM

## 2023-07-03 RX ORDER — ZALEPLON 10 MG/1
10 CAPSULE ORAL
Qty: 30 CAPSULE | Refills: 2 | Status: SHIPPED | OUTPATIENT
Start: 2023-07-03 | End: 2023-10-01

## 2023-07-03 RX ORDER — METHYLPHENIDATE HYDROCHLORIDE 10 MG/1
TABLET ORAL
Qty: 120 TABLET | Refills: 0 | Status: SHIPPED | OUTPATIENT
Start: 2023-07-03

## 2023-07-03 NOTE — BH TREATMENT PLAN
Treatment Plan done but not signed at time of office visit due to:  Plan reviewed with the patient during the virtual visit and verbal consent given. TREATMENT PLAN (Medication Management Only)        [facility]    Name and Date of Birth:  Roya Escamilla 58 y.o. 1960  Date of Treatment Plan: July 3, 2023  Diagnosis/Diagnoses:    1. Adjustment disorder with mixed anxiety and depressed mood    2. Attention deficit hyperactivity disorder (ADHD), combined type    3. Primary insomnia      Strengths/Personal Resources for Self-Care: "supportive family, friends, hobbies and access to therapy". Area/Areas of need (in own words): anxiety, depressive symptoms, ADHD symptoms  1. Long Term Goal: maintain good control of ADHD sxs and continue to improve anxiety and mood sxs. Target Date:6 months - 1/3/2024  Person/Persons responsible for completion of goal: Sherine Dominique  2. Short Term Objective (s) - How will we reach this goal?:   A. Provider new recommended medication/dosage changes and/or continue medication(s): continue current medications as prescribed. B. Attend psychotherapy regularly. C. N/A. Target Date:6 months - 1/3/2024  Person/Persons Responsible for Completion of Goal: Sherine Dominique  Progress Towards Goals: continuing treatment  Treatment Modality: medication management every 6 months, continue psychotherapy with own therapist  Review due 180 days from date of this plan: 6 months - 1/3/2024  Expected length of service: maintenance  My Physician/PA/NP and I have developed this plan together and I agree to work on the goals and objectives. I understand the treatment goals that were developed for my treatment.

## 2023-07-03 NOTE — PSYCH
Virtual Regular Visit    Verification of [place] location: [place]    Patient is located in the following state in which I hold an active license: [place]    Assessment/Plan:    Problem List Items Addressed This Visit        Other    Attention deficit hyperactivity disorder (ADHD), combined type    Relevant Medications    methylphenidate (RITALIN) 10 mg tablet    zaleplon (SONATA) 10 MG capsule    Primary insomnia    Relevant Medications    zaleplon (SONATA) 10 MG capsule   Other Visit Diagnoses     Adjustment disorder with mixed anxiety and depressed mood    -  Primary    Relevant Medications    methylphenidate (RITALIN) 10 mg tablet    zaleplon (SONATA) 10 MG capsule               Reason for visit is   Chief Complaint   Patient presents with   • Medication Management   • ADHD   • Depression   • Anxiety   • Insomnia        Visit Time  Visit Start Time: 1:25 PM  Visit Stop Time: 1:50 PM  Total Visit Duration: 25 minutes    Encounter provider Isabel Bryant MD    Provider located at: [address] [place] [address]    Recent Visits  No visits were found meeting these conditions. Showing recent visits within past 7 days and meeting all other requirements  Today's Visits  Date Type Provider Dept   07/03/23 Telemedicine Isabel Bryant MD [facility]   Showing today's visits and meeting all other requirements  Future Appointments  No visits were found meeting these conditions. Showing future appointments within next 150 days and meeting all other requirements       The patient was identified by name and date of birth. Maricarmen Torre was informed that this is a telemedicine visit and that the visit is being conducted through the [address] [place] [address] [employer] platform. He agrees to proceed. My office door was closed. No one else was in the room. He acknowledged consent and understanding of privacy and security of the video platform.  The patient has agreed to participate and understands they can discontinue the visit at any time. Patient is aware this is a billable service. MEDICATION MANAGEMENT NOTE        ST. Sy      Name and Date of Birth:  Inessa Fam 58 y.o. 1960 MRN: [id number]    Date of Visit: July 3, 2023    Reason for Visit:   Chief Complaint   Patient presents with   • Medication Management   • ADHD   • Depression   • Anxiety   • Insomnia       SUBJECTIVE:  The patient was visited virtually for medication management and follow up visit for depression, anxiety, insomnia and ADHD sxs. Presented calm, and cooperative. Reported feeling better. He noted that he has been feeling "all and all good". He reported sexual side effects with Lexapro, but noted that ir's not a big deal in general. He noted that he had a "very lorrie conversation" with his wife about their sexual life, but noted that after they wanted to have sex, he had delayed ejaculation. He noted that he feels "much better" with increased clarity of the perspective" and as per patient, his wife, his close friend and his therapist provided positive feedback and would like to stay on Lexapro, but would consider tapering off in the future. Endorsed good control of his ADHD sxs. Denied any changes in sleep, appetite, concentration, energy level, or daily activities. Denied feelings of anhedonia, hopelessness, helplessness, worthlessness or guilt and appeared to be future oriented. There was no thought constriction related to death. Denied SI/HI, intent or plan upon direct inquiry at this time. Denied AV/H. No anxiety sxs, specific phobia or panic attacks reported. No manic sxs, paranoid ideations or fixed delusions were elicited. Endorsed good compliance with the medications and denied any side effects. Denied smoking cigarettes, binge drinking alcohol or other illicit substance use.     Given this presentation, medications are maintained at the same dosage. PMDP reviewed. Psycho-education regarding stimulants indications, benefits, risks (including risk of addiction, onesimo if taking more than prescribed), side effects (including but not limited to palpitation/arrhythmia, weight loss and increased anxiety), and alternative options provided to the patient, and the importance of the compliance with psychiatric treatment reiterated. The patient verbalized understanding and agreed to the proposed regimen. Will continue individual therapy. The patient was educated to call 911 or go to the nearest emergency room if the symptoms become overwhelming or unable to remain in control. Verbalized understanding and agreed to seek help in case of distress or concern for safety. Review Of Systems:  Pertinent items are noted in HPI; all others are negative; no recent changes in medications or health status reported.     - did not fill in the questionnaires prior to the visit; denied depressive or anxiety sxs at this time    Past Psychiatric History Update:   - No inpatient psychiatric admission since last encounter  - No SA or SIB since last encounter  - No incidence of violent behavior since last encounter    Past Trauma History Update:    - No new onset of abuse or traumatic events since last encounter     Past Medical History:    Past Medical History:   Diagnosis Date   • Arthritis    • Asthma    • COPD (chronic obstructive pulmonary disease) ([address]) 1990   • Headache(784.0) 2019    Significant osteoarthrities in neck   • HL (hearing loss) 2019    mild   • Pneumonia 2019   • Psoriatic arthritis ([address])    • Septicemia ([address])    • Stomach problems         Past Surgical History:   Procedure Laterality Date   • COLONOSCOPY     • DENTAL IMPLANT     • HERNIA REPAIR     • INCISION AND DRAINAGE ABSCESS / HEMATOMA OF BURSA / KNEE / THIGH Left     thigh   • AL LAPAROSCOPY SURG PARTIAL NEPHRECTOMY Left 11/28/2022    Procedure: ROBOTIC LAPAROSCOPIC PARTIAL NEPHRECTOMY  WITH INTRAOPERATIVE ULTRASOUND INTERPRETATION;  Surgeon: Bautista Michelle MD;  Location: [facility];  Service: Urology   • WI RPR 1ST INGUN HRNA AGE 5 YRS/> REDUCIBLE Right 06/22/2021    Procedure: Inguinal hernia repair with mesh;  Surgeon: Lesia Jonas MD;  Location:  MAIN OR;  Service: General     Allergies   Allergen Reactions   • Erythromycin GI Intolerance       Substance Abuse History:    Social History     Substance and Sexual Activity   Alcohol Use Yes   • Alcohol/week: 1.0 standard drink of alcohol   • Types: 1 Glasses of wine per week    Comment: More like one glass per month socially     Social History     Substance and Sexual Activity   Drug Use Never       Social History:    Social History     Socioeconomic History   • Marital status: /Civil Union     Spouse name: Not on file   • Number of children: Not on file   • Years of education: Not on file   • Highest education level: Not on file   Occupational History   • Not on file   Tobacco Use   • Smoking status: Never   • Smokeless tobacco: Never   Vaping Use   • Vaping Use: Never used   Substance and Sexual Activity   • Alcohol use:  Yes     Alcohol/week: 1.0 standard drink of alcohol     Types: 1 Glasses of wine per week     Comment: More like one glass per month socially   • Drug use: Never   • Sexual activity: Yes     Partners: Female     Birth control/protection: None     Comment: Monogamous,     Other Topics Concern   • Not on file   Social History Narrative   • Not on file     Social Determinants of Health     Financial Resource Strain: Not on file   Food Insecurity: Not on file   Transportation Needs: Not on file   Physical Activity: Not on file   Stress: Not on file   Social Connections: Not on file   Intimate Partner Violence: Not on file   Housing Stability: Not on file       Family Psychiatric History:     Family History   Problem Relation Age of Onset   • Coronary artery disease Father    • Heart disease Father • Arthritis Mother    • Hearing loss Mother    • Heart disease Maternal Grandfather    • Dementia Maternal Grandmother    • Heart disease Paternal Grandfather    • Dementia Maternal Aunt        History Review:  The following portions of the patient's history were reviewed and updated as appropriate: allergies, current medications, past family history, past medical history, past social history, past surgical history and problem list.       OBJECTIVE:     Vital signs in last 24 hours:    Vitals:       Mental Status Evaluation:  Appearance and attitude: appeared as stated age, cooperative and attentive, wearing eyeglasses, casually dressed with good hygiene  Eye contact: good  Motor Function: within normal limits, No PMA/PMR  Gait/station: Not observed  Speech: normal for rate, rhythm, volume, latency, amount  Language: No overt abnormality  Mood/affect: euthymic / Affect was euthymic, reactive, in full range, normal intensity and mood congruent  Thought Processes: sequential and goal-directed  Thought content: denies suicidal ideation or homicidal ideation; no delusions or first rank symptoms  Associations: intact associations  Perceptual disturbances: denies Auditory/Visual/Tactile Hallucinations  Orientation: oriented to time, person, place and to the situational context  Cognitive Function: intact  Memory: recent and remote memory grossly intact  Intellect: average  Fund of knowledge: aware of current events, aware of past history and vocabulary average  Impulse control: good  Insight/judgment: good/good    Laboratory Results: I have personally reviewed all pertinent laboratory/tests results    Recent Labs (last 2 months):   Appointment on 05/17/2023   Component Date Value   • WBC 05/17/2023 5.82    • RBC 05/17/2023 4.99    • Hemoglobin 05/17/2023 15.6    • Hematocrit 05/17/2023 45.2    • MCV 05/17/2023 91    • MCH 05/17/2023 31.3    • MCHC 05/17/2023 34.5    • RDW 05/17/2023 12.6    • MPV 05/17/2023 9.7    • Platelets 76/57/4992 239    • nRBC 05/17/2023 0    • Neutrophils Relative 05/17/2023 51    • Immat GRANS % 05/17/2023 1    • Lymphocytes Relative 05/17/2023 37    • Monocytes Relative 05/17/2023 9    • Eosinophils Relative 05/17/2023 1    • Basophils Relative 05/17/2023 1    • Neutrophils Absolute 05/17/2023 3.02    • Immature Grans Absolute 05/17/2023 0.04    • Lymphocytes Absolute 05/17/2023 2.13    • Monocytes Absolute 05/17/2023 0.52    • Eosinophils Absolute 05/17/2023 0.07    • Basophils Absolute 05/17/2023 0.04    • Sodium 05/17/2023 138    • Potassium 05/17/2023 4.2    • Chloride 05/17/2023 105    • CO2 05/17/2023 29    • ANION GAP 05/17/2023 4    • BUN 05/17/2023 22    • Creatinine 05/17/2023 0.85    • Glucose 05/17/2023 93    • Calcium 05/17/2023 9.5    • AST 05/17/2023 28    • ALT 05/17/2023 32    • Alkaline Phosphatase 05/17/2023 60    • Total Protein 05/17/2023 7.5    • Albumin 05/17/2023 4.4    • Total Bilirubin 05/17/2023 0.93    • eGFR 05/17/2023 92          Assessment/Plan:   A 63 y.o.  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 The patient presented w/ h/o ADHD since 1999, being on Ritalin 60mg daily since then, w/ marked improvement of attention, concentration and his function since being treated. Denied any excessive anxiety, depressed/manic sxs, A/VH or other psychotic sxs.  No h/o substance abuse. Denied SI/HI, intent or plan upon direct inquiry at this time.  WALTER-7: 4. His current presentation meets criteria for ADHD, by history. The patient was educated about the diagnosis of ADHD and the course of ADHD in adults which may present with decreased intensity of sxs, onesimo after age 48, and particularly after age 65. Psycho-education regarding Ritalin indications, benefits, risks, side effects, and alternative options provided to the patient, and the importance of the compliance with psychiatric treatment reiterated. The patient was educated about the potential long-term side effects of ritalin in older age, onesimo cardiac side effects (given the FH of heart attack in his father at age 39), and was educated about the possibility of tapering off or if becoming symptomatic, cross-tapering to different treatment options. He verbalized understanding and agreed to the proposed regimen. Ritalin dose was decreased from 20mg TID (60mg total), to 20/20/10mg (50 mg total); and then switched to equivalent dose of Concerta 72 mg po daily to be tapered down as tolerated. The patient did not tolerate switching Ritalin to Concerta due to high BP, and on 11/5/2021 requested to be switched back to Ritalin 20mg/20mg/10mg as last prescribed (tapered down from 20 mg TID). Upon f/u on 12/28/2021, presented w/ well-controlled ADHD sxs, and stable mood and anxiety. The patient was diagnosed with complex cyst in L kidney and is going to have partial nephrectomy concerning for possible malignancy on 11/28/22, and also his internist recommended to discontinue Ritalin for HTN and high risk of cardia events given the positive FH and age. Ritalin was tapered off by 10 mg weekly and was started on Strattera 40 mg po daily for 1 week and then 40 mg po BID for ADHD.  Upon f/u, the patient reported relatively controlled ADHD sxs with Strattera 40 mg BID and maintained on the same dose. However, the patient was visited on 3/20/23 as requested an earlier appointment and reported poor control of ADHD sxs with side effect of "terrible dry mouth". Presented w/ a persistent pattern of impaired concentration and difficulty sustaining attention, failure to finish duties on time, having difficulty organizing tasks and activities, losing things, and getting easily distracted by external stimuli, figeting with hands, tapping feet or squirming in seat, and talking fast, interrupting others during the conversation and at times moving around as well as having difficulties to wait. Strattera tapered off and started on Ritalin 10 mg TID which later uptitrated to 20/10/10 mg on 4/27/23. Also, the patient was started on Lexapro 10 mg daily for depression and anxiety in the context of multiple psychosocial stressors. Will continue individual therapy and recommended to consider couple's therapy and sex-therapy      Diagnoses and all orders for this visit:    Adjustment disorder with mixed anxiety and depressed mood    Attention deficit hyperactivity disorder (ADHD), combined type  -     methylphenidate (RITALIN) 10 mg tablet; TAKE TWO TABLETS IN THE MORNING, ONE TABLET AROUND NOON AND ONE TABLET IN AFTERNOON IF NEEDED    Primary insomnia  -     zaleplon (SONATA) 10 MG capsule; Take 1 capsule (10 mg total) by mouth daily at bedtime          Impression:  1. Adjustment disorder with mixed anxiety and depressed mood        2. Attention deficit hyperactivity disorder (ADHD), combined type  methylphenidate (RITALIN) 10 mg tablet      3.  Primary insomnia  zaleplon (SONATA) 10 MG capsule          Treatment Recommendations/Precautions:  - Tapered off Strattera due to side effects, successfully  - Continue Ritalin 20/10/10 mg for ADHD (previously was stable on 20/20/10 mg)  - Continue Sonata 10 mg po nightly PRN for insomnia   - Continue Lexapro 10 mg daily for depression and anxiety; doses to be adjusted as indicated  - Continue individual therapy; consider sex therapy / couple's therapy    - Medications sent to patient's pharmacy for 30 day supply x2 refills     - Psychoeducation provided to the patient and benefits, potential risks and side effects discussed; importance of compliance with the psychiatric treatment reiterated, and the patient verbalized understanding of the matter     - RTC on 9/18/23  - Educated about healthy life style, risk of falls/sedation and addiction. Patient was receptive to education.  - The patient was educated about 24 hour and weekend coverage for urgent situations accessed by calling [facility] main practice number  - Patient was educated to call [place] ([phone] [[phone]]) for behavioral crisis at anytime or 205 for any safety concerns, or go to nearest ER if his symptoms become overwhelming or unmanageable.     Current Outpatient Medications   Medication Sig Dispense Refill   • methylphenidate (RITALIN) 10 mg tablet TAKE TWO TABLETS IN THE MORNING, ONE TABLET AROUND NOON AND ONE TABLET IN AFTERNOON IF NEEDED 120 tablet 0   • zaleplon (SONATA) 10 MG capsule Take 1 capsule (10 mg total) by mouth daily at bedtime 30 capsule 2   • albuterol (PROVENTIL HFA,VENTOLIN HFA) 90 mcg/act inhaler Inhale 2 puffs every 6 (six) hours as needed for wheezing     • amLODIPine (NORVASC) 5 mg tablet Take 1 tablet (5 mg total) by mouth daily 90 tablet 3   • aspirin (ECOTRIN LOW STRENGTH) 81 mg EC tablet Take 81 mg by mouth daily     • atorvastatin (LIPITOR) 40 mg tablet Take 1 tablet (40 mg total) by mouth daily (Patient taking differently: Take 40 mg by mouth daily at bedtime) 90 tablet 3   • clobetasol (TEMOVATE) 0.05 % cream Apply 4.81 application topically 2 (two) times a day as needed      • clotrimazole-betamethasone (LOTRISONE) 1-0.05 % cream Apply topically 2 (two) times a day (Patient taking differently: Apply topically if needed) 30 g 5   • EPINEPHrine (EPIPEN) 0.3 mg/0.3 mL SOAJ Inject 0.3 mL (0.3 mg total) into a muscle once for 1 dose (Patient not taking: Reported on 1/27/2023) 1 each 1   • escitalopram (Lexapro) 10 mg tablet Take 1 tablet (10 mg total) by mouth daily 90 tablet 0   • fexofenadine (ALLEGRA) 60 MG tablet Take 60 mg by mouth daily as needed      • ketoconazole (NIZORAL) 2 % cream Apply topically daily as needed for rash (Patient taking differently: Apply 1 application. topically daily as needed for rash) 15 g 5     No current facility-administered medications for this visit. Medications Risks/Benefits      Risks, Benefits And Possible Side Effects Of Medications:    Risks, benefits, and possible side effects of medications explained to Aleta Pugh and he verbalizes understanding and agreement for treatment. Controlled Medication Discussion:     Aleta Pugh has been filling controlled prescriptions on time as prescribed according to [address] reviewed. Psycho-education regarding stimulants indications, benefits, risks (including risk of addiction, onesimo if taking more than prescribed), side effects (including but not limited to palpitation/arrhythmia, weight loss and increased anxiety), and alternative options provided to the patient, and the importance of the compliance with psychiatric treatment reiterated. The patient verbalized understanding and agreed to the proposed regimen. Psychotherapy Provided:     Individual psychotherapy provided: Yes  Counseling was provided during the session today for 16 minutes. Psychoeducation provided to the patient and was educated about the importance of compliance with the medications and psychiatric treatment  Supportive psychotherapy provided to the patient  Solution Focused Brief Therapy (SFBT) provided  Patient's emotions were validated and specific labeled praise provided. [place] suggestions were offered in a supportive non-critical way.      Treatment Plan:    Completed and signed during the session: Yes - with Fabiano Hewitt MD 07/03/23

## 2023-07-25 ENCOUNTER — TELEPHONE (OUTPATIENT)
Dept: PSYCHIATRY | Facility: CLINIC | Age: 63
End: 2023-07-25

## 2023-07-25 NOTE — TELEPHONE ENCOUNTER
Notice of Expiring Prior Authorization received from [facility]. Prior Authorization for Methylphenidate 10 MG tablets faxed to [facility] via [facility].

## 2023-07-26 NOTE — TELEPHONE ENCOUNTER
Fax from [facility] [employer] with [place] Approval for Methylphenidate 10 MG tab effective 7/25/23 - 7/25/24. Called pharmacy and provided them with approval information. LM on Fabby's VM informing her that medication was approved by insurance.

## 2023-08-09 DIAGNOSIS — F90.2 ATTENTION DEFICIT HYPERACTIVITY DISORDER (ADHD), COMBINED TYPE: ICD-10-CM

## 2023-08-09 RX ORDER — METHYLPHENIDATE HYDROCHLORIDE 10 MG/1
TABLET ORAL
Qty: 120 TABLET | Refills: 0 | Status: SHIPPED | OUTPATIENT
Start: 2023-08-09

## 2023-08-09 NOTE — TELEPHONE ENCOUNTER
Medication Refill Request     Name of Medication Ritalin  Dose/Frequency 10mg take 2 tablets in the morning 1 tablet around noon and 1 tablet in the afternoon if needed  Quantity 120  Verified pharmacy   [x]  Verified ordering Provider   [x]  Does patient have enough for the next 3 days? Yes [] No [x]  Does patient have a follow-up appointment scheduled?  Yes [x] No []   If so when is appointment: 9/18/2023 2pm

## 2023-08-09 NOTE — TELEPHONE ENCOUNTER
Jaspreet Suárez on [facility] nursing line reporting his frustration with no contact with front staff or nurses. He has called for the refill multiple times. He stated if he does not hear by the end of the day he guesses he will need to call corporate or something. Please call Arin Cárdenas and give him the correct number to call for [facility]. He did not state the name of the medication. Thanks!        Arin Cárdenas- [phone]

## 2023-08-16 DIAGNOSIS — R05.1 ACUTE COUGH: Primary | ICD-10-CM

## 2023-08-16 RX ORDER — AZITHROMYCIN 250 MG/1
250 TABLET, FILM COATED ORAL EVERY 24 HOURS
Qty: 6 TABLET | Refills: 0 | Status: SHIPPED | OUTPATIENT
Start: 2023-08-16 | End: 2023-08-21

## 2023-08-29 ENCOUNTER — CONSULT (OUTPATIENT)
Dept: CARDIOLOGY CLINIC | Facility: CLINIC | Age: 63
End: 2023-08-29
Payer: COMMERCIAL

## 2023-08-29 VITALS
OXYGEN SATURATION: 96 % | BODY MASS INDEX: 26.32 KG/M2 | WEIGHT: 188 LBS | HEART RATE: 95 BPM | DIASTOLIC BLOOD PRESSURE: 78 MMHG | HEIGHT: 71 IN | SYSTOLIC BLOOD PRESSURE: 122 MMHG

## 2023-08-29 DIAGNOSIS — L40.50 PSORIATIC ARTHRITIS (HCC): ICD-10-CM

## 2023-08-29 DIAGNOSIS — E78.2 MIXED HYPERLIPIDEMIA: ICD-10-CM

## 2023-08-29 DIAGNOSIS — I10 PRIMARY HYPERTENSION: Primary | ICD-10-CM

## 2023-08-29 DIAGNOSIS — I25.10 CORONARY ARTERY DISEASE INVOLVING NATIVE CORONARY ARTERY OF NATIVE HEART WITHOUT ANGINA PECTORIS: ICD-10-CM

## 2023-08-29 PROCEDURE — 93000 ELECTROCARDIOGRAM COMPLETE: CPT | Performed by: INTERNAL MEDICINE

## 2023-08-29 PROCEDURE — 99204 OFFICE O/P NEW MOD 45 MIN: CPT | Performed by: INTERNAL MEDICINE

## 2023-08-29 NOTE — PROGRESS NOTES
[facility]    CHIEF COMPLAINT: No chief complaint on file. HPI:  Carmella Ahumada is a 58 y.o. male with a past medical history of renal cell carcinoma status post partial left nephrectomy, hypertension, hyperlipidemia, psoriatic arthritis, coronary calcifications, exercise-induced asthma. History of hyperlipidemia on statin therapy since he was 27. Family history of premature coronary artery disease. Father had first myocardial infarction at 39. Previously following with Dr. Alexander Quispe at [facility]. He did have a coronary calcium score performed last Fall. His Lipitor was increased to 60 mg but he decreased this to 40 mg due to muscle cramps. Despite reduction in atorvastatin dose, he denies any improvement in cramps. Remains active but a bit less lately due to family responsibilities. He tries to mountain bike 2-3x per week for 10-12 miles at a time. He denies any exertional chest pain or dyspnea. Social history: Never smoker. Family history: Father had first MI at 39 - smoker.      The following portions of the patient's history were reviewed and updated as appropriate: allergies, current medications, past family history, past medical history, past social history, past surgical history, and problem list.    SINCE LAST OV I REVIEWED WITH THE PATIENT THE INTERIM LABS, TEST RESULTS, CONSULTANT(S) NOTES AND PERFORMED AN INTERIM REVIEW OF HISTORY    Past Medical History:   Diagnosis Date   • Arthritis    • Asthma    • COPD (chronic obstructive pulmonary disease) ([address]) 1990   • Headache(784.0) 2019    Significant osteoarthrities in neck   • HL (hearing loss) 2019    mild   • Pneumonia 2019   • Psoriatic arthritis ([address])    • Septicemia ([address])    • Stomach problems        Past Surgical History:   Procedure Laterality Date   • COLONOSCOPY     • DENTAL IMPLANT     • HERNIA REPAIR     • INCISION AND DRAINAGE ABSCESS / HEMATOMA OF BURSA / KNEE / THIGH Left     thigh   • MS LAPAROSCOPY SURG PARTIAL NEPHRECTOMY Left 11/28/2022    Procedure: ROBOTIC LAPAROSCOPIC PARTIAL NEPHRECTOMY  WITH INTRAOPERATIVE ULTRASOUND INTERPRETATION;  Surgeon: Elle Schaeffer MD;  Location: [facility];  Service: Urology   • IA RPR 1ST INGUN HRNA AGE 4 YRS/> REDUCIBLE Right 06/22/2021    Procedure: Inguinal hernia repair with mesh;  Surgeon: Fede Meyers MD;  Location: [facility];  Service: General       Social History     Socioeconomic History   • Marital status: /Civil Union     Spouse name: Not on file   • Number of children: Not on file   • Years of education: Not on file   • Highest education level: Not on file   Occupational History   • Not on file   Tobacco Use   • Smoking status: Never   • Smokeless tobacco: Never   Vaping Use   • Vaping Use: Never used   Substance and Sexual Activity   • Alcohol use:  Yes     Alcohol/week: 1.0 standard drink of alcohol     Types: 1 Glasses of wine per week     Comment: More like one glass per month socially   • Drug use: Never   • Sexual activity: Yes     Partners: Female     Birth control/protection: None     Comment: Monogamous,     Other Topics Concern   • Not on file   Social History Narrative   • Not on file     Social Determinants of Health     Financial Resource Strain: Not on file   Food Insecurity: Not on file   Transportation Needs: Not on file   Physical Activity: Not on file   Stress: Not on file   Social Connections: Not on file   Intimate Partner Violence: Not on file   Housing Stability: Not on file       Family History   Problem Relation Age of Onset   • Coronary artery disease Father    • Heart disease Father    • Arthritis Mother    • Hearing loss Mother    • Heart disease Maternal Grandfather    • Dementia Maternal Grandmother    • Heart disease Paternal Grandfather    • Dementia Maternal Aunt        Allergies   Allergen Reactions   • Erythromycin GI Intolerance       Current Outpatient Medications   Medication Sig Dispense Refill   • albuterol (PROVENTIL HFA,VENTOLIN HFA) 90 mcg/act inhaler Inhale 2 puffs every 6 (six) hours as needed for wheezing     • amLODIPine (NORVASC) 5 mg tablet Take 1 tablet (5 mg total) by mouth daily 90 tablet 3   • aspirin (ECOTRIN LOW STRENGTH) 81 mg EC tablet Take 81 mg by mouth daily     • atorvastatin (LIPITOR) 40 mg tablet Take 1 tablet (40 mg total) by mouth daily (Patient taking differently: Take 40 mg by mouth daily at bedtime) 90 tablet 3   • clobetasol (TEMOVATE) 0.05 % cream Apply 8.27 application topically 2 (two) times a day as needed      • clotrimazole-betamethasone (LOTRISONE) 1-0.05 % cream Apply topically 2 (two) times a day (Patient taking differently: Apply topically if needed) 30 g 5   • fexofenadine (ALLEGRA) 60 MG tablet Take 60 mg by mouth daily as needed      • methylphenidate (RITALIN) 10 mg tablet TAKE TWO TABLETS IN THE MORNING, ONE TABLET AROUND NOON AND ONE TABLET IN AFTERNOON IF NEEDED 120 tablet 0   • zaleplon (SONATA) 10 MG capsule Take 1 capsule (10 mg total) by mouth daily at bedtime 30 capsule 2   • EPINEPHrine (EPIPEN) 0.3 mg/0.3 mL SOAJ Inject 0.3 mL (0.3 mg total) into a muscle once for 1 dose (Patient not taking: Reported on 1/27/2023) 1 each 1   • ketoconazole (NIZORAL) 2 % cream Apply topically daily as needed for rash (Patient not taking: Reported on 8/29/2023) 15 g 5     No current facility-administered medications for this visit. /78   Pulse 95   Ht 5' 11" (1.803 m)   Wt 85.3 kg (188 lb)   SpO2 96%   BMI 26.22 kg/m²     Review of Systems   All other systems reviewed and are negative. Physical Exam  Vitals reviewed. Constitutional:       General: He is not in acute distress. Appearance: Normal appearance. He is well-developed. He is not toxic-appearing. HENT:      Head: Normocephalic and atraumatic. Eyes:      General: No scleral icterus. Extraocular Movements: Extraocular movements intact.       Conjunctiva/sclera: Conjunctivae normal.   Neck: Vascular: No carotid bruit. Cardiovascular:      Rate and Rhythm: Normal rate and regular rhythm. Pulses: Normal pulses. Heart sounds: Normal heart sounds. No murmur heard. No gallop. Pulmonary:      Effort: Pulmonary effort is normal. No respiratory distress. Breath sounds: Normal breath sounds. No wheezing or rales. Abdominal:      General: Abdomen is flat. Bowel sounds are normal. There is no distension. Palpations: Abdomen is soft. Tenderness: There is no abdominal tenderness. There is no guarding. Musculoskeletal:      Right lower leg: No edema. Left lower leg: No edema. Skin:     General: Skin is warm and dry. Capillary Refill: Capillary refill takes less than 2 seconds. Coloration: Skin is not jaundiced or pale. Neurological:      Mental Status: He is alert. Psychiatric:         Mood and Affect: Mood normal.         Behavior: Behavior normal.          Lab Results   Component Value Date    K 4.2 05/17/2023     05/17/2023    CO2 29 05/17/2023    BUN 22 05/17/2023    CREATININE 0.85 05/17/2023    CALCIUM 9.5 05/17/2023    ALT 32 05/17/2023    AST 28 05/17/2023    INR 0.99 11/09/2022       Lab Results   Component Value Date    HDL 42 03/03/2023    LDLCALC 98 03/03/2023    TRIG 114 03/03/2023       Lab Results   Component Value Date    WBC 5.82 05/17/2023    HGB 15.6 05/17/2023    HCT 45.2 05/17/2023     05/17/2023     Cardiac studies:   Results for orders placed or performed in visit on 08/29/23   POCT ECG    Impression    Normal sinus rhythm       CT cardiac calcium scoring-10/6/2022:  Impression:  Agatston calcium score of 246. This places the patient in the   50th-75th percentile for age and sex. Definite, at least moderate   atherosclerotic plaque. Mild coronary artery disease highly likely, and   significant narrowings possible. ASSESSMENT AND PLAN:  Diagnoses and all orders for this visit:    #.  Mixed hyperlipidemia  -     POCT ECG  -     Lipid Panel with Direct LDL reflex; Future  #. Primary hypertension  #. Coronary artery disease involving native coronary artery of native heart without angina pectoris  #. Psoriatic arthritis [facility])    60-year-old gentleman with a past medical history significant for hypertension and family history of premature coronary artery disease who presents today to establish care with a cardiologist.  He is a lifetime non-smoker. He does have psoriatic arthritis which is a risk-enhancer for cardiovascular disease. Results of his calcium scoring were reviewed. He is currently asymptomatic from a cardiovascular perspective. He does complain of leg cramping with activity which is new over the past year. No rest or red flag symptoms. Bilateral PT and DP pulses are normal but discussed screening with arterial duplex imaging if persistent -- would like to defer at this time. Unlikely statin-associated - has been on atorvastatin 40 mg for many years without LEXX.        -Blood pressure is well controlled and at goal-continue amlodipine 5 mg daily  -Lipid panel from 3/3/2023 with total cholesterol 163, triglycerides 114, HDL 42, LDL 98 - continue atorvastatin 40 mg   -Moderate intensity physical activity at least 150 minutes/week  -Mediterranean diet     Tay Madsen MD

## 2023-08-29 NOTE — PATIENT INSTRUCTIONS
You were seen today in the Cardiology office. Your cardiac studies show calcium in coronary arteries. The goal is to control your risk factors. Your blood pressure is well controlled. Continue your cholesterol medication as prescribed. I recommend at least 150 minutes of moderate intensity per week. Please see the attached handout for the Mediterranean diet. No medication changes were made today. Please continue all your medications as prescribed. Thank you for choosing [address] [place] [address].

## 2023-08-31 ENCOUNTER — NURSE TRIAGE (OUTPATIENT)
Dept: PHYSICAL THERAPY | Facility: OTHER | Age: 63
End: 2023-08-31

## 2023-08-31 DIAGNOSIS — M54.2 CHRONIC NECK PAIN: ICD-10-CM

## 2023-08-31 DIAGNOSIS — G89.29 CHRONIC BILATERAL LOW BACK PAIN WITHOUT SCIATICA: Primary | ICD-10-CM

## 2023-08-31 DIAGNOSIS — M54.50 CHRONIC BILATERAL LOW BACK PAIN WITHOUT SCIATICA: Primary | ICD-10-CM

## 2023-08-31 DIAGNOSIS — G89.29 CHRONIC NECK PAIN: ICD-10-CM
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
## 2023-08-31 NOTE — TELEPHONE ENCOUNTER
Call placed to the patient to complete the triage process. Call went to . Voice message left for patient to call back. Phone number and hours of business provided. Triage will be completed if a call back is received.

## 2023-09-01 NOTE — TELEPHONE ENCOUNTER
Additional Information  • Negative: Has the patient had unexplained weight loss? • Negative: Does the patient have a fever? • Negative: Is the patient experiencing blood in sputum? • Negative: Is the patient experiencing urine retention? • Negative: Is the patient experiencing acute drop foot or paralysis? • Negative: Has the patient experienced major trauma? (fall from height, high speed collision, direct blow to spine) and is also experiencing nausea, light-headedness, or loss of consciousness? • Affirmative: Is this a chronic condition? Protocols used: [facility] COMPREHENSIVE SPINE PROGRAM PROTOCOL    Patient states he has had the back and neck pain for over 10 yrs and has done PT PM in the past States he was seeing PM through [facility] and had an injection done a year ago. Patient states he would like to transfer his care to [address] [place] [address]. Patient informed that a referral would be placed to that office and they would be contacting him to discuss an appointment.

## 2023-09-11 DIAGNOSIS — M54.9 CHRONIC BACK PAIN, UNSPECIFIED BACK LOCATION, UNSPECIFIED BACK PAIN LATERALITY: Primary | ICD-10-CM

## 2023-09-11 DIAGNOSIS — G89.29 CHRONIC BACK PAIN, UNSPECIFIED BACK LOCATION, UNSPECIFIED BACK PAIN LATERALITY: Primary | ICD-10-CM

## 2023-09-12 ENCOUNTER — TELEPHONE (OUTPATIENT)
Dept: PHYSICAL THERAPY | Facility: OTHER | Age: 63
End: 2023-09-12

## 2023-09-12 NOTE — TELEPHONE ENCOUNTER
Referral to comp spine was entered by PCP. However this patient was already triaged and a referral was placed for him to see PM.     Per patient message to PCP on 9/11/23, he is unhappy that he can not get into PM until [facility]. Was asking PCP for another referral to be seen sooner? Comp spine has nothing further to offer the patient.  He would have to take up scheduling with PM at [phone]    Comp spine ref closed

## 2023-09-18 ENCOUNTER — TELEMEDICINE (OUTPATIENT)
Dept: PSYCHIATRY | Facility: CLINIC | Age: 63
End: 2023-09-18
Payer: COMMERCIAL

## 2023-09-18 DIAGNOSIS — F51.01 PRIMARY INSOMNIA: ICD-10-CM

## 2023-09-18 DIAGNOSIS — F33.2 SEVERE EPISODE OF RECURRENT MAJOR DEPRESSIVE DISORDER, WITHOUT PSYCHOTIC FEATURES (HCC): ICD-10-CM

## 2023-09-18 DIAGNOSIS — F43.23 ADJUSTMENT DISORDER WITH MIXED ANXIETY AND DEPRESSED MOOD: ICD-10-CM

## 2023-09-18 DIAGNOSIS — F90.2 ATTENTION DEFICIT HYPERACTIVITY DISORDER (ADHD), COMBINED TYPE: Primary | ICD-10-CM

## 2023-09-18 PROCEDURE — 99214 OFFICE O/P EST MOD 30 MIN: CPT | Performed by: STUDENT IN AN ORGANIZED HEALTH CARE EDUCATION/TRAINING PROGRAM

## 2023-09-18 PROCEDURE — 90833 PSYTX W PT W E/M 30 MIN: CPT | Performed by: STUDENT IN AN ORGANIZED HEALTH CARE EDUCATION/TRAINING PROGRAM

## 2023-09-18 RX ORDER — DULOXETIN HYDROCHLORIDE 30 MG/1
30 CAPSULE, DELAYED RELEASE ORAL DAILY
Qty: 7 CAPSULE | Refills: 0 | Status: SHIPPED | OUTPATIENT
Start: 2023-09-18 | End: 2023-09-25

## 2023-09-18 RX ORDER — METHYLPHENIDATE HYDROCHLORIDE 10 MG/1
TABLET ORAL
Qty: 120 TABLET | Refills: 0 | Status: SHIPPED | OUTPATIENT
Start: 2023-09-18

## 2023-09-18 RX ORDER — ZALEPLON 10 MG/1
10 CAPSULE ORAL
Qty: 30 CAPSULE | Refills: 2 | Status: SHIPPED | OUTPATIENT
Start: 2023-09-18 | End: 2023-12-17

## 2023-09-18 RX ORDER — DULOXETIN HYDROCHLORIDE 60 MG/1
60 CAPSULE, DELAYED RELEASE ORAL DAILY
Qty: 90 CAPSULE | Refills: 0 | Status: SHIPPED | OUTPATIENT
Start: 2023-09-18 | End: 2023-12-17
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
## 2023-09-22 ENCOUNTER — CONSULT (OUTPATIENT)
Dept: PAIN MEDICINE | Facility: CLINIC | Age: 63
End: 2023-09-22
Payer: COMMERCIAL

## 2023-09-22 VITALS
DIASTOLIC BLOOD PRESSURE: 66 MMHG | WEIGHT: 188 LBS | HEIGHT: 71 IN | BODY MASS INDEX: 26.32 KG/M2 | SYSTOLIC BLOOD PRESSURE: 120 MMHG

## 2023-09-22 DIAGNOSIS — M48.061 LUMBAR FORAMINAL STENOSIS: ICD-10-CM

## 2023-09-22 DIAGNOSIS — M47.817 LUMBOSACRAL SPONDYLOSIS WITHOUT MYELOPATHY: Primary | ICD-10-CM

## 2023-09-22 PROCEDURE — 99204 OFFICE O/P NEW MOD 45 MIN: CPT | Performed by: ANESTHESIOLOGY

## 2023-09-22 RX ORDER — ALPRAZOLAM 1 MG/1
TABLET ORAL
Qty: 1 TABLET | Refills: 0 | Status: SHIPPED | OUTPATIENT
Start: 2023-09-22

## 2023-09-22 NOTE — PROGRESS NOTES
Assessment  1. Lumbosacral spondylosis without myelopathy    2. Lumbar foraminal stenosis        Plan  Patient presenting with chronic back pain for 10+ years, worsening over the past several months. Pain is consistent with lumbar spondylosis accompanied by pain >7/10 on the pain scale with inability to participate in IADLs for >6 weeks. Patient has previously participated with physical therapy as well as home exercises and stretches. Has been taking ibuprofen, Tylenol, lidocaine patch with modest benefit. Denies any bowel or bladder incontinence, saddle anesthesia. Patient does have a history of psoriatic arthritis. He was previously on biologics but discontinued this secondary to having to have a partial nephrectomy for renal cell carcinoma. Patient previously had bilateral L5-S1 transforaminal epidural steroid injections at [facility] with temporary benefit. He did not have any radicular symptoms at this time and continues to have only axial pain at this time. Reviewed and interpreted relevant imaging studies - specifically lumbar MRI and discussed the results and clinical significance with the patient. This showed multilevel disc degeneration with facet joint degeneration as well as severe left and moderate to severe right foraminal stenosis at L5-S1 with compression of the exiting L5 nerve roots.    -Patient's pain is exclusively axial in nature. I did discuss bilateral lumbar medial branch blocks and subsequent possible radiofrequency ablation. We will schedule the patient for bilateral L3-5 medial branch nerve blocks with intention of moving forward towards radiofrequency ablation if there is an appropriate diagnostic response. The initial blocks will be performed with 2% lidocaine and if an appropriate response is obtained upon review of the patient's pain diary, a confirmatory block will be scheduled with 0.5% bupivacaine.   In the office today, we reviewed the nature of facet joint pathology in depth using a spine model. We discussed the approach we would use for the injections and provided literature for home review. The patient understands the risks associated with the procedure including bleeding, infection, tissue injury, and allergic reaction and provided verbal informed consent in the office today. Reviewed external notes from the relevant aspects of the patient's medical record, specifically external pain management, primary care physician notes in regards to current and prior treatments tried (as mentioned in history of present illness). Reviewed pertinent laboratory studies, specifically renal function, hemoglobin A1c, CBC, coagulation studies, prior to recommending medication therapies/interventional treatment options. [place] Prescription Drug Monitoring Program report was reviewed and was appropriate     My impressions and treatment recommendations were discussed in detail with the patient who verbalized understanding and had no further questions. Discharge instructions were provided. I personally saw and examined the patient and I agree with the above discussed plan of care. Orders Placed This Encounter   Procedures   • FL spine and pain procedure     Standing Status:   Future     Standing Expiration Date:   9/22/2027     Order Specific Question:   Reason for Exam:     Answer:   bilateral lumbar L3-5 MBB #1     Order Specific Question:   Anticoagulant hold needed? Answer:   no     No orders of the defined types were placed in this encounter. History of Present Illness    Alan Billings is a 58 y.o. male presenting for consultation at [address] [place] [address] and [facility] for exam and evaluation of chronic low back pain for 10+years, worsening over the past several months. Pain started without any precipitating injury or trauma. Over the past month, the intensity of pain has been severe. Pain is currently 8/10.  Pain does interfere with age appropriate activities of daily living. Pain is constant, with no typical pattern throughout the day. Pain is described as dull/aching, sharp, stabbing. Patient denies weakness in the lower extremities. Assistance device used: None. Pain is increased with standing, bending, sitting, walking. Pain is decreased with resting, lying down. Prior pertinent treatments tried: Physical therapy, transforaminal epidural steroid injection, exercise, osteopathic manipulation, TENS unit, heat/ice. Pertinent medications tried/currently taking: Capsaicin, lidocaine patch, acetaminophen, ibuprofen      I have personally reviewed and/or updated the patient's past medical history, past surgical history, family history, social history, current medications, allergies, and vital signs today. Review of Systems   Constitutional: Negative for chills and fever. HENT: Positive for hearing loss. Negative for ear pain and sore throat. Eyes: Negative for pain and visual disturbance. Respiratory: Positive for cough, shortness of breath and wheezing. Cardiovascular: Negative for chest pain and palpitations. Gastrointestinal: Negative for abdominal pain and vomiting. Genitourinary: Negative for dysuria and hematuria. Musculoskeletal: Positive for back pain, gait problem and myalgias. Negative for arthralgias. Skin: Negative for color change and rash. Neurological: Negative for seizures and syncope. Psychiatric/Behavioral: Positive for decreased concentration. The patient is nervous/anxious. All other systems reviewed and are negative.       Patient Active Problem List   Diagnosis   • Exercise-induced asthma   • Attention deficit hyperactivity disorder (ADHD), combined type   • Psoriatic arthritis ([address])   • Mixed hyperlipidemia   • Primary insomnia   • Cervical spondylosis   • Controlled substance agreement signed   • Cough in adult patient   • DDD (degenerative disc disease), lumbar   • Degenerative disc disease, cervical   • Elbow pain, left   • Food allergy   • Muscle spasms of both lower extremities   • Right upper quadrant abdominal pain   • Vitamin D deficiency   • HTN (hypertension)   • Chronic maxillary sinusitis   • Dysphoric mood   • Dry eye   • Dry mouth   • Memory difficulties   • Recurrent right inguinal hernia   • Muscle cramps   • Tinnitus of both ears   • Wellness examination   • Left renal mass   • Encounter to discuss test results   • Lipid disorder   • Coronary artery disease involving native coronary artery of native heart without angina pectoris   • COVID-19   • Renal cell carcinoma of left kidney (HCC)   • Immunocompromised (HCC)   • Shortness of breath   • Persistent headaches   • Chest pain       Past Medical History:   Diagnosis Date   • Arthritis    • Asthma    • COPD (chronic obstructive pulmonary disease) ([address]) 1990   • Headache(784.0) 2019    Significant osteoarthrities in neck   • HL (hearing loss) 2019    mild   • Pneumonia 2019   • Psoriatic arthritis ([address])    • Septicemia ([address])    • Stomach problems        Past Surgical History:   Procedure Laterality Date   • COLONOSCOPY     • DENTAL IMPLANT     • HERNIA REPAIR     • INCISION AND DRAINAGE ABSCESS / HEMATOMA OF BURSA / KNEE / THIGH Left     thigh   • NE LAPAROSCOPY SURG PARTIAL NEPHRECTOMY Left 11/28/2022    Procedure: ROBOTIC LAPAROSCOPIC PARTIAL NEPHRECTOMY  WITH INTRAOPERATIVE ULTRASOUND INTERPRETATION;  Surgeon: Yoli Jane MD;  [facility];  Service: Urology   • NE RPR 1ST INGUN HRNA AGE 4 YRS/> REDUCIBLE Right 06/22/2021    Procedure: Inguinal hernia repair with mesh;  Surgeon: Jaimie Benavides MD;  Location: [facility];  Service: General       Family History   Problem Relation Age of Onset   • Coronary artery disease Father    • Heart disease Father    • Arthritis Mother    • Hearing loss Mother    • Heart disease Maternal Grandfather    • Dementia Maternal Grandmother    • Heart disease Paternal Grandfather    • Dementia Maternal Aunt Social History     Occupational History   • Not on file   Tobacco Use   • Smoking status: Never   • Smokeless tobacco: Never   Vaping Use   • Vaping Use: Never used   Substance and Sexual Activity   • Alcohol use:  Yes     Alcohol/week: 1.0 standard drink of alcohol     Types: 1 Glasses of wine per week     Comment: More like one glass per month socially   • Drug use: Never   • Sexual activity: Yes     Partners: Female     Birth control/protection: None     Comment: Monogamous,         Current Outpatient Medications on File Prior to Visit   Medication Sig   • albuterol (PROVENTIL HFA,VENTOLIN HFA) 90 mcg/act inhaler Inhale 2 puffs every 6 (six) hours as needed for wheezing   • amLODIPine (NORVASC) 5 mg tablet Take 1 tablet (5 mg total) by mouth daily   • aspirin (ECOTRIN LOW STRENGTH) 81 mg EC tablet Take 81 mg by mouth daily   • atorvastatin (LIPITOR) 40 mg tablet Take 1 tablet (40 mg total) by mouth daily (Patient taking differently: Take 40 mg by mouth daily at bedtime)   • clobetasol (TEMOVATE) 0.05 % cream Apply 0.57 application topically 2 (two) times a day as needed    • clotrimazole-betamethasone (LOTRISONE) 1-0.05 % cream Apply topically 2 (two) times a day (Patient taking differently: Apply topically if needed)   • DULoxetine (Cymbalta) 30 mg delayed release capsule Take 1 capsule (30 mg total) by mouth daily for 7 days Take 30 mg daily for 1 week and then continue 60 mg po daily   • DULoxetine (CYMBALTA) 60 mg delayed release capsule Take 1 capsule (60 mg total) by mouth daily Take 30 mg daily for 1 week and then continue 60 mg po daily   • fexofenadine (ALLEGRA) 60 MG tablet Take 60 mg by mouth daily as needed    • methylphenidate (RITALIN) 10 mg tablet TAKE TWO TABLETS IN THE MORNING, ONE TABLET AROUND NOON AND ONE TABLET IN AFTERNOON IF NEEDED   • zaleplon (SONATA) 10 MG capsule Take 1 capsule (10 mg total) by mouth daily at bedtime   • EPINEPHrine (EPIPEN) 0.3 mg/0.3 mL SOAJ Inject 0.3 mL (0.3 mg total) into a muscle once for 1 dose (Patient not taking: Reported on 1/27/2023)   • ketoconazole (NIZORAL) 2 % cream Apply topically daily as needed for rash (Patient not taking: Reported on 8/29/2023)     No current facility-administered medications on file prior to visit. Allergies   Allergen Reactions   • Erythromycin GI Intolerance       Physical Exam    /66   Ht 5' 11" (1.803 m)   Wt 85.3 kg (188 lb)   BMI 26.22 kg/m²     Constitutional: normal, well developed, well nourished, alert, in no distress and non-toxic and no overt pain behavior. Eyes: anicteric  HEENT: grossly intact  Neck: supple, symmetric, trachea midline and no masses   Pulmonary:even and unlabored  Cardiovascular:No edema or pitting edema present  Skin:Normal without rashes or lesions and well hydrated  Psychiatric:Mood and affect appropriate  Neurologic: No focal neurologic deficits. Musculoskeletal: Significant lumbar pain provoked with extension and lateral flexion bilaterally. Negative bilateral straight leg raise. Imaging  MRI lumbar spine 9/29/2022: History: Lower back pain. Lumbar foraminal stenosis. Bilateral lumbar   radiculopathy. Psoriatic arthritis. Procedure: Noncontrast MRI of the lumbar spine. Sagittal T1 and STIR, sagittal   and axial T2. Comparison: Lumbar spine x-rays of 9/16/2022. Findings: For the purposes of this dictation, the most inferior disc will be   designated L5-S1. Conus and lower thoracic cord: Both normal. Conus medullaris located at L1. Marrow : There is marrow edema in the anterior corners of the L2-3 endplates   compatible with anterior spondylitis (Romanus lesion). There is also marrow   edema adjacent to the Schmorl's node at the inferior endplate of L2.  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  Clinical correlation for any   signs or symptoms of infection is recommended because early infectious discitis   may have a similar appearance. There is decreased T1 and increased T2 signal   intensity along the L5-S1 endplates, likely secondary to degenerative change   (type I Modic changes).      Alignment: Grade I spondylolisthesis of L5. Bilateral L5 spondylolysis. No   change. L1-L2: Normal.     L2-L3 : Loss of disc height. Annular bulge. Small osteophytes. Mild central   canal and bilateral neural foraminal stenosis. L3-L4: Loss of disc height. Annular bulge. Mild hypertrophy of the ligamentum   flavum. Moderate right greater than left neural foraminal stenosis. Mild right   subarticular recess stenosis. L4-L5: Annular bulge. Degenerated facet joints. Moderate bilateral neural   foraminal stenosis. L5-S1: Severe loss of disc height. Annular bulge. Degenerated facet joints. Severe left and moderate to severe right neural foraminal stenosis with   compression of the exiting L5 nerve roots. Patent central canal.     Upper sacrum: The visualized portions of the sacroiliac joints are normal.     Paraspinal soft tissues: Unremarkable.

## 2023-10-03 ENCOUNTER — TELEPHONE (OUTPATIENT)
Age: 63
End: 2023-10-03

## 2023-10-03 NOTE — TELEPHONE ENCOUNTER
Caller: Nupur Willams     Doctor: Dr Matilda Sharif     Reason for call: Patient calling stating slight cough test negative for covid no fever or any other symptoms just wanted to make sure is ok to proceed with procedure please advise     Call back#: [phone]

## 2023-10-03 NOTE — TELEPHONE ENCOUNTER
S/w pt who states he started with a mild cough, nasal drip and  some  minimal clear mucus production a few days ago and wanted to be sure he can proceed with MBB on 10/5. Pt tested negative for covid. Pt has hx of bronchitis but feels that this is allergy related. Pt is in a lot of pain and would like to move forward with MBB/RFA process.     Advised will notify WS and cb if unable to have procedure

## 2023-10-04 DIAGNOSIS — R05.9 COUGH IN ADULT PATIENT: Primary | ICD-10-CM

## 2023-10-04 RX ORDER — METHYLPREDNISOLONE 4 MG/1
TABLET ORAL
Qty: 21 EACH | Refills: 0 | Status: SHIPPED | OUTPATIENT
Start: 2023-10-04

## 2023-10-04 NOTE — TELEPHONE ENCOUNTER
S/w pt, pt feels worse today and starting antibiotics today for 5 days, pt would like SPA procedure  to call and reschedule today as he is in a lot of pain. Pt appreciative of call. Please cx procedure for 10/5/23 and call to reschedule, ty. Will MD Leland Rosas RN19 hours ago (1:24 PM)       If he feels it is more allergy related that is OK to proceed.  If the patient feels like he has cold-like symptoms I would ask that he reschedule due to consideration of spreading viral infection to other patients, staff etc.

## 2023-10-04 NOTE — TELEPHONE ENCOUNTER
Caller: Dakota Salgado     Doctor: Dr Henna Grace     Reason for call: Patient returning call from  please advise     Call back#: [phone]

## 2023-10-12 DIAGNOSIS — J45.990 EXERCISE-INDUCED ASTHMA: Primary | ICD-10-CM

## 2023-10-12 RX ORDER — ALBUTEROL SULFATE 90 UG/1
2 AEROSOL, METERED RESPIRATORY (INHALATION) EVERY 6 HOURS PRN
Qty: 18 G | Refills: 5 | Status: SHIPPED | OUTPATIENT
Start: 2023-10-12

## 2023-11-02 ENCOUNTER — HOSPITAL ENCOUNTER (OUTPATIENT)
Dept: RADIOLOGY | Facility: MEDICAL CENTER | Age: 63
Discharge: HOME | End: 2023-11-02
Payer: COMMERCIAL

## 2023-11-02 VITALS
TEMPERATURE: 98.7 F | HEART RATE: 81 BPM | DIASTOLIC BLOOD PRESSURE: 72 MMHG | OXYGEN SATURATION: 98 % | RESPIRATION RATE: 16 BRPM | SYSTOLIC BLOOD PRESSURE: 110 MMHG

## 2023-11-02 DIAGNOSIS — M47.817 LUMBOSACRAL SPONDYLOSIS WITHOUT MYELOPATHY: ICD-10-CM

## 2023-11-02 PROCEDURE — 64495 INJ PARAVERT F JNT L/S 3 LEV: CPT | Performed by: ANESTHESIOLOGY

## 2023-11-02 PROCEDURE — 64494 INJ PARAVERT F JNT L/S 2 LEV: CPT | Performed by: ANESTHESIOLOGY

## 2023-11-02 RX ADMIN — Medication 3 ML: at 15:46

## 2023-11-02 NOTE — DISCHARGE INSTRUCTIONS

## 2023-11-02 NOTE — H&P
History of Present Illness:  The patient is a 58 y.o. male who presents with complaints of back pain    Past Medical History:   Diagnosis Date    Arthritis     Asthma     COPD (chronic obstructive pulmonary disease) ([address]) 1990    Headache(784.0) 2019    Significant osteoarthrities in neck    HL (hearing loss) 2019    mild    Pneumonia 2019    Psoriatic arthritis ([address])     Septicemia ([address])     Stomach problems        Past Surgical History:   Procedure Laterality Date    COLONOSCOPY      DENTAL IMPLANT      HERNIA REPAIR      INCISION AND DRAINAGE ABSCESS / HEMATOMA OF BURSA / KNEE / THIGH Left     thigh    OR LAPAROSCOPY SURG PARTIAL NEPHRECTOMY Left 11/28/2022    Procedure: ROBOTIC LAPAROSCOPIC PARTIAL NEPHRECTOMY  WITH INTRAOPERATIVE ULTRASOUND INTERPRETATION;  Surgeon: Delonte Peterson MD;  [facility];  Service: Urology    OR RPR 1ST [place] AGE 4 YRS/> REDUCIBLE Right 06/22/2021    Procedure: Inguinal hernia repair with mesh;  Surgeon: Christina Atkins MD;  [facility];  Service: General         Current Outpatient Medications:     albuterol (PROVENTIL HFA,VENTOLIN HFA) 90 mcg/act inhaler, Inhale 2 puffs every 6 (six) hours as needed for wheezing Pt requests 2 inhalers, Disp: 18 g, Rfl: 5    ALPRAZolam (XANAX) 1 mg tablet, Take 1 mg PO 45 min prior to procedure arrival time, Disp: 1 tablet, Rfl: 0    amLODIPine (NORVASC) 5 mg tablet, Take 1 tablet (5 mg total) by mouth daily, Disp: 90 tablet, Rfl: 3    aspirin (ECOTRIN LOW STRENGTH) 81 mg EC tablet, Take 81 mg by mouth daily, Disp: , Rfl:     atorvastatin (LIPITOR) 40 mg tablet, Take 1 tablet (40 mg total) by mouth daily (Patient taking differently: Take 40 mg by mouth daily at bedtime), Disp: 90 tablet, Rfl: 3    clobetasol (TEMOVATE) 0.05 % cream, Apply 4.20 application topically 2 (two) times a day as needed , Disp: , Rfl:     clotrimazole-betamethasone (LOTRISONE) 1-0.05 % cream, Apply topically 2 (two) times a day (Patient taking differently: Apply topically if needed), Disp: 30 g, Rfl: 5    DULoxetine (Cymbalta) 30 mg delayed release capsule, Take 1 capsule (30 mg total) by mouth daily for 7 days Take 30 mg daily for 1 week and then continue 60 mg po daily, Disp: 7 capsule, Rfl: 0    DULoxetine (CYMBALTA) 60 mg delayed release capsule, Take 1 capsule (60 mg total) by mouth daily Take 30 mg daily for 1 week and then continue 60 mg po daily, Disp: 90 capsule, Rfl: 0    EPINEPHrine (EPIPEN) 0.3 mg/0.3 mL SOAJ, Inject 0.3 mL (0.3 mg total) into a muscle once for 1 dose (Patient not taking: Reported on 1/27/2023), Disp: 1 each, Rfl: 1    fexofenadine (ALLEGRA) 60 MG tablet, Take 60 mg by mouth daily as needed , Disp: , Rfl:     ketoconazole (NIZORAL) 2 % cream, Apply topically daily as needed for rash (Patient not taking: Reported on 8/29/2023), Disp: 15 g, Rfl: 5    methylphenidate (RITALIN) 10 mg tablet, TAKE TWO TABLETS IN THE MORNING, ONE TABLET AROUND NOON AND ONE TABLET IN AFTERNOON IF NEEDED, Disp: 120 tablet, Rfl: 0    zaleplon (SONATA) 10 MG capsule, Take 1 capsule (10 mg total) by mouth daily at bedtime, Disp: 30 capsule, Rfl: 2    Allergies   Allergen Reactions    Erythromycin GI Intolerance       Physical Exam:   Vitals:    11/02/23 1520   BP: 115/77   Pulse: 92   Resp: 16   Temp: 98.7 °F (37.1 °C)   SpO2: 94%     General: Awake, Alert, Oriented x 3, Mood and affect appropriate  Respiratory: Respirations even and unlabored  Cardiovascular: Peripheral pulses intact; no edema  Musculoskeletal Exam: pain with lumbar spine ROM    ASA Score: 2    Patient/Chart Verification  Patient ID Verified: Verbal  Consents Confirmed: Procedural, To be obtained in the Pre-Procedure area  H&P( within 30 days) Verified: To be obtained in the Pre-Procedure area  Allergies Reviewed: Yes  Anticoag/NSAID held?: NA  Currently on antibiotics?: No  Pregnancy denied?: NA    Assessment:   1.  Lumbosacral spondylosis without myelopathy        Plan: bilateral lumbar L3-5 MBB #1

## 2023-11-03 ENCOUNTER — TELEMEDICINE (OUTPATIENT)
Dept: PSYCHIATRY | Facility: CLINIC | Age: 63
End: 2023-11-03
Payer: COMMERCIAL

## 2023-11-03 DIAGNOSIS — F90.2 ATTENTION DEFICIT HYPERACTIVITY DISORDER (ADHD), COMBINED TYPE: Primary | ICD-10-CM

## 2023-11-03 DIAGNOSIS — F43.23 ADJUSTMENT DISORDER WITH MIXED ANXIETY AND DEPRESSED MOOD: ICD-10-CM

## 2023-11-03 DIAGNOSIS — F33.2 SEVERE EPISODE OF RECURRENT MAJOR DEPRESSIVE DISORDER, WITHOUT PSYCHOTIC FEATURES (HCC): ICD-10-CM

## 2023-11-03 DIAGNOSIS — F51.01 PRIMARY INSOMNIA: ICD-10-CM

## 2023-11-03 PROCEDURE — 90833 PSYTX W PT W E/M 30 MIN: CPT | Performed by: STUDENT IN AN ORGANIZED HEALTH CARE EDUCATION/TRAINING PROGRAM

## 2023-11-03 PROCEDURE — 99214 OFFICE O/P EST MOD 30 MIN: CPT | Performed by: STUDENT IN AN ORGANIZED HEALTH CARE EDUCATION/TRAINING PROGRAM

## 2023-11-03 RX ORDER — METHYLPHENIDATE HYDROCHLORIDE 10 MG/1
TABLET ORAL
Qty: 120 TABLET | Refills: 0 | Status: SHIPPED | OUTPATIENT
Start: 2023-11-03

## 2023-11-03 NOTE — PSYCH
Virtual Regular Visit    Verification of [place] location: [place]    Patient is located in the following state in which I hold an active license: [place]    Assessment/Plan:    Problem List Items Addressed This Visit          Other    Attention deficit hyperactivity disorder (ADHD), combined type - Primary    Relevant Medications    methylphenidate (RITALIN) 10 mg tablet    Primary insomnia     Other Visit Diagnoses       Adjustment disorder with mixed anxiety and depressed mood        Relevant Medications    methylphenidate (RITALIN) 10 mg tablet    Severe episode of recurrent major depressive disorder, without psychotic features (HCC)        Relevant Medications    methylphenidate (RITALIN) 10 mg tablet                 Reason for visit is   Chief Complaint   Patient presents with    Medication Management    ADHD    Depression    Anxiety    Insomnia        Visit Time  Visit Start Time: 1:25 PM  Visit Stop Time: 1:55 PM  Total Visit Duration: 30 minutes    Encounter provider Jose Angel Tilley MD    Provider located at: [address] [place] [address]    Recent Visits  No visits were found meeting these conditions. Showing recent visits within past 7 days and meeting all other requirements  Today's Visits  Date Type Provider Dept   11/03/23 Telemedicine Jose Angel Tilley MD [facility]   Showing today's visits and meeting all other requirements  Future Appointments  No visits were found meeting these conditions. Showing future appointments within next 150 days and meeting all other requirements       The patient was identified by name and date of birth. Jamarcus José was informed that this is a telemedicine visit and that the visit is being conducted through the [address] [place] [address] Now platform. He agrees to proceed. My office door was closed. No one else was in the room.  He acknowledged consent and understanding of privacy and security of the video platform. The patient has agreed to participate and understands they can discontinue the visit at any time. Patient is aware this is a billable service. MEDICATION MANAGEMENT NOTE        ST. Sy      Name and Date of Birth:  Summer Fam 58 y.o. 1960 MRN: [id number]    Date of Visit: November 3, 2023    Reason for Visit:   Chief Complaint   Patient presents with    Medication Management    ADHD    Depression    Anxiety    Insomnia       SUBJECTIVE:  The patient was visited virtually for medication management and follow up visit for depression, anxiety, ADHD sxs and insomnia. 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 He had multiple URI's lately. Today, presented calm, and cooperative. Reported feeling better "mentally". He did not take the Cymbalta when he had the URI, and waited for a while as he felt better, and then was scheduled for RF nerve ablation, but had to reschedule due to the bronchitis, and finally had the procedure yesterday. He noted that he was told that he should be on the "highest level of tolerable pain" when coming for the procedure and did not initiate Cymbalta. He took one Xanax prescribed before the procedure, and reportedly felt "stupid" and was "very sleepy" and reportedly had a very painful and "terrible" experience with the procedure, and he is expecting another procedure. He noted that he feels better and wanted to hold on starting Cymbalta. 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 He noted that he feels closer to her than his wife. Reportedly last week they had time during the vacation to talk more. He feels not appreciated by his wife and also is not happy with his sexual life. He expatiated on family dynamics and stressors.  He enjoyed his vacation in [place], and his grandson is growing up and has been more manageable. He continues individual psychotherapy. Endorsed good control of ADHD. Endorsed good sleep since taking Melatonin and does not require Sonata anymore as per patient. Denied any changes in appetite, concentration, energy level, or daily activities. Denied feelings of anhedonia, hopelessness, helplessness, worthlessness or guilt and appeared to be future oriented. There was no thought constriction related to death. Denied SI/HI, intent or plan upon direct inquiry at this time. Denied AV/H. No intense anxiety sxs, specific phobia or panic attacks reported. No manic sxs, paranoid ideations or fixed delusions were elicited. Endorsed good compliance with the medications and denied any side effects. Denied smoking cigarettes, drinking alcohol or other illicit substance use. Given this presentation, medications are maintained at the same dosage and Cymbalta was discontinued due to non-compliance and patient's preference. Will continue individual therapy. The patient was educated to call 911 or go to the nearest emergency room if the symptoms become overwhelming or unable to remain in control. Verbalized understanding and agreed to seek help in case of distress or concern for safety. Review Of Systems:  Pertinent items are noted in HPI; all others are negative; no recent changes in medications or health status reported.         Past Psychiatric History Update:   - No inpatient psychiatric admission since last encounter  - No SA or SIB since last encounter  - No incidence of violent behavior since last encounter    Past Trauma History Update:    - No new onset of abuse or traumatic events since last encounter     Past Medical History:    Past Medical History:   Diagnosis Date    Arthritis     Asthma     COPD (chronic obstructive pulmonary disease) ([address]) 1990    Headache(784.0) 2019    Significant osteoarthrities in neck    HL (hearing loss) 2019    mild    Pneumonia 2019    Psoriatic arthritis ([address]) Septicemia ([address])     Stomach problems         Past Surgical History:   Procedure Laterality Date    COLONOSCOPY      DENTAL IMPLANT      HERNIA REPAIR      INCISION AND DRAINAGE ABSCESS / HEMATOMA OF BURSA / KNEE / THIGH Left     thigh    TX LAPAROSCOPY SURG PARTIAL NEPHRECTOMY Left 11/28/2022    Procedure: ROBOTIC LAPAROSCOPIC PARTIAL NEPHRECTOMY  WITH INTRAOPERATIVE ULTRASOUND INTERPRETATION;  Surgeon: Ron Nix MD;  Location: [facility];  Service: Urology    TX RPR 1ST INGUN HRNA AGE 5 YRS/> REDUCIBLE Right 06/22/2021    Procedure: Inguinal hernia repair with mesh;  Surgeon: Nikhil Wiseman MD;  Location:  MAIN OR;  Service: General     Allergies   Allergen Reactions    Erythromycin GI Intolerance       Substance Abuse History:    Social History     Substance and Sexual Activity   Alcohol Use Yes    Alcohol/week: 1.0 standard drink of alcohol    Types: 1 Glasses of wine per week    Comment: More like one glass per month socially     Social History     Substance and Sexual Activity   Drug Use Never       Social History:    Social History     Socioeconomic History    Marital status: /Civil Union     Spouse name: Not on file    Number of children: Not on file    Years of education: Not on file    Highest education level: Not on file   Occupational History    Not on file   Tobacco Use    Smoking status: Never    Smokeless tobacco: Never   Vaping Use    Vaping Use: Never used   Substance and Sexual Activity    Alcohol use:  Yes     Alcohol/week: 1.0 standard drink of alcohol     Types: 1 Glasses of wine per week     Comment: More like one glass per month socially    Drug use: Never    Sexual activity: Yes     Partners: Female     Birth control/protection: None     Comment: Monogamous,     Other Topics Concern    Not on file   Social History Narrative    Not on file     Social Determinants of Health     Financial Resource Strain: Not on file   Food Insecurity: Not on file   Transportation Needs: Not on file   Physical Activity: Not on file   Stress: Not on file   Social Connections: Not on file   Intimate Partner Violence: Not on file   Housing Stability: Not on file       Family Psychiatric History:     Family History   Problem Relation Age of Onset    Coronary artery disease Father     Heart disease Father     Arthritis Mother     Hearing loss Mother     Heart disease Maternal Grandfather     Dementia Maternal Grandmother     Heart disease Paternal Grandfather     Dementia Maternal Aunt        History Review:  The following portions of the patient's history were reviewed and updated as appropriate: allergies, current medications, past family history, past medical history, past social history, past surgical history and problem list.       OBJECTIVE:     Vital signs in last 24 hours: Not checked - virtual visit    Mental Status Evaluation:  Appearance and attitude: appeared as stated age, cooperative and attentive, casually dressed with good hygiene  Eye contact: good  Motor Function: within normal limits, No PMA/PMR  Gait/station: Not observed  Speech: normal for rate, rhythm, volume, latency, amount  Language: No overt abnormality  Mood/affect: euthymic / Affect was euthymic, reactive, in full range, normal intensity and mood congruent  Thought Processes: sequential and goal-directed  Thought content: denies suicidal ideation or homicidal ideation; no delusions or first rank symptoms  Associations: intact associations  Perceptual disturbances: denies Auditory/Visual/Tactile Hallucinations  Orientation: oriented to time, person, place and to the situational context  Cognitive Function: intact  Memory: recent and remote memory grossly intact  Intellect: average  Fund of knowledge: aware of current events, aware of past history, and vocabulary average  Impulse control: good  Insight/judgment: fair/good    Laboratory Results: I have personally reviewed all pertinent laboratory/tests results    Recent Labs (last 2 months):   No visits with results within 2 Month(s) from this visit. Latest known visit with results is:   Appointment on 05/17/2023   Component Date Value    WBC 05/17/2023 5.82     RBC 05/17/2023 4.99     Hemoglobin 05/17/2023 15.6     Hematocrit 05/17/2023 45.2     MCV 05/17/2023 91     MCH 05/17/2023 31.3     MCHC 05/17/2023 34.5     RDW 05/17/2023 12.6     MPV 05/17/2023 9.7     Platelets 31/45/9450 239     nRBC 05/17/2023 0     Neutrophils Relative 05/17/2023 51     Immat GRANS % 05/17/2023 1     Lymphocytes Relative 05/17/2023 37     Monocytes Relative 05/17/2023 9     Eosinophils Relative 05/17/2023 1     Basophils Relative 05/17/2023 1     Neutrophils Absolute 05/17/2023 3.02     Immature Grans Absolute 05/17/2023 0.04     Lymphocytes Absolute 05/17/2023 2.13     Monocytes Absolute 05/17/2023 0.52     Eosinophils Absolute 05/17/2023 0.07     Basophils Absolute 05/17/2023 0.04     Sodium 05/17/2023 138     Potassium 05/17/2023 4.2     Chloride 05/17/2023 105     CO2 05/17/2023 29     ANION GAP 05/17/2023 4     BUN 05/17/2023 22     Creatinine 05/17/2023 0.85     Glucose 05/17/2023 93     Calcium 05/17/2023 9.5     AST 05/17/2023 28     ALT 05/17/2023 32     Alkaline Phosphatase 05/17/2023 60     Total Protein 05/17/2023 7.5     Albumin 05/17/2023 4.4     Total Bilirubin 05/17/2023 0.93     eGFR 05/17/2023 92          Assessment/Plan:   A 58 y.o.   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 The patient presented w/ h/o ADHD since 1999, being on Ritalin 60mg daily since then, w/ marked improvement of attention, concentration and his function since being treated. Denied any excessive anxiety, depressed/manic sxs, A/VH or other psychotic sxs. No h/o substance abuse. Denied SI/HI, intent or plan upon direct inquiry at this time. WALTER-7: 4. His current presentation meets criteria for ADHD, by history. The patient was educated about the diagnosis of ADHD and the course of ADHD in adults which may present with decreased intensity of sxs, onesimo after age 48, and particularly after age 72. Psycho-education regarding Ritalin indications, benefits, risks, side effects, and alternative options provided to the patient, and the importance of the compliance with psychiatric treatment reiterated. The patient was educated about the potential long-term side effects of ritalin in older age, onesimo cardiac side effects (given the FH of heart attack in his father at age 39), and was educated about the possibility of tapering off or if becoming symptomatic, cross-tapering to different treatment options. He verbalized understanding and agreed to the proposed regimen. Ritalin dose was decreased from 20mg TID (60mg total), to 20/20/10mg (50 mg total); and then switched to equivalent dose of Concerta 72 mg po daily to be tapered down as tolerated. The patient did not tolerate switching Ritalin to Concerta due to high BP, and on 11/5/2021 requested to be switched back to Ritalin 20mg/20mg/10mg as last prescribed (tapered down from 20 mg TID). Upon f/u on 12/28/2021, presented w/ well-controlled ADHD sxs, and stable mood and anxiety.  The patient was diagnosed with complex cyst in L kidney and is going to have partial nephrectomy concerning for possible malignancy on 11/28/22, and also his internist recommended to discontinue Ritalin for HTN and high risk of cardia events given the positive FH and age. Ritalin was tapered off by 10 mg weekly and was started on Strattera 40 mg po daily for 1 week and then 40 mg po BID for ADHD. Upon f/u, the patient reported relatively controlled ADHD sxs with Strattera 40 mg BID and maintained on the same dose. However, the patient was visited on 3/20/23 as requested an earlier appointment and reported poor control of ADHD sxs with side effect of "terrible dry mouth". Presented w/ a persistent pattern of impaired concentration and difficulty sustaining attention, failure to finish duties on time, having difficulty organizing tasks and activities, losing things, and getting easily distracted by external stimuli, figeting with hands, tapping feet or squirming in seat, and talking fast, interrupting others during the conversation and at times moving around as well as having difficulties to wait. Strattera tapered off and started on Ritalin 10 mg TID which later uptitrated to 20/10/10 mg on 4/27/23. Also, the patient was started on Lexapro 10 mg daily for depression and anxiety in the context of multiple psychosocial stressors. The patient stopped taking Lexapro after 8-10 weeks, and presented with moderately severe depression. Agreed to consider Cymbalta (helping with depression, anxiety and pain), but reportedly did not start the medication as had URI and procedures for pain management and then felt better and is not willing to start it at the moment.  Will continue individual therapy and recommended to consider couple's therapy and sex-therapy      Diagnoses and all orders for this visit:    Attention deficit hyperactivity disorder (ADHD), combined type  -     methylphenidate (RITALIN) 10 mg tablet; TAKE TWO TABLETS IN THE MORNING, ONE TABLET AROUND NOON AND ONE TABLET IN AFTERNOON IF NEEDED    Adjustment disorder with mixed anxiety and depressed mood    Primary insomnia    Severe episode of recurrent major depressive disorder, without psychotic features ([address]) Impression:  1. Attention deficit hyperactivity disorder (ADHD), combined type  methylphenidate (RITALIN) 10 mg tablet      2. Adjustment disorder with mixed anxiety and depressed mood        3. Primary insomnia        4. Severe episode of recurrent major depressive disorder, without psychotic features ([address])            Treatment Recommendations/Precautions:  - Tapered off Strattera due to side effects, successfully  - Continue Ritalin 20/10/10 mg for ADHD (previously was stable on 20/20/10 mg)  - Continue Sonata 10 mg po nightly PRN for insomnia (has been sleeping well taking Melatonin recently and did not need a refill)  - Discontinued Lexapro due to sexual side effects and non-compliance; discontinued Cymbalta as the patient did not start and is not willing to initiate at this time  - Continue individual therapy; consider sex therapy / couple's therapy    - Medications sent to patient's pharmacy for 30 day supply      - Psychoeducation provided to the patient and benefits, potential risks and side effects discussed; importance of compliance with the psychiatric treatment reiterated, and the patient verbalized understanding of the matter     - RTC in 10 weeks  - Educated about healthy life style, risk of falls/sedation and addiction. Patient was receptive to education.  - The patient was educated about 24 hour and weekend coverage for urgent situations accessed by calling [facility] practice number  - Patient was educated to call [place] ([phone] [[phone]]) for behavioral crisis at anytime or 911 for any safety concerns, or go to nearest ER if his symptoms become overwhelming or unmanageable.     Current Outpatient Medications   Medication Sig Dispense Refill    methylphenidate (RITALIN) 10 mg tablet TAKE TWO TABLETS IN THE MORNING, ONE TABLET AROUND NOON AND ONE TABLET IN AFTERNOON IF NEEDED 120 tablet 0    albuterol (PROVENTIL HFA,VENTOLIN HFA) 90 mcg/act inhaler Inhale 2 puffs every 6 (six) hours as needed for wheezing Pt requests 2 inhalers 18 g 5    amLODIPine (NORVASC) 5 mg tablet Take 1 tablet (5 mg total) by mouth daily 90 tablet 3    aspirin (ECOTRIN LOW STRENGTH) 81 mg EC tablet Take 81 mg by mouth daily      atorvastatin (LIPITOR) 40 mg tablet Take 1 tablet (40 mg total) by mouth daily (Patient taking differently: Take 40 mg by mouth daily at bedtime) 90 tablet 3    clobetasol (TEMOVATE) 0.05 % cream Apply 6.11 application topically 2 (two) times a day as needed       clotrimazole-betamethasone (LOTRISONE) 1-0.05 % cream Apply topically 2 (two) times a day (Patient taking differently: Apply topically if needed) 30 g 5    EPINEPHrine (EPIPEN) 0.3 mg/0.3 mL SOAJ Inject 0.3 mL (0.3 mg total) into a muscle once for 1 dose (Patient not taking: Reported on 1/27/2023) 1 each 1    fexofenadine (ALLEGRA) 60 MG tablet Take 60 mg by mouth daily as needed       ketoconazole (NIZORAL) 2 % cream Apply topically daily as needed for rash (Patient not taking: Reported on 8/29/2023) 15 g 5    zaleplon (SONATA) 10 MG capsule Take 1 capsule (10 mg total) by mouth daily at bedtime 30 capsule 2     No current facility-administered medications for this visit. Medications Risks/Benefits      Risks, Benefits And Possible Side Effects Of Medications:    Risks, benefits, and possible side effects of medications explained to Maria Alejandra Claire and he verbalizes understanding and agreement for treatment. Controlled Medication Discussion:     Maria Alejandra Claire has been filling controlled prescriptions on time as prescribed according to [address] [facility] [address] Program  Discussed with Maria Alejandra Claire the risks of sedation, respiratory depression, impairment of ability to drive and potential for abuse and addiction related to treatment with benzodiazepine medications.  He understands risk of treatment with benzodiazepine medications, agrees to not drive if feels impaired and agrees to take medications as prescribed  Discussed with Mónica Contreras Box warning on concurrent use of benzodiazepines and opioid medications including sedation, respiratory depression, coma and death. He understands the risk of treatment with benzodiazepines in addition to opioids and wants to continue taking those medications  PMDP reviewed. Psycho-education regarding stimulants indications, benefits, risks (including risk of addiction, onesimo if taking more than prescribed), side effects (including but not limited to palpitation/arrhythmia, weight loss and increased anxiety), and alternative options provided to the patient, and the importance of the compliance with psychiatric treatment reiterated. The patient verbalized understanding and agreed to the proposed regimen. Psychotherapy Provided:     Individual psychotherapy provided: Yes  Counseling was provided during the session today for 18 minutes. Psychoeducation provided to the patient and was educated about the importance of compliance with the medications and psychiatric treatment  Solution Focused Brief Therapy (SFBT) provided  Patient's emotions were validated and specific labeled praise provided. [place] suggestions were offered in a supportive non-critical way.    Cognitive Analytic Therapy and supportive expressive interventions     Treatment Plan:    Completed and signed during the session: Not applicable - Treatment Plan not due at this session    Edgar Cooney MD 11/03/23

## 2023-11-08 ENCOUNTER — TELEPHONE (OUTPATIENT)
Dept: RADIOLOGY | Facility: MEDICAL CENTER | Age: 63
End: 2023-11-08

## 2023-11-08 DIAGNOSIS — M47.817 LUMBOSACRAL SPONDYLOSIS WITHOUT MYELOPATHY: Primary | ICD-10-CM

## 2023-11-08 NOTE — TELEPHONE ENCOUNTER
Pain diary reviewed by Dr. Fito Pabon; proceed with B/L L3-5 MBB#2 and f/u; I will call and coordinate.

## 2023-11-09 NOTE — TELEPHONE ENCOUNTER
Caller: Miguelina Blanc     Doctor: Corry Ritchie    Reason for call: patient returning schedulers phone call     Call back#: [phone]

## 2023-11-09 NOTE — TELEPHONE ENCOUNTER
Caller: Cathy Hardin pt    Doctor: Adonis Montenegro    Reason for call: follow up on earlier phone call.

## 2023-11-10 RX ORDER — ALPRAZOLAM 1 MG/1
1 TABLET ORAL
Qty: 1 TABLET | Refills: 0 | Status: SHIPPED | OUTPATIENT
Start: 2023-11-10

## 2023-11-10 NOTE — TELEPHONE ENCOUNTER
Patient scheduled for 11/30 for B/L L3-5 MBB#2    Reviewed instructions: , NPO 1 hour prior, loose-fitting/comfortable clothes, if ill/fever/infx/abx to call and reschedule. Also pain level at leat 5/10 and refrain from PRN, as-needed pain meds 6h prior. Patient stated verbal understanding.      Dr. Michoacano Cramer: patient was asking if you can prescribe him the one dose of Xanax for the procedure like you did the last time, please advise

## 2023-11-30 ENCOUNTER — HOSPITAL ENCOUNTER (OUTPATIENT)
Dept: RADIOLOGY | Facility: MEDICAL CENTER | Age: 63
End: 2023-11-30
Payer: COMMERCIAL

## 2023-11-30 VITALS
RESPIRATION RATE: 20 BRPM | TEMPERATURE: 98 F | DIASTOLIC BLOOD PRESSURE: 87 MMHG | HEART RATE: 84 BPM | SYSTOLIC BLOOD PRESSURE: 135 MMHG | OXYGEN SATURATION: 98 %

## 2023-11-30 DIAGNOSIS — M47.816 LUMBAR SPONDYLOSIS: ICD-10-CM

## 2023-11-30 PROCEDURE — 64493 INJ PARAVERT F JNT L/S 1 LEV: CPT | Performed by: ANESTHESIOLOGY

## 2023-11-30 PROCEDURE — 64494 INJ PARAVERT F JNT L/S 2 LEV: CPT | Performed by: ANESTHESIOLOGY

## 2023-11-30 RX ORDER — BUPIVACAINE HYDROCHLORIDE 5 MG/ML
3 INJECTION, SOLUTION EPIDURAL; INTRACAUDAL ONCE
Status: COMPLETED | OUTPATIENT
Start: 2023-11-30 | End: 2023-11-30

## 2023-11-30 RX ADMIN — BUPIVACAINE HYDROCHLORIDE 3 ML: 5 INJECTION, SOLUTION EPIDURAL; INTRACAUDAL at 13:31

## 2023-11-30 NOTE — DISCHARGE INSTRUCTIONS

## 2023-11-30 NOTE — H&P
History of Present Illness:  The patient is a 58 y.o. male who presents with complaints of back pain    Past Medical History:   Diagnosis Date    Arthritis     Asthma     COPD (chronic obstructive pulmonary disease) ([address]) 1990    Headache(784.0) 2019    Significant osteoarthrities in neck    HL (hearing loss) 2019    mild    Pneumonia 2019    Psoriatic arthritis ([address])     Septicemia ([address])     Stomach problems        Past Surgical History:   Procedure Laterality Date    COLONOSCOPY      DENTAL IMPLANT      HERNIA REPAIR      INCISION AND DRAINAGE ABSCESS / HEMATOMA OF BURSA / KNEE / THIGH Left     thigh    RI LAPAROSCOPY SURG PARTIAL NEPHRECTOMY Left 11/28/2022    Procedure: ROBOTIC LAPAROSCOPIC PARTIAL NEPHRECTOMY  WITH INTRAOPERATIVE ULTRASOUND INTERPRETATION;  Surgeon: Tono Montoya MD;  Location[facility];  Service: Urology    RI RPR [address] [place] [address] AGE 5 YRS/> REDUCIBLE Right 06/22/2021    Procedure: Inguinal hernia repair with mesh;  Surgeon: Laury Neri MD;  Location: [facility];  Service: General         Current Outpatient Medications:     albuterol (PROVENTIL HFA,VENTOLIN HFA) 90 mcg/act inhaler, Inhale 2 puffs every 6 (six) hours as needed for wheezing Pt requests 2 inhalers, Disp: 18 g, Rfl: 5    ALPRAZolam (XANAX) 1 mg tablet, Take 1 tablet (1 mg total) by mouth 60 minutes pre-procedure, Disp: 1 tablet, Rfl: 0    amLODIPine (NORVASC) 5 mg tablet, Take 1 tablet (5 mg total) by mouth daily, Disp: 90 tablet, Rfl: 3    aspirin (ECOTRIN LOW STRENGTH) 81 mg EC tablet, Take 81 mg by mouth daily, Disp: , Rfl:     atorvastatin (LIPITOR) 40 mg tablet, Take 1 tablet (40 mg total) by mouth daily (Patient taking differently: Take 40 mg by mouth daily at bedtime), Disp: 90 tablet, Rfl: 3    clobetasol (TEMOVATE) 0.05 % cream, Apply 8.89 application topically 2 (two) times a day as needed , Disp: , Rfl:     clotrimazole-betamethasone (LOTRISONE) 1-0.05 % cream, Apply topically 2 (two) times a day (Patient taking differently: Apply topically if needed), Disp: 30 g, Rfl: 5    EPINEPHrine (EPIPEN) 0.3 mg/0.3 mL SOAJ, Inject 0.3 mL (0.3 mg total) into a muscle once for 1 dose (Patient not taking: Reported on 1/27/2023), Disp: 1 each, Rfl: 1    fexofenadine (ALLEGRA) 60 MG tablet, Take 60 mg by mouth daily as needed , Disp: , Rfl:     ketoconazole (NIZORAL) 2 % cream, Apply topically daily as needed for rash (Patient not taking: Reported on 8/29/2023), Disp: 15 g, Rfl: 5    methylphenidate (RITALIN) 10 mg tablet, TAKE TWO TABLETS IN THE MORNING, ONE TABLET AROUND NOON AND ONE TABLET IN AFTERNOON IF NEEDED, Disp: 120 tablet, Rfl: 0    zaleplon (SONATA) 10 MG capsule, Take 1 capsule (10 mg total) by mouth daily at bedtime, Disp: 30 capsule, Rfl: 2    Current Facility-Administered Medications:     bupivacaine (PF) (MARCAINE) 0.5 % injection 3 mL, 3 mL, Injection, Once, Will Stacey Hernandez MD    Allergies   Allergen Reactions    Erythromycin GI Intolerance       Physical Exam:   Vitals:    11/30/23 1308   BP: 151/92   Pulse: 74   Resp: 20   Temp: 98 °F (36.7 °C)   SpO2: 98%     General: Awake, Alert, Oriented x 3, Mood and affect appropriate  Respiratory: Respirations even and unlabored  Cardiovascular: Peripheral pulses intact; no edema  Musculoskeletal Exam: pain with lumbar spine ROM    ASA Score: 2    Patient/Chart Verification  Patient ID Verified: Verbal  ID Band Applied: No  Consents Confirmed: Procedural, To be obtained in the Pre-Procedure area  H&P( within 30 days) Verified: To be obtained in the Pre-Procedure area  Interval H&P(within 24 hr) Complete (required for Outpatients and Surgery Admit only): To be obtained in the Pre-Procedure area  Allergies Reviewed: Yes  Anticoag/NSAID held?: NA  Currently on antibiotics?: No    Assessment:   1.  Lumbar spondylosis        Plan: B/L L3-5 MBB#2

## 2023-12-13 ENCOUNTER — OFFICE VISIT (OUTPATIENT)
Dept: INTERNAL MEDICINE CLINIC | Facility: CLINIC | Age: 63
End: 2023-12-13
Payer: COMMERCIAL

## 2023-12-13 VITALS
OXYGEN SATURATION: 96 % | HEART RATE: 86 BPM | DIASTOLIC BLOOD PRESSURE: 84 MMHG | HEIGHT: 71 IN | SYSTOLIC BLOOD PRESSURE: 122 MMHG | WEIGHT: 178.5 LBS | BODY MASS INDEX: 24.99 KG/M2

## 2023-12-13 DIAGNOSIS — Z12.5 SCREENING FOR PROSTATE CANCER: ICD-10-CM

## 2023-12-13 DIAGNOSIS — I10 PRIMARY HYPERTENSION: Primary | ICD-10-CM

## 2023-12-13 DIAGNOSIS — E55.9 VITAMIN D DEFICIENCY: ICD-10-CM

## 2023-12-13 DIAGNOSIS — F90.2 ATTENTION DEFICIT HYPERACTIVITY DISORDER (ADHD), COMBINED TYPE: ICD-10-CM

## 2023-12-13 DIAGNOSIS — I25.10 CORONARY ARTERY DISEASE INVOLVING NATIVE CORONARY ARTERY OF NATIVE HEART WITHOUT ANGINA PECTORIS: ICD-10-CM

## 2023-12-13 DIAGNOSIS — E78.2 MIXED HYPERLIPIDEMIA: ICD-10-CM

## 2023-12-13 DIAGNOSIS — L40.50 PSORIATIC ARTHRITIS (HCC): ICD-10-CM

## 2023-12-13 PROCEDURE — 99214 OFFICE O/P EST MOD 30 MIN: CPT | Performed by: INTERNAL MEDICINE

## 2023-12-13 RX ORDER — CLOBETASOL PROPIONATE 0.5 MG/G
0.05 CREAM TOPICAL 2 TIMES DAILY PRN
Qty: 60 G | Refills: 5 | Status: SHIPPED | OUTPATIENT
Start: 2023-12-13

## 2023-12-13 NOTE — ASSESSMENT & PLAN NOTE
Pt reports less arthritis symptoms when drumming regularly. Steroid renewed to use as needed topical to breakout areas.

## 2023-12-13 NOTE — PROGRESS NOTES
Name: Zeny Harper      : 1960      MRN: [id number]  Encounter Provider: Kyaw Cabrera MD  Encounter Date: 2023   Encounter department: [facility]     1. Primary hypertension  Assessment & Plan:  Well-controlled, continue amlodipine along with healthy diet. 2. Coronary artery disease involving native coronary artery of native heart without angina pectoris  Assessment & Plan:  Follow up Cardiology, no cardiopulmonary complaints      3. Mixed hyperlipidemia  Assessment & Plan:  Continue atorvastatin along with healthy diet    Orders:  -     TSH, 3rd generation with Free T4 reflex; Future    4. Screening for prostate cancer  -     PSA, Total Screen; Future    5. Psoriatic arthritis ([address])  Assessment & Plan:  Pt reports less arthritis symptoms when drumming regularly. Steroid renewed to use as needed topical to breakout areas. Orders:  -     clobetasol (TEMOVATE) 0.05 % cream; Apply 0.33 g (9.75 Applications total) topically 2 (two) times a day as needed (rash)    6. Vitamin D deficiency  -     Vitamin D 25 hydroxy; Future    7. Attention deficit hyperactivity disorder (ADHD), combined type  Assessment & Plan:  Follow up psych             Subjective     Pt here for regular follow up      Review of Systems   Constitutional:  Negative for chills, fatigue and fever. HENT:  Negative for congestion, nosebleeds, postnasal drip, sore throat and trouble swallowing. Eyes:  Negative for pain. Respiratory:  Negative for cough, chest tightness, shortness of breath and wheezing. Cardiovascular:  Negative for chest pain, palpitations and leg swelling. Gastrointestinal:  Negative for abdominal pain, constipation, diarrhea, nausea and vomiting. Endocrine: Negative for polydipsia and polyuria. Genitourinary:  Negative for dysuria, flank pain and hematuria. Musculoskeletal:  Positive for arthralgias. Skin:  Positive for rash (intermittent). Neurological:  Negative for dizziness, tremors, light-headedness and headaches. Hematological:  Does not bruise/bleed easily. Psychiatric/Behavioral:  Negative for confusion and dysphoric mood. The patient is not nervous/anxious. Past Medical History:   Diagnosis Date   • Arthritis    • Asthma    • COPD (chronic obstructive pulmonary disease) ([address]) 1990   • Headache(784.0) 2019    Significant osteoarthrities in neck   • HL (hearing loss) 2019    mild   • Pneumonia 2019   • Psoriatic arthritis ([address])    • Septicemia ([address])    • Stomach problems      Past Surgical History:   Procedure Laterality Date   • COLONOSCOPY     • DENTAL IMPLANT     • HERNIA REPAIR     • INCISION AND DRAINAGE ABSCESS / HEMATOMA OF BURSA / KNEE / THIGH Left     thigh   • NV LAPAROSCOPY SURG PARTIAL NEPHRECTOMY Left 11/28/2022    Procedure: ROBOTIC LAPAROSCOPIC PARTIAL NEPHRECTOMY  WITH INTRAOPERATIVE ULTRASOUND INTERPRETATION;  Surgeon: Chris iSngleton MD;  [facility];  Service: Urology   • NV RPR 1ST INGUN HRNA AGE 5 YRS/> REDUCIBLE Right 06/22/2021    Procedure: Inguinal hernia repair with mesh;  Surgeon: Heron Mcgraw MD;  Location:  MAIN OR;  Service: General     Family History   Problem Relation Age of Onset   • Coronary artery disease Father    • Heart disease Father    • Arthritis Mother    • Hearing loss Mother    • Heart disease Maternal Grandfather    • Dementia Maternal Grandmother    • Heart disease Paternal Grandfather    • Dementia Maternal Aunt      Social History     Socioeconomic History   • Marital status: /Civil Union     Spouse name: None   • Number of children: None   • Years of education: None   • Highest education level: None   Occupational History   • None   Tobacco Use   • Smoking status: Never   • Smokeless tobacco: Never   Vaping Use   • Vaping status: Never Used   Substance and Sexual Activity   • Alcohol use:  Yes     Alcohol/week: 1.0 standard drink of alcohol     Types: 1 Glasses of wine per week     Comment: More like one glass per month socially   • Drug use: Never   • Sexual activity: Yes     Partners: Female     Birth control/protection: None     Comment: Monogamous,     Other Topics Concern   • None   Social History Narrative   • None     Social Determinants of Health     Financial Resource Strain: Not on file   Food Insecurity: Not on file   Transportation Needs: Not on file   Physical Activity: Not on file   Stress: Not on file   Social Connections: Not on file   Intimate Partner Violence: Not on file   Housing Stability: Not on file     Current Outpatient Medications on File Prior to Visit   Medication Sig   • albuterol (PROVENTIL HFA,VENTOLIN HFA) 90 mcg/act inhaler Inhale 2 puffs every 6 (six) hours as needed for wheezing Pt requests 2 inhalers   • ALPRAZolam (XANAX) 1 mg tablet Take 1 tablet (1 mg total) by mouth 60 minutes pre-procedure   • amLODIPine (NORVASC) 5 mg tablet Take 1 tablet (5 mg total) by mouth daily   • aspirin (ECOTRIN LOW STRENGTH) 81 mg EC tablet Take 81 mg by mouth daily   • atorvastatin (LIPITOR) 40 mg tablet Take 1 tablet (40 mg total) by mouth daily (Patient taking differently: Take 40 mg by mouth daily at bedtime)   • clotrimazole-betamethasone (LOTRISONE) 1-0.05 % cream Apply topically 2 (two) times a day (Patient taking differently: Apply topically if needed)   • fexofenadine (ALLEGRA) 60 MG tablet Take 60 mg by mouth daily as needed    • ketoconazole (NIZORAL) 2 % cream Apply topically daily as needed for rash   • methylphenidate (RITALIN) 10 mg tablet TAKE TWO TABLETS IN THE MORNING, ONE TABLET AROUND NOON AND ONE TABLET IN AFTERNOON IF NEEDED   • zaleplon (SONATA) 10 MG capsule Take 1 capsule (10 mg total) by mouth daily at bedtime   • [DISCONTINUED] clobetasol (TEMOVATE) 0.05 % cream Apply 5.32 application topically 2 (two) times a day as needed    • EPINEPHrine (EPIPEN) 0.3 mg/0.3 mL SOAJ Inject 0.3 mL (0.3 mg total) into a muscle once for 1 dose (Patient not taking: Reported on 1/27/2023)     Allergies   Allergen Reactions   • Erythromycin GI Intolerance     Immunization History   Administered Date(s) Administered   • COVID-19 MODERNA VACC 0.5 ML IM 12/29/2021   • INFLUENZA 10/05/2007, 11/03/2013, 11/10/2015, 11/22/2016, 09/26/2017, 10/18/2018   • Pneumococcal Polysaccharide PPV23 01/15/2019   • Tdap 09/26/2017   • Zoster Vaccine Recombinant 07/17/2019, 09/27/2019   • influenza, injectable, quadrivalent 11/04/2020       Objective     /84   Pulse 86   Ht 5' 11" (1.803 m)   Wt 81 kg (178 lb 8 oz)   SpO2 96%   BMI 24.90 kg/m²     Physical Exam  Vitals reviewed. Constitutional:       General: He is not in acute distress. Appearance: Normal appearance. He is well-developed. HENT:      Head: Normocephalic and atraumatic. Right Ear: External ear normal.      Left Ear: External ear normal.   Eyes:      General: No scleral icterus. Conjunctiva/sclera: Conjunctivae normal.   Neck:      Thyroid: No thyromegaly. Trachea: No tracheal deviation. Cardiovascular:      Rate and Rhythm: Normal rate and regular rhythm. Heart sounds: Normal heart sounds. No murmur heard. Pulmonary:      Effort: Pulmonary effort is normal. No respiratory distress. Breath sounds: Normal breath sounds. No wheezing or rales. Musculoskeletal:      Cervical back: Normal range of motion and neck supple. Right lower leg: No edema. Left lower leg: No edema. Lymphadenopathy:      Cervical: No cervical adenopathy. Skin:     Coloration: Skin is not jaundiced or pale. Neurological:      General: No focal deficit present. Mental Status: He is alert and oriented to person, place, and time. Psychiatric:         Mood and Affect: Mood normal.         Behavior: Behavior normal.         Thought Content:  Thought content normal.         Judgment: Judgment normal.       Danie Dean MD

## 2023-12-13 NOTE — PATIENT INSTRUCTIONS
Problem List Items Addressed This Visit          Cardiovascular and Mediastinum    HTN (hypertension) - Primary     Well-controlled, continue amlodipine along with healthy diet. Coronary artery disease involving native coronary artery of native heart without angina pectoris     Follow up Cardiology, no cardiopulmonary complaints            Musculoskeletal and Integument    Psoriatic arthritis ([address])     Pt reports less arthritis symptoms when drumming regularly. Steroid renewed to use as needed topical to breakout areas.          Relevant Medications    clobetasol (TEMOVATE) 0.05 % cream       Other    Attention deficit hyperactivity disorder (ADHD), combined type     Follow up psych         Mixed hyperlipidemia     Continue atorvastatin along with healthy diet         Relevant Orders    TSH, 3rd generation with Free T4 reflex    Vitamin D deficiency    Relevant Orders    Vitamin D 25 hydroxy     Other Visit Diagnoses       Screening for prostate cancer        Relevant Orders    PSA, Total Screen

## 2023-12-19 ENCOUNTER — APPOINTMENT (OUTPATIENT)
Dept: LAB | Facility: HOSPITAL | Age: 63
End: 2023-12-19
Payer: COMMERCIAL

## 2023-12-19 ENCOUNTER — TELEPHONE (OUTPATIENT)
Dept: PAIN MEDICINE | Facility: CLINIC | Age: 63
End: 2023-12-19

## 2023-12-19 ENCOUNTER — TELEPHONE (OUTPATIENT)
Dept: OTHER | Facility: OTHER | Age: 63
End: 2023-12-19

## 2023-12-19 ENCOUNTER — PATIENT MESSAGE (OUTPATIENT)
Dept: UROLOGY | Facility: CLINIC | Age: 63
End: 2023-12-19

## 2023-12-19 DIAGNOSIS — C64.2 RENAL CELL CARCINOMA OF LEFT KIDNEY (HCC): Primary | ICD-10-CM

## 2023-12-19 DIAGNOSIS — C64.2 RENAL CELL CARCINOMA OF LEFT KIDNEY (HCC): ICD-10-CM

## 2023-12-19 DIAGNOSIS — E55.9 VITAMIN D DEFICIENCY: ICD-10-CM

## 2023-12-19 DIAGNOSIS — E78.2 MIXED HYPERLIPIDEMIA: ICD-10-CM

## 2023-12-19 DIAGNOSIS — Z12.5 SCREENING FOR PROSTATE CANCER: ICD-10-CM

## 2023-12-19 LAB
25(OH)D3 SERPL-MCNC: 27.6 NG/ML (ref 30–100)
ALBUMIN SERPL BCP-MCNC: 4.3 G/DL (ref 3.5–5)
ALP SERPL-CCNC: 70 U/L (ref 34–104)
ALT SERPL W P-5'-P-CCNC: 22 U/L (ref 7–52)
ANION GAP SERPL CALCULATED.3IONS-SCNC: 9 MMOL/L
AST SERPL W P-5'-P-CCNC: 26 U/L (ref 13–39)
BILIRUB SERPL-MCNC: 0.65 MG/DL (ref 0.2–1)
BUN SERPL-MCNC: 17 MG/DL (ref 5–25)
CALCIUM SERPL-MCNC: 9.6 MG/DL (ref 8.4–10.2)
CHLORIDE SERPL-SCNC: 102 MMOL/L (ref 96–108)
CO2 SERPL-SCNC: 28 MMOL/L (ref 21–32)
CREAT SERPL-MCNC: 0.94 MG/DL (ref 0.6–1.3)
GFR SERPL CREATININE-BSD FRML MDRD: 85 ML/MIN/1.73SQ M
GLUCOSE P FAST SERPL-MCNC: 83 MG/DL (ref 65–99)
POTASSIUM SERPL-SCNC: 3.9 MMOL/L (ref 3.5–5.3)
PROT SERPL-MCNC: 7.3 G/DL (ref 6.4–8.4)
PSA SERPL-MCNC: 1.07 NG/ML (ref 0–4)
SODIUM SERPL-SCNC: 139 MMOL/L (ref 135–147)
TSH SERPL DL<=0.05 MIU/L-ACNC: 2.75 UIU/ML (ref 0.45–4.5)

## 2023-12-19 PROCEDURE — G0103 PSA SCREENING: HCPCS

## 2023-12-19 PROCEDURE — 80053 COMPREHEN METABOLIC PANEL: CPT

## 2023-12-19 PROCEDURE — 84443 ASSAY THYROID STIM HORMONE: CPT

## 2023-12-19 PROCEDURE — 82306 VITAMIN D 25 HYDROXY: CPT

## 2023-12-19 PROCEDURE — 36415 COLL VENOUS BLD VENIPUNCTURE: CPT

## 2023-12-19 NOTE — TELEPHONE ENCOUNTER
Patient calling to give Dr. Barbosa team a heads up that he just sent a [employer] message regarding a blood work order that he needs to be sent this morning. He was just at the Lab at [facility] and had Blood work drawn that his PCP had ordered. They capo the BUN and Creatinine that he needs to have done prior to his CT Scan on 1/15/2024. They need a current ordered to process it. They are holding the blood until they get it.

## 2023-12-19 NOTE — TELEPHONE ENCOUNTER
Insurance plan terms on 1/1/2024 wont be enough time for the auth team to get approval for 1/4. Will have to move his procedure date up.  Left message for patient to call me back DIRECTLY to schedule.  Left my DIRECT phone # [phone] on message.

## 2023-12-20 NOTE — TELEPHONE ENCOUNTER
Spoke with patient and rescheduled both ablations and his 6 wk follow up appointment. Insurance terms 1/1/2024 and was not able to keep 1/4 date for 1st ablation.

## 2023-12-28 ENCOUNTER — TELEPHONE (OUTPATIENT)
Dept: PSYCHIATRY | Facility: CLINIC | Age: 63
End: 2023-12-28

## 2023-12-28 DIAGNOSIS — F90.2 ATTENTION DEFICIT HYPERACTIVITY DISORDER (ADHD), COMBINED TYPE: ICD-10-CM

## 2023-12-28 NOTE — TELEPHONE ENCOUNTER
Crispin called and left message t [place] office.  He requested refill for methylphenidate 10 mg.  Sent to [address]

## 2024-01-01 NOTE — ASSESSMENT & PLAN NOTE
[place] Discharge Instructions  Trip Styles is a 1 day old  infant, delivered at Gestational Age: 38w2d.Discharge Date: 2024  Feeding method: Formula   Last time baby ate: Formula Volume - P.O. (mL): 12 mL (24 0908)  Last wet diaper: 1 (24 0750)  Last stool: 1 (24 2230)    Immunizations/Screenings:    [place] Screen done: Done   Immunizations:   Most Recent Immunizations   Administered Date(s) Administered    None   Deferred Date(s) Deferred    Hep B, adolescent or pediatric 2024       Hearing Test Machine: Auditory Brainstem Response (Algo) (24)  [place] Hearing Test Results: Pass R, Pass L (24)    Birth Weight: 6 lb 3.8 oz (2830 g)    Discharge weight: 6 lb 2 oz (2777 g)  -2%      Safe Sleep: Reduce your baby’s risk of SIDS (Sudden Infant Death Syndrome) by practicing Safe Sleep during naps and at night.  Always place your baby on his/her back in a safety approved crib, bassinet, or portable play area. DO NOT use a carseat, adult bed, swing, carrier, or similar products for everyday sleep.    Place your baby on a firm surface, covered with a fitted sheet, and NEVER on a couch, pillow, quilt, or with fluffy materials.  No pillows, stuffed animals, toys, or crib bumpers.     Carseat Safety:   It is recommended that children under the age of two should always be in a rear-facing seat that is installed in the back seat.   Proper fit: Harness straps should lie flat and be snug when clipped.  The chest clip should be at armpit level. Adjust as your baby grows    Feeding your baby:  Your baby should be fed on cue (wakes up, sucking on hands, turning head with mouth open, and/or then cries).  Your baby will eat every 2-3 hours day and night, or  8-12 times in 24 hours.  Your baby will let you know he/she is full when they detach off nipple (mother’s or a bottle), suck less vigorously, or becomes sleepy and relaxed.   Keep a record of your baby’s feedings and  Most likely secondary to diaphragmatic irritation from pneumoperitoneum-from laparoscopy surgery  Twelve lead EKG was negative for any acute ischemic changes, troponins were negative  diapers just like you did in the hospital until the baby is seen by the pediatrician.    For breastfeeding tips please refer to page 32 of the breastfeeding section in your A New Beginning booklet given to you by your caregivers.    Tummy Time:  Allow your alert and awake baby to have 30-60 minutes of supervised tummy time each day.    Bathing your baby:  Babies have sensitive skin that can dry out easily. Wipe your baby’s face with just warm water. Wash his/her body with a soapy wash cloth until umbilical cord falls off and/or circumcision heals (about 1 week of age); then you can bathe the baby in a tub.     Umbilical Cord:   Keep umbilical cord dry. Do not apply any medications or alcohol to site.   The cord will fall off in about 1 week.     Circumcision Care:  Wash penis with only warm water. Avoid wiping with baby wipes as they could sting.   Apply a quarter size of petroleum jelly in your baby’s diaper with every diaper change to prevent the sore area of the penis from sticking to the diaper.     Jaundice: Yellowing of the skin or “jaundice” is common in newborns. The yellow appearance is most noticeable in eyes and skin and is caused by bilirubin.  Jaundice is normal but can become dangerous if the level of bilirubin is too high.  Your baby’s doctor will monitor your infant’s bilirubin level and treat it as necessary. You may have to bring your infant to the pediatrician for a blood test if bilirubin was high in the hospital. Call your pediatrician if your baby’s skin or eyes become yellow or your baby becomes lethargic (too sleepy).      Safety at Home:   Accidental infant falls can happen; the key to avoid a fall is prevention.    Remember as you are recovering the medications you may be taking may make you more tired.  Keep this in mind when holding your baby.  If you are feeling sleepy or plan on sleeping place your baby in a safe place such as their crib or bassinet.     Babies wiggle, move, and push  against things with their feet.  Even these early movements can result in a fall.  Do not leave your baby alone on a changing table, bed, sofa, or chair.  If you cannot hold your baby put them in a safe place such as their crib or playpen.    If you have other children please be cautious and assist your older children while they are holding baby.  If your child has a serious fall or does not act normally after a fall, call your doctor.      Call your Pediatrician:  Fever 100 degrees F or above  Forceful vomiting (not spitting up)  Several feedings when infant does not suck  Watery, runny stools (mucous, blood or foul odor)  Infant injury (fall from bed or table, dropped or severely shaken)  Constant crying  Any unusual rash  Yellow color of the eyes or skin  Has less than 4 wet diapers in a 24 hr period in the first week of life, and less   than 6 wet diapers in a 24 hr period after the baby is 7 days old  No stool for 48 hours  Redness, drainage or foul odor from the umbilical cord.    If you have additional questions about these discharge instructions, please call the [facility] Nurse's station at [phone]

## 2024-01-02 RX ORDER — METHYLPHENIDATE HYDROCHLORIDE 10 MG/1
TABLET ORAL
Qty: 120 TABLET | Refills: 0 | Status: SHIPPED | OUTPATIENT
Start: 2024-01-02

## 2024-01-12 ENCOUNTER — TELEMEDICINE (OUTPATIENT)
Dept: PSYCHIATRY | Facility: CLINIC | Age: 64
End: 2024-01-12
Payer: COMMERCIAL

## 2024-01-12 DIAGNOSIS — F51.01 PRIMARY INSOMNIA: ICD-10-CM

## 2024-01-12 DIAGNOSIS — F43.23 ADJUSTMENT DISORDER WITH MIXED ANXIETY AND DEPRESSED MOOD: ICD-10-CM

## 2024-01-12 DIAGNOSIS — F90.2 ATTENTION DEFICIT HYPERACTIVITY DISORDER (ADHD), COMBINED TYPE: Primary | ICD-10-CM

## 2024-01-12 PROCEDURE — 99214 OFFICE O/P EST MOD 30 MIN: CPT | Performed by: STUDENT IN AN ORGANIZED HEALTH CARE EDUCATION/TRAINING PROGRAM

## 2024-01-12 PROCEDURE — 90833 PSYTX W PT W E/M 30 MIN: CPT | Performed by: STUDENT IN AN ORGANIZED HEALTH CARE EDUCATION/TRAINING PROGRAM

## 2024-01-12 RX ORDER — METHYLPHENIDATE HYDROCHLORIDE 10 MG/1
TABLET ORAL
Qty: 120 TABLET | Refills: 0 | Status: SHIPPED | OUTPATIENT
Start: 2024-02-01

## 2024-01-12 NOTE — PSYCH
Virtual Regular Visit    Verification of patient location: [place]    Patient is located in the following state in which I hold an active license: [place]    Assessment/Plan:    Problem List Items Addressed This Visit          Other    Attention deficit hyperactivity disorder (ADHD), combined type - Primary    Primary insomnia     Other Visit Diagnoses       Adjustment disorder with mixed anxiety and depressed mood                     Reason for visit is   Chief Complaint   Patient presents with    Medication Management    ADHD    Anxiety    Depression        Visit Time  Visit Start Time: 1:25 PM  Visit Stop Time: 1:55 PM  Total Visit Duration: 30 minutes    Encounter provider Vielka Victoria MD    Provider located at: [facility] [address], [address] [place] [address]    Recent Visits  No visits were found meeting these conditions.  Showing recent visits within past 7 days and meeting all other requirements  Today's Visits  Date Type Provider Dept   01/12/24 Telemedicine Vielka Victoria MD  [facility]   Showing today's visits and meeting all other requirements  Future Appointments  No visits were found meeting these conditions.  Showing future appointments within next 150 days and meeting all other requirements       The patient was identified by name and date of birth. KelDANISHA Fam was informed that this is a telemedicine visit and that the visit is being conducted through the Prov[employer] platform. He agrees to proceed. My office door was closed. No one else was in the room. He acknowledged consent and understanding of privacy and security of the video platform. The patient has agreed to participate and understands they can discontinue the visit at any time. Patient is aware this is a billable service.       MEDICATION MANAGEMENT NOTE        [facility]      Name and Date of Birth:  Crispin  "DANISHA Fam 63 y.o. 1960 MRN: [id number]    Date of Visit: January 12, 2024    Reason for Visit:   Chief Complaint   Patient presents with    Medication Management    ADHD    Anxiety    Depression       SUBJECTIVE:  The patient was visited virtually for medication management and follow up visit for ADHD, depression, anxiety and insomnia. Presented calm, and cooperative. Reported feeling \"ok\". He enjoyed the Holiday season as his son and daughter in law and stayed with them. He noted that \"work is going well\". He had an annual visit, and has been trying to stay fit and has lost 10 lbs, biking more often and feels more refreshed. He has been using Melatonin as much as possible, and endorsed relatively good sleep, and has not taken Sonata lately. Endorsed good control of his ADHD sxs.  Denied any changes in appetite, concentration, energy level, or daily activities. Denied feelings of anhedonia, hopelessness, helplessness, worthlessness or guilt and appeared to be future oriented. There was no thought constriction related to death. Denied SI/HI, intent or plan upon direct inquiry at this time. Safety plan developed and reviewed / signed by the patient. Denied AV/H. No anxiety sxs, specific phobia or panic attacks reported. No manic sxs, paranoid ideations or fixed delusions were elicited. Endorsed good compliance with the medications and denied any side effects. Denied smoking cigarettes, binge drinking alcohol or other illicit substance use.    Given this presentation, medications are maintained at the same dosage. PMDP reviewed. Psycho-education regarding stimulants indications, benefits, risks (including risk of addiction, onesimo if taking more than prescribed), side effects (including but not limited to palpitation/arrhythmia, weight loss and increased anxiety), and alternative options provided to the patient, and the importance of the compliance with psychiatric treatment reiterated. The patient verbalized " understanding and agreed to the proposed regimen. Will continue individual therapy. The patient was educated to call 911 or go to the nearest emergency room if the symptoms become overwhelming or unable to remain in control. Verbalized understanding and agreed to seek help in case of distress or concern for safety.    Review Of Systems:  Pertinent items are noted in HPI; all others are negative; no recent changes in medications or health status reported.      PHQ-2/9 Depression Screening                   Past Psychiatric History Update:   - No inpatient psychiatric admission since last encounter  - No SA or SIB since last encounter  - No incidence of violent behavior since last encounter    Past Trauma History Update:    - No new onset of abuse or traumatic events since last encounter     Past Medical History:    Past Medical History:   Diagnosis Date    Arthritis     Asthma     COPD (chronic obstructive pulmonary disease) (HCC) 1990    Headache(784.0) 2019    Significant osteoarthrities in neck    HL (hearing loss) 2019    mild    Pneumonia 2019    Psoriatic arthritis (HCC)     Septicemia (HCC)     Stomach problems         Past Surgical History:   Procedure Laterality Date    COLONOSCOPY      DENTAL IMPLANT      HERNIA REPAIR      INCISION AND DRAINAGE ABSCESS / HEMATOMA OF BURSA / KNEE / THIGH Left     thigh    KY LAPAROSCOPY SURG PARTIAL NEPHRECTOMY Left 11/28/2022    Procedure: ROBOTIC LAPAROSCOPIC PARTIAL NEPHRECTOMY  WITH INTRAOPERATIVE ULTRASOUND INTERPRETATION;  Surgeon: Shane Barbosa MD;  Location: [facility];  Service: Urology    KY RPR 1ST INGUN HRNA AGE 5 YRS/> REDUCIBLE Right 06/22/2021    Procedure: Inguinal hernia repair with mesh;  Surgeon: Robert Bloch, MD;  Location:  MAIN OR;  Service: General     Allergies   Allergen Reactions    Erythromycin GI Intolerance       Substance Abuse History:    Social History     Substance and Sexual Activity   Alcohol Use Yes    Alcohol/week: 1.0 standard  drink of alcohol    Types: 1 Glasses of wine per week    Comment: More like one glass per month socially     Social History     Substance and Sexual Activity   Drug Use Never       Social History:    Social History     Socioeconomic History    Marital status: /Civil Union     Spouse name: Not on file    Number of children: Not on file    Years of education: Not on file    Highest education level: Not on file   Occupational History    Not on file   Tobacco Use    Smoking status: Never    Smokeless tobacco: Never   Vaping Use    Vaping status: Never Used   Substance and Sexual Activity    Alcohol use: Yes     Alcohol/week: 1.0 standard drink of alcohol     Types: 1 Glasses of wine per week     Comment: More like one glass per month socially    Drug use: Never    Sexual activity: Yes     Partners: Female     Birth control/protection: None     Comment: Monogamous,     Other Topics Concern    Not on file   Social History Narrative    Not on file     Social Determinants of Health     Financial Resource Strain: Not on file   Food Insecurity: Not on file   Transportation Needs: Not on file   Physical Activity: Not on file   Stress: Not on file   Social Connections: Not on file   Intimate Partner Violence: Not on file   Housing Stability: Not on file       Family Psychiatric History:     Family History   Problem Relation Age of Onset    Coronary artery disease Father     Heart disease Father     Arthritis Mother     Hearing loss Mother     Heart disease Maternal Grandfather     Dementia Maternal Grandmother     Heart disease Paternal Grandfather     Dementia Maternal Aunt        History Review: The following portions of the patient's history were reviewed and updated as appropriate: allergies, current medications, past family history, past medical history, past social history, past surgical history and problem list.       OBJECTIVE:     Vital signs in last 24 hours: Not checked - virtual visit    Mental Status  Evaluation:  Appearance and attitude: appeared as stated age, cooperative and attentive, wearing eyeglasses, casually dressed with good hygiene  Eye contact: good  Motor Function: within normal limits, No PMA/PMR  Gait/station: Not observed  Speech: normal for rate, rhythm, volume, latency, amount  Language: No overt abnormality  Mood/affect: euthymic / Affect was euthymic, reactive, in full range, normal intensity and mood congruent  Thought Processes: sequential and goal-directed  Thought content: denies suicidal ideation or homicidal ideation; no delusions or first rank symptoms  Associations: intact associations  Perceptual disturbances: denies Auditory/Visual/Tactile Hallucinations  Orientation: oriented to time, person, place and to the situational context  Cognitive Function: intact  Memory: recent and remote memory grossly intact  Intellect: average  Fund of knowledge: aware of current events, aware of past history, and vocabulary average  Impulse control: good  Insight/judgment: good/good      Laboratory Results: I have personally reviewed all pertinent laboratory/tests results    Recent Labs (last 2 months):   Appointment on 12/19/2023   Component Date Value    PSA 12/19/2023 1.07     Vit D, 25-Hydroxy 12/19/2023 27.6 (L)     TSH 3RD GENERATON 12/19/2023 2.755     Sodium 12/19/2023 139     Potassium 12/19/2023 3.9     Chloride 12/19/2023 102     CO2 12/19/2023 28     ANION GAP 12/19/2023 9     BUN 12/19/2023 17     Creatinine 12/19/2023 0.94     Glucose, Fasting 12/19/2023 83     Calcium 12/19/2023 9.6     AST 12/19/2023 26     ALT 12/19/2023 22     Alkaline Phosphatase 12/19/2023 70     Total Protein 12/19/2023 7.3     Albumin 12/19/2023 4.3     Total Bilirubin 12/19/2023 0.65     eGFR 12/19/2023 85          Assessment/Plan:   A 63 y.o.  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 The patient presented w/ h/o ADHD since 1999, being on Ritalin 60mg daily since then, w/ marked improvement of attention, concentration and his function since being treated. Denied any excessive anxiety, depressed/manic sxs, A/VH or other psychotic sxs. No h/o substance abuse. Denied SI/HI, intent or plan upon direct inquiry at this time. WALTER-7: 4. His current presentation meets criteria for ADHD, by history. The patient was educated about the diagnosis of ADHD and the course of ADHD in adults which may present with decreased intensity of sxs, onesimo after age 50, and particularly after age 65. Psycho-education regarding Ritalin indications, benefits, risks, side effects, and alternative options provided to the patient, and the importance of the compliance with psychiatric treatment reiterated. The patient was educated about the potential long-term side effects of ritalin in older age, onesimo cardiac side effects (given the FH of heart attack in his father at age 36), and was educated about the possibility of tapering off or if becoming symptomatic, cross-tapering to different treatment options. He verbalized understanding and agreed to the proposed regimen. Ritalin dose was decreased from 20mg TID (60mg total), to 20/20/10mg (50 mg total); and then switched to equivalent dose of Concerta 72 mg po daily to be tapered down as tolerated. The patient did not tolerate switching Ritalin to Concerta due to high BP, and on 11/5/2021 requested to be switched back to Ritalin 20mg/20mg/10mg as last  "prescribed (tapered down from 20 mg TID). Upon f/u on 12/28/2021, presented w/ well-controlled ADHD sxs, and stable mood and anxiety. The patient was diagnosed with complex cyst in L kidney and is going to have partial nephrectomy concerning for possible malignancy on 11/28/22, and also his internist recommended to discontinue Ritalin for HTN and high risk of cardia events given the positive FH and age. Ritalin was tapered off by 10 mg weekly and was started on Strattera 40 mg po daily for 1 week and then 40 mg po BID for ADHD. Upon f/u, the patient reported relatively controlled ADHD sxs with Strattera 40 mg BID and maintained on the same dose. However, the patient was visited on 3/20/23 as requested an earlier appointment and reported poor control of ADHD sxs with side effect of \"terrible dry mouth\". Presented w/ a persistent pattern of impaired concentration and difficulty sustaining attention, failure to finish duties on time, having difficulty organizing tasks and activities, losing things, and getting easily distracted by external stimuli, figeting with hands, tapping feet or squirming in seat, and talking fast, interrupting others during the conversation and at times moving around as well as having difficulties to wait. Strattera tapered off and started on Ritalin 10 mg TID which later uptitrated to 20/10/10 mg on 4/27/23. Also, the patient was started on Lexapro 10 mg daily for depression and anxiety in the context of multiple psychosocial stressors. The patient stopped taking Lexapro after 8-10 weeks, and presented with moderately severe depression. Agreed to consider Cymbalta (helping with depression, anxiety and pain), but reportedly did not start the medication as had URI and procedures for pain management and then felt better and is not willing to start it at the moment. Will continue individual therapy and recommended to consider couple's therapy and sex-therapy      Diagnoses and all orders for this " visit:    Attention deficit hyperactivity disorder (ADHD), combined type    Adjustment disorder with mixed anxiety and depressed mood    Primary insomnia          Impression:  1. Attention deficit hyperactivity disorder (ADHD), combined type        2. Adjustment disorder with mixed anxiety and depressed mood        3. Primary insomnia            Treatment Recommendations/Precautions:  - Tapered off Strattera due to side effects, successfully  - Continue Ritalin 20/10/10 mg for ADHD (previously was stable on 20/20/10 mg)  - Continue Sonata 10 mg po nightly PRN for insomnia (has been sleeping well taking Melatonin recently and did not need a refill)  - Discontinued Lexapro due to sexual side effects and non-compliance; discontinued Cymbalta as the patient did not start and is not willing to initiate at this time  - Continue individual therapy; consider sex therapy / couple's therapy  - Medications sent to patient's pharmacy for 30 day     - Psychoeducation provided to the patient and benefits, potential risks and side effects discussed; importance of compliance with the psychiatric treatment reiterated, and the patient verbalized understanding of the matter     - RTC in 3 months  - Educated about healthy life style, risk of falls/sedation and addiction. Patient was receptive to education.  - The patient was educated about 24 hour and weekend coverage for urgent situations accessed by calling [facility] main practice number  - Patient was educated to call National Suicide Prevention Lifeline (8-426-991-CRLI [5591]) for behavioral crisis at anytime or 911 for any safety concerns, or go to nearest ER if his symptoms become overwhelming or unmanageable.    Current Outpatient Medications   Medication Sig Dispense Refill    albuterol (PROVENTIL HFA,VENTOLIN HFA) 90 mcg/act inhaler Inhale 2 puffs every 6 (six) hours as needed for wheezing Pt requests 2 inhalers 18 g 5    ALPRAZolam (XANAX) 1 mg tablet  Take 1 tablet (1 mg total) by mouth 60 minutes pre-procedure 1 tablet 0    amLODIPine (NORVASC) 5 mg tablet Take 1 tablet (5 mg total) by mouth daily 90 tablet 3    aspirin (ECOTRIN LOW STRENGTH) 81 mg EC tablet Take 81 mg by mouth daily      atorvastatin (LIPITOR) 40 mg tablet Take 1 tablet (40 mg total) by mouth daily (Patient taking differently: Take 40 mg by mouth daily at bedtime) 90 tablet 3    clobetasol (TEMOVATE) 0.05 % cream Apply 0.25 g (0.05 Applications total) topically 2 (two) times a day as needed (rash) 60 g 5    clotrimazole-betamethasone (LOTRISONE) 1-0.05 % cream Apply topically 2 (two) times a day (Patient taking differently: Apply topically if needed) 30 g 5    EPINEPHrine (EPIPEN) 0.3 mg/0.3 mL SOAJ Inject 0.3 mL (0.3 mg total) into a muscle once for 1 dose (Patient not taking: Reported on 1/27/2023) 1 each 1    fexofenadine (ALLEGRA) 60 MG tablet Take 60 mg by mouth daily as needed       ketoconazole (NIZORAL) 2 % cream Apply topically daily as needed for rash 15 g 5    methylphenidate (RITALIN) 10 mg tablet TAKE TWO TABLETS IN THE MORNING, ONE TABLET AROUND NOON AND ONE TABLET IN AFTERNOON IF NEEDED 120 tablet 0    zaleplon (SONATA) 10 MG capsule Take 1 capsule (10 mg total) by mouth daily at bedtime 30 capsule 2     No current facility-administered medications for this visit.         Medications Risks/Benefits      Risks, Benefits And Possible Side Effects Of Medications:    Risks, benefits, and possible side effects of medications explained to Crispin and he verbalizes understanding and agreement for treatment.    Controlled Medication Discussion:     Crispin has been filling controlled prescriptions on time as prescribed according to [place] Prescription Drug Monitoring Program  PMDP reviewed. Psycho-education regarding stimulants indications, benefits, risks (including risk of addiction, onesimo if taking more than prescribed), side effects (including but not limited to  palpitation/arrhythmia, weight loss and increased anxiety), and alternative options provided to the patient, and the importance of the compliance with psychiatric treatment reiterated. The patient verbalized understanding and agreed to the proposed regimen.       Psychotherapy Provided:     Individual psychotherapy provided: Yes  Counseling was provided during the session today for 16 minutes.   Psychoeducation provided to the patient and was educated about the importance of compliance with the medications and psychiatric treatment  Psycho-spiritual therapy provided to the patient, and the importance of simultaneously engaging the body, mind, and spirit in healing mental health issues, moving beyond problematic life patterns, and overcoming traumatic life experiences reiterated. The patient was educated about the breathing techniques, guided imagery meditation and healthy life-style  Solution Focused Brief Therapy (SFBT) provided  Patient's emotions were validated and specific labeled praise provided.   [place] suggestions were offered in a supportive non-critical way.   Cognitive Analytic Therapy and supportive expressive interventions     Treatment Plan:    Completed and signed during the session: Yes - with Crispin Victoria MD 01/12/24

## 2024-01-12 NOTE — BH CRISIS PLAN
"Safety Plan done but not signed at time of office visit due to:  Plan reviewed with the patient during the virtual visit and verbal consent given.      [facility] Safety Plan    Creation Date: 1/12/24 Update Date: 1/12/25   Created By: Vielka Victoria MD       Step 1: Warning Signs:   Warning Signs   \"Never had any suicidal thoughts\"            Step 2: Internal Coping Strategies:   Internal Coping Strategies   Playing Music, writing and outdoor activities            Step 3: People and social settings that provide distraction:   Name Contact Information   Friend Juvenla contact saved in cellphone    Places   Outdoor activites and bike trail         Step 4: People whom I can ask for help during a crisis:    Name Contact Information    Friend Juvenal contact saved in cellphone    Therapist: Denise contact saved in cellphone      Step 5: Professionals or agencies I can contact during a crisis:    [facility]/Agency Name Phone Emergency Contact    Vielka Victoria -[phone]     Local Emergency Department Emergency Department Phone Emergency   Department Address    [facility] [phone] [address] [place] [address]      Crisis Phone Numbers:   Suicide Prevention Lifeline: Call or Text  654 Crisis Text Line: Text HOME   to 476-881   Please note: Some [facility] do not have a separate number for   Child/Adolescent specific crisis. If your county is not listed under   Child/Adolescent, please call the adult number for your county      Adult Crisis Numbers: Child/Adolescent Crisis Numbers   [facility]: [phone] [facility]: [phone]   [facility]: [phone] [facility]: [phone]   [facility]: [phone] [place]: [phone]   [facility]: [phone] [place] [facility]: [phone]   [place] [facility]: [phone]   [facility]: [phone]   [facility]: [phone]   [facility]: [phone] (daytime) [phone]   (after " hours, weekends, holidays)      Step 6: Making the environment safer (plan for lethal means safety):   Plan: Firearms at home; always locked     Optional: What is most important to me and worth living for?      [place] Safety Plan. Ute Gan and Zhen Cruz. Used with   permission of the authors.

## 2024-01-12 NOTE — BH TREATMENT PLAN
"Treatment Plan done but not signed at time of office visit due to:  Plan reviewed with the patient during the virtual visit and verbal consent given.    TREATMENT PLAN (Medication Management Only)        [facility]    Name and Date of Birth:  Crispin Fam 63 y.o. 1960  Date of Treatment Plan: January 12, 2024  Diagnosis/Diagnoses:    1. Attention deficit hyperactivity disorder (ADHD), combined type    2. Adjustment disorder with mixed anxiety and depressed mood    3. Primary insomnia      Strengths/Personal Resources for Self-Care: \"supportive family, friends, hobbies and access to therapy\".  Area/Areas of need (in own words): ADHD symptoms, insomnia, depression and anxiety  1. Long Term Goal: maintain control of ADHD symptoms and anxiety, prevent depressive sxs and insomnia  Target Date:6 months - 7/12/2024  Person/Persons responsible for completion of goal: Crispin  2.  Short Term Objective (s) - How will we reach this goal?:   A. Provider new recommended medication/dosage changes and/or continue medication(s): continue current medications as prescribed.  B. Attend psychotherapy regularly.  C. N/A.  Target Date:6 months - 7/12/2024  Person/Persons Responsible for Completion of Goal: Crispin  Progress Towards Goals: stable, continuing treatment  Treatment Modality: medication management every 6 months, continue psychotherapy with own therapist  Review due 180 days from date of this plan: 6 months - 7/12/2024  Expected length of service: maintenance  My Physician/PA/NP and I have developed this plan together and I agree to work on the goals and objectives. I understand the treatment goals that were developed for my treatment.      "

## 2024-01-15 ENCOUNTER — HOSPITAL ENCOUNTER (OUTPATIENT)
Dept: CT IMAGING | Facility: HOSPITAL | Age: 64
Discharge: HOME | End: 2024-01-15
Attending: UROLOGY
Payer: COMMERCIAL

## 2024-01-15 DIAGNOSIS — C64.2 RENAL CELL CARCINOMA OF LEFT KIDNEY (HCC): ICD-10-CM

## 2024-01-15 PROCEDURE — G1004 CDSM NDSC: HCPCS

## 2024-01-15 PROCEDURE — 74160 CT ABDOMEN W/CONTRAST: CPT

## 2024-01-15 RX ADMIN — IOHEXOL 85 ML: 350 INJECTION, SOLUTION INTRAVENOUS at 17:35

## 2024-01-17 ENCOUNTER — TELEPHONE (OUTPATIENT)
Dept: RADIOLOGY | Facility: MEDICAL CENTER | Age: 64
End: 2024-01-17

## 2024-01-17 DIAGNOSIS — M47.817 LUMBOSACRAL SPONDYLOSIS WITHOUT MYELOPATHY: ICD-10-CM

## 2024-01-17 NOTE — TELEPHONE ENCOUNTER
Good morning, Called patient to remind him of his appt tomorrow with Dr. Logan. Patient wants a call back to confirm if he can take Tylenol or Ibuprofen prior to treatment. Patient would also like to know how long the treatment will be. Thank you

## 2024-01-18 ENCOUNTER — HOSPITAL ENCOUNTER (OUTPATIENT)
Dept: RADIOLOGY | Facility: MEDICAL CENTER | Age: 64
Discharge: HOME | End: 2024-01-18
Payer: COMMERCIAL

## 2024-01-18 ENCOUNTER — TELEPHONE (OUTPATIENT)
Dept: PAIN MEDICINE | Facility: CLINIC | Age: 64
End: 2024-01-18

## 2024-01-18 VITALS
TEMPERATURE: 99.3 F | DIASTOLIC BLOOD PRESSURE: 81 MMHG | RESPIRATION RATE: 20 BRPM | OXYGEN SATURATION: 96 % | HEART RATE: 85 BPM | SYSTOLIC BLOOD PRESSURE: 127 MMHG

## 2024-01-18 DIAGNOSIS — M47.816 LUMBAR SPONDYLOSIS: ICD-10-CM

## 2024-01-18 PROCEDURE — 64635 DESTROY LUMB/SAC FACET JNT: CPT | Performed by: ANESTHESIOLOGY

## 2024-01-18 PROCEDURE — 64636 DESTROY L/S FACET JNT ADDL: CPT | Performed by: ANESTHESIOLOGY

## 2024-01-18 RX ORDER — METHYLPREDNISOLONE ACETATE 40 MG/ML
40 INJECTION, SUSPENSION INTRA-ARTICULAR; INTRALESIONAL; INTRAMUSCULAR; PARENTERAL; SOFT TISSUE ONCE
Status: COMPLETED | OUTPATIENT
Start: 2024-01-18 | End: 2024-01-18

## 2024-01-18 RX ORDER — LIDOCAINE HYDROCHLORIDE 10 MG/ML
10 INJECTION, SOLUTION EPIDURAL; INFILTRATION; INTRACAUDAL; PERINEURAL ONCE
Status: COMPLETED | OUTPATIENT
Start: 2024-01-18 | End: 2024-01-18

## 2024-01-18 RX ORDER — BUPIVACAINE HCL/PF 2.5 MG/ML
3 VIAL (ML) INJECTION ONCE
Status: COMPLETED | OUTPATIENT
Start: 2024-01-18 | End: 2024-01-18

## 2024-01-18 RX ORDER — ALPRAZOLAM 1 MG/1
1 TABLET ORAL
Qty: 1 TABLET | Refills: 0 | Status: SHIPPED | OUTPATIENT
Start: 2024-01-18

## 2024-01-18 RX ADMIN — LIDOCAINE HYDROCHLORIDE 10 ML: 10 INJECTION, SOLUTION EPIDURAL; INFILTRATION; INTRACAUDAL at 15:09

## 2024-01-18 RX ADMIN — Medication 3 ML: at 15:28

## 2024-01-18 RX ADMIN — METHYLPREDNISOLONE ACETATE 40 MG: 40 INJECTION, SUSPENSION INTRA-ARTICULAR; INTRALESIONAL; INTRAMUSCULAR; PARENTERAL; SOFT TISSUE at 15:28

## 2024-01-18 RX ADMIN — Medication 1.5 ML: at 15:25

## 2024-01-18 NOTE — DISCHARGE INSTRUCTIONS

## 2024-01-18 NOTE — TELEPHONE ENCOUNTER
S/w pt who is scheduled for RFA today. Pt advised ok to take Tylenol or Ibuprofen.    Pt is requesting another xanax dose be sent to [facility] pharmacy for next RFA on 2/8/24.    Ok to [facility] if medication sent   with patient

## 2024-01-18 NOTE — TELEPHONE ENCOUNTER
Attempted to reach pt, left detailed message on machine (per medical communication consent on file) advising script of xanax sent to pharmacy for next RFA, nurse advised on message to take medication 1 hour prior to procedure and to have a  to and from procedure. Provided  OH and cb# if pt were to have any further questions.

## 2024-01-18 NOTE — TELEPHONE ENCOUNTER
Caller: Igor    Doctor: Doreen    Reason for call: can he take tylenol or ibuprofen? Please advise     He did not get our missed call yesterday if he misses today's call back please leave detailed message with answer he has a procedure scheduled today at 3 pm     Call back#: [phone]

## 2024-01-19 NOTE — TELEPHONE ENCOUNTER
Pt did well over night no s/s of infection or sunburn sensation.  Aware it takes 2 weeks to notice pain relief and 4-6 weeks for full pain effect to be achieved.  Aware to medicate as previous for discomfort and may use ice or heat.  Confirmed next appt. 2/8 @2:50  Call if any questions or concerns prior to next appt.

## 2024-01-19 NOTE — TELEPHONE ENCOUNTER
Will Sherrill Logan MD  [facility]3 minutes ago (10:04 AM)       Ok, thank you

## 2024-02-02 NOTE — PROGRESS NOTES
"     Problem List Items Addressed This Visit       Encounter to discuss test results    Renal cell carcinoma of left kidney (HCC) - Primary    Relevant Orders    CBC and Platelet    Basic metabolic panel    CT abdomen w wo contrast             Discussion:      Igor is doing well.  ECOG of 0, no new complaints.  Films showing stability in these areas.  I am not concerned by these areas in the liver.  His kidney has healed well and the area of renorrhaphy shows no nodularity and there are no new lesions.  He will see us back in 1 year with imaging prior    Portions of the above record have been created with voice recognition software.  Occasional wrong word or \"sound alike\" substitution may have occurred due to the inherent limitations of voice recognition software.  Read the chart carefully and recognize, using context, where substitution may have occurred.    Assessment and plan:       Please see problem oriented charting for the assessment plan of today's urological complaints      Shane Barbosa MD      Chief Complaint     As listed above      History of Present Illness     Crispin Fam is a 63 y.o. man with history of a left renal mass status post left partial nephrectomy in November 2022 showing a grade 1 pathologic stage T1a clear-cell renal cell carcinoma    ECOG of 0      The following portions of the patient's history were reviewed and updated as appropriate: allergies, current medications, past family history, past medical history, past social history, past surgical history and problem list.    Detailed Urologic History     - please refer to HPI    Review of Systems     Review of Systems   Constitutional: Negative.    HENT: Negative.     Eyes: Negative.    Respiratory: Negative.     Cardiovascular: Negative.    Gastrointestinal: Negative.    Endocrine: Negative.    Genitourinary: Negative.    Musculoskeletal: Negative.    Skin: Negative.    Allergic/Immunologic: Negative.    Neurological: " Negative.    Hematological: Negative.    Psychiatric/Behavioral: Negative.         AUA SYMPTOM SCORE      Flowsheet Row Most Recent Value   AUA SYMPTOM SCORE    How often have you had a sensation of not emptying your bladder completely after you finished urinating? 4 (P)    How often have you had to urinate again less than two hours after you finished urinating? 4 (P)    How often have you found you stopped and started again several times when you urinate? 5 (P)    How often have you found it difficult to postpone urination? 3 (P)    How often have you had a weak urinary stream? 4 (P)    How often have you had to push or strain to begin urination? 2 (P)    How many times did you most typically get up to urinate from the time you went to bed at night until the time you got up in the morning? 5 (P)    Quality of Life: If you were to spend the rest of your life with your urinary condition just the way it is now, how would you feel about that? 3 (P)    AUA SYMPTOM SCORE 27 (P)               Allergies     Allergies   Allergen Reactions    Erythromycin GI Intolerance       Physical Exam     Physical Exam  Vitals and nursing note reviewed.   Constitutional:       General: He is not in acute distress.     Appearance: Normal appearance. He is well-developed. He is not ill-appearing, toxic-appearing or diaphoretic.   HENT:      Head: Normocephalic and atraumatic.   Eyes:      General: No scleral icterus.  Pulmonary:      Effort: Pulmonary effort is normal. No respiratory distress.   Abdominal:      General: There is no distension.      Palpations: Abdomen is soft.      Tenderness: There is no abdominal tenderness.      Comments: Well-healed scars   Musculoskeletal:         General: No deformity.      Cervical back: Neck supple.   Skin:     Coloration: Skin is not jaundiced or pale.   Neurological:      Mental Status: He is alert and oriented to person, place, and time. Mental status is at baseline.      Cranial Nerves: No  cranial nerve deficit.      Motor: No weakness.      Coordination: Coordination normal.   Psychiatric:         Mood and Affect: Mood normal.         Behavior: Behavior normal.         Thought Content: Thought content normal.         Judgment: Judgment normal.             Vital Signs  There were no vitals filed for this visit.      Current Medications       Current Outpatient Medications:     albuterol (PROVENTIL HFA,VENTOLIN HFA) 90 mcg/act inhaler, Inhale 2 puffs every 6 (six) hours as needed for wheezing Pt requests 2 inhalers, Disp: 18 g, Rfl: 5    ALPRAZolam (XANAX) 1 mg tablet, Take 1 tablet (1 mg total) by mouth 60 minutes pre-procedure, Disp: 1 tablet, Rfl: 0    amLODIPine (NORVASC) 5 mg tablet, Take 1 tablet (5 mg total) by mouth daily, Disp: 90 tablet, Rfl: 3    aspirin (ECOTRIN LOW STRENGTH) 81 mg EC tablet, Take 81 mg by mouth daily, Disp: , Rfl:     atorvastatin (LIPITOR) 40 mg tablet, Take 1 tablet (40 mg total) by mouth daily (Patient taking differently: Take 40 mg by mouth daily at bedtime), Disp: 90 tablet, Rfl: 3    clobetasol (TEMOVATE) 0.05 % cream, Apply 0.25 g (0.05 Applications total) topically 2 (two) times a day as needed (rash), Disp: 60 g, Rfl: 5    clotrimazole-betamethasone (LOTRISONE) 1-0.05 % cream, Apply topically 2 (two) times a day (Patient taking differently: Apply topically if needed), Disp: 30 g, Rfl: 5    EPINEPHrine (EPIPEN) 0.3 mg/0.3 mL SOAJ, Inject 0.3 mL (0.3 mg total) into a muscle once for 1 dose (Patient not taking: Reported on 1/27/2023), Disp: 1 each, Rfl: 1    fexofenadine (ALLEGRA) 60 MG tablet, Take 60 mg by mouth daily as needed , Disp: , Rfl:     ketoconazole (NIZORAL) 2 % cream, Apply topically daily as needed for rash, Disp: 15 g, Rfl: 5    methylphenidate (RITALIN) 10 mg tablet, TAKE TWO TABLETS IN THE MORNING, ONE TABLET AROUND NOON AND ONE TABLET IN AFTERNOON IF NEEDED Do not start before February 1, 2024., Disp: 120 tablet, Rfl: 0    zaleplon (SONATA) 10 MG  capsule, Take 1 capsule (10 mg total) by mouth daily at bedtime, Disp: 30 capsule, Rfl: 2      Active Problems     Patient Active Problem List   Diagnosis    Exercise-induced asthma    Attention deficit hyperactivity disorder (ADHD), combined type    Psoriatic arthritis (HCC)    Mixed hyperlipidemia    Primary insomnia    Cervical spondylosis    Controlled substance agreement signed    Cough in adult patient    DDD (degenerative disc disease), lumbar    Degenerative disc disease, cervical    Elbow pain, left    Food allergy    Muscle spasms of both lower extremities    Right upper quadrant abdominal pain    Vitamin D deficiency    HTN (hypertension)    Chronic maxillary sinusitis    Dysphoric mood    Dry eye    Dry mouth    Memory difficulties    Recurrent right inguinal hernia    Muscle cramps    Tinnitus of both ears    Wellness examination    Left renal mass    Encounter to discuss test results    Lipid disorder    Coronary artery disease involving native coronary artery of native heart without angina pectoris    COVID-19    Renal cell carcinoma of left kidney (HCC)    Immunocompromised (HCC)    Shortness of breath    Persistent headaches    Chest pain    Lumbosacral spondylosis without myelopathy    Lumbar spondylosis         Past Medical History     Past Medical History:   Diagnosis Date    Arthritis     Asthma     COPD (chronic obstructive pulmonary disease) (HCC) 1990    Headache(784.0) 2019    Significant osteoarthrities in neck    HL (hearing loss) 2019    mild    Pneumonia 2019    Psoriatic arthritis (HCC)     Septicemia (HCC)     Stomach problems          Surgical History     Past Surgical History:   Procedure Laterality Date    COLONOSCOPY      DENTAL IMPLANT      HERNIA REPAIR      INCISION AND DRAINAGE ABSCESS / HEMATOMA OF BURSA / KNEE / THIGH Left     thigh    [place] LAPAROSCOPY SURG PARTIAL NEPHRECTOMY Left 11/28/2022    Procedure: ROBOTIC LAPAROSCOPIC PARTIAL NEPHRECTOMY  WITH INTRAOPERATIVE ULTRASOUND  INTERPRETATION;  Surgeon: Shane Barbosa MD;  [facility];  Service: Urology    DC RPR 1ST INGUN HRNA AGE 5 YRS/> REDUCIBLE Right 06/22/2021    Procedure: Inguinal hernia repair with mesh;  Surgeon: Robert Bloch, MD;  [facility];  Service: General         Family History     Family History   Problem Relation Age of Onset    Coronary artery disease Father     Heart disease Father     Arthritis Mother     Hearing loss Mother     Heart disease Maternal Grandfather     Dementia Maternal Grandmother     Heart disease Paternal Grandfather     Dementia Maternal Aunt          Social History     Social History     Social History     Tobacco Use   Smoking Status Never   Smokeless Tobacco Never         Pertinent Lab Values     Lab Results   Component Value Date    CREATININE 0.94 12/19/2023       Lab Results   Component Value Date    PSA 1.07 12/19/2023    PSA 0.6 08/13/2022               Pertinent Imaging       The patient's images were reviewed by me personally and also in real time with them in the examination room using our PACS imaging system.  The imaging findings are significant for some small, benign-appearing cysts within the liver.  No new renal masses or lesions or metastasis.

## 2024-02-05 ENCOUNTER — OFFICE VISIT (OUTPATIENT)
Dept: UROLOGY | Facility: CLINIC | Age: 64
End: 2024-02-05
Payer: COMMERCIAL

## 2024-02-05 VITALS
DIASTOLIC BLOOD PRESSURE: 88 MMHG | WEIGHT: 179 LBS | OXYGEN SATURATION: 76 % | HEART RATE: 98 BPM | HEIGHT: 71 IN | RESPIRATION RATE: 14 BRPM | SYSTOLIC BLOOD PRESSURE: 130 MMHG | BODY MASS INDEX: 25.06 KG/M2

## 2024-02-05 DIAGNOSIS — C64.2 RENAL CELL CARCINOMA OF LEFT KIDNEY (HCC): Primary | ICD-10-CM

## 2024-02-05 DIAGNOSIS — Z71.2 ENCOUNTER TO DISCUSS TEST RESULTS: ICD-10-CM

## 2024-02-05 DIAGNOSIS — I10 HYPERTENSION, UNSPECIFIED TYPE: ICD-10-CM

## 2024-02-05 PROCEDURE — 99213 OFFICE O/P EST LOW 20 MIN: CPT | Performed by: UROLOGY

## 2024-02-05 RX ORDER — AMLODIPINE BESYLATE 5 MG/1
5 TABLET ORAL DAILY
Qty: 90 TABLET | Refills: 3 | Status: SHIPPED | OUTPATIENT
Start: 2024-02-05

## 2024-02-08 ENCOUNTER — TELEPHONE (OUTPATIENT)
Age: 64
End: 2024-02-08

## 2024-02-14 NOTE — PROGRESS NOTES
Assessment and Plan:   Mr. Fam is a 63-year-old male with history significant for psoriatic arthritis, psoriasis, primary generalized osteoarthritis specifically cervical/lumbar region involvement, uveitis and renal cancer s/p left sided partial nephrectomy in 11/22 who presents to establish with [facility] rheumatology for continued management.  He is currently not on DMARDs.  He is transferring care from [facility] rheumatology.  He is self-referred.    - Igor presents today to establish with [facility] rheumatology for his diagnosis of psoriatic arthritis and psoriasis.  He has been seen by multiple rheumatologists thus far and been on medications including methotrexate, sulfasalazine, adalimumab, secukinumab and guselkumab which were discontinued either due to side effects/inefficacy or with his last biologic discontinued due to a diagnosis of renal cancer status post left-sided partial nephrectomy in November 2022.  He overall seems to be fairly stable from a psoriasis and peripheral spondyloarthritis perspective and his main complaints at this time are chronic neck and low back pain/stiffness of which I suspect a majority of symptoms is due to underlying degenerative changes as opposed to active inflammation.  He will follow-up with spine and pain management to receive the radiofrequency ablation in March but if this procedure does not provide him with any benefit then we discussed a retrial with biologic DMARDs to see if we can achieve any improvement in the spinal pain.  Of note the renal cancer diagnosis does not pose an absolute contraindication to initiating biologics.  I will likely plan to start him on subcutaneous etanercept 50 mg once weekly.  He will update a chronic hepatitis panel and QuantiFERON-TB gold.  In the meanwhile he may continue with Tylenol/NSAIDs as needed.      I have spent a total time of 60 minutes on 02/15/24 in caring for this patient including Diagnostic  results, Prognosis, Risks and benefits of tx options, Impressions, Documenting in the medical record, and Obtaining or reviewing history  .      Plan:  Diagnoses and all orders for this visit:    Psoriatic arthropathy (HCC)  -     C-reactive protein; Future  -     Sedimentation rate, automated; Future  -     XR sacroiliac joints 3+ views; Future  -     XR spine cervical complete 4 or 5 vw non injury; Future    Psoriasis  -     C-reactive protein; Future  -     Sedimentation rate, automated; Future    Long-term use of immunosuppressant medication  -     CBC and differential; Future  -     Comprehensive metabolic panel; Future    Primary generalized (osteo)arthritis  -     XR sacroiliac joints 3+ views; Future  -     XR spine cervical complete 4 or 5 vw non injury; Future    History of renal carcinoma    Need for hepatitis C screening test  -     Chronic Hepatitis Panel; Future    Screening-pulmonary TB  -     Quantiferon TB Gold Plus Assay; Future      I have personally reviewed pertinent films in PACS of the CXR which does not show osseous abnormalities.       Activities as tolerated.   Exercise: try to maintain a low impact exercise regimen as much as possible.   Continue other medications as prescribed by PCP and other specialists.       RTC in 4 months.        HPI  Mr. Fam is a 63-year-old male with history significant for psoriatic arthritis, psoriasis, primary generalized osteoarthritis specifically cervical/lumbar region involvement, uveitis and renal cancer s/p left sided partial nephrectomy in 11/22 who presents to establish with [facility] rheumatology for continued management.  He is currently not on DMARDs.  He is transferring care from [facility] rheumatology.  He is self-referred.    Patient reports he was diagnosed with psoriasis in his 30s but this has never been a major issue for him as he has not presented with large plaques.  He had been followed by dermatology and reports  approximately 8 to 10 years ago due to joint pains he was also experiencing his dermatologist considered the possibility of psoriatic arthritis.  Since then he has been seen by multiple rheumatologists including Dr. Corrales, Dr. Mario and Dr. Kitchen.  Dr. Corrales agreed with the diagnosis of psoriatic arthritis due to concerns for a small erosion on his right hand which was not seen on follow-up x-rays a few years later.  He cannot recall the exact timeline of medications but reports he has previously been treated with NSAIDs, sulfasalazine which was discontinued due to inefficacy, methotrexate to which he developed side effects, adalimumab which was discontinued due to gradual loss of efficacy, secukinumab which was effective but discontinued as he did not want to inject 2 pens and guselkumab which was discontinued due to the diagnosis of renal cancer.  He cannot recall if this last medication was effective for him.  He reports these medications will help to clear up the psoriasis and this has not been an ongoing issue for him, but if it does occur it may affect around his hairline and eyes.  In terms of the joint pains it is mostly his low back that bothers him followed by his neck and then some of his peripheral joints including his hands and knees.  He has previously experienced swelling of his hands but denies prolonged morning stiffness.  He has significant limitations as a result of his spinal pain which occurs on a daily and continuous basis.  He is currently taking ibuprofen 400 to 600 mg twice a day as needed along with Tylenol 1 g daily as needed which does help him.  He is following with spine and pain management in view of the lumbar spine degenerative arthritis and spinal stenosis.  Most recently on 1/18/2024 he underwent a radiofrequency ablation of the left L3-L5 medial branch nerves without any benefit thus far.  He is scheduled for a right-sided procedure on 3/14.  He has previously received epidural  injections without much benefit.  Of note his last MRI lumbar spine done in September 2022 showed degenerative disc disease at L5-S1 with severe left and moderate to severe right neuroforaminal stenosis at L5-S1.  There were also concerns for Mary and Romanus lesions which are findings that may be seen in patients with a spondyloarthropathy.    He reports approximately 5 years ago and prior to this he had 2 occurrences of uveitis but cannot recall the specifics.  No history of inflammatory bowel disease.  He reports a history of psoriasis in his grandfather and uncle.    The following portions of the patient's history were reviewed and updated as appropriate: allergies, current medications, past family history, past medical history, past social history, past surgical history and problem list.      Review of Systems  Constitutional: Negative for weight change, fevers, chills, night sweats, fatigue.  ENT/Mouth: Negative for hearing changes, ear pain, nasal congestion, sinus pain, hoarseness, sore throat, rhinorrhea, swallowing difficulty.   Eyes: Negative for pain, redness, discharge, vision changes.   Cardiovascular: Negative for chest pain, SOB, palpitations.   Respiratory: Negative for cough, sputum, wheezing, dyspnea.   Gastrointestinal: Negative for nausea, vomiting, diarrhea, constipation, pain, heartburn.  Genitourinary: Negative for dysuria, urinary frequency, hematuria.   Musculoskeletal: As per HPI.  Skin: Negative for skin rash currently, color changes.   Neuro: Negative for weakness, numbness, tingling, loss of consciousness.   Psych: Negative for anxiety, depression.   Heme/Lymph: Negative for easy bruising, bleeding, lymphadenopathy.        Past Medical History:   Diagnosis Date    Arthritis     Asthma     COPD (chronic obstructive pulmonary disease) ([facility]) 1990    Headache(784.0) 2019    Significant osteoarthrities in neck    HL (hearing loss) 2019    mild    Pneumonia 2019    Psoriatic arthritis  (HCC)     Septicemia (HCC)     Stomach problems        Past Surgical History:   Procedure Laterality Date    COLONOSCOPY      DENTAL IMPLANT      HERNIA REPAIR      INCISION AND DRAINAGE ABSCESS / HEMATOMA OF BURSA / KNEE / THIGH Left     thigh    [place] LAPAROSCOPY SURG PARTIAL NEPHRECTOMY Left 11/28/2022    Procedure: ROBOTIC LAPAROSCOPIC PARTIAL NEPHRECTOMY  WITH INTRAOPERATIVE ULTRASOUND INTERPRETATION;  Surgeon: Shane Barbosa MD;  [facility];  Service: Urology    KY RPR 1ST INGUN HRNA AGE 4 YRS/> REDUCIBLE Right 06/22/2021    Procedure: Inguinal hernia repair with mesh;  Surgeon: Robert Bloch, MD;  [facility];  Service: General       Social History     Socioeconomic History    Marital status: /Civil Union     Spouse name: Not on file    Number of children: Not on file    Years of education: Not on file    Highest education level: Not on file   Occupational History    Not on file   Tobacco Use    Smoking status: Never    Smokeless tobacco: Never   Vaping Use    Vaping status: Never Used   Substance and Sexual Activity    Alcohol use: Yes     Alcohol/week: 1.0 standard drink of alcohol     Types: 1 Glasses of wine per week     Comment: More like one glass per month socially    Drug use: Never    Sexual activity: Yes     Partners: Female     Birth control/protection: None     Comment: Monogamous,     Other Topics Concern    Not on file   Social History Narrative    Not on file     Social Determinants of Health     Financial Resource Strain: Not on file   Food Insecurity: Not on file   Transportation Needs: Not on file   Physical Activity: Not on file   Stress: Not on file   Social Connections: Not on file   Intimate Partner Violence: Not on file   Housing Stability: Not on file       Family History   Problem Relation Age of Onset    Coronary artery disease Father     Heart disease Father     Arthritis Mother     Hearing loss Mother     Heart disease Maternal Grandfather      "Dementia Maternal Grandmother     Heart disease Paternal Grandfather     Dementia Maternal Aunt        No Known Allergies      Current Outpatient Medications:     albuterol (PROVENTIL HFA,VENTOLIN HFA) 90 mcg/act inhaler, Inhale 2 puffs every 6 (six) hours as needed for wheezing Pt requests 2 inhalers, Disp: 18 g, Rfl: 5    ALPRAZolam (XANAX) 1 mg tablet, Take 1 tablet (1 mg total) by mouth 60 minutes pre-procedure, Disp: 1 tablet, Rfl: 0    amLODIPine (NORVASC) 5 mg tablet, TAKE ONE TABLET BY MOUTH EVERY DAY, Disp: 90 tablet, Rfl: 3    aspirin (ECOTRIN LOW STRENGTH) 81 mg EC tablet, Take 81 mg by mouth daily, Disp: , Rfl:     clobetasol (TEMOVATE) 0.05 % cream, Apply 0.25 g (0.05 Applications total) topically 2 (two) times a day as needed (rash), Disp: 60 g, Rfl: 5    fexofenadine (ALLEGRA) 60 MG tablet, Take 60 mg by mouth daily as needed , Disp: , Rfl:     ketoconazole (NIZORAL) 2 % cream, Apply topically daily as needed for rash, Disp: 15 g, Rfl: 5    methylphenidate (RITALIN) 10 mg tablet, TAKE TWO TABLETS IN THE MORNING, ONE TABLET AROUND NOON AND ONE TABLET IN AFTERNOON IF NEEDED Do not start before February 1, 2024., Disp: 120 tablet, Rfl: 0    atorvastatin (LIPITOR) 40 mg tablet, Take 1 tablet (40 mg total) by mouth daily (Patient not taking: Reported on 2/5/2024), Disp: 90 tablet, Rfl: 3    clotrimazole-betamethasone (LOTRISONE) 1-0.05 % cream, Apply topically 2 (two) times a day (Patient not taking: Reported on 2/5/2024), Disp: 30 g, Rfl: 5    EPINEPHrine (EPIPEN) 0.3 mg/0.3 mL SOAJ, Inject 0.3 mL (0.3 mg total) into a muscle once for 1 dose (Patient not taking: Reported on 1/27/2023), Disp: 1 each, Rfl: 1    zaleplon (SONATA) 10 MG capsule, Take 1 capsule (10 mg total) by mouth daily at bedtime, Disp: 30 capsule, Rfl: 2      Objective:    Vitals:    02/15/24 1224   BP: 128/82   Weight: 80.7 kg (178 lb)   Height: 5' 11\" (1.803 m)       Physical Exam  General: Well appearing, well nourished, in no " distress. Oriented x 3, normal mood and affect.  Ambulating without difficulty.  Skin: Good turgor, no rash, unusual bruising or prominent lesions.  Hair: Normal texture and distribution.  Nails: Normal color, no deformities.  No pits.   HEENT:  Head: Normocephalic, atraumatic.  Eyes: Conjunctiva clear, sclera non-icteric, EOM intact.  Nose: No external lesions.  Extremities: No amputations or deformities, cyanosis, edema.  Musculoskeletal:   There is no peripheral joint soft tissue swelling or tenderness noted today.  No enthesitis or dactylitis.  No spinal tenderness but he has significant limitation in range of motion in all directions of his cervical, thoracic and lumbar spine due to pain.  Neurologic: Alert and oriented. No focal neurological deficits appreciated.   Psychiatric: Normal mood and affect.       Kim Hoff M.D.  Rheumatology

## 2024-02-15 ENCOUNTER — OFFICE VISIT (OUTPATIENT)
Dept: RHEUMATOLOGY | Facility: CLINIC | Age: 64
End: 2024-02-15
Payer: COMMERCIAL

## 2024-02-15 ENCOUNTER — TELEPHONE (OUTPATIENT)
Dept: RHEUMATOLOGY | Facility: CLINIC | Age: 64
End: 2024-02-15

## 2024-02-15 VITALS
SYSTOLIC BLOOD PRESSURE: 128 MMHG | HEIGHT: 71 IN | BODY MASS INDEX: 24.92 KG/M2 | WEIGHT: 178 LBS | DIASTOLIC BLOOD PRESSURE: 82 MMHG

## 2024-02-15 DIAGNOSIS — L40.50 PSORIATIC ARTHROPATHY (HCC): Primary | ICD-10-CM

## 2024-02-15 DIAGNOSIS — L40.9 PSORIASIS: ICD-10-CM

## 2024-02-15 DIAGNOSIS — Z11.59 NEED FOR HEPATITIS C SCREENING TEST: ICD-10-CM

## 2024-02-15 DIAGNOSIS — M15.0 PRIMARY GENERALIZED (OSTEO)ARTHRITIS: ICD-10-CM

## 2024-02-15 DIAGNOSIS — Z79.60 LONG-TERM USE OF IMMUNOSUPPRESSANT MEDICATION: ICD-10-CM

## 2024-02-15 DIAGNOSIS — Z85.528 HISTORY OF RENAL CARCINOMA: ICD-10-CM

## 2024-02-15 DIAGNOSIS — Z11.1 SCREENING-PULMONARY TB: ICD-10-CM

## 2024-02-15 PROCEDURE — 99205 OFFICE O/P NEW HI 60 MIN: CPT | Performed by: INTERNAL MEDICINE

## 2024-02-15 NOTE — TELEPHONE ENCOUNTER
Please start prior authorization for subcutaneous Enbrel 50 mg once weekly for psoriatic arthritis and psoriasis.  He has a previously negative hepatitis panel and TB test.  He has previously tried methotrexate, sulfasalazine, Humira, Cosentyx and Tremfya.

## 2024-02-16 NOTE — TELEPHONE ENCOUNTER
PA for Enbrel    Submitted via    [x]CMM-KEY [id number] - PA Case ID: [id number]  []Surescripts-Case ID #    []Faxed to plan   []Other website    []Phone call Case ID #      Office notes sent, clinical questions answered. Awaiting determination    Turnaround time for your insurance to make a decision on your Prior Authorization can take 7-21 business days.

## 2024-02-16 NOTE — TELEPHONE ENCOUNTER
PA for Enbrel Approved   Date(s) approved 2/16/24-8/16/24  Case # [id number]    Patient advised by [x] Xoftt Message                      [] Phone call       Pharmacy advised by [x]Fax                                     []Phone call    Approval letter scanned into Media Yes      [facility]

## 2024-02-21 PROBLEM — R05.9 COUGH IN ADULT PATIENT: Status: RESOLVED | Noted: 2020-07-14 | Resolved: 2024-02-21

## 2024-02-28 ENCOUNTER — TELEPHONE (OUTPATIENT)
Dept: PSYCHIATRY | Facility: CLINIC | Age: 64
End: 2024-02-28

## 2024-02-28 DIAGNOSIS — F90.2 ATTENTION DEFICIT HYPERACTIVITY DISORDER (ADHD), COMBINED TYPE: ICD-10-CM

## 2024-02-28 RX ORDER — METHYLPHENIDATE HYDROCHLORIDE 10 MG/1
TABLET ORAL
Qty: 120 TABLET | Refills: 0 | Status: SHIPPED | OUTPATIENT
Start: 2024-03-01

## 2024-02-28 NOTE — TELEPHONE ENCOUNTER
Crispin left a  requesting a refill of methylphenidate. Please send to [facility] in [place]. He said he didn't need a return call.

## 2024-03-05 NOTE — TELEPHONE ENCOUNTER
Caller: pt    Doctor: martin    Reason for call: pt has procedure today and woke up sick can he still have his procedure?    Call back#: [phone]  
Please reschedule pt.  
Pt is rescheduled for 3/7/24  
S/W pt.  Pt has RFA procedure scheduled for today.  He has a cold that started 3 days ago.  He is losing his voice, has a really bad sore throat and cough.  Pt denies fever.  The last 1/2 hr his right eye became very inflamed.  He thinks he has pink eye.  His eye is swollen, weeping and it hurts.  Advised pt to call his PCP.  Pt stated he has asthma so he always has a z pack at home and he has eye drops at home.  Advised pt needs to reschedule and will have the  call him to reschedule.  Pt verbalized understanding.       Cancelled appt in [facility]  
Consent: The patient's consent was obtained including but not limited to risks of crusting, scabbing, blistering, scarring, darker or lighter pigmentary change, recurrence, incomplete removal and infection.
Application Tool (Optional): Liquid Nitrogen Sprayer
Show Aperture Variable?: Yes
Duration Of Freeze Thaw-Cycle (Seconds): 5
Render Post-Care Instructions In Note?: no
Post-Care Instructions: I reviewed with the patient in detail post-care instructions. Patient is to wear sunprotection, and avoid picking at any of the treated lesions. Pt may apply Vaseline to crusted or scabbing areas.
Detail Level: Detailed

## 2024-03-06 ENCOUNTER — APPOINTMENT (OUTPATIENT)
Dept: LAB | Facility: HOSPITAL | Age: 64
End: 2024-03-06
Payer: COMMERCIAL

## 2024-03-06 ENCOUNTER — HOSPITAL ENCOUNTER (OUTPATIENT)
Dept: RADIOLOGY | Facility: HOSPITAL | Age: 64
Discharge: HOME | End: 2024-03-06
Payer: COMMERCIAL

## 2024-03-06 DIAGNOSIS — Z79.60 LONG-TERM USE OF IMMUNOSUPPRESSANT MEDICATION: ICD-10-CM

## 2024-03-06 DIAGNOSIS — M15.0 PRIMARY GENERALIZED (OSTEO)ARTHRITIS: ICD-10-CM

## 2024-03-06 DIAGNOSIS — E78.2 MIXED HYPERLIPIDEMIA: ICD-10-CM

## 2024-03-06 DIAGNOSIS — L40.50 PSORIATIC ARTHROPATHY (HCC): ICD-10-CM

## 2024-03-06 DIAGNOSIS — Z11.59 NEED FOR HEPATITIS C SCREENING TEST: ICD-10-CM

## 2024-03-06 DIAGNOSIS — L40.9 PSORIASIS: ICD-10-CM

## 2024-03-06 DIAGNOSIS — Z11.1 SCREENING-PULMONARY TB: ICD-10-CM

## 2024-03-06 LAB
ALBUMIN SERPL BCP-MCNC: 4.3 G/DL (ref 3.5–5)
ALP SERPL-CCNC: 60 U/L (ref 34–104)
ALT SERPL W P-5'-P-CCNC: 24 U/L (ref 7–52)
ANION GAP SERPL CALCULATED.3IONS-SCNC: 6 MMOL/L
AST SERPL W P-5'-P-CCNC: 21 U/L (ref 13–39)
BASOPHILS # BLD AUTO: 0.03 THOUSANDS/ÂΜL (ref 0–0.1)
BASOPHILS NFR BLD AUTO: 1 % (ref 0–1)
BILIRUB SERPL-MCNC: 0.63 MG/DL (ref 0.2–1)
BUN SERPL-MCNC: 18 MG/DL (ref 5–25)
CALCIUM SERPL-MCNC: 9.6 MG/DL (ref 8.4–10.2)
CHLORIDE SERPL-SCNC: 103 MMOL/L (ref 96–108)
CHOLEST SERPL-MCNC: 167 MG/DL
CO2 SERPL-SCNC: 30 MMOL/L (ref 21–32)
CREAT SERPL-MCNC: 0.91 MG/DL (ref 0.6–1.3)
CRP SERPL QL: <1 MG/L
EOSINOPHIL # BLD AUTO: 0.06 THOUSAND/ÂΜL (ref 0–0.61)
EOSINOPHIL NFR BLD AUTO: 1 % (ref 0–6)
ERYTHROCYTE [DISTWIDTH] IN BLOOD BY AUTOMATED COUNT: 12.3 % (ref 11.6–15.1)
ERYTHROCYTE [SEDIMENTATION RATE] IN BLOOD: 16 MM/HOUR (ref 0–19)
GFR SERPL CREATININE-BSD FRML MDRD: 88 ML/MIN/1.73SQ M
GLUCOSE P FAST SERPL-MCNC: 96 MG/DL (ref 65–99)
HCT VFR BLD AUTO: 47.7 % (ref 36.5–49.3)
HDLC SERPL-MCNC: 48 MG/DL
HGB BLD-MCNC: 15.8 G/DL (ref 12–17)
IMM GRANULOCYTES # BLD AUTO: 0.01 THOUSAND/UL (ref 0–0.2)
IMM GRANULOCYTES NFR BLD AUTO: 0 % (ref 0–2)
LDLC SERPL CALC-MCNC: 96 MG/DL (ref 0–100)
LYMPHOCYTES # BLD AUTO: 1.78 THOUSANDS/ÂΜL (ref 0.6–4.47)
LYMPHOCYTES NFR BLD AUTO: 28 % (ref 14–44)
MCH RBC QN AUTO: 30.6 PG (ref 26.8–34.3)
MCHC RBC AUTO-ENTMCNC: 33.1 G/DL (ref 31.4–37.4)
MCV RBC AUTO: 92 FL (ref 82–98)
MONOCYTES # BLD AUTO: 0.46 THOUSAND/ÂΜL (ref 0.17–1.22)
MONOCYTES NFR BLD AUTO: 7 % (ref 4–12)
NEUTROPHILS # BLD AUTO: 4.1 THOUSANDS/ÂΜL (ref 1.85–7.62)
NEUTS SEG NFR BLD AUTO: 63 % (ref 43–75)
NRBC BLD AUTO-RTO: 0 /100 WBCS
PLATELET # BLD AUTO: 232 THOUSANDS/UL (ref 149–390)
PMV BLD AUTO: 9.6 FL (ref 8.9–12.7)
POTASSIUM SERPL-SCNC: 4.4 MMOL/L (ref 3.5–5.3)
PROT SERPL-MCNC: 7.3 G/DL (ref 6.4–8.4)
RBC # BLD AUTO: 5.16 MILLION/UL (ref 3.88–5.62)
SODIUM SERPL-SCNC: 139 MMOL/L (ref 135–147)
TRIGL SERPL-MCNC: 114 MG/DL
WBC # BLD AUTO: 6.44 THOUSAND/UL (ref 4.31–10.16)

## 2024-03-06 PROCEDURE — 80053 COMPREHEN METABOLIC PANEL: CPT

## 2024-03-06 PROCEDURE — 86704 HEP B CORE ANTIBODY TOTAL: CPT

## 2024-03-06 PROCEDURE — 86803 HEPATITIS C AB TEST: CPT

## 2024-03-06 PROCEDURE — 72200 X-RAY EXAM SI JOINTS: CPT

## 2024-03-06 PROCEDURE — 86480 TB TEST CELL IMMUN MEASURE: CPT

## 2024-03-06 PROCEDURE — 85652 RBC SED RATE AUTOMATED: CPT

## 2024-03-06 PROCEDURE — 86705 HEP B CORE ANTIBODY IGM: CPT

## 2024-03-06 PROCEDURE — 72050 X-RAY EXAM NECK SPINE 4/5VWS: CPT

## 2024-03-06 PROCEDURE — 87340 HEPATITIS B SURFACE AG IA: CPT

## 2024-03-06 PROCEDURE — 86140 C-REACTIVE PROTEIN: CPT

## 2024-03-06 PROCEDURE — 36415 COLL VENOUS BLD VENIPUNCTURE: CPT

## 2024-03-06 PROCEDURE — 80061 LIPID PANEL: CPT

## 2024-03-06 PROCEDURE — 85025 COMPLETE CBC W/AUTO DIFF WBC: CPT

## 2024-03-07 LAB
GAMMA INTERFERON BACKGROUND BLD IA-ACNC: 0.14 IU/ML
HBV CORE AB SER QL: NORMAL
HBV CORE IGM SER QL: NORMAL
HBV SURFACE AG SER QL: NORMAL
HCV AB SER QL: NORMAL
M TB IFN-G BLD-IMP: NEGATIVE
M TB IFN-G CD4+ BCKGRND COR BLD-ACNC: -0.04 IU/ML
M TB IFN-G CD4+ BCKGRND COR BLD-ACNC: -0.05 IU/ML
MITOGEN IGNF BCKGRD COR BLD-ACNC: 9.86 IU/ML

## 2024-03-14 ENCOUNTER — TELEPHONE (OUTPATIENT)
Dept: PAIN MEDICINE | Facility: MEDICAL CENTER | Age: 64
End: 2024-03-14

## 2024-03-14 ENCOUNTER — HOSPITAL ENCOUNTER (OUTPATIENT)
Dept: RADIOLOGY | Facility: MEDICAL CENTER | Age: 64
Discharge: HOME | End: 2024-03-14
Payer: COMMERCIAL

## 2024-03-14 VITALS
HEART RATE: 84 BPM | RESPIRATION RATE: 20 BRPM | SYSTOLIC BLOOD PRESSURE: 122 MMHG | OXYGEN SATURATION: 98 % | DIASTOLIC BLOOD PRESSURE: 84 MMHG | TEMPERATURE: 97.9 F

## 2024-03-14 DIAGNOSIS — M47.816 LUMBAR SPONDYLOSIS: ICD-10-CM

## 2024-03-14 PROCEDURE — 64635 DESTROY LUMB/SAC FACET JNT: CPT | Performed by: ANESTHESIOLOGY

## 2024-03-14 PROCEDURE — 64636 DESTROY L/S FACET JNT ADDL: CPT | Performed by: ANESTHESIOLOGY

## 2024-03-14 RX ORDER — BUPIVACAINE HCL/PF 2.5 MG/ML
2 VIAL (ML) INJECTION ONCE
Status: COMPLETED | OUTPATIENT
Start: 2024-03-14 | End: 2024-03-14

## 2024-03-14 RX ORDER — LIDOCAINE HYDROCHLORIDE 10 MG/ML
10 INJECTION, SOLUTION EPIDURAL; INFILTRATION; INTRACAUDAL; PERINEURAL ONCE
Status: COMPLETED | OUTPATIENT
Start: 2024-03-14 | End: 2024-03-14

## 2024-03-14 RX ORDER — METHYLPREDNISOLONE ACETATE 40 MG/ML
40 INJECTION, SUSPENSION INTRA-ARTICULAR; INTRALESIONAL; INTRAMUSCULAR; PARENTERAL; SOFT TISSUE ONCE
Status: COMPLETED | OUTPATIENT
Start: 2024-03-14 | End: 2024-03-14

## 2024-03-14 RX ADMIN — Medication 2 ML: at 14:54

## 2024-03-14 RX ADMIN — METHYLPREDNISOLONE ACETATE 40 MG: 40 INJECTION, SUSPENSION INTRA-ARTICULAR; INTRALESIONAL; INTRAMUSCULAR; PARENTERAL; SOFT TISSUE at 14:54

## 2024-03-14 RX ADMIN — Medication 1.5 ML: at 14:50

## 2024-03-14 RX ADMIN — LIDOCAINE HYDROCHLORIDE 10 ML: 10 INJECTION, SOLUTION EPIDURAL; INFILTRATION; INTRACAUDAL at 14:43

## 2024-03-14 NOTE — H&P
History of Present Illness: The patient is a 63 y.o. male who presents with complaints of back pain    Past Medical History:   Diagnosis Date    Arthritis     Asthma     COPD (chronic obstructive pulmonary disease) (HCC) 1990    Headache(784.0) 2019    Significant osteoarthrities in neck    HL (hearing loss) 2019    mild    Pneumonia 2019    Psoriatic arthritis (HCC)     Septicemia (HCC)     Stomach problems        Past Surgical History:   Procedure Laterality Date    COLONOSCOPY      DENTAL IMPLANT      HERNIA REPAIR      INCISION AND DRAINAGE ABSCESS / HEMATOMA OF BURSA / KNEE / THIGH Left     thigh    IA LAPAROSCOPY SURG PARTIAL NEPHRECTOMY Left 11/28/2022    Procedure: ROBOTIC LAPAROSCOPIC PARTIAL NEPHRECTOMY  WITH INTRAOPERATIVE ULTRASOUND INTERPRETATION;  Surgeon: Shane Barbosa MD;  Location: [facility];  Service: Urology    IA RPR 1ST INGUN HRNA AGE 5 YRS/> REDUCIBLE Right 06/22/2021    Procedure: Inguinal hernia repair with mesh;  Surgeon: Robert Bloch, MD;  Location: [facility];  Service: General         Current Outpatient Medications:     albuterol (PROVENTIL HFA,VENTOLIN HFA) 90 mcg/act inhaler, Inhale 2 puffs every 6 (six) hours as needed for wheezing Pt requests 2 inhalers, Disp: 18 g, Rfl: 5    ALPRAZolam (XANAX) 1 mg tablet, Take 1 tablet (1 mg total) by mouth 60 minutes pre-procedure, Disp: 1 tablet, Rfl: 0    amLODIPine (NORVASC) 5 mg tablet, TAKE ONE TABLET BY MOUTH EVERY DAY, Disp: 90 tablet, Rfl: 3    aspirin (ECOTRIN LOW STRENGTH) 81 mg EC tablet, Take 81 mg by mouth daily, Disp: , Rfl:     atorvastatin (LIPITOR) 40 mg tablet, Take 1 tablet (40 mg total) by mouth daily (Patient not taking: Reported on 2/5/2024), Disp: 90 tablet, Rfl: 3    clobetasol (TEMOVATE) 0.05 % cream, Apply 0.25 g (0.05 Applications total) topically 2 (two) times a day as needed (rash), Disp: 60 g, Rfl: 5    clotrimazole-betamethasone (LOTRISONE) 1-0.05 % cream, Apply topically 2 (two) times a day (Patient not taking:  Reported on 2/5/2024), Disp: 30 g, Rfl: 5    EPINEPHrine (EPIPEN) 0.3 mg/0.3 mL SOAJ, Inject 0.3 mL (0.3 mg total) into a muscle once for 1 dose (Patient not taking: Reported on 1/27/2023), Disp: 1 each, Rfl: 1    fexofenadine (ALLEGRA) 60 MG tablet, Take 60 mg by mouth daily as needed , Disp: , Rfl:     ketoconazole (NIZORAL) 2 % cream, Apply topically daily as needed for rash, Disp: 15 g, Rfl: 5    methylphenidate (RITALIN) 10 mg tablet, TAKE TWO TABLETS IN THE MORNING, ONE TABLET AROUND NOON AND ONE TABLET IN AFTERNOON IF NEEDED Do not start before March 1, 2024., Disp: 120 tablet, Rfl: 0    zaleplon (SONATA) 10 MG capsule, Take 1 capsule (10 mg total) by mouth daily at bedtime, Disp: 30 capsule, Rfl: 2    Current Facility-Administered Medications:     bupivacaine (PF) (MARCAINE) 0.25 % injection 2 mL, 2 mL, Perineural, Once, Will Sherrill Logan MD    lidocaine (PF) (XYLOCAINE-MPF) 1 % injection 10 mL, 10 mL, Infiltration, Once, Will Sherrill Logan MD    lidocaine (PF) (XYLOCAINE-MPF) 2 % injection 1.5 mL, 1.5 mL, Perineural, Once, Will Sherrill Logan MD    methylPREDNISolone acetate (DEPO-MEDROL) injection 40 mg, 40 mg, Perineural, Once, Will Sherrill Logan MD    No Known Allergies      Physical Exam:   Vitals:    03/14/24 1433   BP: 121/79   Resp: 20   Temp: 97.9 °F (36.6 °C)   SpO2: 97%       General: Awake, Alert, Oriented x 3, Mood and affect appropriate  Respiratory: Respirations even and unlabored  Cardiovascular: Peripheral pulses intact; no edema  Musculoskeletal Exam: pain with lumbar spine ROM    ASA Score: 2    Patient/Chart Verification  Patient ID Verified: Verbal  ID Band Applied: No  Consents Confirmed: Procedural, To be obtained in the Pre-Procedure area  H&P( within 30 days) Verified: To be obtained in the Pre-Procedure area  Interval H&P(within 24 hr) Complete (required for Outpatients and Surgery Admit only): To be obtained in the Pre-Procedure area  Allergies Reviewed: Yes  Anticoag/NSAID held?:  NA  Currently on antibiotics?: No    Assessment:   1. Lumbar spondylosis        Plan: RT L3-5 RFA

## 2024-03-14 NOTE — DISCHARGE INSTRUCTIONS

## 2024-03-15 NOTE — TELEPHONE ENCOUNTER
S/W pt. Pt stated current pain level is 2/10.  Pt stated needle sites look good, denies S&S of infection, denies fevers, denies soreness and denies sun burn like sensation.  Advised pt if he does get pain to take his prescribed or OTC pain medications and/or use ice/heat and that it takes 4 to 6 weeks to see the full effect.  Confirmed next appt w/ pt.  Pt verbalized understanding.

## 2024-03-25 ENCOUNTER — TELEPHONE (OUTPATIENT)
Dept: PSYCHIATRY | Facility: CLINIC | Age: 64
End: 2024-03-25

## 2024-03-25 ENCOUNTER — TELEPHONE (OUTPATIENT)
Age: 64
End: 2024-03-25

## 2024-03-25 DIAGNOSIS — L40.9 PSORIASIS: ICD-10-CM

## 2024-03-25 DIAGNOSIS — L40.50 PSORIATIC ARTHROPATHY (HCC): Primary | ICD-10-CM

## 2024-03-25 DIAGNOSIS — F90.2 ATTENTION DEFICIT HYPERACTIVITY DISORDER (ADHD), COMBINED TYPE: ICD-10-CM

## 2024-03-25 RX ORDER — METHYLPHENIDATE HYDROCHLORIDE 10 MG/1
TABLET ORAL
Qty: 120 TABLET | Refills: 0 | Status: SHIPPED | OUTPATIENT
Start: 2024-04-01

## 2024-03-25 NOTE — TELEPHONE ENCOUNTER
Received a VM at [facility] from Crispin requesting a refill of methylphenidate 10 mg tab. Please send to [facility].    Crispin said he's about ready to ask his PCP to order his medication as he can never reach anyone at the numbers he has.

## 2024-03-25 NOTE — TELEPHONE ENCOUNTER
Returned Igor's call and informed him that script was sent to pharmacy.  Igor was asking if it is easier to have his PCP take care of his scripts because he can never get through to anybody.  Offered him my direct line but I do not have VM.  Provided him with [address] office number and instructed him to call here since Dr Victoria is now at this location.

## 2024-03-25 NOTE — TELEPHONE ENCOUNTER
PMDP reviewed. Refill was sent to the patient's preferred pharmacy to be picked up on 4/1/24 or later.

## 2024-03-25 NOTE — TELEPHONE ENCOUNTER
Follow up call to Igor to inform him that Dr Victoria is fine with PCP prescribing his medications if he is willing to do so.  Since he has been stable on the medication, if PCP prescribes we can just discharge him from our practice unless he feels he still needs/wants our services.  Nothing further needed at this time.

## 2024-03-25 NOTE — TELEPHONE ENCOUNTER
Patient called to give an update. He stated he went an ablation and the procedure was very painful, but did not help. He would like to know if you would proceed with the Enbrel. Please advise. He is ok with being contacted via [employer].

## 2024-04-10 DIAGNOSIS — E78.2 MIXED HYPERLIPIDEMIA: ICD-10-CM

## 2024-04-10 DIAGNOSIS — T78.40XS ALLERGY, SEQUELA: ICD-10-CM

## 2024-04-10 DIAGNOSIS — J45.990 EXERCISE-INDUCED ASTHMA: ICD-10-CM

## 2024-04-11 RX ORDER — ALBUTEROL SULFATE 90 UG/1
2 AEROSOL, METERED RESPIRATORY (INHALATION) EVERY 6 HOURS PRN
Qty: 18 G | Refills: 1 | Status: SHIPPED | OUTPATIENT
Start: 2024-04-11

## 2024-04-11 RX ORDER — ATORVASTATIN CALCIUM 40 MG/1
40 TABLET, FILM COATED ORAL DAILY
Qty: 90 TABLET | Refills: 1 | Status: SHIPPED | OUTPATIENT
Start: 2024-04-11

## 2024-04-11 RX ORDER — EPINEPHRINE 0.3 MG/.3ML
0.3 INJECTION SUBCUTANEOUS ONCE
Qty: 1 EACH | Refills: 0 | Status: SHIPPED | OUTPATIENT
Start: 2024-04-11 | End: 2024-04-12

## 2024-04-12 ENCOUNTER — TELEMEDICINE (OUTPATIENT)
Dept: PSYCHIATRY | Facility: CLINIC | Age: 64
End: 2024-04-12

## 2024-04-12 DIAGNOSIS — F43.23 ADJUSTMENT DISORDER WITH MIXED ANXIETY AND DEPRESSED MOOD: ICD-10-CM

## 2024-04-12 DIAGNOSIS — F51.01 PRIMARY INSOMNIA: ICD-10-CM

## 2024-04-12 DIAGNOSIS — F90.2 ATTENTION DEFICIT HYPERACTIVITY DISORDER (ADHD), COMBINED TYPE: Primary | ICD-10-CM

## 2024-04-12 RX ORDER — METHYLPHENIDATE HYDROCHLORIDE 10 MG/1
TABLET ORAL
Qty: 120 TABLET | Refills: 0 | Status: SHIPPED | OUTPATIENT
Start: 2024-04-30

## 2024-04-12 RX ORDER — ZALEPLON 10 MG/1
10 CAPSULE ORAL
Qty: 30 CAPSULE | Refills: 2 | Status: SHIPPED | OUTPATIENT
Start: 2024-04-12 | End: 2024-07-11

## 2024-04-12 NOTE — PSYCH
Virtual Regular Visit    Verification of [place] location: [place]    Patient is located in the following state in which I hold an active license: [place]    Assessment/Plan:    Problem List Items Addressed This Visit          Behavioral Health    Attention deficit hyperactivity disorder (ADHD), combined type - Primary       Neurology/Sleep    Primary insomnia     Other Visit Diagnoses       Adjustment disorder with mixed anxiety and depressed mood                     Reason for visit is   Chief Complaint   Patient presents with    Medication Management    ADHD    Anxiety    Depression    Insomnia        Visit Time  Visit Start Time: 1:55 PM  Visit Stop Time: 2:20 PM  Total Visit Duration: 25 minutes    Encounter provider Vielka Victroia MD    Provider located at: [facility] [address], [address]  [place] [address]    Recent Visits  No visits were found meeting these conditions.  Showing recent visits within past 7 days and meeting all other requirements  Today's Visits  Date Type Provider Dept   04/12/24 [facility]medicine [facility]   Showing today's visits and meeting all other requirements  Future Appointments  No visits were found meeting these conditions.  Showing future appointments within next 150 days and meeting all other requirements       The patient was identified by name and date of birth. Crispin Fam was informed that this is a telemedicine visit and that the visit is being conducted through the [employer] platform. He agrees to proceed. My office door was closed. No one else was in the room. He acknowledged consent and understanding of privacy and security of the video platform. The patient has agreed to participate and understands they can discontinue the visit at any time. Patient is aware this is a billable service.       MEDICATION MANAGEMENT NOTE        [facility]      Name and Date of Birth:  Crispin Fam 63 y.o. 1960 MRN: [id number]    Date of Visit: April 12, 2024    Reason for Visit:   Chief Complaint   Patient presents with    Medication Management    ADHD    Anxiety    Depression    Insomnia       SUBJECTIVE:  The patient was visited virtually for Medication Management, ADHD, Anxiety, Depression, and Insomnia. Presented calm, and cooperative. Reported feeling good.  Denied any changes in sleep, appetite, concentration, energy level, or daily activities. Endorsed good control of ADHD with current regimen, but was frustrated with the delay in refills at times. He expatiated on his marital conflicts and psychosocial stressors. He noted that his RF ablation did not work for his pain, and visited a new rheumatologist, was started on Etanercept injections. Denied feelings of anhedonia, hopelessness, helplessness, worthlessness or guilt and appeared to be future oriented. There was no thought constriction related to death. Denied SI/HI, intent or plan upon direct inquiry at this time. Denied AV/H. No anxiety sxs, specific phobia or panic attacks reported. No manic sxs, paranoid ideations or fixed delusions were elicited. Endorsed good compliance with the medications and denied any side effects. Denied smoking cigarettes, drinking alcohol or other illicit substance use.    Given this presentation, medications are maintained at the same dosage. The patient was educated to call 911 or go to the nearest emergency room if the symptoms become overwhelming or unable to remain in control. Verbalized understanding and agreed to seek help in case of distress or concern for safety.    Review Of Systems:  Pertinent items are noted in HPI; all others are negative; no recent changes in medications or health status reported.        Past Psychiatric History Update:   - No inpatient psychiatric admission since last encounter  - No SA or SIB since last encounter  - No  incidence of violent behavior since last encounter    Past Trauma History Update:    - No new onset of abuse or traumatic events since last encounter     Past Medical History:    Past Medical History:   Diagnosis Date    Arthritis     Asthma     COPD (chronic obstructive pulmonary disease) (HCC) 1990    Headache(784.0) 2019    Significant osteoarthrities in neck    HL (hearing loss) 2019    mild    Pneumonia 2019    Psoriatic arthritis (HCC)     Septicemia (HCC)     Stomach problems         Past Surgical History:   Procedure Laterality Date    COLONOSCOPY      DENTAL IMPLANT      HERNIA REPAIR      INCISION AND DRAINAGE ABSCESS / HEMATOMA OF BURSA / KNEE / THIGH Left     thigh    NE LAPAROSCOPY SURG PARTIAL NEPHRECTOMY Left 11/28/2022    Procedure: ROBOTIC LAPAROSCOPIC PARTIAL NEPHRECTOMY  WITH INTRAOPERATIVE ULTRASOUND INTERPRETATION;  Surgeon: Shane Barbosa MD;  Location: [facility];  Service: Urology    NE RPR 1ST INGUN HRNA AGE 4 YRS/> REDUCIBLE Right 06/22/2021    Procedure: Inguinal hernia repair with mesh;  Surgeon: Robert Bloch, MD;  Location: [facility];  Service: General     No Known Allergies    Substance Abuse History:    Social History     Substance and Sexual Activity   Alcohol Use Yes    Alcohol/week: 1.0 standard drink of alcohol    Types: 1 Glasses of wine per week    Comment: More like one glass per month socially     Social History     Substance and Sexual Activity   Drug Use Never       Social History:    Social History     Socioeconomic History    Marital status: /Civil Union     Spouse name: Not on file    Number of children: Not on file    Years of education: Not on file    Highest education level: Not on file   Occupational History    Not on file   Tobacco Use    Smoking status: Never    Smokeless tobacco: Never   Vaping Use    Vaping status: Never Used   Substance and Sexual Activity    Alcohol use: Yes     Alcohol/week: 1.0 standard drink of alcohol     Types: 1 Glasses of wine  per week     Comment: More like one glass per month socially    Drug use: Never    Sexual activity: Yes     Partners: Female     Birth control/protection: None     Comment: Monogamous,     Other Topics Concern    Not on file   Social History Narrative    Not on file     Social Determinants of Health     Financial Resource Strain: Not on file   Food Insecurity: Not on file   Transportation Needs: Not on file   Physical Activity: Not on file   Stress: Not on file   Social Connections: Not on file   Intimate Partner Violence: Not on file   Housing Stability: Not on file       Family Psychiatric History:     Family History   Problem Relation Age of Onset    Coronary artery disease Father     Heart disease Father     Arthritis Mother     Hearing loss Mother     Heart disease Maternal Grandfather     Dementia Maternal Grandmother     Heart disease Paternal Grandfather     Dementia Maternal Aunt        History Review: The following portions of the patient's history were reviewed and updated as appropriate: allergies, current medications, past family history, past medical history, past social history, past surgical history and problem list.       OBJECTIVE:     Vital signs in last 24 hours: Not checked - virtual visit    Mental Status Evaluation:  Appearance and attitude: appeared as stated age, cooperative and attentive, casually dressed with good hygiene  Eye contact: good  Motor Function: within normal limits, No PMA/PMR  Gait/station: Not observed  Speech: normal for rate, rhythm, volume, latency, amount  Language: No overt abnormality  Mood/affect: euthymic / Affect was euthymic, reactive, in full range, normal intensity and mood congruent  Thought Processes: sequential and goal-directed  Thought content: denies suicidal ideation or homicidal ideation; no delusions or first rank symptoms  Associations: intact associations  Perceptual disturbances: denies Auditory/Visual/Tactile Hallucinations  Orientation:  oriented to time, person, place and to the situational context  Cognitive Function: intact  Memory: recent and remote memory grossly intact  Intellect: average  Fund of knowledge: aware of current events, aware of past history, and vocabulary average  Impulse control: good  Insight/judgment: good/good      Laboratory Results: I have personally reviewed all pertinent laboratory/tests results    Recent Labs (last 2 months):   Appointment on 03/06/2024   Component Date Value    Cholesterol 03/06/2024 167     Triglycerides 03/06/2024 114     HDL, Direct 03/06/2024 48     LDL Calculated 03/06/2024 96     WBC 03/06/2024 6.44     RBC 03/06/2024 5.16     Hemoglobin 03/06/2024 15.8     Hematocrit 03/06/2024 47.7     MCV 03/06/2024 92     MCH 03/06/2024 30.6     MCHC 03/06/2024 33.1     RDW 03/06/2024 12.3     MPV 03/06/2024 9.6     Platelets 03/06/2024 232     nRBC 03/06/2024 0     Segmented % 03/06/2024 63     Immature Grans % 03/06/2024 0     Lymphocytes % 03/06/2024 28     Monocytes % 03/06/2024 7     Eosinophils Relative 03/06/2024 1     Basophils Relative 03/06/2024 1     Absolute Neutrophils 03/06/2024 4.10     Absolute Immature Grans 03/06/2024 0.01     Absolute Lymphocytes 03/06/2024 1.78     Absolute Monocytes 03/06/2024 0.46     Eosinophils Absolute 03/06/2024 0.06     Basophils Absolute 03/06/2024 0.03     Sodium 03/06/2024 139     Potassium 03/06/2024 4.4     Chloride 03/06/2024 103     CO2 03/06/2024 30     ANION GAP 03/06/2024 6     BUN 03/06/2024 18     Creatinine 03/06/2024 0.91     Glucose, Fasting 03/06/2024 96     Calcium 03/06/2024 9.6     AST 03/06/2024 21     ALT 03/06/2024 24     Alkaline Phosphatase 03/06/2024 60     Total Protein 03/06/2024 7.3     Albumin 03/06/2024 4.3     Total Bilirubin 03/06/2024 0.63     eGFR 03/06/2024 88     CRP 03/06/2024 <1.0     Sed Rate 03/06/2024 16     Hepatitis B Surface Ag 03/06/2024 Non-reactive     Hepatitis C Ab 03/06/2024 Non-reactive     Hep B C IgM 03/06/2024  Non-reactive     Hep B Core Total Ab 03/06/2024 Non-reactive     QFT Nil 03/06/2024 0.14     QFT TB1-NIL 03/06/2024 -0.05     QFT TB2-NIL 03/06/2024 -0.04     QFT Mitogen-NIL 03/06/2024 9.86     QFT Final Interpretation 03/06/2024 Negative          Assessment/Plan:   A 63 y.o.  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 The patient presented w/ h/o ADHD since 1999, being on Ritalin 60mg daily since then, w/ marked improvement of attention, concentration and his function since being treated. Denied any excessive anxiety, depressed/manic sxs, A/VH or other psychotic sxs. No h/o substance abuse. Denied SI/HI, intent or plan upon direct inquiry at this time. WALTER-7: 4. His current presentation meets criteria for ADHD, by history. The patient was educated about the diagnosis of ADHD and the course of ADHD in adults which may present with decreased intensity of sxs, onesimo after age 50, and particularly after age 65. Psycho-education regarding Ritalin indications, benefits, risks, side effects, and alternative options provided to the patient, and the importance of the compliance with psychiatric treatment reiterated. The patient was educated about the potential long-term side effects of ritalin in older age, onesimo cardiac side effects (given the FH of heart attack in his father at age 36), and was educated about the possibility of tapering off or if becoming symptomatic,  "cross-tapering to different treatment options. He verbalized understanding and agreed to the proposed regimen. Ritalin dose was decreased from 20mg TID (60mg total), to 20/20/10mg (50 mg total); and then switched to equivalent dose of Concerta 72 mg po daily to be tapered down as tolerated. The patient did not tolerate switching Ritalin to Concerta due to high BP, and on 11/5/2021 requested to be switched back to Ritalin 20mg/20mg/10mg as last prescribed (tapered down from 20 mg TID). Upon f/u on 12/28/2021, presented w/ well-controlled ADHD sxs, and stable mood and anxiety. The patient was diagnosed with complex cyst in L kidney and is going to have partial nephrectomy concerning for possible malignancy on 11/28/22, and also his internist recommended to discontinue Ritalin for HTN and high risk of cardia events given the positive FH and age. Ritalin was tapered off by 10 mg weekly and was started on Strattera 40 mg po daily for 1 week and then 40 mg po BID for ADHD. Upon f/u, the patient reported relatively controlled ADHD sxs with Strattera 40 mg BID and maintained on the same dose. However, the patient was visited on 3/20/23 as requested an earlier appointment and reported poor control of ADHD sxs with side effect of \"terrible dry mouth\". Presented w/ a persistent pattern of impaired concentration and difficulty sustaining attention, failure to finish duties on time, having difficulty organizing tasks and activities, losing things, and getting easily distracted by external stimuli, figeting with hands, tapping feet or squirming in seat, and talking fast, interrupting others during the conversation and at times moving around as well as having difficulties to wait. Strattera tapered off and started on Ritalin 10 mg TID which later uptitrated to 20/10/10 mg on 4/27/23. Also, the patient was started on Lexapro 10 mg daily for depression and anxiety in the context of multiple psychosocial stressors. The patient " stopped taking Lexapro after 8-10 weeks, and presented with moderately severe depression. Agreed to consider Cymbalta (helping with depression, anxiety and pain), but reportedly did not start the medication as had URI and procedures for pain management and then felt better and is not willing to start it at the moment. Will continue individual therapy and recommended to consider couple's therapy and sex-therapy      Diagnoses and all orders for this visit:    Attention deficit hyperactivity disorder (ADHD), combined type    Adjustment disorder with mixed anxiety and depressed mood    Primary insomnia          Impression:  1. Attention deficit hyperactivity disorder (ADHD), combined type        2. Adjustment disorder with mixed anxiety and depressed mood        3. Primary insomnia            Treatment Recommendations/Precautions:  - Tapered off Strattera due to side effects, successfully  - Continue Ritalin 20/10/10 mg for ADHD (previously was stable on 20/20/10 mg)  - Continue Sonata 10 mg po nightly PRN for insomnia (has been sleeping well taking Melatonin recently and did not need a refill)  - Discontinued Lexapro due to sexual side effects and non-compliance; discontinued Cymbalta as the patient did not start and is not willing to initiate at this time  - Continue individual therapy; consider sex therapy / couple's therapy  - Medications sent to patient's pharmacy for 30 day supply      - Psychoeducation provided to the patient and benefits, potential risks and side effects discussed; importance of compliance with the psychiatric treatment reiterated, and the patient verbalized understanding of the matter     - RTC in 24 weeks  - Educated about healthy life style, risk of falls/sedation and addiction. Patient was receptive to education.  - The patient was educated about 24 hour and weekend coverage for urgent situations accessed by calling [facility] main practice number  - Patient was  educated to call National Suicide Prevention Lifeline (2-598-491-YEWO [4245]) for behavioral crisis at anytime or 911 for any safety concerns, or go to nearest ER if his symptoms become overwhelming or unmanageable.    Current Outpatient Medications   Medication Sig Dispense Refill    albuterol (PROVENTIL HFA,VENTOLIN HFA) 90 mcg/act inhaler Inhale 2 puffs every 6 (six) hours as needed for wheezing Pt requests 2 inhalers 18 g 1    ALPRAZolam (XANAX) 1 mg tablet Take 1 tablet (1 mg total) by mouth 60 minutes pre-procedure 1 tablet 0    amLODIPine (NORVASC) 5 mg tablet TAKE ONE TABLET BY MOUTH EVERY DAY 90 tablet 3    aspirin (ECOTRIN LOW STRENGTH) 81 mg EC tablet Take 81 mg by mouth daily      atorvastatin (LIPITOR) 40 mg tablet Take 1 tablet (40 mg total) by mouth daily 90 tablet 1    clobetasol (TEMOVATE) 0.05 % cream Apply 0.25 g (0.05 Applications total) topically 2 (two) times a day as needed (rash) 60 g 5    clotrimazole-betamethasone (LOTRISONE) 1-0.05 % cream Apply topically 2 (two) times a day (Patient not taking: Reported on 2/5/2024) 30 g 5    EPINEPHrine (EPIPEN) 0.3 mg/0.3 mL SOAJ Inject 0.3 mL (0.3 mg total) into a muscle once for 1 dose 1 each 0    etanercept (ENBREL SURECLICK) 50 MG/ML injection Inject 1 mL (50 mg total) under the skin once a week 4 mL 5    fexofenadine (ALLEGRA) 60 MG tablet Take 60 mg by mouth daily as needed       ketoconazole (NIZORAL) 2 % cream Apply topically daily as needed for rash 15 g 5    methylphenidate (RITALIN) 10 mg tablet TAKE TWO TABLETS IN THE MORNING, ONE TABLET AROUND NOON AND ONE TABLET IN AFTERNOON IF NEEDED Do not start before April 1, 2024. 120 tablet 0    zaleplon (SONATA) 10 MG capsule Take 1 capsule (10 mg total) by mouth daily at bedtime 30 capsule 2     No current facility-administered medications for this visit.         Medications Risks/Benefits      Risks, Benefits And Possible Side Effects Of Medications:    Risks, benefits, and possible side effects  of medications explained to Crispin and he verbalizes understanding and agreement for treatment.    Controlled Medication Discussion:     Crispin has been filling controlled prescriptions on time as prescribed according to [place] Prescription Drug Monitoring Program  Discussed with Crispin the risks of sedation, respiratory depression, impairment of ability to drive and potential for abuse and addiction related to treatment with benzodiazepine medications. He understands risk of treatment with benzodiazepine medications, agrees to not drive if feels impaired and agrees to take medications as prescribed  Discussed with Crispin Black Box warning on concurrent use of benzodiazepines and opioid medications including sedation, respiratory depression, coma and death. He understands the risk of treatment with benzodiazepines in addition to opioids and wants to continue taking those medications  PMDP reviewed. Psycho-education regarding stimulants indications, benefits, risks (including risk of addiction, onesimo if taking more than prescribed), side effects (including but not limited to palpitation/arrhythmia, weight loss and increased anxiety), and alternative options provided to the patient, and the importance of the compliance with psychiatric treatment reiterated. The patient verbalized understanding and agreed to the proposed regimen.     Psychotherapy Provided:     Individual psychotherapy provided: Yes  Counseling was provided during the session today for 16 minutes.   Psychoeducation provided to the patient and was educated about the importance of compliance with the medications and psychiatric treatment  Solution Focused Brief Therapy (SFBT) provided  Patient's emotions were validated and specific labeled praise provided.   [place] suggestions were offered in a supportive non-critical way.   Cognitive Behavioral Therapy and supportive expressive interventions    Treatment Plan:    Completed and signed during  the session: Not applicable - Treatment Plan not due at this session    Vielka Victoria MD 04/12/24      This note was completed in part utilizing Dragon dictation Software. Grammatical, translation, syntax errors, random word insertions, spelling mistakes, and incomplete sentences may be an occasional consequence of this system secondary to software limitations with voice recognition, ambient noise, and hardware issues. If you have any questions or concerns about the content, text, or information contained within the body of this dictation, please contact the provider for clarification.

## 2024-05-09 ENCOUNTER — APPOINTMENT (OUTPATIENT)
Dept: LAB | Facility: HOSPITAL | Age: 64
End: 2024-05-09
Payer: COMMERCIAL

## 2024-05-09 ENCOUNTER — HOSPITAL ENCOUNTER (OUTPATIENT)
Dept: RADIOLOGY | Facility: HOSPITAL | Age: 64
End: 2024-05-09
Payer: COMMERCIAL

## 2024-05-09 DIAGNOSIS — C64.2 RENAL CELL CARCINOMA OF LEFT KIDNEY (HCC): ICD-10-CM

## 2024-05-09 PROCEDURE — 71046 X-RAY EXAM CHEST 2 VIEWS: CPT

## 2024-05-13 ENCOUNTER — OFFICE VISIT (OUTPATIENT)
Dept: INTERNAL MEDICINE CLINIC | Facility: CLINIC | Age: 64
End: 2024-05-13
Payer: COMMERCIAL

## 2024-05-13 VITALS
BODY MASS INDEX: 25.31 KG/M2 | OXYGEN SATURATION: 97 % | DIASTOLIC BLOOD PRESSURE: 78 MMHG | RESPIRATION RATE: 16 BRPM | HEART RATE: 57 BPM | WEIGHT: 180.8 LBS | SYSTOLIC BLOOD PRESSURE: 124 MMHG | HEIGHT: 71 IN

## 2024-05-13 DIAGNOSIS — Z23 ENCOUNTER FOR IMMUNIZATION: ICD-10-CM

## 2024-05-13 DIAGNOSIS — I25.10 CORONARY ARTERY DISEASE INVOLVING NATIVE CORONARY ARTERY OF NATIVE HEART WITHOUT ANGINA PECTORIS: ICD-10-CM

## 2024-05-13 DIAGNOSIS — J45.990 EXERCISE-INDUCED ASTHMA: ICD-10-CM

## 2024-05-13 DIAGNOSIS — I10 PRIMARY HYPERTENSION: Primary | ICD-10-CM

## 2024-05-13 DIAGNOSIS — E78.2 MIXED HYPERLIPIDEMIA: ICD-10-CM

## 2024-05-13 DIAGNOSIS — L40.50 PSORIATIC ARTHRITIS (HCC): ICD-10-CM

## 2024-05-13 DIAGNOSIS — D84.9 IMMUNOCOMPROMISED (HCC): ICD-10-CM

## 2024-05-13 DIAGNOSIS — Z00.00 WELLNESS EXAMINATION: ICD-10-CM

## 2024-05-13 DIAGNOSIS — C64.2 RENAL CELL CARCINOMA OF LEFT KIDNEY (HCC): ICD-10-CM

## 2024-05-13 DIAGNOSIS — F90.2 ATTENTION DEFICIT HYPERACTIVITY DISORDER (ADHD), COMBINED TYPE: ICD-10-CM

## 2024-05-13 PROCEDURE — 99214 OFFICE O/P EST MOD 30 MIN: CPT | Performed by: INTERNAL MEDICINE

## 2024-05-13 PROCEDURE — 99396 PREV VISIT EST AGE 40-64: CPT | Performed by: INTERNAL MEDICINE

## 2024-05-13 NOTE — ASSESSMENT & PLAN NOTE
Discussed preventative health, cancer screening, immunizations, and safety issues.  Patient had Cologuard June 4, 2022 with recommendations recheck again in 3 years.  Patient had both Shingrix vaccines.  Patient had Tdap vaccination June 26, 2017.  I recommend Prevnar 20 in the near future.  Recent labs reviewed with patient

## 2024-05-13 NOTE — ASSESSMENT & PLAN NOTE
Patient reports having some adverse side effects Enbrel including fogginess of cognition, cough, dizziness, he is currently off it for a 2-week.  To see how he does, and then he will report back to rheumatology.  He has been off it for 2 weeks, symptoms seem to have improved.

## 2024-05-13 NOTE — PATIENT INSTRUCTIONS
Problem List Items Addressed This Visit          Cardiovascular and Mediastinum    HTN (hypertension) - Primary     Blood pressure is controlled, continue medication along with healthy diet and exercise         Coronary artery disease involving native coronary artery of native heart without angina pectoris     No cardiopulmonary complaints, follow-up cardiology            Respiratory    Exercise-induced asthma     Patient has albuterol inhaler to use as needed            Musculoskeletal and Integument    Psoriatic arthritis (HCC)     Patient reports having some adverse side effects Enbrel including fogginess of cognition, cough, dizziness, he is currently off it for a 2-week.  To see how he does, and then he will report back to rheumatology.  He has been off it for 2 weeks, symptoms seem to have improved.            Genitourinary    Renal cell carcinoma of left kidney (HCC)     Patient had partial nephrectomy on the left            Behavioral Health    Attention deficit hyperactivity disorder (ADHD), combined type     Continue Ritalin as per psychiatry            Other    Mixed hyperlipidemia     Continue atorvastatin along with healthy diet and exercise         Wellness examination     Discussed preventative health, cancer screening, immunizations, and safety issues.  Patient had Cologuard June 4, 2022 with recommendations recheck again in 3 years.  Patient had both Shingrix vaccines.  Patient had Tdap vaccination June 26, 2017.  I recommend Prevnar 20 in the near future.  Recent labs reviewed with patient         Immunocompromised (HCC)     Patient is on Enbrel, follow-up rheumatology          Other Visit Diagnoses       Encounter for immunization

## 2024-05-13 NOTE — PROGRESS NOTES
Name: Crispin Fam      : 1960      MRN: [id number]  Encounter Provider: Demetrio Rios MD  Encounter Date: 2024   Enc[facility] department: [facility]    Assessment & Plan     1. Primary hypertension  Assessment & Plan:  Blood pressure is controlled, continue medication along with healthy diet and exercise      2. Encounter for immunization    3. Wellness examination  Assessment & Plan:  Discussed preventative health, cancer screening, immunizations, and safety issues.  Patient had Cologuard 2022 with recommendations recheck again in 3 years.  Patient had both Shingrix vaccines.  Patient had Tdap vaccination 2017.  I recommend Prevnar 20 in the near future.  Recent labs reviewed with patient      4. Mixed hyperlipidemia  Assessment & Plan:  Continue atorvastatin along with healthy diet and exercise      5. Psoriatic arthritis (HCC)  Assessment & Plan:  Patient reports having some adverse side effects Enbrel including fogginess of cognition, cough, dizziness, he is currently off it for a 2-week.  To see how he does, and then he will report back to rheumatology.  He has been off it for 2 weeks, symptoms seem to have improved.      6. Exercise-induced asthma  Assessment & Plan:  Patient has albuterol inhaler to use as needed      7. Coronary artery disease involving native coronary artery of native heart without angina pectoris  Assessment & Plan:  No cardiopulmonary complaints, follow-up cardiology      8. Attention deficit hyperactivity disorder (ADHD), combined type  Assessment & Plan:  Continue Ritalin as per psychiatry      9. Renal cell carcinoma of left kidney (HCC)  Assessment & Plan:  Patient had partial nephrectomy on the left      10. Immunocompromised (HCC)  Assessment & Plan:  Patient is on Enbrel, follow-up rheumatology          Depression Screening and Follow-up Plan: Patient was screened for depression during today's encounter. They screened  negative with a PHQ-2 score of 2.        Subjective     Wellness: No smoking cigarettes, patient gets routine dental care, patient is active, eats a healthy diet      Review of Systems   Constitutional:  Negative for chills, fatigue and fever.   HENT:  Negative for congestion, nosebleeds, postnasal drip, sore throat and trouble swallowing.    Eyes:  Negative for pain.   Respiratory:  Negative for cough, chest tightness, shortness of breath and wheezing.    Cardiovascular:  Negative for chest pain, palpitations and leg swelling.   Gastrointestinal:  Negative for abdominal pain, constipation, diarrhea, nausea and vomiting.   Endocrine: Negative for polydipsia and polyuria.   Genitourinary:  Negative for dysuria, flank pain and hematuria.   Musculoskeletal:  Negative for arthralgias.   Skin:  Negative for rash.   Neurological:  Negative for dizziness, tremors, light-headedness and headaches.   Hematological:  Does not bruise/bleed easily.   Psychiatric/Behavioral:  Negative for confusion and dysphoric mood. The patient is not nervous/anxious.        Past Medical History:   Diagnosis Date   • Arthritis    • Asthma    • COPD (chronic obstructive pulmonary disease) (HCC) 1990   • Headache(784.0) 2019    Significant osteoarthrities in neck   • HL (hearing loss) 2019    mild   • Pneumonia 2019   • Psoriatic arthritis (HCC)    • Septicemia (HCC)    • Stomach problems      Past Surgical History:   Procedure Laterality Date   • COLONOSCOPY     • DENTAL IMPLANT     • HERNIA REPAIR     • INCISION AND DRAINAGE ABSCESS / HEMATOMA OF BURSA / KNEE / THIGH Left     thigh   • ID LAPAROSCOPY SURG PARTIAL NEPHRECTOMY Left 11/28/2022    Procedure: ROBOTIC LAPAROSCOPIC PARTIAL NEPHRECTOMY  WITH INTRAOPERATIVE ULTRASOUND INTERPRETATION;  Surgeon: Shane Barbosa MD;  Location: [facility];  Service: Urology   • ID RPR 1ST INGUN HRNA AGE 5 YRS/> REDUCIBLE Right 06/22/2021    Procedure: Inguinal hernia repair with mesh;  Surgeon: Derik  Bloch, MD;  Location:  MAIN OR;  Service: General     Family History   Problem Relation Age of Onset   • Coronary artery disease Father    • Heart disease Father    • Arthritis Mother    • Hearing loss Mother    • Heart disease Maternal Grandfather    • Dementia Maternal Grandmother    • Heart disease Paternal Grandfather    • Dementia Maternal Aunt      Social History     Socioeconomic History   • Marital status: /Civil Union     Spouse name: None   • Number of children: None   • Years of education: None   • Highest education level: None   Occupational History   • None   Tobacco Use   • Smoking status: Never   • Smokeless tobacco: Never   Vaping Use   • Vaping status: Never Used   Substance and Sexual Activity   • Alcohol use: Yes     Alcohol/week: 1.0 standard drink of alcohol     Types: 1 Glasses of wine per week     Comment: More like one glass per month socially   • Drug use: Never   • Sexual activity: Yes     Partners: Female     Birth control/protection: None     Comment: Monogamous,     Other Topics Concern   • None   Social History Narrative   • None     Social Determinants of Health     Financial Resource Strain: Not on file   Food Insecurity: Not on file   Transportation Needs: Not on file   Physical Activity: Not on file   Stress: Not on file   Social Connections: Not on file   Intimate Partner Violence: Not on file   Housing Stability: Not on file     Current Outpatient Medications on File Prior to Visit   Medication Sig   • albuterol (PROVENTIL HFA,VENTOLIN HFA) 90 mcg/act inhaler Inhale 2 puffs every 6 (six) hours as needed for wheezing Pt requests 2 inhalers   • amLODIPine (NORVASC) 5 mg tablet TAKE ONE TABLET BY MOUTH EVERY DAY   • aspirin (ECOTRIN LOW STRENGTH) 81 mg EC tablet Take 81 mg by mouth daily   • atorvastatin (LIPITOR) 40 mg tablet Take 1 tablet (40 mg total) by mouth daily   • clobetasol (TEMOVATE) 0.05 % cream Apply 0.25 g (0.05 Applications total) topically 2 (two)  "times a day as needed (rash)   • clotrimazole-betamethasone (LOTRISONE) 1-0.05 % cream Apply topically 2 (two) times a day   • etanercept (ENBREL SURECLICK) 50 MG/ML injection Inject 1 mL (50 mg total) under the skin once a week   • fexofenadine (ALLEGRA) 60 MG tablet Take 60 mg by mouth daily as needed    • ketoconazole (NIZORAL) 2 % cream Apply topically daily as needed for rash   • methylphenidate (RITALIN) 10 mg tablet TAKE TWO TABLETS IN THE MORNING, ONE TABLET AROUND NOON AND ONE TABLET IN AFTERNOON IF NEEDED Do not start before April 30, 2024.   • zaleplon (SONATA) 10 MG capsule Take 1 capsule (10 mg total) by mouth daily at bedtime   • EPINEPHrine (EPIPEN) 0.3 mg/0.3 mL SOAJ Inject 0.3 mL (0.3 mg total) into a muscle once for 1 dose     No Known Allergies  Immunization History   Administered Date(s) Administered   • COVID-19 J&J ([employer]) vaccine 0.5 mL 04/08/2021   • COVID-19 MODERNA VACC 0.5 ML IM 12/29/2021   • INFLUENZA 10/05/2007, 11/03/2013, 11/10/2015, 11/22/2016, 09/26/2017, 10/18/2018, 11/04/2020, 10/18/2022, 12/11/2023   • Pneumococcal Polysaccharide PPV23 01/15/2019   • Tdap 09/26/2017   • Zoster Vaccine Recombinant 07/17/2019, 09/27/2019   • influenza, injectable, quadrivalent 11/04/2020       Objective     /78   Pulse 57   Resp 16   Ht 5' 11\" (1.803 m)   Wt 82 kg (180 lb 12.8 oz)   SpO2 97%   BMI 25.22 kg/m²     Physical Exam  Vitals reviewed.   Constitutional:       General: He is not in acute distress.     Appearance: Normal appearance. He is well-developed.   HENT:      Head: Normocephalic and atraumatic.      Right Ear: External ear normal.      Left Ear: External ear normal.   Eyes:      General: No scleral icterus.     Conjunctiva/sclera: Conjunctivae normal.   Neck:      Thyroid: No thyromegaly.      Trachea: No tracheal deviation.   Cardiovascular:      Rate and Rhythm: Normal rate and regular rhythm.      Heart sounds: Normal heart sounds. No murmur heard.  Pulmonary:      " Effort: Pulmonary effort is normal. No respiratory distress.      Breath sounds: Normal breath sounds. No wheezing or rales.   Abdominal:      General: Bowel sounds are normal.      Palpations: Abdomen is soft.      Tenderness: There is no abdominal tenderness. There is no guarding or rebound.      Hernia: There is no hernia in the left inguinal area or right inguinal area.   Genitourinary:     Testes: Normal.   Musculoskeletal:      Cervical back: Normal range of motion and neck supple.      Right lower leg: No edema.      Left lower leg: No edema.   Lymphadenopathy:      Cervical: No cervical adenopathy.   Skin:     Coloration: Skin is not jaundiced or pale.   Neurological:      General: No focal deficit present.      Mental Status: He is alert and oriented to person, place, and time.   Psychiatric:         Mood and Affect: Mood normal.         Behavior: Behavior normal.         Thought Content: Thought content normal.         Judgment: Judgment normal.       Demetrio Rios MD

## 2024-05-28 DIAGNOSIS — F90.2 ATTENTION DEFICIT HYPERACTIVITY DISORDER (ADHD), COMBINED TYPE: ICD-10-CM

## 2024-05-28 RX ORDER — METHYLPHENIDATE HYDROCHLORIDE 10 MG/1
TABLET ORAL
Qty: 120 TABLET | Refills: 0 | Status: SHIPPED | OUTPATIENT
Start: 2024-05-28

## 2024-05-28 NOTE — TELEPHONE ENCOUNTER
Medication Refill Request     Name of Medication ritalin  Dose/Frequency 10mg take 2 tablets in the morning,one tablet around noon and 1 tablet in afternoon if needed  Quantity 120  Verified pharmacy   [x]  Verified ordering Provider   [x]  Does patient have enough for the next 3 days? Yes [x] No []  Does patient have a follow-up appointment scheduled? Yes [x] No []   If so when is appointment: 9/27/2024 1:30pm

## 2024-06-12 ENCOUNTER — OFFICE VISIT (OUTPATIENT)
Dept: CARDIOLOGY CLINIC | Facility: CLINIC | Age: 64
End: 2024-06-12
Payer: COMMERCIAL

## 2024-06-12 VITALS
WEIGHT: 178.4 LBS | BODY MASS INDEX: 24.98 KG/M2 | HEIGHT: 71 IN | OXYGEN SATURATION: 97 % | SYSTOLIC BLOOD PRESSURE: 128 MMHG | DIASTOLIC BLOOD PRESSURE: 80 MMHG | TEMPERATURE: 97 F | HEART RATE: 85 BPM

## 2024-06-12 DIAGNOSIS — I25.10 CORONARY ARTERY DISEASE INVOLVING NATIVE CORONARY ARTERY OF NATIVE HEART WITHOUT ANGINA PECTORIS: Primary | ICD-10-CM

## 2024-06-12 DIAGNOSIS — I10 PRIMARY HYPERTENSION: ICD-10-CM

## 2024-06-12 DIAGNOSIS — E78.2 MIXED HYPERLIPIDEMIA: ICD-10-CM

## 2024-06-12 PROCEDURE — 99214 OFFICE O/P EST MOD 30 MIN: CPT | Performed by: INTERNAL MEDICINE

## 2024-06-12 RX ORDER — ROSUVASTATIN CALCIUM 40 MG/1
40 TABLET, COATED ORAL DAILY
Qty: 90 TABLET | Refills: 1 | Status: SHIPPED | OUTPATIENT
Start: 2024-06-12

## 2024-06-12 NOTE — PROGRESS NOTES
[facility]    CHIEF COMPLAINT: No chief complaint on file.      HPI:  Crispin Fam is a 64 y.o. male with a past medical history of renal cell carcinoma status post partial left nephrectomy, hypertension, hyperlipidemia, psoriatic arthritis, coronary calcifications, exercise-induced asthma.    Initial OV - 8/29/23: History of hyperlipidemia on statin therapy since he was 34. Family history of premature coronary artery disease. Father had first myocardial infarction at 36. Previously following with Dr. Burton at [facility] He did have a coronary calcium score performed last Fall. His Lipitor was increased to 60 mg but he decreased this to 40 mg due to muscle cramps. Despite reduction in atorvastatin dose, he denies any improvement in cramps. Remains active but a bit less lately due to family responsibilities. He tries to mountain bike 2-3x per week for 10-12 miles at a time. He denies any exertional chest pain or dyspnea. Social history: Never smoker. Family history: Father had first MI at 36 - smoker.     Interval history: Doing well with no acute cardiac or pulmonary complaints. He remains quite active. Denies any exertional chest discomfort or dyspnea during heavier bouts of exertion on his bike.  For his psoriatic arthritis he was started on Enbrel but reports side effects.     The following portions of the patient's history were reviewed and updated as appropriate: allergies, current medications, past family history, past medical history, past social history, past surgical history, and problem list.    SINCE LAST OV I REVIEWED WITH THE PATIENT THE INTERIM LABS, TEST RESULTS, CONSULTANT(S) NOTES AND PERFORMED AN INTERIM REVIEW OF HISTORY    Past Medical History:   Diagnosis Date    Arthritis     Asthma     COPD (chronic obstructive pulmonary disease) ([facility]) 1990    Headache(784.0) 2019    Significant osteoarthrities in neck    HL (hearing loss) 2019    mild    Pneumonia 2019    Psoriatic  arthritis (HCC)     Septicemia (HCC)     Stomach problems        Past Surgical History:   Procedure Laterality Date    COLONOSCOPY      DENTAL IMPLANT      HERNIA REPAIR      INCISION AND DRAINAGE ABSCESS / HEMATOMA OF BURSA / KNEE / THIGH Left     thigh    CT LAPAROSCOPY SURG PARTIAL NEPHRECTOMY Left 11/28/2022    Procedure: ROBOTIC LAPAROSCOPIC PARTIAL NEPHRECTOMY  WITH INTRAOPERATIVE ULTRASOUND INTERPRETATION;  Surgeon: Shane Barbosa MD;  Location[facility];  Service: Urology    CT RPR 1ST INGUN HRNA AGE 5 YRS/> REDUCIBLE Right 06/22/2021    Procedure: Inguinal hernia repair with mesh;  Surgeon: Robert Bloch, MD;  Location: [facility];  Service: General       Social History     Socioeconomic History    Marital status: /Civil Union     Spouse name: Not on file    Number of children: Not on file    Years of education: Not on file    Highest education level: Not on file   Occupational History    Not on file   Tobacco Use    Smoking status: Never    Smokeless tobacco: Never   Vaping Use    Vaping status: Never Used   Substance and Sexual Activity    Alcohol use: Yes     Alcohol/week: 1.0 standard drink of alcohol     Types: 1 Glasses of wine per week     Comment: More like one glass per month socially    Drug use: Never    Sexual activity: Yes     Partners: Female     Birth control/protection: None     Comment: Monogamous,     Other Topics Concern    Not on file   Social History Narrative    Not on file     Social Determinants of Health     Financial Resource Strain: Not on file   Food Insecurity: Not on file   Transportation Needs: Not on file   Physical Activity: Not on file   Stress: Not on file   Social Connections: Not on file   Intimate Partner Violence: Not on file   Housing Stability: Not on file       Family History   Problem Relation Age of Onset    Coronary artery disease Father     Heart disease Father     Arthritis Mother     Hearing loss Mother     Heart disease Maternal Grandfather   "   Dementia Maternal Grandmother     Heart disease Paternal Grandfather     Dementia Maternal Aunt        No Known Allergies      Current Outpatient Medications   Medication Sig Dispense Refill    albuterol (PROVENTIL HFA,VENTOLIN HFA) 90 mcg/act inhaler Inhale 2 puffs every 6 (six) hours as needed for wheezing Pt requests 2 inhalers 18 g 1    amLODIPine (NORVASC) 5 mg tablet TAKE ONE TABLET BY MOUTH EVERY DAY 90 tablet 3    aspirin (ECOTRIN LOW STRENGTH) 81 mg EC tablet Take 81 mg by mouth daily      clobetasol (TEMOVATE) 0.05 % cream Apply 0.25 g (0.05 Applications total) topically 2 (two) times a day as needed (rash) 60 g 5    methylphenidate (RITALIN) 10 mg tablet TAKE TWO TABLETS IN THE MORNING, ONE TABLET AROUND NOON AND ONE TABLET IN AFTERNOON IF NEEDED 120 tablet 0    zaleplon (SONATA) 10 MG capsule Take 1 capsule (10 mg total) by mouth daily at bedtime 30 capsule 2    clotrimazole-betamethasone (LOTRISONE) 1-0.05 % cream Apply topically 2 (two) times a day (Patient not taking: Reported on 6/12/2024) 30 g 5    EPINEPHrine (EPIPEN) 0.3 mg/0.3 mL SOAJ Inject 0.3 mL (0.3 mg total) into a muscle once for 1 dose (Patient not taking: Reported on 6/12/2024) 1 each 0    ketoconazole (NIZORAL) 2 % cream Apply topically daily as needed for rash (Patient not taking: Reported on 6/12/2024) 15 g 5    rosuvastatin (CRESTOR) 40 MG tablet Take 1 tablet (40 mg total) by mouth daily 90 tablet 1     No current facility-administered medications for this visit.       /80 (BP Location: Right arm, Patient Position: Sitting, Cuff Size: Adult)   Pulse 85   Temp (!) 97 °F (36.1 °C)   Ht 5' 11\" (1.803 m)   Wt 80.9 kg (178 lb 6.4 oz)   SpO2 97%   BMI 24.88 kg/m²     Review of Systems   All other systems reviewed and are negative.      Physical Exam  Vitals reviewed.   Constitutional:       General: He is not in acute distress.     Appearance: Normal appearance. He is well-developed. He is not toxic-appearing.   HENT:      " Head: Normocephalic and atraumatic.   Eyes:      General: No scleral icterus.  Cardiovascular:      Rate and Rhythm: Normal rate and regular rhythm.      Pulses: Normal pulses.      Heart sounds: Normal heart sounds. No murmur heard.     No gallop.   Pulmonary:      Effort: Pulmonary effort is normal. No respiratory distress.      Breath sounds: Normal breath sounds. No wheezing or rales.   Abdominal:      General: Abdomen is flat. Bowel sounds are normal. There is no distension.      Palpations: Abdomen is soft.      Tenderness: There is no abdominal tenderness. There is no guarding.   Musculoskeletal:      Right lower leg: No edema.      Left lower leg: No edema.   Skin:     General: Skin is warm and dry.      Coloration: Skin is not jaundiced or pale.   Neurological:      Mental Status: He is alert.   Psychiatric:         Mood and Affect: Mood normal.         Behavior: Behavior normal.          Lab Results   Component Value Date    K 4.4 03/06/2024     03/06/2024    CO2 30 03/06/2024    BUN 18 03/06/2024    CREATININE 0.91 03/06/2024    CALCIUM 9.6 03/06/2024    ALT 24 03/06/2024    AST 21 03/06/2024    INR 0.99 11/09/2022       Lab Results   Component Value Date    HDL 48 03/06/2024    LDLCALC 96 03/06/2024    TRIG 114 03/06/2024       Lab Results   Component Value Date    WBC 6.44 03/06/2024    HGB 15.8 03/06/2024    HCT 47.7 03/06/2024     03/06/2024     Cardiac studies:     CT cardiac calcium scoring-10/6/2022:  Impression:  Agatston calcium score of 246. This places the patient in the   50th-75th percentile for age and sex. Definite, at least moderate   atherosclerotic plaque. Mild coronary artery disease highly likely, and   significant narrowings possible.     ASSESSMENT AND PLAN:  Diagnoses and all orders for this visit:    Coronary artery disease involving native coronary artery of native heart without angina pectoris: He is a lifetime non-smoker.  He has a history of hypertension. History  of hyperlipidemia since his 30s. He does have psoriatic arthritis which is a risk-enhancer for cardiovascular disease. Total calcium score 246.     Continue aggressive risk factor modification with blood pressure and lipid lowering management. Check exercise stress echo to assess for obstructive coronary disease and risk stratification.     Primary hypertension: Blood pressure is well-controlled on amlodipine 5 mg daily. Continue low sodium diet. Continue moderate intensity physical activity at least 150 minutes/week. He has been taking a lot of NSAIDs for pain related to his psoriatic arthritis-related pain and we discussed limiting this to as needed.    Mixed hyperlipidemia:   Lipid panel from 3/3/23 with total cholesterol 163, triglycerides 114, HDL 42, LDL 98  Lipid panel - 3/6/24: Total cholesterol 167, triglycerides 114, HDL 48, LDL 96  -He has attempted atorvastatin 80 in the past but reports muscle side effects. He was later tried on atorvastatin 60 mg but lowered the dose to 40 mg due to muscle pains. Lowering the dose did not help with muscle pains. Currently no side effects. Discussed adding Ezetimibe vs switching to rosuvastatin - we will attempt the later.  -D/c atorvastatin 40 mg and start rosuvastatin 40 mg     Naldo Edwards MD

## 2024-06-27 ENCOUNTER — OFFICE VISIT (OUTPATIENT)
Dept: RHEUMATOLOGY | Facility: CLINIC | Age: 64
End: 2024-06-27
Payer: COMMERCIAL

## 2024-06-27 VITALS
DIASTOLIC BLOOD PRESSURE: 70 MMHG | WEIGHT: 176 LBS | HEIGHT: 71 IN | SYSTOLIC BLOOD PRESSURE: 124 MMHG | BODY MASS INDEX: 24.64 KG/M2

## 2024-06-27 DIAGNOSIS — Z85.528 HISTORY OF RENAL CARCINOMA: ICD-10-CM

## 2024-06-27 DIAGNOSIS — M45.0 ANKYLOSING SPONDYLITIS OF MULTIPLE SITES IN SPINE (HCC): ICD-10-CM

## 2024-06-27 DIAGNOSIS — Z79.60 LONG-TERM USE OF IMMUNOSUPPRESSANT MEDICATION: ICD-10-CM

## 2024-06-27 DIAGNOSIS — M46.1 SACROILIITIS (HCC): ICD-10-CM

## 2024-06-27 DIAGNOSIS — L40.9 PSORIASIS: ICD-10-CM

## 2024-06-27 DIAGNOSIS — M15.0 PRIMARY GENERALIZED (OSTEO)ARTHRITIS: ICD-10-CM

## 2024-06-27 DIAGNOSIS — L40.50 PSORIATIC ARTHROPATHY (HCC): Primary | ICD-10-CM

## 2024-06-27 PROCEDURE — 99214 OFFICE O/P EST MOD 30 MIN: CPT | Performed by: INTERNAL MEDICINE

## 2024-06-27 NOTE — PROGRESS NOTES
Assessment and Plan:   Mr. Fam is a 64-year-old male with history significant for psoriatic arthritis, psoriasis, primary generalized osteoarthritis specifically cervical/lumbar region involvement, uveitis and renal cancer s/p left sided partial nephrectomy in 11/22 who presents for a follow-up.  He is currently not on DMARDs.      - Igor presents today for a follow-up of psoriatic arthritis and psoriasis.  He has been seen by multiple rheumatologists thus far and been on medications including methotrexate, sulfasalazine, adalimumab, secukinumab and guselkumab which were discontinued either due to side effects/inefficacy or with his last biologic discontinued due to a diagnosis of renal cancer status post left-sided partial nephrectomy in November 2022.  He overall seems to be fairly stable from a psoriasis and peripheral spondyloarthritis perspective and his main complaints at this time are chronic neck and low back pain/stiffness of which I suspect a majority of symptoms is due to underlying degenerative changes as opposed to active inflammation.  I encouraged him to continue follow-up with spine and pain management as needed but to determine if there may be a component of spondylitis contributing to his symptoms at our initial visit 4 months ago I offered him a trial with subcutaneous etanercept 50 mg once weekly.  He discontinued this prematurely as there were concerns for possible side effects of brain fog, dizziness and cough which stopped after discontinuing the medication.    - At this time as it is still unclear whether his symptoms are related to a spondyloarthropathy versus osteoarthritis, I would like to update an MRI of the lumbar spine and sacroiliac joints.  Depending on the results I will determine if a retrial with biologic DMARD is indicated.  Of note the renal cancer diagnosis does not pose an absolute contraindication to initiating biologics.  In the meanwhile he may continue with  Tylenol/NSAIDs as needed.      Plan:  Diagnoses and all orders for this visit:    Psoriatic arthropathy (HCC)  -     MRI lumbar spine wo contrast; Future  -     MRI pelvis sacrum,coccyx, si jts wo contrast; Future    Psoriasis    Ankylosing spondylitis of multiple sites in spine (HCC)  -     MRI lumbar spine wo contrast; Future  -     MRI pelvis sacrum,coccyx, si jts wo contrast; Future    Sacroiliitis (HCC)  -     MRI lumbar spine wo contrast; Future  -     MRI pelvis sacrum,coccyx, si jts wo contrast; Future    Long-term use of immunosuppressant medication    Primary generalized (osteo)arthritis  -     MRI lumbar spine wo contrast; Future  -     MRI pelvis sacrum,coccyx, si jts wo contrast; Future    History of renal carcinoma      I have personally reviewed pertinent films in PACS of the CXR which does not show osseous abnormalities.       Activities as tolerated.   Exercise: try to maintain a low impact exercise regimen as much as possible.   Continue other medications as prescribed by PCP and other specialists.       RTC in 6 months.        Back Pain  This is a chronic problem. The current episode started more than 1 year ago. The problem occurs constantly. The problem has been gradually worsening since onset. The pain is present in the lumbar spine. The quality of the pain is described as aching and stabbing. The pain does not radiate. The pain is at a severity of 8/10. The pain is The same all the time. The symptoms are aggravated by bending, coughing, position, sitting, standing and twisting. Stiffness is present All day. Pertinent negatives include no abdominal pain, bladder incontinence, bowel incontinence, chest pain, dysuria, fever, headaches, leg pain, numbness, paresis, paresthesias, pelvic pain, perianal numbness, tingling or weakness. Risk factors include history of cancer and poor posture.     INITIAL VISIT NOTE (2/2024):  Mr. Fam is a 63-year-old male with history significant for psoriatic  arthritis, psoriasis, primary generalized osteoarthritis specifically cervical/lumbar region involvement, uveitis and renal cancer s/p left sided partial nephrectomy in 11/22 who presents to establish with [facility] rheumatology for continued management.  He is currently not on DMARDs.  He is transferring care from [facility] rheumatology.  He is self-referred.    Patient reports he was diagnosed with psoriasis in his 30s but this has never been a major issue for him as he has not presented with large plaques.  He had been followed by dermatology and reports approximately 8 to 10 years ago due to joint pains he was also experiencing his dermatologist considered the possibility of psoriatic arthritis.  Since then he has been seen by multiple rheumatologists including Dr. Corrales, Dr. Mario and Dr. Kitchen.  Dr. Corrales agreed with the diagnosis of psoriatic arthritis due to concerns for a small erosion on his right hand which was not seen on follow-up x-rays a few years later.  He cannot recall the exact timeline of medications but reports he has previously been treated with NSAIDs, sulfasalazine which was discontinued due to inefficacy, methotrexate to which he developed side effects, adalimumab which was discontinued due to gradual loss of efficacy, secukinumab which was effective but discontinued as he did not want to inject 2 pens and guselkumab which was discontinued due to the diagnosis of renal cancer.  He cannot recall if this last medication was effective for him.  He reports these medications will help to clear up the psoriasis and this has not been an ongoing issue for him, but if it does occur it may affect around his hairline and eyes.  In terms of the joint pains it is mostly his low back that bothers him followed by his neck and then some of his peripheral joints including his hands and knees.  He has previously experienced swelling of his hands but denies prolonged morning stiffness.  He  has significant limitations as a result of his spinal pain which occurs on a daily and continuous basis.  He is currently taking ibuprofen 400 to 600 mg twice a day as needed along with Tylenol 1 g daily as needed which does help him.  He is following with spine and pain management in view of the lumbar spine degenerative arthritis and spinal stenosis.  Most recently on 1/18/2024 he underwent a radiofrequency ablation of the left L3-L5 medial branch nerves without any benefit thus far.  He is scheduled for a right-sided procedure on 3/14.  He has previously received epidural injections without much benefit.  Of note his last MRI lumbar spine done in September 2022 showed degenerative disc disease at L5-S1 with severe left and moderate to severe right neuroforaminal stenosis at L5-S1.  There were also concerns for Mary and Romanus lesions which are findings that may be seen in patients with a spondyloarthropathy.    He reports approximately 5 years ago and prior to this he had 2 occurrences of uveitis but cannot recall the specifics.  No history of inflammatory bowel disease.  He reports a history of psoriasis in his grandfather and uncle.      6/27/2024:  Patient presents for a follow-up of psoriatic arthritis and psoriasis.  He is currently not on DMARDs.  I reviewed his testing done after the last visit which showed a normal CBC, CMP, ESR, CRP, chronic hepatitis panel and QuantiFERON-TB gold.    After the last visit he did try subcutaneous etanercept 50 mg once weekly for 4 doses but as this caused presumed side effects of brain fog, dizziness and a persistent cough all of which resolved after stopping the etanercept he opted to stay off it.  He has not had any new complaints but continues to experience the significant back pain.  In the interim in March he did undergo a radiofrequency ablation but reports this worsened his symptoms.    The following portions of the patient's history were reviewed and updated  as appropriate: allergies, current medications, past family history, past medical history, past social history, past surgical history and problem list.      Review of Systems   Constitutional:  Negative for fever.   Cardiovascular:  Negative for chest pain.   Gastrointestinal:  Negative for abdominal pain and bowel incontinence.   Genitourinary:  Negative for bladder incontinence, dysuria and pelvic pain.   Musculoskeletal:  Positive for back pain.   Neurological:  Negative for tingling, weakness, numbness, headaches and paresthesias.       Constitutional: Negative for weight change, fevers, chills, night sweats, fatigue.  ENT/Mouth: Negative for hearing changes, ear pain, nasal congestion, sinus pain, hoarseness, sore throat, rhinorrhea, swallowing difficulty.   Eyes: Negative for pain, redness, discharge, vision changes.   Cardiovascular: Negative for chest pain, SOB, palpitations.   Respiratory: Negative for cough, sputum, wheezing, dyspnea.   Gastrointestinal: Negative for nausea, vomiting, diarrhea, constipation, pain, heartburn.  Genitourinary: Negative for dysuria, urinary frequency, hematuria.   Musculoskeletal: As per HPI.  Skin: Negative for skin rash currently, color changes.   Neuro: Negative for weakness, numbness, tingling, loss of consciousness.   Psych: Negative for anxiety, depression.   Heme/Lymph: Negative for easy bruising, bleeding, lymphadenopathy.        Past Medical History:   Diagnosis Date    Arthritis     Asthma     COPD (chronic obstructive pulmonary disease) ([facility]) 1990    Headache(784.0) 2019    Significant osteoarthrities in neck    HL (hearing loss) 2019    mild    Pneumonia 2019    Psoriatic arthritis ([facility])     Septicemia ([facility])     Stomach problems        Past Surgical History:   Procedure Laterality Date    COLONOSCOPY      DENTAL IMPLANT      HERNIA REPAIR      INCISION AND DRAINAGE ABSCESS / HEMATOMA OF BURSA / KNEE / THIGH Left     thigh    NE LAPAROSCOPY SURG PARTIAL NEPHRECTOMY  Left 11/28/2022    Procedure: ROBOTIC LAPAROSCOPIC PARTIAL NEPHRECTOMY  WITH INTRAOPERATIVE ULTRASOUND INTERPRETATION;  Surgeon: Shane Barbosa MD;  Location: [facility];  Service: Urology    MA RPR 1ST INGUN HRNA AGE 4 YRS/> REDUCIBLE Right 06/22/2021    Procedure: Inguinal hernia repair with mesh;  Surgeon: Robert Bloch, MD;  Location: [facility];  Service: General       Social History     Socioeconomic History    Marital status: /Civil Union     Spouse name: Not on file    Number of children: Not on file    Years of education: Not on file    Highest education level: Not on file   Occupational History    Not on file   Tobacco Use    Smoking status: Never    Smokeless tobacco: Never   Vaping Use    Vaping status: Never Used   Substance and Sexual Activity    Alcohol use: Yes     Alcohol/week: 1.0 standard drink of alcohol     Types: 1 Glasses of wine per week     Comment: More like one glass per month socially    Drug use: Never    Sexual activity: Yes     Partners: Female     Birth control/protection: None     Comment: Monogamous,     Other Topics Concern    Not on file   Social History Narrative    Not on file     Social Determinants of Health     Financial Resource Strain: Not on file   Food Insecurity: Not on file   Transportation Needs: Not on file   Physical Activity: Not on file   Stress: Not on file   Social Connections: Unknown (6/18/2024)    Received from [employer]    Social vivio     How often do you feel lonely or isolated from those around you? (Adult - for ages 18 years and over): Not on file   Intimate Partner Violence: Not on file   Housing Stability: Not on file       Family History   Problem Relation Age of Onset    Coronary artery disease Father     Heart disease Father     Arthritis Mother     Hearing loss Mother     Heart disease Maternal Grandfather     Dementia Maternal Grandmother     Heart disease Paternal Grandfather     Dementia Maternal Aunt        No Known  "Allergies      Current Outpatient Medications:     albuterol (PROVENTIL HFA,VENTOLIN HFA) 90 mcg/act inhaler, Inhale 2 puffs every 6 (six) hours as needed for wheezing Pt requests 2 inhalers, Disp: 18 g, Rfl: 1    amLODIPine (NORVASC) 5 mg tablet, TAKE ONE TABLET BY MOUTH EVERY DAY, Disp: 90 tablet, Rfl: 3    aspirin (ECOTRIN LOW STRENGTH) 81 mg EC tablet, Take 81 mg by mouth daily, Disp: , Rfl:     clobetasol (TEMOVATE) 0.05 % cream, Apply 0.25 g (0.05 Applications total) topically 2 (two) times a day as needed (rash), Disp: 60 g, Rfl: 5    methylphenidate (RITALIN) 10 mg tablet, TAKE TWO TABLETS IN THE MORNING, ONE TABLET AROUND NOON AND ONE TABLET IN AFTERNOON IF NEEDED, Disp: 120 tablet, Rfl: 0    rosuvastatin (CRESTOR) 40 MG tablet, Take 1 tablet (40 mg total) by mouth daily, Disp: 90 tablet, Rfl: 1    zaleplon (SONATA) 10 MG capsule, Take 1 capsule (10 mg total) by mouth daily at bedtime, Disp: 30 capsule, Rfl: 2    clotrimazole-betamethasone (LOTRISONE) 1-0.05 % cream, Apply topically 2 (two) times a day (Patient not taking: Reported on 6/12/2024), Disp: 30 g, Rfl: 5    EPINEPHrine (EPIPEN) 0.3 mg/0.3 mL SOAJ, Inject 0.3 mL (0.3 mg total) into a muscle once for 1 dose (Patient not taking: Reported on 6/12/2024), Disp: 1 each, Rfl: 0    ketoconazole (NIZORAL) 2 % cream, Apply topically daily as needed for rash (Patient not taking: Reported on 6/12/2024), Disp: 15 g, Rfl: 5      Objective:    Vitals:    06/27/24 1203   BP: 124/70   Weight: 79.8 kg (176 lb)   Height: 5' 11\" (1.803 m)       Physical Exam  General: Well appearing, well nourished, in no distress. Oriented x 3, normal mood and affect.  Ambulating without difficulty.  Skin: Good turgor, no rash, unusual bruising or prominent lesions.  Hair: Normal texture and distribution.  Nails: Normal color, no deformities.  No pits.   HEENT:  Head: Normocephalic, atraumatic.  Eyes: Conjunctiva clear, sclera non-icteric, EOM intact.  Nose: No external " lesions.  Extremities: No amputations or deformities, cyanosis, edema.  Musculoskeletal:   There is no peripheral joint soft tissue swelling or tenderness noted today.  No enthesitis or dactylitis.  No spinal tenderness but he has significant limitation in range of motion in all directions of his cervical, thoracic and lumbar spine due to pain.  Neurologic: Alert and oriented. No focal neurological deficits appreciated.   Psychiatric: Normal mood and affect.       Kim Hoff M.D.  Rheumatology

## 2024-06-28 DIAGNOSIS — F90.2 ATTENTION DEFICIT HYPERACTIVITY DISORDER (ADHD), COMBINED TYPE: ICD-10-CM

## 2024-06-28 RX ORDER — METHYLPHENIDATE HYDROCHLORIDE 10 MG/1
TABLET ORAL
Qty: 120 TABLET | Refills: 0 | Status: SHIPPED | OUTPATIENT
Start: 2024-06-28

## 2024-06-28 NOTE — TELEPHONE ENCOUNTER
Medication Refill Request     Name of Medication Methylpheniate  Dose/Frequency 10mg Take two tablets in the morning, One tablet around noon and one tablet in afternoon if needed  Quantity 120 Tablets  Verified pharmacy   [x]  Verified ordering Provider   [x]  Does patient have enough for the next 3 days? Yes [] No [x]  Does patient have a follow-up appointment scheduled? Yes [] No [x]   If so when is appointment: 9/27/24

## 2024-07-24 ENCOUNTER — TELEPHONE (OUTPATIENT)
Age: 64
End: 2024-07-24

## 2024-07-24 NOTE — TELEPHONE ENCOUNTER
Margoth from  office called and would like for the last 2 OV notes and 3 most recent labs to sent to the office . MRO request is scan in the patient chart as of 6/25/24    Fax # [phone]

## 2024-07-25 ENCOUNTER — HOSPITAL ENCOUNTER (OUTPATIENT)
Dept: MRI IMAGING | Facility: HOSPITAL | Age: 64
End: 2024-07-25
Attending: INTERNAL MEDICINE
Payer: COMMERCIAL

## 2024-07-25 DIAGNOSIS — M15.0 PRIMARY GENERALIZED (OSTEO)ARTHRITIS: ICD-10-CM

## 2024-07-25 DIAGNOSIS — L40.50 PSORIATIC ARTHROPATHY (HCC): ICD-10-CM

## 2024-07-25 DIAGNOSIS — M46.1 SACROILIITIS (HCC): ICD-10-CM

## 2024-07-25 DIAGNOSIS — M45.0 ANKYLOSING SPONDYLITIS OF MULTIPLE SITES IN SPINE (HCC): ICD-10-CM

## 2024-07-25 PROCEDURE — 72148 MRI LUMBAR SPINE W/O DYE: CPT

## 2024-07-25 PROCEDURE — 72195 MRI PELVIS W/O DYE: CPT

## 2024-07-26 ENCOUNTER — HOSPITAL ENCOUNTER (OUTPATIENT)
Dept: NON INVASIVE DIAGNOSTICS | Facility: HOSPITAL | Age: 64
Discharge: HOME | End: 2024-07-26
Attending: INTERNAL MEDICINE
Payer: COMMERCIAL

## 2024-07-26 VITALS
DIASTOLIC BLOOD PRESSURE: 94 MMHG | BODY MASS INDEX: 24.64 KG/M2 | SYSTOLIC BLOOD PRESSURE: 140 MMHG | HEART RATE: 88 BPM | RESPIRATION RATE: 16 BRPM | WEIGHT: 176 LBS | HEIGHT: 71 IN | OXYGEN SATURATION: 97 %

## 2024-07-26 DIAGNOSIS — I25.10 CORONARY ARTERY DISEASE INVOLVING NATIVE CORONARY ARTERY OF NATIVE HEART WITHOUT ANGINA PECTORIS: ICD-10-CM

## 2024-07-26 LAB
CHEST PAIN STATEMENT: NORMAL
MAX DIASTOLIC BP: 102 MMHG
MAX HR PERCENT: 94 %
MAX HR: 146 BPM
MAX PREDICTED HEART RATE: 155 BPM
PROTOCOL NAME: NORMAL
RATE PRESSURE PRODUCT: NORMAL
SL CV LV EF: 60
SL CV STRESS RECOVERY BP: NORMAL MMHG
SL CV STRESS RECOVERY HR: 96 BPM
SL CV STRESS RECOVERY O2 SAT: 99 %
SL CV STRESS STAGE REACHED: 4
STRESS ANGINA INDEX: 0
STRESS BASELINE BP: NORMAL MMHG
STRESS BASELINE HR: 88 BPM
STRESS DUKE TREADMILL SCORE: 10
STRESS O2 SAT REST: 97 %
STRESS PEAK HR: 146 BPM
STRESS POST ESTIMATED WORKLOAD: 10.9 METS
STRESS POST EXERCISE DUR MIN: 9 MIN
STRESS POST EXERCISE DUR MIN: 9 MIN
STRESS POST EXERCISE DUR SEC: 31 SEC
STRESS POST EXERCISE DUR SEC: 31 SEC
STRESS POST O2 SAT PEAK: 99 %
STRESS POST PEAK BP: 140 MMHG
STRESS POST PEAK HR: 146 BPM
STRESS POST PEAK SYSTOLIC BP: 162 MMHG
STRESS ST DEPRESSION: 0 MM
TARGET HR FORMULA: NORMAL
TEST INDICATION: NORMAL

## 2024-07-26 PROCEDURE — 93350 STRESS TTE ONLY: CPT | Performed by: INTERNAL MEDICINE

## 2024-07-26 PROCEDURE — 93350 STRESS TTE ONLY: CPT

## 2024-07-29 ENCOUNTER — TELEPHONE (OUTPATIENT)
Age: 64
End: 2024-07-29

## 2024-07-29 DIAGNOSIS — F90.2 ATTENTION DEFICIT HYPERACTIVITY DISORDER (ADHD), COMBINED TYPE: ICD-10-CM

## 2024-07-29 NOTE — TELEPHONE ENCOUNTER
Medication: methlyphenidate    Dose/Frequency: 10mg  Take 2 tablets in the morning, one tablet around noon and one tablet in the afternoon if needed.     Quantity: 120    Pharmacy: [address] [facility] [address]     Office:   [] PCP/Provider -   [x] Speciality/Provider - Vielka Victoria    Does the patient have enough for 3 days?   [] Yes   [x] No - Send as HP to POD

## 2024-07-30 ENCOUNTER — TELEPHONE (OUTPATIENT)
Age: 64
End: 2024-07-30

## 2024-07-30 ENCOUNTER — TELEPHONE (OUTPATIENT)
Dept: PSYCHIATRY | Facility: CLINIC | Age: 64
End: 2024-07-30

## 2024-07-30 RX ORDER — METHYLPHENIDATE HYDROCHLORIDE 10 MG/1
TABLET ORAL
Qty: 120 TABLET | Refills: 0 | Status: SHIPPED | OUTPATIENT
Start: 2024-07-30

## 2024-07-30 NOTE — TELEPHONE ENCOUNTER
PA for methylphenidate (RITALIN) 10 mg tablet  SUBMITTED     via    []CMM-KEY   [x][employer]-Case ID # [id number]   []Faxed to plan   []Other website   []Phone call Case ID #     Office notes sent, clinical questions answered. Awaiting determination    Turnaround time for your insurance to make a decision on your Prior Authorization can take 7-21 business days.

## 2024-07-30 NOTE — TELEPHONE ENCOUNTER
Request for Prior Authorization for Methylphenidate 10 MG tablets 120 per 30 days.  ID # [id number], BIN: [id number], PCN: KIRSTIN, GRP: [id number].  Previous auth  24.

## 2024-08-05 ENCOUNTER — TELEPHONE (OUTPATIENT)
Age: 64
End: 2024-08-05

## 2024-08-05 NOTE — TELEPHONE ENCOUNTER
PA for methylphenidate (RITALIN) 10 mg tablet  Approved     Date(s) approved 8/5/2024 to 2/5/2025    Case #[id number]     Patient advised by          [] MyChart Message  [x] Phone call-spoke to Igor, he was informed PA approved and pharmacy was notified as well.  []LMOM  []L/M to call office as no active Communication consent on file  []Unable to leave detailed message as VM not approved on Communication consent       Pharmacy advised by    []Fax  [x]Phone call    Approval letter scanned into Media Yes

## 2024-08-05 NOTE — TELEPHONE ENCOUNTER
Patient called rx refill line inquring of Prior auth status for methylphenidate (RITALIN) 10 mg tablet medication    Patient asked for me to send a HP message to the office asking the doctor to call patient and to inform him that he will be out of medication in 2 days and is traveling out of the country in 3 days

## 2024-08-05 NOTE — TELEPHONE ENCOUNTER
Called [employer] [phone], spoke to rep January in PA dept, she stated PA that was submitted on 7/30 cancelled, resubmitted PA via phone and marked it Urgent, patient is leaving the country in 2 days.  Should receive decision in 24 hours via fax.  Case #: [id number]    Spoke to Igor, he was informed of the above info. He was a little upset, he only has 2 days of medication left and he is leaving the country in 2 days. I informed patient I advised rep to jackelyn PA urgent. Patient stated he will call insurance today as well.

## 2024-08-05 NOTE — TELEPHONE ENCOUNTER
Received incoming call from Igor, he stated he called [employer] regarding PA for Ritalin 10 mg.  He was told by [facility] that they sent 4 letters to his provider's office regarding the expiration of the prescription for Ritalin 10 mg. First letter was sent a month and half ago and never got a response back from the provider's office so by the time the PA was submitted it was already cancelled and the first PA they received was today (8/5)   Igor was very upset that this was not taken care of a month ago.   I informed gIor if I do not receive a response from [employer] by mid morning on 8/6 I will call for an update.   He wants the office/provider to know that this is unacceptable.     He also stated that as of 9/1 Ritalin prescription should go to [facility]  [facility]  Phone: [phone]  Fax: [phone]

## 2024-08-05 NOTE — TELEPHONE ENCOUNTER
Patient called rx refill line stating he will be out of medication in two days and is heading out of the country in 3days and is very concerned about the PA being completed before he leaves    Please contact patient with any updates

## 2024-08-19 ENCOUNTER — NURSE TRIAGE (OUTPATIENT)
Age: 64
End: 2024-08-19

## 2024-08-19 DIAGNOSIS — M47.816 LUMBAR SPONDYLOSIS: Primary | ICD-10-CM

## 2024-08-19 RX ORDER — GABAPENTIN 100 MG/1
100 CAPSULE ORAL 3 TIMES DAILY
Qty: 90 CAPSULE | Refills: 5 | Status: SHIPPED | OUTPATIENT
Start: 2024-08-19

## 2024-08-19 NOTE — TELEPHONE ENCOUNTER
Regarding: incapacitating pain  ----- Message from Yesica FIGUEREDO sent at 8/19/2024  1:42 PM EDT -----  Please, see patient message copied below and triage. Thank you.    I am using the portal only because I am in severe pain and thought it might yield faster results. I can make a virtual appointment if you prefer.     My back pain is bordering on incapacitating. The nerve ablations performed by Dr. Logan were excruciatingly painful and did absolutely nothing to relieve my pain. I believe the ablations in fact made the pain worse. I can barely support my upper body today.     I had a consultation last week with a 2nd MD orthopedic specialist. I've attached his summary report. The meaningful information starts on P.7.      The report recommends a flexion/extension X-ray, trying Gabapentin or Lyrica in place of the 400-600mg Ibuprofen/1,000mg Acetominophen doses I've been taking to get some temporary relief, and potentially, steroid injections. I'm open to all of those. I am NOT open to surgery unless at some point it cannot be avoided.      I do not want to return to Dr. Logan. I was not impressed by his skill nor his communications. What should I do next, please?   Attachments   2nd MD Consult summary report [id number]

## 2024-08-19 NOTE — TELEPHONE ENCOUNTER
"Reason for Disposition  • Nursing judgment    Answer Assessment - Initial Assessment Questions  1. REASON FOR CALL or QUESTION: \"What is your reason for calling today?\" or \"How can I best help you?\" or \"What question do you have that I can help answer?\"      Patient is asking for a referral to a different Pain and spine, the best one Dr Rios recommends ,he will go anywhere  , does not want to see Dr Logan . Asking if Dr Rios would consider starting any of the medication 2nd MD suggest in the meantime. His pain level is a 9/10 . Taking 600 mg Ibuprofen and 1,000 mg Tylenol alternating, concerns him with his history of kidney cancer, they mentioned Gabapentin or Lyrica. He is more then happy to come in for an appt if Dr Rios would rather see him    Protocols used: Information Only Call - No Triage-ADULT-[place]    "

## 2024-08-19 NOTE — TELEPHONE ENCOUNTER
Done, let pt know.  Gabapentin 100 mg 3 times a day sent, patient can titrate this up to 300 mg 3 times a day if not getting relief at 100 mg 3 times a day.  For alternative pain management, he could try Dr. Wade or Dr. Lamb.

## 2024-08-26 DIAGNOSIS — F90.2 ATTENTION DEFICIT HYPERACTIVITY DISORDER (ADHD), COMBINED TYPE: ICD-10-CM

## 2024-08-26 NOTE — TELEPHONE ENCOUNTER
Reason for call:   [x] Refill   [] Prior Auth  [] Other:     Office:   [x] Specialty/Provider - psych / [facility]    Medication: methylphenidate    Dose/Frequency: 10mg (4 tabs qd)    Quantity: 120    Pharmacy: [address] [facility] - [address] [place] [address]     Does the patient have enough for 3 days?   [x] Yes   [] No - Send as HP to POD

## 2024-08-27 RX ORDER — METHYLPHENIDATE HYDROCHLORIDE 10 MG/1
TABLET ORAL
Qty: 120 TABLET | Refills: 0 | Status: SHIPPED | OUTPATIENT
Start: 2024-09-03

## 2024-08-27 NOTE — TELEPHONE ENCOUNTER
Medication:  PDMP  08/05/2024 07/30/2024 Methylphenidate Hcl (Tablet) 120.0 30 10 MG NA [id number] Commercial Insurance 0 / 0 PA   1 [id number] 06/28/2024 06/28/2024 Methylphenidate Hcl (Tablet) 120.0 30 10 MG NA [id number] Commercial Insurance 0 / 0 PA   1 [id number] 05/30/2024 05/28/2024 Methylphenidate Hcl (Tablet) 120.0 30 10 MG NA [id number]  Active agreement on file -

## 2024-08-28 ENCOUNTER — TELEPHONE (OUTPATIENT)
Age: 64
End: 2024-08-28

## 2024-08-28 NOTE — TELEPHONE ENCOUNTER
Patient is unhappy with current SPA MD and is requesting a JED to you; current treatment plan by Oscar Logan MD (SPA) noted below. (Per Pt: Was not happy with Dr. Logan)    1. Pt was seen ONLY ONCE in office for NP CONSULT on 9/22/2023 by Oscar Logan MD (SPA) for Dx of Lumbosacral spondylosis without myelopathy, & Lumbar foraminal stenosis. (Ordered: PRO: B/L L3-L5 MBB)    2. Per Oscar Logan MD (SPA) notes on 9/22/2023: Patient presenting with chronic back pain for 10+ years, worsening over the past several months. Pain is consistent with lumbar spondylosis accompanied by pain >7/10 on the pain scale with inability to participate in IADLs for >6 weeks. Patient has previously participated with physical therapy as well as home exercises and stretches.  Has been taking ibuprofen, Tylenol, lidocaine patch with modest benefit. Patient does have a history of psoriatic arthritis.  He was previously on biologics but discontinued this secondary to having to have a partial nephrectomy for renal cell carcinoma. Patient previously had bilateral L5-S1 transforaminal epidural steroid injections at [facility] with temporary benefit.  He did not have any radicular symptoms at this time and continues to have only axial pain at this time. Lumbar MRI: This showed multilevel disc degeneration with facet joint degeneration as well as severe left and moderate to severe right foraminal stenosis at L5-S1 with compression of the exiting L5 nerve roots. Patient's pain is exclusively axial in nature.  I did discuss bilateral lumbar medial branch blocks and subsequent possible radiofrequency ablation.  -SUGGESTIONS/RECOMMENDATIONS: We will schedule the patient for bilateral L3-5 medial branch nerve blocks with intention of moving forward towards radiofrequency ablation if there is an appropriate diagnostic response.      3. Pt has NOT been seen for f/u OV with Oscar Logan MD (SPA) following x4 completed PROs.      4. Pts PRO Hx  Includes:   -Right L3-L5 RFA on 3/14/2024.   -Left L3-L5 RFA on 1/18/2024.   -B/L L3-L5 MBB #2 on 11/30/2023.    -B/L L3-L5 MBB #1 on 11/2/2023.   -Inguinal hernia repair with mesh (Right: Groin) on 6/22/2021.    5. Pt had MRIs of Pelvis, Sacrum & Coccyx & Lumbar Spine both completed on 7/25/2024. (Both reports & imaging available for viewing)    6. Per PDMP: No opioid scripts within past year.       Would you take over this Pts care? Approve/Deny JED?   Please advise- Thank you!

## 2024-08-28 NOTE — TELEPHONE ENCOUNTER
Caller: pt    Doctor/Office: sun    Callback#: [phone]        Patient is requesting a transfer of care for the following reason: wants to go to dr vang.        Doctor: martin    Would you release patient from your care?      Doctor: ciera    Would you take patient on as a patient?        Please advise,   Thank you.

## 2024-08-30 NOTE — TELEPHONE ENCOUNTER
Please make Pt aware of below recommendations & schedule accordingly.     1. Regarding Pts Request for JED from Oscar Logan MD (SPA) to Siddhartha Wade MD (SPA) due to unhappy with current SPA MD: Pt is APPROVED for JED. (COURTESY NOTIFICATION: This RN making WS aware of this recommendation) (FQ was consulted & aware/agree with recommendation for APPROVAL of JED)    2. Per Siddhartha Wade MD (SPA): Ok for JED.    Thank You!

## 2024-09-23 DIAGNOSIS — F90.2 ATTENTION DEFICIT HYPERACTIVITY DISORDER (ADHD), COMBINED TYPE: ICD-10-CM

## 2024-09-23 RX ORDER — METHYLPHENIDATE HYDROCHLORIDE 10 MG/1
TABLET ORAL
Qty: 120 TABLET | Refills: 0 | Status: SHIPPED | OUTPATIENT
Start: 2024-10-02

## 2024-09-23 NOTE — TELEPHONE ENCOUNTER
Reason for call:   [x] Refill   [] Prior Auth  [] Other:     Office:   [] PCP/Provider -   [x] Specialty/Provider - psych, Dr nelson    Medication:   Methylphenidate 10 mg, 4 tabs daily , 120      Pharmacy: [facility]    Does the patient have enough for 3 days?   [x] Yes   [] No - Send as HP to POD

## 2024-09-27 ENCOUNTER — TELEMEDICINE (OUTPATIENT)
Dept: PSYCHIATRY | Facility: CLINIC | Age: 64
End: 2024-09-27
Payer: COMMERCIAL

## 2024-09-27 DIAGNOSIS — Z13.228 SCREENING FOR ENDOCRINE, NUTRITIONAL, METABOLIC AND IMMUNITY DISORDER: ICD-10-CM

## 2024-09-27 DIAGNOSIS — Z13.29 SCREENING FOR ENDOCRINE, NUTRITIONAL, METABOLIC AND IMMUNITY DISORDER: ICD-10-CM

## 2024-09-27 DIAGNOSIS — F51.01 PRIMARY INSOMNIA: ICD-10-CM

## 2024-09-27 DIAGNOSIS — F90.2 ATTENTION DEFICIT HYPERACTIVITY DISORDER (ADHD), COMBINED TYPE: Primary | ICD-10-CM

## 2024-09-27 DIAGNOSIS — F43.23 ADJUSTMENT DISORDER WITH MIXED ANXIETY AND DEPRESSED MOOD: ICD-10-CM

## 2024-09-27 DIAGNOSIS — Z13.21 SCREENING FOR ENDOCRINE, NUTRITIONAL, METABOLIC AND IMMUNITY DISORDER: ICD-10-CM

## 2024-09-27 DIAGNOSIS — Z13.0 SCREENING FOR ENDOCRINE, NUTRITIONAL, METABOLIC AND IMMUNITY DISORDER: ICD-10-CM

## 2024-09-27 DIAGNOSIS — E55.9 VITAMIN D DEFICIENCY: ICD-10-CM

## 2024-09-27 PROCEDURE — 99214 OFFICE O/P EST MOD 30 MIN: CPT | Performed by: STUDENT IN AN ORGANIZED HEALTH CARE EDUCATION/TRAINING PROGRAM

## 2024-09-27 PROCEDURE — 90833 PSYTX W PT W E/M 30 MIN: CPT | Performed by: STUDENT IN AN ORGANIZED HEALTH CARE EDUCATION/TRAINING PROGRAM

## 2024-09-27 NOTE — BH TREATMENT PLAN
"Treatment Plan done but not signed at time of office visit due to:  Plan reviewed with the patient during the virtual visit and verbal consent given.    TREATMENT PLAN (Medication Management Only)        [facility]    Name and Date of Birth:  Crispin Fam 64 y.o. 1960  Date of Treatment Plan: September 27, 2024  Diagnosis/Diagnoses:    1. Attention deficit hyperactivity disorder (ADHD), combined type    2. Adjustment disorder with mixed anxiety and depressed mood    3. Primary insomnia    4. Screening for endocrine, nutritional, metabolic and immunity disorder    5. Vitamin D deficiency      Strengths/Personal Resources for Self-Care: \"supportive family, friends, playing Music, hobbies and access to therapy\".  Area/Areas of need (in own words): ADHD symptoms, insomnia, depression and anxiety  1. Long Term Goal: maintain control of ADHD symptoms and anxiety, prevent depressive sxs and insomnia  Target Date:6 months - 3/27/2025  Person/Persons responsible for completion of goal: Crispin  2.  Short Term Objective (s) - How will we reach this goal?:   A. Provider new recommended medication/dosage changes and/or continue medication(s): continue current medications as prescribed.  B. Attend psychotherapy regularly.  C. N/A.  Target Date:6 months - 3/27/2025  Person/Persons Responsible for Completion of Goal: Crispin  Progress Towards Goals: stable, continuing treatment  Treatment Modality: medication management every 6 months, continue psychotherapy with own therapist  Review due 180 days from date of this plan: 6 months - 3/27/2025  Expected length of service: maintenance  My Physician/PA/NP and I have developed this plan together and I agree to work on the goals and objectives. I understand the treatment goals that were developed for my treatment.      "

## 2024-09-27 NOTE — PSYCH
MEDICATION MANAGEMENT NOTE - VIRTUAL VISIT        [facility]      Name and Date of Birth:  Crispin Fam 64 y.o. 1960 MRN: [id number]    Date of Visit: September 27, 2024  Assessment & Plan  Attention deficit hyperactivity disorder (ADHD), combined type         Adjustment disorder with mixed anxiety and depressed mood         Primary insomnia           Plan   A 64 y.o.  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 The patient presented w/ h/o ADHD since 1999, being on Ritalin 60mg daily since then, w/ marked improvement of attention, concentration and his function since being treated. Denied any excessive anxiety, depressed/manic sxs, A/VH or other psychotic sxs. No h/o substance abuse. Denied SI/HI, intent or plan upon direct inquiry at this time. WALTER-7: 4. His current presentation meets criteria for ADHD, by history. The patient was educated about the diagnosis of ADHD and the course of ADHD in adults which may present with decreased intensity of sxs, onesimo after age 50, and particularly after age 65. Psycho-education regarding Ritalin indications, benefits, risks, side effects, and alternative options provided to the patient, and the importance of the compliance with psychiatric treatment reiterated. The patient was educated about the potential long-term side effects of ritalin in older age, onesimo cardiac  "side effects (given the FH of heart attack in his father at age 36), and was educated about the possibility of tapering off or if becoming symptomatic, cross-tapering to different treatment options. He verbalized understanding and agreed to the proposed regimen. Ritalin dose was decreased from 20mg TID (60mg total), to 20/20/10mg (50 mg total); and then switched to equivalent dose of Concerta 72 mg po daily to be tapered down as tolerated. The patient did not tolerate switching Ritalin to Concerta due to high BP, and on 11/5/2021 requested to be switched back to Ritalin 20mg/20mg/10mg as last prescribed (tapered down from 20 mg TID). Upon f/u on 12/28/2021, presented w/ well-controlled ADHD sxs, and stable mood and anxiety. The patient was diagnosed with complex cyst in L kidney and is going to have partial nephrectomy concerning for possible malignancy on 11/28/22, and also his internist recommended to discontinue Ritalin for HTN and high risk of cardia events given the positive FH and age. Ritalin was tapered off by 10 mg weekly and was started on Strattera 40 mg po daily for 1 week and then 40 mg po BID for ADHD. Upon f/u, the patient reported relatively controlled ADHD sxs with Strattera 40 mg BID and maintained on the same dose. However, the patient was visited on 3/20/23 as requested an earlier appointment and reported poor control of ADHD sxs with side effect of \"terrible dry mouth\". Presented w/ a persistent pattern of impaired concentration and difficulty sustaining attention, failure to finish duties on time, having difficulty organizing tasks and activities, losing things, and getting easily distracted by external stimuli, figeting with hands, tapping feet or squirming in seat, and talking fast, interrupting others during the conversation and at times moving around as well as having difficulties to wait. Strattera tapered off and started on Ritalin 10 mg TID which later uptitrated to 20/10/10 mg on " 4/27/23. Also, the patient was started on Lexapro 10 mg daily for depression and anxiety in the context of multiple psychosocial stressors. The patient stopped taking Lexapro after 8-10 weeks, and presented with moderately severe depression. Agreed to consider Cymbalta (helping with depression, anxiety and pain), but reportedly did not start the medication as had URI and procedures for pain management and then felt better and is not willing to start it at the moment. Will continue individual therapy and recommended to consider couple's therapy.    - Tapered off Strattera due to side effects, successfully  - Continue Ritalin 20/10/10 mg for ADHD (previously was stable on 20/20/10 mg)  - Continue Sonata 10 mg po nightly PRN for insomnia (has been sleeping well taking Melatonin recently and did not need a refill)  - Discontinued Lexapro due to sexual side effects and non-compliance; discontinued Cymbalta as the patient did not start and is not willing to initiate at this time  - Continue individual therapy; consider sex therapy / couple's therapy  - Educated about healthy life style, risk of falls/sedation and addiction. Patient was receptive to education.  - Medications sent to the patient's pharmacy for 90 day supply    - RTC in 24 weeks  - The patient was educated about 24 hour and weekend coverage for urgent situations accessed by calling [facility] main practice number  - Patient was educated to call National Suicide Prevention Lifeline (3-142-699-DSNS [0158]) for behavioral crisis at anytime or 741 for any safety concerns, or go to nearest ER if the symptoms become overwhelming or unmanageable.    Medications Risks/Benefits    Risks, Benefits And Possible Side Effects Of Medications:  Risks, benefits, and possible side effects of medications explained to Crispin and he verbalizes understanding and agreement for treatment.    Controlled Medication Discussion:   Crispin has been filling  controlled prescriptions on time as prescribed according to [place] Prescription Drug Monitoring Program  Discussed with Crispin the risks of sedation, respiratory depression, impairment of ability to drive and potential for abuse and addiction related to treatment with sedative/hypnotic medications. He understands risk of treatment with sedative / hypnotic medications, agrees to not drive if feels impaired and agrees to take medications as prescribed  Discussed with Crispin Black Box warning on concurrent use of benzodiazepines and opioid medications including sedation, respiratory depression, coma and death. He understands the risk of treatment with benzodiazepines in addition to opioids and wants to continue taking those medications  PMDP reviewed. Psycho-education regarding stimulants indications, benefits, risks (including risk of addiction, onesimo if taking more than prescribed), side effects (including but not limited to palpitation/arrhythmia, weight loss and increased anxiety), and alternative options provided to the patient, and the importance of the compliance with psychiatric treatment reiterated. The patient verbalized understanding and agreed to the proposed regimen.     Psychotherapy Provided:   Individual psychotherapy provided: Yes  Counseling was provided during the session today for 16 minutes.   Psychoeducation provided to the patient and was educated about the importance of compliance with the medications and psychiatric treatment  Supportive psychotherapy provided to the patient  Solution Focused Brief Therapy (SFBT) provided  Patient's emotions were validated and specific labeled praise provided.   [place] suggestions were offered in a supportive non-critical way.     Treatment Plan:  Completed and signed during the session: Yes - with Crispin Cuadra    The patient was visited virtually for Medication Management, ADHD, Depression, Anxiety, and Insomnia. Presented calm, and  cooperative. Reported feeling good. Visited his sun in August in [facility], and had a nice time with his son. He feels exhausted at times dealing with her grandson, and continues to have marital conflicts. He endorsed good function at work with good control of ADHD sxs with the current sxs. He got promotion at work and was happy about it. 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 He had nerve ablation but his LBP has been worse and now controls the pain with Motrin/Tylenol and lidocaine patch, and is going to f/u with pain management. Denied any changes in sleep, appetite, concentration, energy level, or daily activities.  Denied feelings of anhedonia, hopelessness, helplessness, worthlessness or guilt and appeared to be future oriented.  There was no thought constriction related to death.  Denied SI/HI, intent or plan upon direct inquiry at this time. No intense anxiety sxs, specific phobia or panic attacks reported. Denied AV/H.  Endorsed good compliance with the medications and denied any side effects. Denied smoking cigarettes, binge drinking alcohol or other illicit substance use.    Given this presentation, medications are maintained at the same dosage.  Will continue individual psychotherapy. Routine labs ordered to be checked prior to the next visit. The patient was educated to call 911 or go to the nearest emergency room if the symptoms become overwhelming or unable to remain in control. Verbalized understanding and agreed to seek help in case of distress or concern for safety.    Review Of Systems:  Pertinent items are noted in HPI; all others are negative; no recent changes in medications or health status reported.  The patient did not fill out the questionnaires prior to the virtual visit.  PHQ-2/9 Depression Screening                 Historical Information    Past Psychiatric History Update:   - No inpatient psychiatric admission since last encounter  - No SA or SIB since last  encounter  - No incidence of violent behavior since last encounter    Past Trauma History Update:    - No new onset of abuse or traumatic events since last encounter     History Review: The following portions of the patient's history were reviewed and updated as appropriate: allergies, current medications, past family history, past medical history, past social history, past surgical history and problem list.       Objective      Vital signs in last 24 hours: Not checked - virtual visit    Mental Status Evaluation:  Appearance and attitude: appeared as stated age, cooperative and attentive, casually dressed, with good hygiene  Eye contact: good  Motor Function: within normal limits, No PMA/PMR  Gait/station: Not observed  Speech: normal for rate, rhythm, volume, latency, amount  Language: No overt abnormality  Mood/affect: euthymic / Affect was euthymic, reactive, in full range, normal intensity and mood congruent  Thought Processes: sequential and goal-directed  Thought content: denies suicidal ideation or homicidal ideation; no delusions or first rank symptoms  Associations: intact associations  Perceptual disturbances: denies Auditory/Visual/Tactile Hallucinations  Orientation: oriented to time, person, place and to the situational context  Cognitive Function: intact  Memory: recent and remote memory grossly intact  Intellect: average  Fund of knowledge: aware of current events, aware of past history, and vocabulary average  Impulse control: good  Insight/judgment: good/good          Laboratory Results: I have personally reviewed all pertinent laboratory/tests results  Recent Labs (last 2 months):   No visits with results within 2 Month(s) from this visit.   Latest known visit with results is:   Hospital Outpatient Visit on 07/26/2024   Component Date Value    Baseline HR 07/26/2024 88     Baseline BP 07/26/2024 140/94     O2 sat rest 07/26/2024 97     Stress peak HR 07/26/2024 146     Post peak BP 07/26/2024 140      O2 sat peak 07/26/2024 99     Recovery HR 07/26/2024 96     Recovery BP 07/26/2024 140/80     O2 sat recovery 07/26/2024 99     Max HR 07/26/2024 146     Max HR Percent 07/26/2024 94     Exercise duration (min) 07/26/2024 9     Exercise duration (sec) 07/26/2024 31     Estimated workload 07/26/2024 10.9     Rate Pressure Product 07/26/2024 20,440.0     Angina Index 07/26/2024 0     Stress Stage Reached 07/26/2024 4.0     Duke Treadmill Score 07/26/2024 10     ST Depression (mm) 07/26/2024 0.0     LV EF 07/26/2024 60     Protocol Name 07/26/2024 REANNA     Exercise duration (min) 07/26/2024 9     Exercise duration (sec) 07/26/2024 31     Post Peak Systolic BP 07/26/2024 162     Max Diastolic Bp 07/26/2024 102     Peak HR 07/26/2024 146     Max Predicted Heart Rate 07/26/2024 155     Reason for Termination 07/26/2024                      Value:Fatigue  Target Heart Rate Achieved      Test Indication 07/26/2024 Screening for CAD     Target Hr Formular 07/26/2024 (220 - Age)*100%     Chest Pain Statement 07/26/2024 none            Vielka Victoria MD 09/27/24    This note was completed in part utilizing Dragon dictation Software. Grammatical, translation, syntax errors, random word insertions, spelling mistakes, and incomplete sentences may be an occasional consequence of this system secondary to software limitations with voice recognition, ambient noise, and hardware issues. If you have any questions or concerns about the content, text, or information contained within the body of this dictation, please contact the provider for clarification.     -----------------------------  Virtual Regular Visit    Patient is located at Home in the following state in which I hold an active license [place].    Problem List Items Addressed This Visit          Behavioral Health    Attention deficit hyperactivity disorder (ADHD), combined type - Primary       Neurology/Sleep    Primary insomnia     Other Visit Diagnoses       Adjustment  disorder with mixed anxiety and depressed mood                     Reason for visit is   Chief Complaint   Patient presents with    Medication Management    ADHD    Depression    Anxiety    Insomnia        Visit Time  Visit Start Time: 1:25 PM  Visit Stop Time: 1:52 PM  Total Visit Duration: 27 minutes    Encounter provider Vielka Victoria MD    Provider located at: [facility] [address], [address]  [place] [address]    Recent Visits  No visits were found meeting these conditions.  Showing recent visits within past 7 days and meeting all other requirements  Today's Visits  Date Type Provider Dept   09/27/24 Telemedicine Vielka Victoria MD  [facility]   Showing today's visits and meeting all other requirements  Future Appointments  No visits were found meeting these conditions.  Showing future appointments within next 150 days and meeting all other requirements       The patient was identified by name and date of birth. Crispin RAMAN Sarwat was informed that this is a telemedicine visit and that the visit is being conducted through the [employer] platform. He agrees to proceed. My office door was closed. No one else was in the room. He acknowledged consent and understanding of privacy and security of the video platform. The patient has agreed to participate and understands they can discontinue the visit at any time. Patient is aware this is a billable service.

## 2024-10-21 ENCOUNTER — OFFICE VISIT (OUTPATIENT)
Dept: PAIN MEDICINE | Facility: CLINIC | Age: 64
End: 2024-10-21
Payer: COMMERCIAL

## 2024-10-21 VITALS
WEIGHT: 179 LBS | HEART RATE: 82 BPM | SYSTOLIC BLOOD PRESSURE: 142 MMHG | BODY MASS INDEX: 25.06 KG/M2 | DIASTOLIC BLOOD PRESSURE: 84 MMHG | HEIGHT: 71 IN | RESPIRATION RATE: 16 BRPM

## 2024-10-21 DIAGNOSIS — M51.362 DEGENERATION OF INTERVERTEBRAL DISC OF LUMBAR REGION WITH DISCOGENIC BACK PAIN AND LOWER EXTREMITY PAIN: ICD-10-CM

## 2024-10-21 DIAGNOSIS — M51.16 INTERVERTEBRAL DISC DISORDER WITH RADICULOPATHY OF LUMBAR REGION: Primary | ICD-10-CM

## 2024-10-21 DIAGNOSIS — M43.16 SPONDYLOLISTHESIS OF LUMBAR REGION: ICD-10-CM

## 2024-10-21 DIAGNOSIS — F41.9 ANXIETY: ICD-10-CM

## 2024-10-21 PROCEDURE — 99214 OFFICE O/P EST MOD 30 MIN: CPT | Performed by: ANESTHESIOLOGY

## 2024-10-21 RX ORDER — ALPRAZOLAM 1 MG/1
TABLET ORAL
Qty: 2 TABLET | Refills: 0 | Status: SHIPPED | OUTPATIENT
Start: 2024-10-21 | End: 2024-10-23 | Stop reason: ALTCHOICE

## 2024-10-21 NOTE — PROGRESS NOTES
Assessment:  1. Intervertebral disc disorder with radiculopathy of lumbar region    2. Degeneration of intervertebral disc of lumbar region with discogenic back pain and lower extremity pain    3. Anxiety    4. Spondylolisthesis of lumbar region        Plan:  The patient's symptoms, history/physical are consistent with pain that is multifactorial in origin and secondary to his L5-S1 spondylolisthesis with foraminal narrowing as well as degenerative disc disease at that level.  At this time, given his current radiating symptoms, I discussed proceeding with bilateral L5 transforaminal epidural injections which in the past has been helpful for him.  He would like to proceed and will be scheduled later this week under fluoroscopic guidance.    My impressions and treatment recommendations were discussed in detail with the patient who verbalized understanding and had no further questions.  Discharge instructions were provided. I personally saw and examined the patient and I agree with the above discussed plan of care.    Orders Placed This Encounter   Procedures    FL spine and pain procedure     Standing Status:   Future     Standing Expiration Date:   10/21/2028     Order Specific Question:   Reason for Exam:     Answer:   Bilateral L5 TF KYLE     Order Specific Question:   Anticoagulant hold needed?     Answer:   No     New Medications Ordered This Visit   Medications    ALPRAZolam (XANAX) 1 mg tablet     Sig: Take 1 tablet 2 hours prior to procedure     Dispense:  2 tablet     Refill:  0       History of Present Illness:  Crispin Fam is a 64 y.o. male who presents for a follow up office visit in regards to Back Pain.   The patient previously saw Dr. Logan and care is now being transferred to me.  To review, the patient underwent bilateral L3-5 medial branch radiofrequency ablation in early 2024 after successfully undergoing diagnostic and confirmatory medial branch blocks.  Unfortunately, this treatment worsened  his condition and he experiences excruciating 10/10 pain in the lower back worse on the left that is now rating down both thighs but worse on the left.  Symptoms are felt nearly constantly aggravated with sitting standing and being physically active.  Uses Tylenol and ibuprofen which does help.  He reports that he had previously gotten epidural steroid injections at [facility] which has been helpful.    I have personally reviewed and/or updated the patient's past medical history, past surgical history, family history, social history, current medications, allergies, and vital signs today.     Review of Systems   Respiratory:  Negative for shortness of breath.    Cardiovascular:  Negative for chest pain.   Gastrointestinal:  Negative for constipation, diarrhea, nausea and vomiting.   Musculoskeletal:  Positive for back pain. Negative for arthralgias, gait problem, joint swelling and myalgias.   Skin:  Negative for rash.   Neurological:  Negative for dizziness, seizures and weakness.   All other systems reviewed and are negative.      Patient Active Problem List   Diagnosis    Exercise-induced asthma    Attention deficit hyperactivity disorder (ADHD), combined type    Psoriatic arthritis (HCC)    Mixed hyperlipidemia    Primary insomnia    Cervical spondylosis    Controlled substance agreement signed    DDD (degenerative disc disease), lumbar    Degenerative disc disease, cervical    Elbow pain, left    Food allergy    Muscle spasms of both lower extremities    Right upper quadrant abdominal pain    Vitamin D deficiency    HTN (hypertension)    Chronic maxillary sinusitis    Dysphoric mood    Dry eye    Dry mouth    Memory difficulties    Recurrent right inguinal hernia    Muscle cramps    Tinnitus of both ears    Wellness examination    Encounter to discuss test results    Lipid disorder    Coronary artery disease involving native coronary artery of native heart without angina pectoris    COVID-19    Renal  cell carcinoma of left kidney (HCC)    Immunocompromised (HCC)    Shortness of breath    Persistent headaches    Chest pain    Lumbosacral spondylosis without myelopathy    Lumbar spondylosis       Past Medical History:   Diagnosis Date    Arthritis     Asthma     COPD (chronic obstructive pulmonary disease) (HCC) 1990    Headache(784.0) 2019    Significant osteoarthrities in neck    HL (hearing loss) 2019    mild    Pneumonia 2019    Psoriatic arthritis (HCC)     Septicemia (HCC)     Stomach problems        Past Surgical History:   Procedure Laterality Date    COLONOSCOPY      DENTAL IMPLANT      HERNIA REPAIR      INCISION AND DRAINAGE ABSCESS / HEMATOMA OF BURSA / KNEE / THIGH Left     thigh    NE LAPAROSCOPY SURG PARTIAL NEPHRECTOMY Left 11/28/2022    Procedure: ROBOTIC LAPAROSCOPIC PARTIAL NEPHRECTOMY  WITH INTRAOPERATIVE ULTRASOUND INTERPRETATION;  Surgeon: Shane Barbosa MD;  [facility];  Service: Urology    NE RPR 1ST INGUN HRNA AGE 5 YRS/> REDUCIBLE Right 06/22/2021    Procedure: Inguinal hernia repair with mesh;  Surgeon: Robert Bloch, MD;  Location: [facility];  Service: General       Family History   Problem Relation Age of Onset    Coronary artery disease Father     Heart disease Father     Arthritis Mother     Hearing loss Mother     Heart disease Maternal Grandfather     Dementia Maternal Grandmother     Heart disease Paternal Grandfather     Dementia Maternal Aunt        Social History     Occupational History    Not on file   Tobacco Use    Smoking status: Never    Smokeless tobacco: Never   Vaping Use    Vaping status: Never Used   Substance and Sexual Activity    Alcohol use: Yes     Alcohol/week: 1.0 standard drink of alcohol     Types: 1 Glasses of wine per week     Comment: More like one glass per month socially    Drug use: Never    Sexual activity: Yes     Partners: Female     Birth control/protection: None     Comment: Monogamous,         Current Outpatient Medications on  "File Prior to Visit   Medication Sig    albuterol (PROVENTIL HFA,VENTOLIN HFA) 90 mcg/act inhaler Inhale 2 puffs every 6 (six) hours as needed for wheezing Pt requests 2 inhalers    amLODIPine (NORVASC) 5 mg tablet TAKE ONE TABLET BY MOUTH EVERY DAY    aspirin (ECOTRIN LOW STRENGTH) 81 mg EC tablet Take 81 mg by mouth daily    clobetasol (TEMOVATE) 0.05 % cream Apply 0.25 g (0.05 Applications total) topically 2 (two) times a day as needed (rash)    clotrimazole-betamethasone (LOTRISONE) 1-0.05 % cream Apply topically 2 (two) times a day    ketoconazole (NIZORAL) 2 % cream Apply topically daily as needed for rash    methylphenidate (RITALIN) 10 mg tablet TAKE TWO TABLETS IN THE MORNING, ONE TABLET AROUND NOON AND ONE TABLET IN AFTERNOON IF NEEDED Do not start before October 2, 2024.    rosuvastatin (CRESTOR) 40 MG tablet Take 1 tablet (40 mg total) by mouth daily    EPINEPHrine (EPIPEN) 0.3 mg/0.3 mL SOAJ Inject 0.3 mL (0.3 mg total) into a muscle once for 1 dose    zaleplon (SONATA) 10 MG capsule Take 1 capsule (10 mg total) by mouth daily at bedtime     No current facility-administered medications on file prior to visit.       No Known Allergies    Physical Exam:    /84   Pulse 82   Resp 16   Ht 5' 11\" (1.803 m)   Wt 81.2 kg (179 lb)   BMI 24.97 kg/m²     Constitutional:normal, well developed, well nourished, alert, in no distress and non-toxic and no overt pain behavior.  Eyes:anicteric  HEENT:grossly intact  Neck:supple, symmetric, trachea midline and no masses   Pulmonary:even and unlabored  Cardiovascular:No edema or pitting edema present  Skin:Normal without rashes or lesions and well hydrated  Psychiatric:Mood and affect appropriate  Neurologic:Cranial Nerves II-XII grossly intact  Musculoskeletal:normal    Lumbar Spine Exam  Appearance:  Normal lordosis  Palpation/Tenderness:  left lumbar paraspinal tenderness  right lumbar paraspinal tenderness  Range of Motion:  Flexion:  Severely limited  with " pain  Extension:  Severely limited  with pain  Motor Strength:  Left hip flexion:  5/5  Left hip extension:  5/5  Right hip flexion:  5/5  Right hip extension:  5/5  Left knee flexion:  5/5  Left knee extension:  5/5  Right knee flexion:  5/5  Right knee extension:  5/5  Left foot dorsiflexion:  5/5  Left foot plantar flexion:  5/5  Right foot dorsiflexion:  5/5    Imaging    MRI LUMBAR SPINE WITHOUT CONTRAST (7/25/2024)     INDICATION: L40.50: Arthropathic psoriasis, unspecified  M45.0: Ankylosing spondylitis of multiple sites in spine  M46.1: Sacroiliitis, not elsewhere classified  M15.0: Primary generalized (osteo)arthritis.     COMPARISON: 3/6/2024; 7/25/2024     TECHNIQUE:  Multiplanar, multisequence imaging of the lumbar spine was performed. .        IMAGE QUALITY:  Diagnostic     FINDINGS:     VERTEBRAL BODIES:  There are 5 lumbar type vertebral bodies. Grade 1 anterolisthesis of L5 on S1. Right-sided spondylolysis of L5 noted. Probable left-sided spondylolysis as well. Marked facet hypertrophy noted in this region. Type I Modic endplate   changes about the L5-S1 intervertebral disc space as well as the L2-3 intervertebral disc space.     SACRUM: Type I Modic endplate changes superior endplate of S1.     DISTAL CORD AND CONUS:  Normal size and signal within the distal cord and conus. The conus medullaris terminates at the superior endplate of L2.     PARASPINAL SOFT TISSUES:  Paraspinal soft tissues are unremarkable.     LOWER THORACIC DISC SPACES:  Normal disc height and signal.  No disc herniation, canal stenosis or foraminal narrowing.     LUMBAR DISC SPACES:     L1-L2:  Normal.     L2-L3: There is bilateral facet hypertrophy. There is loss of disc height and signal. There is a left neural foraminal disc protrusion. Moderate narrowing of the left neural foramen. Mild central canal narrowing. Mild right neural foraminal narrowing.     L3-L4: There is loss of disc height and signal. There is a left neural  foraminal disc protrusion. There is bilateral facet hypertrophy. Mild left neural foraminal narrowing. Mild narrowing left subarticular zone. Central canal patent. Mild right   subarticular zone narrowing.     L4-L5: There is bilateral facet hypertrophy. There is a right neural foraminal disc protrusion. Moderate right neural foraminal narrowing. Central canal patent. Mild left neural foraminal narrowing.     L5-S1: There is uncovering the intervertebral disc space. There is bilateral facet hypertrophy. Central canal patent. At least moderate narrowing of the left neural foramina secondary to the anterolisthesis.     OTHER FINDINGS:  None.     IMPRESSION:     Multilevel spondylosis most pronounced at L5-S1 where there is suspected bilateral spondylolysis of L5 and associated moderate narrowing of the neural foramina on the basis of the anterolisthesis.        Workstation performed: [id number]         MRI PELVIS SACRUM,COCCYX, SI JTS WO CONTRAST     INDICATION:   L40.50: Arthropathic psoriasis, unspecified  M45.0: Ankylosing spondylitis of multiple sites in spine  M46.1: Sacroiliitis, not elsewhere classified  M15.0: Primary generalized (osteo)arthritis.     COMPARISON: Radiographs 3/6/2024, CT 4/21/2020     TECHNIQUE:  Multiplanar/multisequence MR of the pelvis to evaluate for sacrum and coccyx pathology was performed.        FINDINGS:     SUBCUTANEOUS TISSUES: Normal     SI JOINTS AND SYMPHYSIS PUBIS:   Intact.     VISUALIZED LUMBAR SPINE: Grade 1 L5-S1 anterolisthesis with bilateral L5 pars defects and moderate to severe L5-S1 degenerative disc disease. Please see separate report for further details.     BONES:  No fracture or dislocation.  No suspicious marrow abnormality.     MUSCULATURE:  Unremarkable.     PELVIC SOFT TISSUES:   Normal.     IMPRESSION:     Normal MRI of the sacrum and coccyx.     Grade 1 L5-S1 spondylolytic anterolisthesis with moderate to severe L5-S1 degenerative disc disease. Please see  separate report for further details.              Workstation performed: [id number]

## 2024-10-23 ENCOUNTER — HOSPITAL ENCOUNTER (OUTPATIENT)
Dept: RADIOLOGY | Facility: CLINIC | Age: 64
Discharge: HOME | End: 2024-10-23
Payer: COMMERCIAL

## 2024-10-23 VITALS
DIASTOLIC BLOOD PRESSURE: 76 MMHG | OXYGEN SATURATION: 96 % | HEART RATE: 77 BPM | SYSTOLIC BLOOD PRESSURE: 112 MMHG | TEMPERATURE: 97.7 F | RESPIRATION RATE: 20 BRPM

## 2024-10-23 DIAGNOSIS — M51.16 INTERVERTEBRAL DISC DISORDER WITH RADICULOPATHY OF LUMBAR REGION: ICD-10-CM

## 2024-10-23 PROCEDURE — 64483 NJX AA&/STRD TFRM EPI L/S 1: CPT | Performed by: ANESTHESIOLOGY

## 2024-10-23 RX ORDER — BUPIVACAINE HCL/PF 2.5 MG/ML
2 VIAL (ML) INJECTION ONCE
Status: COMPLETED | OUTPATIENT
Start: 2024-10-23 | End: 2024-10-23

## 2024-10-23 RX ORDER — METHYLPREDNISOLONE ACETATE 80 MG/ML
80 INJECTION, SUSPENSION INTRA-ARTICULAR; INTRALESIONAL; INTRAMUSCULAR; PARENTERAL; SOFT TISSUE ONCE
Status: COMPLETED | OUTPATIENT
Start: 2024-10-23 | End: 2024-10-23

## 2024-10-23 RX ORDER — 0.9 % SODIUM CHLORIDE 0.9 %
4 VIAL (ML) INJECTION ONCE
Status: COMPLETED | OUTPATIENT
Start: 2024-10-23 | End: 2024-10-23

## 2024-10-23 RX ADMIN — IOHEXOL 1 ML: 300 INJECTION, SOLUTION INTRAVENOUS at 10:25

## 2024-10-23 RX ADMIN — METHYLPREDNISOLONE ACETATE 80 MG: 80 INJECTION, SUSPENSION INTRA-ARTICULAR; INTRALESIONAL; INTRAMUSCULAR; SOFT TISSUE at 10:25

## 2024-10-23 RX ADMIN — Medication 4 ML: at 10:23

## 2024-10-23 RX ADMIN — LIDOCAINE HYDROCHLORIDE 4 ML: 20 INJECTION, SOLUTION EPIDURAL; INFILTRATION; INTRACAUDAL at 10:23

## 2024-10-23 RX ADMIN — BUPIVACAINE HYDROCHLORIDE 2 ML: 2.5 INJECTION, SOLUTION EPIDURAL; INFILTRATION; INTRACAUDAL at 10:25

## 2024-10-23 NOTE — H&P
History of Present Illness: The patient is a 64 y.o. male who presents with complaints of lower back pain is here today for bilateral L5 transforaminal epidural steroid injection    Past Medical History:   Diagnosis Date    Arthritis     Asthma     COPD (chronic obstructive pulmonary disease) (HCC) 1990    Headache(784.0) 2019    Significant osteoarthrities in neck    HL (hearing loss) 2019    mild    Pneumonia 2019    Psoriatic arthritis (HCC)     Septicemia (HCC)     Stomach problems        Past Surgical History:   Procedure Laterality Date    COLONOSCOPY      DENTAL IMPLANT      HERNIA REPAIR      INCISION AND DRAINAGE ABSCESS / HEMATOMA OF BURSA / KNEE / THIGH Left     thigh    MN LAPAROSCOPY SURG PARTIAL NEPHRECTOMY Left 11/28/2022    Procedure: ROBOTIC LAPAROSCOPIC PARTIAL NEPHRECTOMY  WITH INTRAOPERATIVE ULTRASOUND INTERPRETATION;  Surgeon: Shane Barbosa MD;  [facility];  Service: Urology    MN RPR 1ST INGUN HRNA AGE 5 YRS/> REDUCIBLE Right 06/22/2021    Procedure: Inguinal hernia repair with mesh;  Surgeon: Robert Bloch, MD;  Location[facility];  Service: General         Current Outpatient Medications:     albuterol (PROVENTIL HFA,VENTOLIN HFA) 90 mcg/act inhaler, Inhale 2 puffs every 6 (six) hours as needed for wheezing Pt requests 2 inhalers, Disp: 18 g, Rfl: 1    amLODIPine (NORVASC) 5 mg tablet, TAKE ONE TABLET BY MOUTH EVERY DAY, Disp: 90 tablet, Rfl: 3    aspirin (ECOTRIN LOW STRENGTH) 81 mg EC tablet, Take 81 mg by mouth daily, Disp: , Rfl:     clobetasol (TEMOVATE) 0.05 % cream, Apply 0.25 g (0.05 Applications total) topically 2 (two) times a day as needed (rash), Disp: 60 g, Rfl: 5    clotrimazole-betamethasone (LOTRISONE) 1-0.05 % cream, Apply topically 2 (two) times a day, Disp: 30 g, Rfl: 5    EPINEPHrine (EPIPEN) 0.3 mg/0.3 mL SOAJ, Inject 0.3 mL (0.3 mg total) into a muscle once for 1 dose, Disp: 1 each, Rfl: 0    ketoconazole (NIZORAL) 2 % cream, Apply topically daily as needed  for rash, Disp: 15 g, Rfl: 5    methylphenidate (RITALIN) 10 mg tablet, TAKE TWO TABLETS IN THE MORNING, ONE TABLET AROUND NOON AND ONE TABLET IN AFTERNOON IF NEEDED Do not start before October 2, 2024., Disp: 120 tablet, Rfl: 0    rosuvastatin (CRESTOR) 40 MG tablet, Take 1 tablet (40 mg total) by mouth daily, Disp: 90 tablet, Rfl: 1    zaleplon (SONATA) 10 MG capsule, Take 1 capsule (10 mg total) by mouth daily at bedtime, Disp: 30 capsule, Rfl: 2    Current Facility-Administered Medications:     bupivacaine (PF) (MARCAINE) 0.25 % injection 2 mL, 2 mL, Epidural, Once, Siddhartha Wade MD    iohexol (OMNIPAQUE) 300 mg/mL injection 1 mL, 1 mL, Epidural, Once, Siddhartha Wade MD    lidocaine (PF) (XYLOCAINE-MPF) 2 % injection 4 mL, 4 mL, Infiltration, Once, Siddhartha Wade MD    methylPREDNISolone acetate (DEPO-MEDROL) injection 80 mg, 80 mg, Epidural, Once, Siddhartha Wade MD    sodium chloride (PF) 0.9 % injection 4 mL, 4 mL, Infiltration, Once, Siddhartha Wade MD    No Known Allergies    Physical Exam:   Vitals:    10/23/24 1006   BP: 101/69   Pulse: 81   Resp: 20   Temp: 97.7 °F (36.5 °C)   SpO2: 95%     General: Awake, Alert, Oriented x 3, Mood and affect appropriate  Respiratory: Respirations even and unlabored  Cardiovascular: Peripheral pulses intact; no edema  Musculoskeletal Exam: Lower back and leg pain    ASA Score: 3    Patient/Chart Verification  Patient ID Verified: Verbal  Consents Confirmed: To be obtained in the Pre-Procedure area  Interval H&P(within 24 hr) Complete (required for Outpatients and Surgery Admit only): To be obtained in the Procedural area  Allergies Reviewed: Yes  Anticoag/NSAID held?: NA  Currently on antibiotics?: No    Assessment:   1. Intervertebral disc disorder with radiculopathy of lumbar region        Plan: Bilateral L5 TF KYLE

## 2024-10-23 NOTE — DISCHARGE INSTR - LAB
Epidural Steroid Injection   WHAT YOU NEED TO KNOW:   An epidural steroid injection (KYLE) is a procedure to inject steroid medicine into the epidural space. The epidural space is between your spinal cord and vertebrae. Steroids reduce inflammation and fluid buildup in your spine that may be causing pain. You may be given pain medicine along with the steroids.          ACTIVITY  Do not drive or operate machinery today.  No strenuous activity today - bending, lifting, etc.  You may resume normal activites starting tomorrow - start slowly and as tolerated.  You may shower today, but no tub baths or hot tubs.  You may have numbness for several hours from the local anesthetic. Please use caution and common sense, especially with weight-bearing activities.    CARE OF THE INJECTION SITE  If you have soreness or pain, apply ice to the area today (20 minutes on/20 minutes off).  Starting tomorrow, you may use warm, moist heat or ice if needed.  You may have an increase or change in your discomfort for 36-48 hours after your treatment.  Apply ice and continue with any pain medication you have been prescribed.  Notify the [facility] if you have any of the following: redness, drainage, swelling, headache, stiff neck or fever above 100°F.    SPECIAL INSTRUCTIONS  Our office will contact you in approximately 14 days for a progress report.    MEDICATIONS  Continue to take all routine medications.  Our office may have instructed you to hold some medications.    As no general anesthesia was used in today's procedure, you should not experience any side effects related to anesthesia.     If you are diabetic, the steroids used in today's injection may temporarily increase your blood sugar levels after the first few days after your injection. Please keep a close eye on your sugars and alert the doctor who manages your diabetes if your sugars are significantly high from your baseline or you are symptomatic.     If you have a  problem specifically related to your procedure, please call our office at [phone].  Problems not related to your procedure should be directed to your primary care physician.

## 2024-10-28 DIAGNOSIS — F90.2 ATTENTION DEFICIT HYPERACTIVITY DISORDER (ADHD), COMBINED TYPE: ICD-10-CM

## 2024-10-28 NOTE — TELEPHONE ENCOUNTER
Reason for call:   [x] Refill   [] Prior Auth  [] Other:     Office:   [] PCP/Provider -   [x] Specialty/Provider - psych     Medication: methylphenidate (RITALIN) 10 mg tablet     Dose/Frequency: TAKE TWO TABLETS IN THE MORNING, ONE TABLET AROUND NOON AND ONE TABLET IN AFTERNOON IF NEEDED     Quantity:  120 tablet     Pharmacy: [address]      Does the patient have enough for 3 days?   [x] Yes   [] No - Send as HP to POD

## 2024-10-29 RX ORDER — METHYLPHENIDATE HYDROCHLORIDE 10 MG/1
TABLET ORAL
Qty: 120 TABLET | Refills: 0 | Status: SHIPPED | OUTPATIENT
Start: 2024-11-07

## 2024-11-06 ENCOUNTER — TELEPHONE (OUTPATIENT)
Dept: RADIOLOGY | Facility: MEDICAL CENTER | Age: 64
End: 2024-11-06

## 2024-11-06 DIAGNOSIS — F41.9 ANXIETY: Primary | ICD-10-CM

## 2024-11-06 NOTE — TELEPHONE ENCOUNTER
Patient Reports   50      %     improvement post injection    Pain Level   6-7 /10     Patient would like a call back regarding getting  another injection because he still has a fair amount of pain.

## 2024-11-06 NOTE — TELEPHONE ENCOUNTER
Pt said his pain level pre injection was always 9/10 and now it is down to 6-7/10. The pain remains the same in the lower back, left side a little worse than the right side. He did say the buttock and leg pain has gone away.   He said he was just asking if he could get a repeat injection to take even more of this pain away?   He was very pleased how he was able to get OOB after the injection without having to grab onto the wall or night stand! 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   Pls advise.

## 2024-11-07 RX ORDER — ALPRAZOLAM 0.5 MG
TABLET ORAL
Qty: 2 TABLET | Refills: 0 | Status: SHIPPED | OUTPATIENT
Start: 2024-11-07

## 2024-11-07 NOTE — TELEPHONE ENCOUNTER
Prescription for Xanax sent to the pharmacy but I did cut the dose because last time it made him very sedated

## 2024-11-07 NOTE — TELEPHONE ENCOUNTER
Patient has been scheduled for their procedure, please see [employer] message for additional details.

## 2024-11-08 NOTE — TELEPHONE ENCOUNTER
Caller: Crispin     Doctor: Sheri    Reason for call: Patient returning call from nurse please advise     Call back#: [phone]

## 2024-11-08 NOTE — TELEPHONE ENCOUNTER
Pt informed that script for xanax was sent to pharmacy but that FQ said he lowered the dosage b/c last time the dosage made him too sedated.  Told pt he could take 2 hrs before procedure and he must have  bring him.

## 2024-11-21 ENCOUNTER — HOSPITAL ENCOUNTER (OUTPATIENT)
Dept: RADIOLOGY | Facility: CLINIC | Age: 64
Discharge: HOME | End: 2024-11-21
Payer: COMMERCIAL

## 2024-11-21 VITALS
OXYGEN SATURATION: 94 % | TEMPERATURE: 98.1 F | RESPIRATION RATE: 18 BRPM | DIASTOLIC BLOOD PRESSURE: 82 MMHG | SYSTOLIC BLOOD PRESSURE: 123 MMHG | HEART RATE: 92 BPM

## 2024-11-21 DIAGNOSIS — M51.16 INTERVERTEBRAL DISC DISORDER WITH RADICULOPATHY OF LUMBAR REGION: ICD-10-CM

## 2024-11-21 PROCEDURE — 64483 NJX AA&/STRD TFRM EPI L/S 1: CPT | Performed by: ANESTHESIOLOGY

## 2024-11-21 RX ORDER — METHYLPREDNISOLONE ACETATE 80 MG/ML
80 INJECTION, SUSPENSION INTRA-ARTICULAR; INTRALESIONAL; INTRAMUSCULAR; PARENTERAL; SOFT TISSUE ONCE
Status: COMPLETED | OUTPATIENT
Start: 2024-11-21 | End: 2024-11-21

## 2024-11-21 RX ORDER — BUPIVACAINE HCL/PF 2.5 MG/ML
2 VIAL (ML) INJECTION ONCE
Status: COMPLETED | OUTPATIENT
Start: 2024-11-21 | End: 2024-11-21

## 2024-11-21 RX ORDER — 0.9 % SODIUM CHLORIDE 0.9 %
4 VIAL (ML) INJECTION ONCE
Status: COMPLETED | OUTPATIENT
Start: 2024-11-21 | End: 2024-11-21

## 2024-11-21 RX ADMIN — METHYLPREDNISOLONE ACETATE 80 MG: 80 INJECTION, SUSPENSION INTRA-ARTICULAR; INTRALESIONAL; INTRAMUSCULAR; SOFT TISSUE at 14:11

## 2024-11-21 RX ADMIN — IOHEXOL 1 ML: 300 INJECTION, SOLUTION INTRAVENOUS at 14:11

## 2024-11-21 RX ADMIN — LIDOCAINE HYDROCHLORIDE 4 ML: 20 INJECTION, SOLUTION EPIDURAL; INFILTRATION; INTRACAUDAL at 14:09

## 2024-11-21 RX ADMIN — Medication 4 ML: at 14:09

## 2024-11-21 RX ADMIN — BUPIVACAINE HYDROCHLORIDE 2 ML: 2.5 INJECTION, SOLUTION EPIDURAL; INFILTRATION; INTRACAUDAL at 14:11

## 2024-11-21 NOTE — H&P
History of Present Illness: The patient is a 64 y.o. male who presents with complaints of lower back and leg pain is here today for bilateral L5 transforaminal epidural steroid injection    Past Medical History:   Diagnosis Date    Arthritis     Asthma     COPD (chronic obstructive pulmonary disease) (HCC) 1990    Headache(784.0) 2019    Significant osteoarthrities in neck    HL (hearing loss) 2019    mild    Pneumonia 2019    Psoriatic arthritis (HCC)     Septicemia (HCC)     Stomach problems        Past Surgical History:   Procedure Laterality Date    COLONOSCOPY      DENTAL IMPLANT      HERNIA REPAIR      INCISION AND DRAINAGE ABSCESS / HEMATOMA OF BURSA / KNEE / THIGH Left     thigh    CT LAPAROSCOPY SURG PARTIAL NEPHRECTOMY Left 11/28/2022    Procedure: ROBOTIC LAPAROSCOPIC PARTIAL NEPHRECTOMY  WITH INTRAOPERATIVE ULTRASOUND INTERPRETATION;  Surgeon: Shane Barbosa MD;  [facility];  Service: Urology    CT RPR 1ST INGUN HRNA AGE 5 YRS/> REDUCIBLE Right 06/22/2021    Procedure: Inguinal hernia repair with mesh;  Surgeon: Robert Bloch, MD;  [facility];  Service: General         Current Outpatient Medications:     albuterol (PROVENTIL HFA,VENTOLIN HFA) 90 mcg/act inhaler, Inhale 2 puffs every 6 (six) hours as needed for wheezing Pt requests 2 inhalers, Disp: 18 g, Rfl: 1    ALPRAZolam (XANAX) 0.5 mg tablet, Take 1 tablet 2 hours prior to procedure. OK to repeat 30 minutes prior, Disp: 2 tablet, Rfl: 0    amLODIPine (NORVASC) 5 mg tablet, TAKE ONE TABLET BY MOUTH EVERY DAY, Disp: 90 tablet, Rfl: 3    aspirin (ECOTRIN LOW STRENGTH) 81 mg EC tablet, Take 81 mg by mouth daily, Disp: , Rfl:     clobetasol (TEMOVATE) 0.05 % cream, Apply 0.25 g (0.05 Applications total) topically 2 (two) times a day as needed (rash), Disp: 60 g, Rfl: 5    clotrimazole-betamethasone (LOTRISONE) 1-0.05 % cream, Apply topically 2 (two) times a day, Disp: 30 g, Rfl: 5    EPINEPHrine (EPIPEN) 0.3 mg/0.3 mL SOAJ, Inject  0.3 mL (0.3 mg total) into a muscle once for 1 dose, Disp: 1 each, Rfl: 0    ketoconazole (NIZORAL) 2 % cream, Apply topically daily as needed for rash, Disp: 15 g, Rfl: 5    methylphenidate (RITALIN) 10 mg tablet, TAKE TWO TABLETS IN THE MORNING, ONE TABLET AROUND NOON AND ONE TABLET IN AFTERNOON IF NEEDED Do not start before November 7, 2024., Disp: 120 tablet, Rfl: 0    rosuvastatin (CRESTOR) 40 MG tablet, Take 1 tablet (40 mg total) by mouth daily, Disp: 90 tablet, Rfl: 1    zaleplon (SONATA) 10 MG capsule, Take 1 capsule (10 mg total) by mouth daily at bedtime, Disp: 30 capsule, Rfl: 2    Current Facility-Administered Medications:     bupivacaine (PF) (MARCAINE) 0.25 % injection 2 mL, 2 mL, Epidural, Once, Siddhartha Wade MD    iohexol (OMNIPAQUE) 300 mg/mL injection 1 mL, 1 mL, Epidural, Once, Siddhartha Wade MD    lidocaine (PF) (XYLOCAINE-MPF) 2 % injection 4 mL, 4 mL, Infiltration, Once, Siddhartha Wade MD    methylPREDNISolone acetate (DEPO-MEDROL) injection 80 mg, 80 mg, Epidural, Once, Siddhartha Wade MD    sodium chloride (PF) 0.9 % injection 4 mL, 4 mL, Infiltration, Once, Siddhartha Wade MD    No Known Allergies    Physical Exam:   Vitals:    11/21/24 1359   BP: 149/88   Pulse: 93   Resp: 18   Temp: 98.1 °F (36.7 °C)   SpO2: 94%     General: Awake, Alert, Oriented x 3, Mood and affect appropriate  Respiratory: Respirations even and unlabored  Cardiovascular: Peripheral pulses intact; no edema  Musculoskeletal Exam: Lower back and leg pain    ASA Score: 2    Patient/Chart Verification  Patient ID Verified: Verbal  ID Band Applied: No  Consents Confirmed: Procedural, To be obtained in the Pre-Procedure area  H&P( within 30 days) Verified: To be obtained in the Procedural area  Allergies Reviewed: Yes  Anticoag/NSAID held?: No  Currently on antibiotics?: No    Assessment:   1. Intervertebral disc disorder with radiculopathy of lumbar region        Plan: B/L L5 TFESI

## 2024-11-21 NOTE — DISCHARGE INSTR - LAB
Epidural Steroid Injection   WHAT YOU NEED TO KNOW:   An epidural steroid injection (KYLE) is a procedure to inject steroid medicine into the epidural space. The epidural space is between your spinal cord and vertebrae. Steroids reduce inflammation and fluid buildup in your spine that may be causing pain. You may be given pain medicine along with the steroids.          ACTIVITY  Do not drive or operate machinery today.  No strenuous activity today - bending, lifting, etc.  You may resume normal activites starting tomorrow - start slowly and as tolerated.  You may shower today, but no tub baths or hot tubs.  You may have numbness for several hours from the local anesthetic. Please use caution and common sense, especially with weight-bearing activities.    CARE OF THE INJECTION SITE  If you have soreness or pain, apply ice to the area today (20 minutes on/20 minutes off).  Starting tomorrow, you may use warm, moist heat or ice if needed.  You may have an increase or change in your discomfort for 36-48 hours after your treatment.  Apply ice and continue with any pain medication you have been prescribed.  Notify the [facility] if you have any of the following: redness, drainage, swelling, headache, stiff neck or fever above 100°F.    SPECIAL INSTRUCTIONS  Our office will contact you in approximately 14 days for a progress report.    MEDICATIONS  Continue to take all routine medications.  Our office may have instructed you to hold some medications.    As no general anesthesia was used in today's procedure, you should not experience any side effects related to anesthesia.     If you are diabetic, the steroids used in today's injection may temporarily increase your blood sugar levels after the first few days after your injection. Please keep a close eye on your sugars and alert the doctor who manages your diabetes if your sugars are significantly high from your baseline or you are symptomatic.     If you have a  problem specifically related to your procedure, please call our office at [phone].  Problems not related to your procedure should be directed to your primary care physician.

## 2024-11-22 ENCOUNTER — APPOINTMENT (OUTPATIENT)
Dept: LAB | Facility: HOSPITAL | Age: 64
End: 2024-11-22
Attending: UROLOGY
Payer: COMMERCIAL

## 2024-11-22 DIAGNOSIS — Z13.29 SCREENING FOR ENDOCRINE, NUTRITIONAL, METABOLIC AND IMMUNITY DISORDER: ICD-10-CM

## 2024-11-22 DIAGNOSIS — Z13.21 SCREENING FOR ENDOCRINE, NUTRITIONAL, METABOLIC AND IMMUNITY DISORDER: ICD-10-CM

## 2024-11-22 DIAGNOSIS — E78.2 MIXED HYPERLIPIDEMIA: ICD-10-CM

## 2024-11-22 DIAGNOSIS — E55.9 VITAMIN D DEFICIENCY: ICD-10-CM

## 2024-11-22 DIAGNOSIS — Z13.228 SCREENING FOR ENDOCRINE, NUTRITIONAL, METABOLIC AND IMMUNITY DISORDER: ICD-10-CM

## 2024-11-22 DIAGNOSIS — Z13.0 SCREENING FOR ENDOCRINE, NUTRITIONAL, METABOLIC AND IMMUNITY DISORDER: ICD-10-CM

## 2024-11-22 DIAGNOSIS — I25.10 CORONARY ARTERY DISEASE INVOLVING NATIVE CORONARY ARTERY OF NATIVE HEART WITHOUT ANGINA PECTORIS: ICD-10-CM

## 2024-11-22 DIAGNOSIS — C64.2 RENAL CELL CARCINOMA OF LEFT KIDNEY (HCC): ICD-10-CM

## 2024-11-22 LAB
25(OH)D3 SERPL-MCNC: 25.8 NG/ML (ref 30–100)
BASOPHILS # BLD AUTO: 0.02 THOUSANDS/ÂΜL (ref 0–0.1)
BASOPHILS NFR BLD AUTO: 0 % (ref 0–1)
CHOLEST SERPL-MCNC: 172 MG/DL (ref ?–200)
EOSINOPHIL # BLD AUTO: 0.01 THOUSAND/ÂΜL (ref 0–0.61)
EOSINOPHIL NFR BLD AUTO: 0 % (ref 0–6)
ERYTHROCYTE [DISTWIDTH] IN BLOOD BY AUTOMATED COUNT: 12.4 % (ref 11.6–15.1)
EST. AVERAGE GLUCOSE BLD GHB EST-MCNC: 117 MG/DL
HBA1C MFR BLD: 5.7 %
HCT VFR BLD AUTO: 45.4 % (ref 36.5–49.3)
HDLC SERPL-MCNC: 56 MG/DL
HGB BLD-MCNC: 15.4 G/DL (ref 12–17)
IMM GRANULOCYTES # BLD AUTO: 0.04 THOUSAND/UL (ref 0–0.2)
IMM GRANULOCYTES NFR BLD AUTO: 0 % (ref 0–2)
LDLC SERPL CALC-MCNC: 101 MG/DL (ref 0–100)
LYMPHOCYTES # BLD AUTO: 1.23 THOUSANDS/ÂΜL (ref 0.6–4.47)
LYMPHOCYTES NFR BLD AUTO: 12 % (ref 14–44)
MCH RBC QN AUTO: 31.1 PG (ref 26.8–34.3)
MCHC RBC AUTO-ENTMCNC: 33.9 G/DL (ref 31.4–37.4)
MCV RBC AUTO: 92 FL (ref 82–98)
MONOCYTES # BLD AUTO: 0.57 THOUSAND/ÂΜL (ref 0.17–1.22)
MONOCYTES NFR BLD AUTO: 6 % (ref 4–12)
NEUTROPHILS # BLD AUTO: 8.08 THOUSANDS/ÂΜL (ref 1.85–7.62)
NEUTS SEG NFR BLD AUTO: 82 % (ref 43–75)
NRBC BLD AUTO-RTO: 0 /100 WBCS
PLATELET # BLD AUTO: 242 THOUSANDS/UL (ref 149–390)
PMV BLD AUTO: 9.9 FL (ref 8.9–12.7)
RBC # BLD AUTO: 4.95 MILLION/UL (ref 3.88–5.62)
TRIGL SERPL-MCNC: 75 MG/DL (ref ?–150)
TSH SERPL DL<=0.05 MIU/L-ACNC: 0.89 UIU/ML (ref 0.45–4.5)
WBC # BLD AUTO: 9.95 THOUSAND/UL (ref 4.31–10.16)

## 2024-11-22 PROCEDURE — 36415 COLL VENOUS BLD VENIPUNCTURE: CPT

## 2024-11-22 PROCEDURE — 80061 LIPID PANEL: CPT

## 2024-11-22 PROCEDURE — 85025 COMPLETE CBC W/AUTO DIFF WBC: CPT

## 2024-11-22 PROCEDURE — 82306 VITAMIN D 25 HYDROXY: CPT

## 2024-11-22 PROCEDURE — 83036 HEMOGLOBIN GLYCOSYLATED A1C: CPT

## 2024-11-22 PROCEDURE — 84443 ASSAY THYROID STIM HORMONE: CPT

## 2024-12-02 ENCOUNTER — TELEPHONE (OUTPATIENT)
Dept: PSYCHIATRY | Facility: CLINIC | Age: 64
End: 2024-12-02

## 2024-12-02 DIAGNOSIS — F90.2 ATTENTION DEFICIT HYPERACTIVITY DISORDER (ADHD), COMBINED TYPE: ICD-10-CM

## 2024-12-02 RX ORDER — METHYLPHENIDATE HYDROCHLORIDE 10 MG/1
TABLET ORAL
Qty: 120 TABLET | Refills: 0 | Status: SHIPPED | OUTPATIENT
Start: 2024-12-10 | End: 2024-12-09 | Stop reason: SDUPTHER

## 2024-12-02 RX ORDER — METHYLPHENIDATE HYDROCHLORIDE 10 MG/1
TABLET ORAL
Qty: 120 TABLET | Refills: 0 | Status: SHIPPED | OUTPATIENT
Start: 2024-12-10 | End: 2024-12-02 | Stop reason: SDUPTHER

## 2024-12-02 NOTE — TELEPHONE ENCOUNTER
Patient called the RX Refill Line. Message is being forwarded to the office.     Patient is requesting a refill on methylphenidate (RITALIN) 10 mg tablet.  He picked the last order up on 11/11 and the pharm wont fill this one until 12/11 but he only has a few days left.  He stated that pharmacy filled the last few scripts late and it kept pushing his fill date back and he was cutting back on the amount of pills he takes each day to make them last.  Can someone call the pharm and let them know that it is OK to fill this a few days early?    [facility] [place]      Please contact patient at [phone]

## 2024-12-02 NOTE — TELEPHONE ENCOUNTER
PMDP reviewed. Refill was sent to the patient's preferred pharmacy (The medication to be started on 12/10 or later, but could be picked up earlier if needed).

## 2024-12-05 ENCOUNTER — TELEPHONE (OUTPATIENT)
Dept: RADIOLOGY | Facility: MEDICAL CENTER | Age: 64
End: 2024-12-05

## 2024-12-05 NOTE — TELEPHONE ENCOUNTER
Patient Reports   25-35      %     improvement post injection    Pain Level     4/10   Pain goes up a lot after activity

## 2024-12-06 ENCOUNTER — OFFICE VISIT (OUTPATIENT)
Dept: CARDIOLOGY CLINIC | Facility: CLINIC | Age: 64
End: 2024-12-06
Payer: COMMERCIAL

## 2024-12-06 VITALS
HEIGHT: 71 IN | DIASTOLIC BLOOD PRESSURE: 80 MMHG | SYSTOLIC BLOOD PRESSURE: 132 MMHG | TEMPERATURE: 97 F | HEART RATE: 81 BPM | OXYGEN SATURATION: 97 % | WEIGHT: 175.6 LBS | BODY MASS INDEX: 24.58 KG/M2

## 2024-12-06 DIAGNOSIS — E78.2 MIXED HYPERLIPIDEMIA: ICD-10-CM

## 2024-12-06 DIAGNOSIS — I25.10 CORONARY ARTERY DISEASE INVOLVING NATIVE CORONARY ARTERY OF NATIVE HEART WITHOUT ANGINA PECTORIS: Primary | ICD-10-CM

## 2024-12-06 DIAGNOSIS — I10 PRIMARY HYPERTENSION: ICD-10-CM

## 2024-12-06 PROCEDURE — 99214 OFFICE O/P EST MOD 30 MIN: CPT | Performed by: INTERNAL MEDICINE

## 2024-12-06 NOTE — PROGRESS NOTES
[facility]    CHIEF COMPLAINT: No chief complaint on file.      HPI:  Crispin Fam is a 64 y.o. male with a past medical history of renal cell carcinoma status post partial left nephrectomy, hypertension, hyperlipidemia, psoriatic arthritis, coronary calcifications, exercise-induced asthma.    Initial OV - 8/29/23: History of hyperlipidemia on statin therapy since he was 34. Family history of premature coronary artery disease. Father had first myocardial infarction at 36. Previously following with Dr. Burton at [facility] He did have a coronary calcium score performed last Fall. His Lipitor was increased to 60 mg but he decreased this to 40 mg due to muscle cramps. Despite reduction in atorvastatin dose, he denies any improvement in cramps. Remains active but a bit less lately due to family responsibilities. He tries to mountain bike 2-3x per week for 10-12 miles at a time. He denies any exertional chest pain or dyspnea. Social history: Never smoker. Family history: Father had first MI at 36 - smoker.     OV - 6/12/24: Doing well with no acute cardiac or pulmonary complaints. He remains quite active. Denies any exertional chest discomfort or dyspnea during heavier bouts of exertion on his bike.  For his psoriatic arthritis he was started on Enbrel but reports side effects.     Interval history: Presents for follow up. Biking less due to time constraints. No exertional chest pain or dyspnea. Blood pressure has been well-controlled. He is no off Enbrel all together. Getting steroid injections for lower back. Taking statin consistently.    The following portions of the patient's history were reviewed and updated as appropriate: allergies, current medications, past family history, past medical history, past social history, past surgical history, and problem list.    SINCE LAST OV I REVIEWED WITH THE PATIENT THE INTERIM LABS, TEST RESULTS, CONSULTANT(S) NOTES AND PERFORMED AN INTERIM REVIEW OF  HISTORY    Past Medical History:   Diagnosis Date    Arthritis     Asthma     COPD (chronic obstructive pulmonary disease) (HCC) 1990    Headache(784.0) 2019    Significant osteoarthrities in neck    HL (hearing loss) 2019    mild    Pneumonia 2019    Psoriatic arthritis (HCC)     Septicemia (HCC)     Stomach problems        Past Surgical History:   Procedure Laterality Date    COLONOSCOPY      DENTAL IMPLANT      HERNIA REPAIR      INCISION AND DRAINAGE ABSCESS / HEMATOMA OF BURSA / KNEE / THIGH Left     thigh    NE LAPAROSCOPY SURG PARTIAL NEPHRECTOMY Left 11/28/2022    Procedure: ROBOTIC LAPAROSCOPIC PARTIAL NEPHRECTOMY  WITH INTRAOPERATIVE ULTRASOUND INTERPRETATION;  Surgeon: Shane Barbosa MD;  Location: [facility];  Service: Urology    NE RPR 1ST INGUN HRNA AGE 5 YRS/> REDUCIBLE Right 06/22/2021    Procedure: Inguinal hernia repair with mesh;  Surgeon: Robert Bloch, MD;  Location: [facility];  Service: General       Social History     Socioeconomic History    Marital status: /Civil Union     Spouse name: Not on file    Number of children: Not on file    Years of education: Not on file    Highest education level: Not on file   Occupational History    Not on file   Tobacco Use    Smoking status: Never    Smokeless tobacco: Never   Vaping Use    Vaping status: Never Used   Substance and Sexual Activity    Alcohol use: Yes     Alcohol/week: 1.0 standard drink of alcohol     Types: 1 Glasses of wine per week     Comment: More like one glass per month socially    Drug use: Never    Sexual activity: Yes     Partners: Female     Birth control/protection: None     Comment: Monogamous,     Other Topics Concern    Not on file   Social History Narrative    Not on file     Social Drivers of Health     Financial Resource Strain: Not on file   Food Insecurity: Not on file   Transportation Needs: Not on file   Physical Activity: Not on file   Stress: Not on file   Social Connections: Unknown (6/18/2024)     Received from [facility]     How often do you feel lonely or isolated from those around you? (Adult - for ages 18 years and over): Not on file   Intimate Partner Violence: Not on file   Housing Stability: Not on file       Family History   Problem Relation Age of Onset    Coronary artery disease Father     Heart disease Father     Arthritis Mother     Hearing loss Mother     Heart disease Maternal Grandfather     Dementia Maternal Grandmother     Heart disease Paternal Grandfather     Dementia Maternal Aunt        No Known Allergies      Current Outpatient Medications   Medication Sig Dispense Refill    albuterol (PROVENTIL HFA,VENTOLIN HFA) 90 mcg/act inhaler Inhale 2 puffs every 6 (six) hours as needed for wheezing Pt requests 2 inhalers 18 g 1    ALPRAZolam (XANAX) 0.5 mg tablet Take 1 tablet 2 hours prior to procedure. OK to repeat 30 minutes prior 2 tablet 0    amLODIPine (NORVASC) 5 mg tablet TAKE ONE TABLET BY MOUTH EVERY DAY 90 tablet 3    aspirin (ECOTRIN LOW STRENGTH) 81 mg EC tablet Take 81 mg by mouth daily      clobetasol (TEMOVATE) 0.05 % cream Apply 0.25 g (0.05 Applications total) topically 2 (two) times a day as needed (rash) 60 g 5    clotrimazole-betamethasone (LOTRISONE) 1-0.05 % cream Apply topically 2 (two) times a day 30 g 5    EPINEPHrine (EPIPEN) 0.3 mg/0.3 mL SOAJ Inject 0.3 mL (0.3 mg total) into a muscle once for 1 dose 1 each 0    ketoconazole (NIZORAL) 2 % cream Apply topically daily as needed for rash 15 g 5    [START ON 12/10/2024] methylphenidate (RITALIN) 10 mg tablet TAKE TWO TABLETS IN THE MORNING, ONE TABLET AROUND NOON AND ONE TABLET IN AFTERNOON IF NEEDED Do not start before December 10, 2024. 120 tablet 0    rosuvastatin (CRESTOR) 40 MG tablet Take 1 tablet (40 mg total) by mouth daily 90 tablet 1    zaleplon (SONATA) 10 MG capsule Take 1 capsule (10 mg total) by mouth daily at bedtime 30 capsule 2     No current facility-administered medications for this  "visit.       /80 (BP Location: Left arm, Patient Position: Sitting, Cuff Size: Adult)   Pulse 81   Temp (!) 97 °F (36.1 °C) (Tympanic)   Ht 5' 11\" (1.803 m)   Wt 79.7 kg (175 lb 9.6 oz)   SpO2 97%   BMI 24.49 kg/m²     Review of Systems   All other systems reviewed and are negative.      Physical Exam  Vitals reviewed.   Constitutional:       General: He is not in acute distress.     Appearance: Normal appearance. He is well-developed and normal weight. He is not toxic-appearing.   HENT:      Head: Normocephalic and atraumatic.   Cardiovascular:      Rate and Rhythm: Normal rate and regular rhythm.      Pulses: Normal pulses.      Heart sounds: Normal heart sounds. No murmur heard.     No gallop.   Pulmonary:      Effort: Pulmonary effort is normal. No respiratory distress.      Breath sounds: Normal breath sounds. No wheezing or rales.   Abdominal:      General: Abdomen is flat. Bowel sounds are normal. There is no distension.      Palpations: Abdomen is soft.      Tenderness: There is no abdominal tenderness. There is no guarding.   Musculoskeletal:      Right lower leg: No edema.      Left lower leg: No edema.   Skin:     General: Skin is warm and dry.      Coloration: Skin is not jaundiced or pale.   Neurological:      Mental Status: He is alert.   Psychiatric:         Mood and Affect: Mood normal.         Behavior: Behavior normal.          Lab Results   Component Value Date    K 4.4 03/06/2024     03/06/2024    CO2 30 03/06/2024    BUN 18 03/06/2024    CREATININE 0.91 03/06/2024    CALCIUM 9.6 03/06/2024    ALT 24 03/06/2024    AST 21 03/06/2024    INR 0.99 11/09/2022       Lab Results   Component Value Date    HDL 56 11/22/2024    LDLCALC 101 (H) 11/22/2024    TRIG 75 11/22/2024       Lab Results   Component Value Date    WBC 9.95 11/22/2024    HGB 15.4 11/22/2024    HCT 45.4 11/22/2024     11/22/2024     Cardiac studies:   [facility] - 7/26/24:    Stress ECG: A Micheal protocol stress test " was performed. Overall, the patient's exercise capacity was normal for their age. The patient reached stage 4.0 of the protocol after exercising for 9 min and 31 sec and had a maximal HR of 146 bpm (94% of MPHR) and 10.9 METS. The patient experienced no angina during the test.    Stress ECG: The stress ECG is negative for ischemia after maximal exercise.    Post Stress Echo: Left ventricle cavity has normal reduction in size post-stress. The left ventricle systolic function is improved post-stress. The post-stress echo showed normal wall motion.    Echo Post Impression: The study is negative and shows no echocardiographic evidence of ischemia.    CT cardiac calcium scoring-10/6/2022:  Impression:  Agatston calcium score of 246. This places the patient in the   50th-75th percentile for age and sex. Definite, at least moderate   atherosclerotic plaque. Mild coronary artery disease highly likely, and   significant narrowings possible.     ASSESSMENT AND PLAN:  Diagnoses and all orders for this visit:    Coronary artery disease involving native coronary artery of native heart without angina pectoris: He is a lifetime non-smoker.  He has a history of hypertension. History of hyperlipidemia since his 30s. He does have psoriatic arthritis which is a risk-enhancer for cardiovascular disease. Total calcium score 246. Exercise stress echo from 7/2024 - Micheal protocol for 9.5 minutes and 10.9 METS. No anginal symptoms report. Negative exercise stress ECG and echo for ischemia.  -Blood pressure is well controlled   -Lipids just above goal    Primary hypertension: Blood pressure is well-controlled on amlodipine 5 mg daily. Continue low sodium diet. Moderate intensity physical activity, as tolerated.    Mixed hyperlipidemia: Lipid panel from 3/3/23 with total cholesterol 163, triglycerides 114, HDL 42, LDL 98. Lipid panel - 3/6/24: Total cholesterol 167, triglycerides 114, HDL 48, LDL 96. He has attempted atorvastatin 80 in the  past but reports muscle side effects. He was later tried on atorvastatin 60 mg but lowered the dose to 40 mg due to muscle pains. Lowering the dose did not help with muscle pains.   -We switched to rosuvastatin at our last visit. No side effects with Crestor. Lipid panel from 11/22/24 shows total cholesterol 172, Tgs 75, HDL 56, . Continue rosuvastatin 40 mg.    Naldo Edwards MD

## 2024-12-09 DIAGNOSIS — F90.2 ATTENTION DEFICIT HYPERACTIVITY DISORDER (ADHD), COMBINED TYPE: ICD-10-CM

## 2024-12-09 RX ORDER — METHYLPHENIDATE HYDROCHLORIDE 10 MG/1
TABLET ORAL
Qty: 120 TABLET | Refills: 0 | Status: SHIPPED | OUTPATIENT
Start: 2024-12-10 | End: 2024-12-11 | Stop reason: SDUPTHER

## 2024-12-09 NOTE — TELEPHONE ENCOUNTER
Patient called the RX Refill Line. Message is being forwarded to the office.     Patient is requesting a rx refill. And wants to make sure his medication can be filled tomorrow. He tried to get it filled a few days ago but the pharmacy cx it. Pt states he didn't know why. But he is just confused.    Please contact patient if needed at [phone]

## 2024-12-11 ENCOUNTER — TELEPHONE (OUTPATIENT)
Age: 64
End: 2024-12-11

## 2024-12-11 ENCOUNTER — TELEMEDICINE (OUTPATIENT)
Dept: PSYCHIATRY | Facility: CLINIC | Age: 64
End: 2024-12-11
Payer: COMMERCIAL

## 2024-12-11 DIAGNOSIS — F90.2 ATTENTION DEFICIT HYPERACTIVITY DISORDER (ADHD), COMBINED TYPE: Primary | ICD-10-CM

## 2024-12-11 DIAGNOSIS — F43.23 ADJUSTMENT DISORDER WITH MIXED ANXIETY AND DEPRESSED MOOD: ICD-10-CM

## 2024-12-11 DIAGNOSIS — F51.01 PRIMARY INSOMNIA: ICD-10-CM

## 2024-12-11 PROCEDURE — 90833 PSYTX W PT W E/M 30 MIN: CPT | Performed by: STUDENT IN AN ORGANIZED HEALTH CARE EDUCATION/TRAINING PROGRAM

## 2024-12-11 PROCEDURE — 99214 OFFICE O/P EST MOD 30 MIN: CPT | Performed by: STUDENT IN AN ORGANIZED HEALTH CARE EDUCATION/TRAINING PROGRAM

## 2024-12-11 RX ORDER — METHYLPHENIDATE HYDROCHLORIDE 10 MG/1
TABLET ORAL
Qty: 120 TABLET | Refills: 0 | Status: SHIPPED | OUTPATIENT
Start: 2024-12-11

## 2024-12-11 NOTE — TELEPHONE ENCOUNTER
Patient called requesting refill for Ritalin . Patient made aware medication was refilled on 12/10/24 for 120 with 0 refills to [facility]  pharmacy. Patient instructed to contact the pharmacy to obtain refills of medication. Patient verbalized understanding.

## 2024-12-11 NOTE — TELEPHONE ENCOUNTER
Pharmacy called to advise that they will no longer accept any medication refills for this patient due to the following reasons:  -Yelling at staff  -Taking more medication than advised    Medication concern for:   methylphenidate (RITALIN) 10 mg tablet     Please be advised the patient will need to utilize a different pharmacy, and can no longer fill scripts at:  [id number]  [facility]IR[facility] Pratt - [address] [place] [address]     For any questions please call pharmacy:  [phone]

## 2024-12-11 NOTE — PSYCH
MEDICATION MANAGEMENT NOTE - VIRTUAL VISIT        [facility]      Name and Date of Birth:  Crispin Fam 64 y.o. 1960 MRN: [id number]    Date of Visit: December 11, 2024  Assessment & Plan  Attention deficit hyperactivity disorder (ADHD), combined type         Adjustment disorder with mixed anxiety and depressed mood         Primary insomnia           Plan   A 64 y.o.  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 The patient presented w/ h/o ADHD since 1999, being on Ritalin 60mg daily since then, w/ marked improvement of attention, concentration and his function since being treated. Denied any excessive anxiety, depressed/manic sxs, A/VH or other psychotic sxs. No h/o substance abuse. Denied SI/HI, intent or plan upon direct inquiry at this time. WALTER-7: 4. His current presentation meets criteria for ADHD, by history. The patient was educated about the diagnosis of ADHD and the course of ADHD in adults which may present with decreased intensity of sxs, onesimo after age 50, and particularly after age 65. Psycho-education regarding Ritalin indications, benefits, risks, side effects, and alternative options provided to the patient, and the importance of the compliance with psychiatric treatment reiterated. The patient was educated about the potential long-term side effects of ritalin in older age, onesimo cardiac  "side effects (given the FH of heart attack in his father at age 36), and was educated about the possibility of tapering off or if becoming symptomatic, cross-tapering to different treatment options. He verbalized understanding and agreed to the proposed regimen. Ritalin dose was decreased from 20mg TID (60mg total), to 20/20/10mg (50 mg total); and then switched to equivalent dose of Concerta 72 mg po daily to be tapered down as tolerated. The patient did not tolerate switching Ritalin to Concerta due to high BP, and on 11/5/2021 requested to be switched back to Ritalin 20mg/20mg/10mg as last prescribed (tapered down from 20 mg TID). Upon f/u on 12/28/2021, presented w/ well-controlled ADHD sxs, and stable mood and anxiety. The patient was diagnosed with complex cyst in L kidney and is going to have partial nephrectomy concerning for possible malignancy on 11/28/22, and also his internist recommended to discontinue Ritalin for HTN and high risk of cardia events given the positive FH and age. Ritalin was tapered off by 10 mg weekly and was started on Strattera 40 mg po daily for 1 week and then 40 mg po BID for ADHD. Upon f/u, the patient reported relatively controlled ADHD sxs with Strattera 40 mg BID and maintained on the same dose. However, the patient was visited on 3/20/23 as requested an earlier appointment and reported poor control of ADHD sxs with side effect of \"terrible dry mouth\". Presented w/ a persistent pattern of impaired concentration and difficulty sustaining attention, failure to finish duties on time, having difficulty organizing tasks and activities, losing things, and getting easily distracted by external stimuli, figeting with hands, tapping feet or squirming in seat, and talking fast, interrupting others during the conversation and at times moving around as well as having difficulties to wait. Strattera tapered off and started on Ritalin 10 mg TID which later uptitrated to 20/10/10 mg on " 4/27/23. Also, the patient was started on Lexapro 10 mg daily for depression and anxiety in the context of multiple psychosocial stressors. The patient stopped taking Lexapro after 8-10 weeks, and presented with moderately severe depression. Agreed to consider Cymbalta (helping with depression, anxiety and pain), but reportedly did not start the medication as had URI and procedures for pain management and then felt better and is not willing to start it at the moment. Will continue individual therapy and recommended to consider couple's therapy.     - Tapered off Strattera due to side effects, successfully  - Continue Ritalin 20/10/10 mg for ADHD (previously was stable on 20/20/10 mg)  - Continue Sonata 10 mg po nightly PRN for insomnia (has been sleeping well taking Melatonin recently and did not need a refill)  - Discontinued Lexapro due to sexual side effects and non-compliance; discontinued Cymbalta as the patient did not start and is not willing to initiate at this time  - Continue individual therapy; consider sex therapy / couple's therapy  - Educated about healthy life style, risk of falls/sedation and addiction. Patient was receptive to education.  - Medications sent to the patient's pharmacy for 30 day supply   - RTC in 12 weeks  - The patient was educated about 24 hour and weekend coverage for urgent situations accessed by calling [facility] main practice number  - Patient was educated to call National Suicide Prevention Lifeline (6-933-442-KOKF [3748]) for behavioral crisis at anytime or 031 for any safety concerns, or go to nearest ER if the symptoms become overwhelming or unmanageable.    Medications Risks/Benefits    Risks, Benefits And Possible Side Effects Of Medications:  Risks, benefits, and possible side effects of medications explained to Crispin and he verbalizes understanding and agreement for treatment.    Controlled Medication Discussion:   Crispin has been filling  "controlled prescriptions on time as prescribed according to [place] Prescription Drug Monitoring Program  PMDP reviewed. Psycho-education regarding stimulants indications, benefits, risks (including risk of addiction, onesimo if taking more than prescribed), side effects (including but not limited to palpitation/arrhythmia, weight loss and increased anxiety), and alternative options provided to the patient, and the importance of the compliance with psychiatric treatment reiterated. The patient verbalized understanding and agreed to the proposed regimen.     Psychotherapy Provided:   Individual psychotherapy provided: Yes  Counseling was provided during the session today for 18 minutes.   Psychoeducation provided to the patient and was educated about the importance of compliance with the medications and psychiatric treatment  Psycho-spiritual therapy provided to the patient, and the importance of simultaneously engaging the body, mind, and spirit in healing mental health issues, moving beyond problematic life patterns, and overcoming traumatic life experiences reiterated. The patient was educated about the breathing techniques, guided imagery meditation and healthy life-style  Solution Focused Brief Therapy (SFBT) provided  Patient's emotions were validated and specific labeled praise provided.   Troy suggestions were offered in a supportive non-critical way.   Cognitive Behavioral Therapy and supportive expressive interventions    Treatment Plan:  Completed and signed during the session: Not applicable - Treatment Plan not due at this session       Subjective    The patient was visited virtually for Medication Management, ADHD, Depression, Anxiety, and Insomnia after called and requested an earlier appointment.   Of note, [facility] called to advise that they will no longer accept any medication refills for this patient due to the following reasons:  -Yelling at staff  -Taking more medication than " "advised\"    Presented calm, and cooperative. He noted that he has been five tablets of Ritalin for past 3-4 months, and he ran out of his medication earlier. He gets up at 5 AM-6AM and then takes a tablet every 4 hours (except on weekends - taking x3/day). He is up until 10:30-11:30 PM. He works a demanding full time job and he also takes care of his family and 3.6 y/o grandson. 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 He believes that he does not have any options, expatiated on issues her daughter dealing with and noted that he is not going to turn his family down. He noted that he has been getting his medication from [facility] pharmacy for many years and as per patient, the staff changed recently, and as per patient the new staff were rude to her and denied yelling at them. He noted that his sun came home for 20 days and they spent time together, and his son even was concerned about the demanding aspects of his life. He endorsed good control of his ADHD sxs while taking medications. Denied any changes in sleep, appetite, concentration, energy level, or daily activities.  Denied feelings of anhedonia, hopelessness, helplessness, worthlessness or guilt and appeared to be future oriented.  There was no thought constriction related to death.  Denied SI/HI, intent or plan upon direct inquiry at this time. No specific phobia or panic attacks reported. Denied AV/H.  Endorsed good compliance with the medications and denied any side effects. Denied smoking cigarettes, binge drinking alcohol or other illicit substance use.    PMDP reviewed. Psycho-education regarding stimulants indications, benefits, risks (including risk of addiction, onesimo if taking more than prescribed), side effects (including but not limited to palpitation/arrhythmia, weight loss and increased anxiety), and alternative options provided to the patient, and the importance of the compliance " "with psychiatric treatment reiterated. The patient verbalized understanding and agreed to the proposed regimen. Given this presentation, medications are maintained at the same dosage.  Will continue individual psychotherapy. The patient was educated to call 911 or go to the nearest emergency room if the symptoms become overwhelming or unable to remain in control. Verbalized understanding and agreed to seek help in case of distress or concern for safety.    Review Of Systems:  Pertinent items are noted in HPI; all others are negative; no recent changes in medications or health status reported.      Historical Information    Past Psychiatric History Update:   - No inpatient psychiatric admission since last encounter  - No SA or SIB since last encounter  - No incidence of violent behavior since last encounter    Past Trauma History Update:    - No new onset of abuse or traumatic events since last encounter     History Review: The following portions of the patient's history were reviewed and updated as appropriate: allergies, current medications, past family history, past medical history, past social history, past surgical history and problem list.       Objective      Vital signs in last 24 hours: Not checked - virtual visit    Mental Status Evaluation:  Appearance and attitude: appeared as stated age, cooperative and attentive, casually dressed, with good hygiene  Eye contact: good  Motor Function: within normal limits, No PMA/PMR  Gait/station: Not observed  Speech: normal for rate, rhythm, volume, latency, amount  Language: No overt abnormality  Mood/affect:  \"overwhelmed\"  / Affect was constricted but reactive, mood congruent  Thought Processes: sequential and goal-directed  Thought content: denied suicidal ideations or homicidal ideations, no overt delusions elicited, ruminations  Associations: intact associations  Perceptual disturbances: denies Auditory/Visual/Tactile Hallucinations  Orientation: oriented to " time, person, place and to the situational context  Cognitive Function: intact  Memory: recent and remote memory grossly intact  Intellect: average  Fund of knowledge: aware of current events, aware of past history, and vocabulary average  Impulse control: good  Insight/judgment: fair/good          Laboratory Results: I have personally reviewed all pertinent laboratory/tests results  Recent Labs (last 2 months):   Appointment on 11/22/2024   Component Date Value    WBC 11/22/2024 9.95     RBC 11/22/2024 4.95     Hemoglobin 11/22/2024 15.4     Hematocrit 11/22/2024 45.4     MCV 11/22/2024 92     MCH 11/22/2024 31.1     MCHC 11/22/2024 33.9     RDW 11/22/2024 12.4     MPV 11/22/2024 9.9     Platelets 11/22/2024 242     nRBC 11/22/2024 0     Segmented % 11/22/2024 82 (H)     Immature Grans % 11/22/2024 0     Lymphocytes % 11/22/2024 12 (L)     Monocytes % 11/22/2024 6     Eosinophils Relative 11/22/2024 0     Basophils Relative 11/22/2024 0     Absolute Neutrophils 11/22/2024 8.08 (H)     Absolute Immature Grans 11/22/2024 0.04     Absolute Lymphocytes 11/22/2024 1.23     Absolute Monocytes 11/22/2024 0.57     Eosinophils Absolute 11/22/2024 0.01     Basophils Absolute 11/22/2024 0.02     Cholesterol 11/22/2024 172     Triglycerides 11/22/2024 75     HDL, Direct 11/22/2024 56     LDL Calculated 11/22/2024 101 (H)     Hemoglobin A1C 11/22/2024 5.7 (H)     EAG 11/22/2024 117     TSH 3RD GENERATON 11/22/2024 0.888     Vit D, 25-Hydroxy 11/22/2024 25.8 (L)            Vielka Victoria MD 12/11/24    This note was completed in part utilizing Dragon dictation Software. Grammatical, translation, syntax errors, random word insertions, spelling mistakes, and incomplete sentences may be an occasional consequence of this system secondary to software limitations with voice recognition, ambient noise, and hardware issues. If you have any questions or concerns about the content, text, or information contained within the body of this  dictation, please contact the provider for clarification.     -----------------------------  Virtual Regular Visit    Patient is located at Home in the following state in which I hold an active license [place].    Problem List Items Addressed This Visit          Behavioral Health    Attention deficit hyperactivity disorder (ADHD), combined type - Primary                     Neurology/Sleep    Primary insomnia                   Other Visit Diagnoses         Adjustment disorder with mixed anxiety and depressed mood                     Reason for visit is   Chief Complaint   Patient presents with    Medication Management    ADHD    Depression    Anxiety    Insomnia        Visit Time  Visit Start Time: 1:25 PM  Visit Stop Time: 1:55 PM PM  Total Visit Duration: 30 minutes    Encounter provider Vielka Victoria MD    Provider located at: [facility] [address], [address]  [place] [address]    Recent Visits  No visits were found meeting these conditions.  Showing recent visits within past 7 days and meeting all other requirements  Today's Visits  Date Type Provider Dept   12/11/24 Telephone Vielka Victoria MD [facility]   12/11/24 Telemedicine Vielka Victoria MD [facility] [address]   Showing today's visits and meeting all other requirements  Future Appointments  No visits were found meeting these conditions.  Showing future appointments within next 150 days and meeting all other requirements       The patient was identified by name and date of birth. KelDANISHA Fam was informed that this is a telemedicine visit and that the visit is being conducted through the Epic Embedded platform. He agrees to proceed. My office door was closed. No one else was in the room. He acknowledged consent and understanding of privacy and security of the video platform. The patient has agreed to participate and understands they can discontinue the visit at any time.  Patient is aware this is a billable service.

## 2024-12-11 NOTE — TELEPHONE ENCOUNTER
Patient called in seeking an earlier appt then the one he has scheduled. Writer was able to schedule the patient for virtual  at  1;30 pm today with Dr Victoria for a medication adjustment per request of the patient.

## 2024-12-11 NOTE — BH CRISIS PLAN
"Client Name: Igor Fam       Client YOB: 1960    MalikAnthony Safety Plan      Creation Date: 1/12/24 Update Date: 12/11/24   Created By: Vielka Victoria MD Last Updated By: Vielka Victoria MD      Step 1: Warning Signs:   Warning Signs   \"Never had any suicidal thoughts\"            Step 2: Internal Coping Strategies:   Internal Coping Strategies   Playing Music, writing and outdoor activities            Step 3: People and social settings that provide distraction:   Name Contact Information   Friend Juvenal contact saved in cellphone    Places   Outdoor activites and bike trail           Step 4: People whom I can ask for help during a crisis:      Name Contact Information    Friend Juvenal contact saved in cellphone    Therapist: Denise contact saved in cellphone      Step 5: Professionals or agencies I can contact during a crisis:      Clinican/Agency Name Phone Emergency Contact    Vielka Victoria -[phone]       Local Emergency Department Emergency Department Phone Emergency Department Address    [facility] [phone] [address] [place] [id number]        Crisis Phone Numbers:   Suicide Prevention Lifeline: Call or Text  298 Crisis Text Line: Text HOME to 191-594   Please note: Some [facility] do not have a separate number for Child/Adolescent specific crisis. If your county is not listed under Child/Adolescent, please call the adult number for your county      Adult Crisis Numbers: Child/Adolescent Crisis Numbers   [facility]: [phone] [facility]: [phone]   [facility]: [phone] [facility]: [phone]   [facility]: [phone] [place]: [phone]   [facility]: [phone] [place]: [phone]   [place] [facility]: [phone]   [facility]: [phone]   [facility]: [phone]   [facility]: [phone] (daytime) [phone] (after hours, weekends, holidays)      Step 6: " Making the environment safer (plan for lethal means safety):   Plan: Firearms at home; always locked     Optional: What is most important to me and worth living for?      [place] Safety Plan. Ute Gan and Zhen Cruz. Used with permission of the authors.

## 2024-12-11 NOTE — TELEPHONE ENCOUNTER
Patient called the RX Refill Line. Message is being forwarded to the office.     Patient is requesting if his rx Ritalin can be sent to another pharmacy. He states the pharmacist just yelled at him and he doesn't want to go back there again. He rather just use the hospital pharmacy from now on.    Pt is also going to schedule a virtual appt. Pt has been under a lot of stress.    Please contact patient if needed at [phone]      Pharmacy:[facility]) - [place] - [phone]

## 2024-12-12 NOTE — TELEPHONE ENCOUNTER
Patient Reports      50   %     improvement post injection    Pain Level  can go up to 7-8 but goes away after he rests   /10

## 2024-12-22 ENCOUNTER — OFFICE VISIT (OUTPATIENT)
Dept: URGENT CARE | Age: 64
End: 2024-12-22
Payer: COMMERCIAL

## 2024-12-22 ENCOUNTER — APPOINTMENT (OUTPATIENT)
Dept: RADIOLOGY | Age: 64
End: 2024-12-22
Payer: COMMERCIAL

## 2024-12-22 VITALS
OXYGEN SATURATION: 98 % | DIASTOLIC BLOOD PRESSURE: 84 MMHG | RESPIRATION RATE: 18 BRPM | BODY MASS INDEX: 23.57 KG/M2 | WEIGHT: 169 LBS | SYSTOLIC BLOOD PRESSURE: 138 MMHG | TEMPERATURE: 98.3 F | HEART RATE: 97 BPM

## 2024-12-22 DIAGNOSIS — R05.1 ACUTE COUGH: ICD-10-CM

## 2024-12-22 DIAGNOSIS — U07.1 COVID-19: Primary | ICD-10-CM

## 2024-12-22 DIAGNOSIS — J45.901 ASTHMA WITH ACUTE EXACERBATION, UNSPECIFIED ASTHMA SEVERITY, UNSPECIFIED WHETHER PERSISTENT: ICD-10-CM

## 2024-12-22 DIAGNOSIS — R05.3 CHRONIC COUGH: ICD-10-CM

## 2024-12-22 PROCEDURE — 71046 X-RAY EXAM CHEST 2 VIEWS: CPT

## 2024-12-22 PROCEDURE — G0383 LEV 4 HOSP TYPE B ED VISIT: HCPCS | Performed by: EMERGENCY MEDICINE

## 2024-12-22 RX ORDER — BENZONATATE 200 MG/1
200 CAPSULE ORAL 3 TIMES DAILY PRN
Qty: 20 CAPSULE | Refills: 0 | Status: SHIPPED | OUTPATIENT
Start: 2024-12-22

## 2024-12-22 NOTE — PATIENT INSTRUCTIONS
You may  take OTC Tylenol for pain. You may take no more than 1000 mg tabs every 6 hours (no more than 4 grams in 24 hours!).  Please increase fluid intake  You may take OTC cough medication such as Mucinex DM for cough/congestion.  Please take up to four puffs of your albuterol inhaler as needed for shortness of breath and/or wheezing. If your shortness of breath and/or wheezing worsen please return to our clinic or go to the ER.  We will call you if radiologist read changes plan of care            COVID-19 Home Care Guidelines    Your healthcare provider and/or public health staff have evaluated you and have determined that you do not need to remain in the hospital at this time.  At this time you can be isolated at home where you will be monitored by staff from your local or state health department. You should carefully follow the prevention and isolation steps below until a healthcare provider or local or state health department says that you can return to your normal activities.      Stay home except to get medical care    People who are mildly ill with COVID-19 are able to isolate at home during their illness. You should restrict activities outside your home, except for getting medical care. Do not go to work, school, or public areas. Avoid using public transportation, ride-sharing, or taxis.    Separate yourself from other people and animals in your home    People: As much as possible, you should stay in a specific room and away from other people in your home. Also, you should use a separate bathroom, if available.  Animals: You should restrict contact with pets and other animals while you are sick with COVID-19, just like you would around other people. Although there have not been reports of pets or other animals becoming sick with COVID-19, it is still recommended that people sick with COVID-19 limit contact with animals until more information is known about the virus. When possible, have another member of  your household care for your animals while you are sick. If you are sick with COVID-19, avoid contact with your pet, including petting, snuggling, being kissed or licked, and sharing food. If you must care for your pet or be around animals while you are sick, wash your hands before and after you interact with pets and wear a facemask. See COVID-19 and Animals for more information.    Call ahead before visiting your doctor    If you have a medical appointment, call the healthcare provider and tell them that you have or may have COVID-19. This will help the healthcare provider’s office take steps to keep other people from getting infected or exposed.    Wear a facemask    You should wear a facemask when you are around other people (e.g., sharing a room or vehicle) or pets and before you enter a healthcare provider’s office. If you are not able to wear a facemask (for example, because it causes trouble breathing), then people who live with you should not stay in the same room with you, or they should wear a facemask if they enter your room.    Cover your coughs and sneezes    Cover your mouth and nose with a tissue when you cough or sneeze. Throw used tissues in a lined trash can. Immediately wash your hands with soap and water for at least 20 seconds or, if soap and water are not available, clean your hands with an alcohol-based hand  that contains at least 60% alcohol.    Clean your hands often    Wash your hands often with soap and water for at least 20 seconds, especially after blowing your nose, coughing, or sneezing; going to the bathroom; and before eating or preparing food. If soap and water are not readily available, use an alcohol-based hand  with at least 60% alcohol, covering all surfaces of your hands and rubbing them together until they feel dry.  Soap and water are the best option if hands are visibly dirty. Avoid touching your eyes, nose, and mouth with unwashed hands.    Avoid sharing  personal household items    You should not share dishes, drinking glasses, cups, eating utensils, towels, or bedding with other people or pets in your home. After using these items, they should be washed thoroughly with soap and water.    Clean all “high-touch” surfaces everyday    High touch surfaces include counters, tabletops, doorknobs, bathroom fixtures, toilets, phones, keyboards, tablets, and bedside tables. Also, clean any surfaces that may have blood, stool, or body fluids on them. Use a household cleaning spray or wipe, according to the label instructions. Labels contain instructions for safe and effective use of the cleaning product including precautions you should take when applying the product, such as wearing gloves and making sure you have good ventilation during use of the product.    Monitor your symptoms    Seek prompt medical attention if your illness is worsening (e.g., difficulty breathing). Before seeking care, call your healthcare provider and tell them that you have, or are being evaluated for, COVID-19. Put on a facemask before you enter the facility. These steps will help the healthcare provider’s office to keep other people in the office or waiting room from getting infected or exposed. Ask your healthcare provider to call the local or state health department. Persons who are placed under active monitoring or facilitated self-monitoring should follow instructions provided by their local health department or occupational health professionals, as appropriate.  If you have a medical emergency and need to call 911, notify the dispatch personnel that you have, or are being evaluated for COVID-19. If possible, put on a facemask before emergency medical services arrive.    Discontinuing home isolation    Patients with confirmed COVID-19 should remain under home isolation precautions until the following conditions are met:   They have had no fever for at least 24 hours (that is one full day of no  fever without the use medicine that reduces fevers)  AND  other symptoms have improved (for example, when their cough or shortness of breath have improved)  AND  If had mild or moderate illness, at least 5 days have passed since their symptoms first appeared or if severe illness (needed oxygen) or immunosuppressed,   Patients with confirmed COVID-19 should also notify close contacts (including their workplace) and ask that they self-quarantine. Currently, close contact is defined as being within 6 feet for 15 minutes or more from the period 24 hours starting 48 hours before symptom onset to the time at which the patient went into isolation.    Source: https://www.cdc.gov/coronavirus/2019-ncov/hcp/guidance-prevent-spread.html

## 2024-12-30 DIAGNOSIS — I25.10 CORONARY ARTERY DISEASE INVOLVING NATIVE CORONARY ARTERY OF NATIVE HEART WITHOUT ANGINA PECTORIS: ICD-10-CM

## 2024-12-30 DIAGNOSIS — E78.2 MIXED HYPERLIPIDEMIA: ICD-10-CM

## 2024-12-31 RX ORDER — ROSUVASTATIN CALCIUM 40 MG/1
40 TABLET, COATED ORAL DAILY
Qty: 90 TABLET | Refills: 1 | Status: SHIPPED | OUTPATIENT
Start: 2024-12-31

## 2025-01-07 ENCOUNTER — OFFICE VISIT (OUTPATIENT)
Dept: INTERNAL MEDICINE CLINIC | Facility: CLINIC | Age: 65
End: 2025-01-07
Payer: COMMERCIAL

## 2025-01-07 VITALS
DIASTOLIC BLOOD PRESSURE: 76 MMHG | HEART RATE: 88 BPM | BODY MASS INDEX: 24.69 KG/M2 | TEMPERATURE: 97.3 F | HEIGHT: 71 IN | OXYGEN SATURATION: 98 % | SYSTOLIC BLOOD PRESSURE: 124 MMHG | WEIGHT: 176.4 LBS

## 2025-01-07 DIAGNOSIS — F90.2 ATTENTION DEFICIT HYPERACTIVITY DISORDER (ADHD), COMBINED TYPE: ICD-10-CM

## 2025-01-07 DIAGNOSIS — L40.50 PSORIATIC ARTHROPATHY (HCC): ICD-10-CM

## 2025-01-07 DIAGNOSIS — J45.901 ASTHMA WITH ACUTE EXACERBATION, UNSPECIFIED ASTHMA SEVERITY, UNSPECIFIED WHETHER PERSISTENT: ICD-10-CM

## 2025-01-07 DIAGNOSIS — R05.1 ACUTE COUGH: Primary | ICD-10-CM

## 2025-01-07 DIAGNOSIS — C64.2 RENAL CELL CARCINOMA OF LEFT KIDNEY (HCC): ICD-10-CM

## 2025-01-07 DIAGNOSIS — D84.9 IMMUNOCOMPROMISED (HCC): ICD-10-CM

## 2025-01-07 PROCEDURE — 99214 OFFICE O/P EST MOD 30 MIN: CPT | Performed by: INTERNAL MEDICINE

## 2025-01-07 RX ORDER — FLUTICASONE FUROATE 200 UG/1
1 POWDER RESPIRATORY (INHALATION) DAILY
Qty: 30 BLISTER | Refills: 0 | Status: SHIPPED | OUTPATIENT
Start: 2025-01-07 | End: 2025-02-06

## 2025-01-07 NOTE — ASSESSMENT & PLAN NOTE
Continue inhalers, no wheezing on exam today      Jewels Logan is a 38 year old female presenting for Consult:Scalp lesion, ref. Anish Beebe, MICHAEL    Medications verified, no changes.    Denies known Latex allergy or symptoms of Latex sensitivity.    Social History     Tobacco Use   Smoking Status Former   • Current packs/day: 0.00   • Average packs/day: 0.3 packs/day for 24.0 years (6.0 pk-yrs)   • Types: Cigarettes   • Start date: 1991   • Quit date: 2015   • Years since quittin.6   Smokeless Tobacco Never   Tobacco Comments    smoking history is approximate per patient report   Vaping Use   Vaping Status never used

## 2025-01-07 NOTE — TELEPHONE ENCOUNTER
Reason for call:   [x] Refill   [] Prior Auth  [] Other:     Office:   [] PCP/Provider -   [x] Specialty/Provider - psych/ Amir Esme    Medication: methylphenidate (RITALIN) 10 mg tablet    Dose/Frequency: Sig: TAKE TWO TABLETS IN THE MORNING, ONE TABLET AROUND NOON AND ONE TABLET IN AFTERNOON IF NEEDED    Quantity: 120    Pharmacy: [facility] [place] - [address]  [address] [facility] [address]  Phone: [phone]  Fax: [phone]     Does the patient have enough for 3 days?   [x] Yes   [] No - Send as HP to POD

## 2025-01-07 NOTE — PATIENT INSTRUCTIONS
Problem List Items Addressed This Visit          Respiratory    Exercise-induced asthma    Continue inhalers, no wheezing on exam today         Relevant Medications    fluticasone (Arnuity Ellipta) 200 MCG/ACT AEPB inhaler       Musculoskeletal and Integument    Psoriatic arthritis (HCC)    Patient not on medication for this currently            Genitourinary    Renal cell carcinoma of left kidney (HCC)    Patient had partial nephrectomy left kidney Nov 2022            Other    Acute cough - Primary    Discussed with patient the possibility of RSV, and supportive care for this.  Patient does have an inhaler.  Pulse ox 98%, lungs clear on exam.  Discussed with patient getting chest x-ray if not improving as expected.         Relevant Medications    fluticasone (Arnuity Ellipta) 200 MCG/ACT AEPB inhaler    Other Relevant Orders    XR chest pa and lateral    Immunocompromised (HCC)    Patient currently not on med that would cause him to be immunocompromise

## 2025-01-07 NOTE — PROGRESS NOTES
Name: Crispin Fam      : 1960      MRN: [id number]  Encounter Provider: Demetrio Rios MD  Encounter Date: 2025   [facility]    Assessment & Plan  Acute cough  Discussed with patient the possibility of RSV, and supportive care for this.  Patient does have an inhaler.  Pulse ox 98%, lungs clear on exam.  Discussed with patient getting chest x-ray if not improving as expected.  I also prescribed short-term use of steroid inhaler    Orders:  •  XR chest pa and lateral; Future  •  fluticasone (Arnuity Ellipta) 200 MCG/ACT AEPB inhaler; Inhale 1 puff daily Rinse mouth after use.    Renal cell carcinoma of left kidney ([facility])  Patient had partial nephrectomy left kidney 2022       Psoriatic arthropathy ([facility])  Patient not on medication for this currently       Immunocompromised ([facility])  Patient currently not on med that would cause him to be immunocompromise       Asthma with acute exacerbation, unspecified asthma severity, unspecified whether persistent  Continue inhalers, no wheezing on exam today            History of Present Illness     Pt has been feeling sick off and on for about a month.  He  had COVID 24.  He improved after than then started feeling sick about a week ago, and he started a Zpack that he had at home.      Cough  Pertinent negatives include no myalgias or sore throat.   Fatigue  Associated symptoms include congestion, coughing and fatigue. Pertinent negatives include no arthralgias, myalgias or sore throat.     Review of Systems   Constitutional:  Positive for fatigue.   HENT:  Positive for congestion and sinus pressure. Negative for sore throat.    Respiratory:  Positive for cough.    Musculoskeletal:  Negative for arthralgias and myalgias.     Past Medical History:   Diagnosis Date   • Arthritis    • Asthma    • Cancer ([facility])    • COPD (chronic obstructive pulmonary disease) ([facility])    • Headache(784.0) 2019    Significant  osteoarthrities in neck   • HL (hearing loss) 2019    mild   • Hyperlipidemia 1995   • Hypertension 2020   • Pneumonia 2019   • Psoriatic arthritis (HCC)    • Septicemia (HCC)    • Stomach problems      Past Surgical History:   Procedure Laterality Date   • COLONOSCOPY     • DENTAL IMPLANT     • FRACTURE SURGERY  in youth    wrist, leg   • HERNIA REPAIR     • INCISION AND DRAINAGE ABSCESS / HEMATOMA OF BURSA / KNEE / THIGH Left     thigh   • DE LAPAROSCOPY SURG PARTIAL NEPHRECTOMY Left 11/28/2022    Procedure: ROBOTIC LAPAROSCOPIC PARTIAL NEPHRECTOMY  WITH INTRAOPERATIVE ULTRASOUND INTERPRETATION;  Surgeon: Shane Barbosa MD;  Location: [facility];  Service: Urology   • DE RPR 1ST INGUN HRNA AGE 4 YRS/> REDUCIBLE Right 06/22/2021    Procedure: Inguinal hernia repair with mesh;  Surgeon: Robert Bloch, MD;  Location: [facility];  Service: General     Family History   Problem Relation Age of Onset   • Coronary artery disease Father    • Heart disease Father    • Heart attack Father         multiple heart attacks and surgeries   • Heart failure Father    • Arthritis Mother    • Hearing loss Mother    • Heart disease Maternal Grandfather    • Heart failure Maternal Grandfather    • Dementia Maternal Grandmother         Alzheimers   • Heart disease Paternal Grandfather    • Heart failure Paternal Grandfather    • Dementia Maternal Aunt    • Dementia Maternal Aunt         Alzheimers     Social History     Tobacco Use   • Smoking status: Never   • Smokeless tobacco: Never   • Tobacco comments:     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   Vaping Use   • Vaping status: Never Used   Substance and Sexual Activity   • Alcohol use: Yes     Alcohol/week: 1.0 standard drink of alcohol     Types: 1 Glasses of wine per week     Comment: More like one glass per month   • Drug use: Never   • Sexual activity: Yes     Partners: Female     Birth control/protection: None     Comment: Monogamous,       Current  "Outpatient Medications on File Prior to Visit   Medication Sig   • albuterol (PROVENTIL HFA,VENTOLIN HFA) 90 mcg/act inhaler Inhale 2 puffs every 6 (six) hours as needed for wheezing Pt requests 2 inhalers   • ALPRAZolam (XANAX) 0.5 mg tablet Take 1 tablet 2 hours prior to procedure. OK to repeat 30 minutes prior   • amLODIPine (NORVASC) 5 mg tablet TAKE ONE TABLET BY MOUTH EVERY DAY   • aspirin (ECOTRIN LOW STRENGTH) 81 mg EC tablet Take 81 mg by mouth daily   • benzonatate (TESSALON) 200 MG capsule Take 1 capsule (200 mg total) by mouth 3 (three) times a day as needed for cough   • clobetasol (TEMOVATE) 0.05 % cream Apply 0.25 g (0.05 Applications total) topically 2 (two) times a day as needed (rash)   • clotrimazole-betamethasone (LOTRISONE) 1-0.05 % cream Apply topically 2 (two) times a day   • ketoconazole (NIZORAL) 2 % cream Apply topically daily as needed for rash   • methylphenidate (RITALIN) 10 mg tablet TAKE TWO TABLETS IN THE MORNING, ONE TABLET AROUND NOON AND ONE TABLET IN AFTERNOON IF NEEDED   • rosuvastatin (CRESTOR) 40 MG tablet Take 1 tablet (40 mg total) by mouth daily   • EPINEPHrine (EPIPEN) 0.3 mg/0.3 mL SOAJ Inject 0.3 mL (0.3 mg total) into a muscle once for 1 dose   • zaleplon (SONATA) 10 MG capsule Take 1 capsule (10 mg total) by mouth daily at bedtime     No Known Allergies  Immunization History   Administered Date(s) Administered   • COVID-19 J&J (Hugo & Debra Natural) vaccine 0.5 mL 04/08/2021   • COVID-19 MODERNA VACC 0.5 ML IM 12/29/2021   • INFLUENZA 10/05/2007, 11/03/2013, 11/10/2015, 11/22/2016, 09/26/2017, 10/18/2018, 11/04/2020, 10/18/2022, 12/11/2023   • Pneumococcal Polysaccharide PPV23 01/15/2019   • Tdap 09/26/2017   • Zoster Vaccine Recombinant 07/17/2019, 09/27/2019   • influenza, injectable, quadrivalent 11/04/2020     Objective   /76 (BP Location: Left arm, Patient Position: Sitting, Cuff Size: Large)   Pulse 88   Temp (!) 97.3 °F (36.3 °C) (Tympanic)   Ht 5' 11\" (1.803 m)   " Wt 80 kg (176 lb 6.4 oz)   SpO2 98%   BMI 24.60 kg/m²     Physical Exam  Constitutional:       General: He is not in acute distress.     Appearance: Normal appearance.   Pulmonary:      Effort: Pulmonary effort is normal. No respiratory distress.      Breath sounds: Normal breath sounds. No stridor. No wheezing, rhonchi or rales.   Neurological:      Mental Status: He is alert.

## 2025-01-07 NOTE — ASSESSMENT & PLAN NOTE
Discussed with patient the possibility of RSV, and supportive care for this.  Patient does have an inhaler.  Pulse ox 98%, lungs clear on exam.  Discussed with patient getting chest x-ray if not improving as expected.  I also prescribed short-term use of steroid inhaler    Orders:  •  XR chest pa and lateral; Future  •  fluticasone (Arnuity Ellipta) 200 MCG/ACT AEPB inhaler; Inhale 1 puff daily Rinse mouth after use.

## 2025-01-08 RX ORDER — METHYLPHENIDATE HYDROCHLORIDE 10 MG/1
TABLET ORAL
Qty: 120 TABLET | Refills: 0 | Status: SHIPPED | OUTPATIENT
Start: 2025-01-10

## 2025-01-08 NOTE — TELEPHONE ENCOUNTER
Medication:  PDMP 1 [id number] 12/11/2024 12/11/2024 Methylphenidate Hcl (Tablet) 120.0 30 10 MG NA TERRANCE MUR[facility] Commercial Insurance 0 / 0 PA   1 [id number] 11/11/2024 10/29/2024 Methylphenidate Hcl (Tablet) 120.0 30 10 MG NA TRAVONR K[id number] Commercial Insurance 0 / 0 PA

## 2025-01-13 ENCOUNTER — PATIENT MESSAGE (OUTPATIENT)
Dept: INTERNAL MEDICINE CLINIC | Facility: CLINIC | Age: 65
End: 2025-01-13

## 2025-01-13 DIAGNOSIS — R05.1 ACUTE COUGH: Primary | ICD-10-CM

## 2025-01-13 NOTE — PATIENT COMMUNICATION
Pt called, he also sent [employer] message.  He is feeling worse today, sinus headache and very fatigued.  He is producing a lot of green mucus.  Said he has a lot of work to do today and does not feel he could make another appt. Asking if he should have another antibiotic.

## 2025-01-23 ENCOUNTER — TELEPHONE (OUTPATIENT)
Age: 65
End: 2025-01-23

## 2025-01-23 DIAGNOSIS — C64.2 RENAL CELL CARCINOMA OF LEFT KIDNEY (HCC): Primary | ICD-10-CM

## 2025-01-23 NOTE — TELEPHONE ENCOUNTER
CT Dept in [place] called to ask that an order for one of the following be placed for the patient to have drawn before 1/31 CT Scan w/wo contrast: BMP, CMP, or BUN.    In addition CT Dept is asking that once the order has been placed, that the patient be contacted and informed.    Please review.    Call patient [phone]

## 2025-01-23 NOTE — TELEPHONE ENCOUNTER
Advised patient of lab work needed prior to the CT on 1/31. He will go to get done sometime in the next few days

## 2025-01-23 NOTE — TELEPHONE ENCOUNTER
There is an active BMP order from February 2024 that patient can done. Otherwise, I placed a new order

## 2025-01-27 ENCOUNTER — APPOINTMENT (OUTPATIENT)
Dept: LAB | Facility: HOSPITAL | Age: 65
End: 2025-01-27
Payer: COMMERCIAL

## 2025-01-27 DIAGNOSIS — C64.2 RENAL CELL CARCINOMA OF LEFT KIDNEY (HCC): Primary | ICD-10-CM

## 2025-01-27 DIAGNOSIS — C64.2 RENAL CELL CARCINOMA OF LEFT KIDNEY (HCC): ICD-10-CM

## 2025-01-27 LAB
ANION GAP SERPL CALCULATED.3IONS-SCNC: 8 MMOL/L (ref 4–13)
BUN SERPL-MCNC: 22 MG/DL (ref 5–25)
CALCIUM SERPL-MCNC: 9.7 MG/DL (ref 8.4–10.2)
CHLORIDE SERPL-SCNC: 102 MMOL/L (ref 96–108)
CO2 SERPL-SCNC: 28 MMOL/L (ref 21–32)
CREAT SERPL-MCNC: 0.84 MG/DL (ref 0.6–1.3)
ERYTHROCYTE [DISTWIDTH] IN BLOOD BY AUTOMATED COUNT: 12.6 % (ref 11.6–15.1)
GFR SERPL CREATININE-BSD FRML MDRD: 91 ML/MIN/1.73SQ M
GLUCOSE P FAST SERPL-MCNC: 97 MG/DL (ref 65–99)
HCT VFR BLD AUTO: 46.9 % (ref 36.5–49.3)
HGB BLD-MCNC: 15.9 G/DL (ref 12–17)
MCH RBC QN AUTO: 31.1 PG (ref 26.8–34.3)
MCHC RBC AUTO-ENTMCNC: 33.9 G/DL (ref 31.4–37.4)
MCV RBC AUTO: 92 FL (ref 82–98)
PLATELET # BLD AUTO: 259 THOUSANDS/UL (ref 149–390)
PMV BLD AUTO: 9.3 FL (ref 8.9–12.7)
POTASSIUM SERPL-SCNC: 4 MMOL/L (ref 3.5–5.3)
RBC # BLD AUTO: 5.11 MILLION/UL (ref 3.88–5.62)
SODIUM SERPL-SCNC: 138 MMOL/L (ref 135–147)
WBC # BLD AUTO: 7.49 THOUSAND/UL (ref 4.31–10.16)

## 2025-01-27 PROCEDURE — 85027 COMPLETE CBC AUTOMATED: CPT

## 2025-01-27 PROCEDURE — 80048 BASIC METABOLIC PNL TOTAL CA: CPT

## 2025-01-27 PROCEDURE — 36415 COLL VENOUS BLD VENIPUNCTURE: CPT

## 2025-01-31 ENCOUNTER — HOSPITAL ENCOUNTER (OUTPATIENT)
Dept: CT IMAGING | Facility: HOSPITAL | Age: 65
End: 2025-01-31
Attending: UROLOGY
Payer: COMMERCIAL

## 2025-01-31 DIAGNOSIS — C64.2 RENAL CELL CARCINOMA OF LEFT KIDNEY (HCC): ICD-10-CM

## 2025-01-31 PROCEDURE — 74170 CT ABD WO CNTRST FLWD CNTRST: CPT

## 2025-01-31 PROCEDURE — G1004 CDSM NDSC: HCPCS

## 2025-01-31 RX ADMIN — IOHEXOL 75 ML: 350 INJECTION, SOLUTION INTRAVENOUS at 16:34

## 2025-02-06 DIAGNOSIS — F90.2 ATTENTION DEFICIT HYPERACTIVITY DISORDER (ADHD), COMBINED TYPE: ICD-10-CM

## 2025-02-06 PROBLEM — R05.1 ACUTE COUGH: Status: RESOLVED | Noted: 2020-07-14 | Resolved: 2025-02-06

## 2025-02-06 NOTE — TELEPHONE ENCOUNTER
Reason for call:   [x] Refill   [] Prior Auth  [] Other:     Office:   [] PCP/Provider -   [x] Specialty/Provider - [facility] R[facility]N Negro MD     Medication: methylphenidate (RITALIN) 10 mg tablet     Dose/Frequency:  TAKE TWO TABLETS IN THE MORNING, ONE TABLET AROUND NOON AND ONE TABLET IN AFTERNOON IF NEEDED Do not start before January 10, 2025     Quantity: 120 tablet     Pharmacy: [facility] - [place] - [address] [phone]    Does the patient have enough for 3 days?   [x] Yes   [] No - Send as HP to POD

## 2025-02-07 ENCOUNTER — RESULTS FOLLOW-UP (OUTPATIENT)
Dept: OTHER | Facility: HOSPITAL | Age: 65
End: 2025-02-07

## 2025-02-07 RX ORDER — METHYLPHENIDATE HYDROCHLORIDE 10 MG/1
TABLET ORAL
Qty: 120 TABLET | Refills: 0 | Status: SHIPPED | OUTPATIENT
Start: 2025-02-11

## 2025-02-07 NOTE — RESULT ENCOUNTER NOTE
CT abdomen reviewed:    1.  Left kidney wedge resection noted, WITHOUT evidence of local recurrence or metastatic disease  2.  No kidney swelling or stones visualized.    Follow-up in office as directed, if the patient denies any further questions in regards to his imaging they can be discussed at his upcoming appointment with Dr. Barbosa.

## 2025-02-14 ENCOUNTER — TELEPHONE (OUTPATIENT)
Dept: PSYCHIATRY | Facility: CLINIC | Age: 65
End: 2025-02-14

## 2025-02-14 ENCOUNTER — TELEMEDICINE (OUTPATIENT)
Dept: RHEUMATOLOGY | Facility: CLINIC | Age: 65
End: 2025-02-14

## 2025-02-14 DIAGNOSIS — M15.0 PRIMARY GENERALIZED (OSTEO)ARTHRITIS: Primary | ICD-10-CM

## 2025-02-14 DIAGNOSIS — L40.9 PSORIASIS: ICD-10-CM

## 2025-02-14 DIAGNOSIS — Z85.528 HISTORY OF RENAL CARCINOMA: ICD-10-CM

## 2025-02-14 NOTE — TELEPHONE ENCOUNTER
Patient called in upset requesting for a PA to be initiated for    methylphenidate (RITALIN) 10 mg tablet. Patient states that he will be running out of medication by today. Patient would like to know why a PA is always needed as this happened last year and he was left without medication for days.  Please review and reach out to Patient for further assistance.    PA initiated on separate encounter.

## 2025-02-14 NOTE — TELEPHONE ENCOUNTER
PA for Methylphenidate 10 mg SUBMITTED to [facility]     via    []CMM-KEY:   [x]Surescripts-Case ID # [id number]   []Availity-Auth ID # NDC #   []Faxed to plan   []Other website   []Phone call Case ID #     []PA sent as URGENT    All office notes, labs and other pertaining documents and studies sent. Clinical questions answered. Awaiting determination from insurance company.     Turnaround time for your insurance to make a decision on your Prior Authorization can take 7-21 business days.

## 2025-02-14 NOTE — TELEPHONE ENCOUNTER
Reason for call:   [x] Prior Auth  [] Other:     Caller:  [x] Patient  [] Pharmacy  Name:   Address:   Callback Number:      Medication: methylphenidate (RITALIN) 10 mg tablet    Dose/Frequency: TAKE TWO TABLETS IN THE MORNING, ONE TABLET AROUND NOON AND ONE TABLET IN AFTERNOON IF NEEDED     Quantity: 120 tablet    Ordering Provider:    PCP/Provider -   [x] Speciality/Provider - [facility]

## 2025-02-14 NOTE — TELEPHONE ENCOUNTER
PA for Methylphenidate 10 mg  APPROVED     Date(s) approved 02/14/25-07/14/2025    Case #    Patient advised by          []SafetyTathart Message  []Phone call   []LMOM  []L/M to call office as no active Communication consent on file  [x]Unable to leave detailed message as VM not approved on Communication consent       Pharmacy advised by    [x]Fax  []Phone call    Specialty Pharmacy    []     Approval letter scanned into Media Yes

## 2025-02-15 NOTE — PROGRESS NOTES
Virtual Regular Visit  Name: Crispin Fam      : 1960      MRN: [id number]  Encounter Provider: Kim Hoff MD  Encounter Date: 2025   Encounter department: [facility] [place]      Verification of patient location:  Patient is located at Home in the following state in which I hold an active license [place] :  Assessment & Plan  Primary generalized (osteo)arthritis  Mr. Fam is a 64-year-old male with history significant for psoriatic arthritis, psoriasis, primary generalized osteoarthritis specifically cervical/lumbar region involvement, uveitis and renal cancer s/p left sided partial nephrectomy in  who presents for a follow-up.  He is currently not on DMARDs.       - Igor presents today for a follow-up of psoriatic arthritis and psoriasis.  He has been seen by multiple rheumatologists thus far and been on medications including methotrexate, sulfasalazine, adalimumab, secukinumab and guselkumab which were discontinued either due to side effects/inefficacy or with his last biologic discontinued due to a diagnosis of renal cancer status post left-sided partial nephrectomy in 2022.  He overall seems to be fairly stable from a psoriasis and peripheral spondyloarthritis perspective and his main complaints at this time are chronic neck and low back pain/stiffness of which I suspect a majority of symptoms is due to underlying degenerative changes as opposed to active inflammation.  I encouraged him to continue follow-up with spine and pain management as needed but to determine if there may be a component of spondylitis contributing to his symptoms at our initial visit I offered him a trial with subcutaneous etanercept 50 mg once weekly.  He discontinued this prematurely as there were concerns for possible side effects of brain fog, dizziness and cough which stopped after discontinuing the medication.    - At our last visit to determine if there may be a  component of inflammatory spondylitis, I did update an MRI of the lumbar spine and sacroiliac joint which did not show evidence of this.  Degenerative findings were noted and I suspect his presentation is consistent with spinal degenerative osteoarthritis.  In view of this he will continue follow-up with spine and pain management.  Neuromodulating medications and interventional procedures can be continued as required.    - He does not require a rheumatology follow-up as there has been no convincing evidence of an axial/peripheral inflammatory arthritis or psoriasis since he has been seeing me.          Psoriasis         History of renal carcinoma         Primary generalized (osteo)arthritis         Psoriasis         History of renal carcinoma               Encounter provider Kim Hoff MD    The patient was identified by name and date of birth. Crispin Fam was informed that this is a telemedicine visit and that the visit is being conducted through the Epic Embedded platform. He agrees to proceed..  My office door was closed. No one else was in the room.  He acknowledged consent and understanding of privacy and security of the video platform. The patient has agreed to participate and understands they can discontinue the visit at any time.    Patient is aware this is a billable service.     History of Present Illness     HPI    INITIAL VISIT NOTE (2/2024):  Mr. Fam is a 63-year-old male with history significant for psoriatic arthritis, psoriasis, primary generalized osteoarthritis specifically cervical/lumbar region involvement, uveitis and renal cancer s/p left sided partial nephrectomy in 11/22 who presents to establish with [facility] rheumatology for continued management.  He is currently not on DMARDs.  He is transferring care from [facility] rheumatology.  He is self-referred.     Patient reports he was diagnosed with psoriasis in his 30s but this has never been a major  issue for him as he has not presented with large plaques.  He had been followed by dermatology and reports approximately 8 to 10 years ago due to joint pains he was also experiencing his dermatologist considered the possibility of psoriatic arthritis.  Since then he has been seen by multiple rheumatologists including Dr. Corrales, Dr. Mario and Dr. Kitchen.  Dr. Corrales agreed with the diagnosis of psoriatic arthritis due to concerns for a small erosion on his right hand which was not seen on follow-up x-rays a few years later.  He cannot recall the exact timeline of medications but reports he has previously been treated with NSAIDs, sulfasalazine which was discontinued due to inefficacy, methotrexate to which he developed side effects, adalimumab which was discontinued due to gradual loss of efficacy, secukinumab which was effective but discontinued as he did not want to inject 2 pens and guselkumab which was discontinued due to the diagnosis of renal cancer.  He cannot recall if this last medication was effective for him.  He reports these medications will help to clear up the psoriasis and this has not been an ongoing issue for him, but if it does occur it may affect around his hairline and eyes.  In terms of the joint pains it is mostly his low back that bothers him followed by his neck and then some of his peripheral joints including his hands and knees.  He has previously experienced swelling of his hands but denies prolonged morning stiffness.  He has significant limitations as a result of his spinal pain which occurs on a daily and continuous basis.  He is currently taking ibuprofen 400 to 600 mg twice a day as needed along with Tylenol 1 g daily as needed which does help him.  He is following with spine and pain management in view of the lumbar spine degenerative arthritis and spinal stenosis.  Most recently on 1/18/2024 he underwent a radiofrequency ablation of the left L3-L5 medial branch nerves without any  benefit thus far.  He is scheduled for a right-sided procedure on 3/14.  He has previously received epidural injections without much benefit.  Of note his last MRI lumbar spine done in September 2022 showed degenerative disc disease at L5-S1 with severe left and moderate to severe right neuroforaminal stenosis at L5-S1.  There were also concerns for Mary and Romanus lesions which are findings that may be seen in patients with a spondyloarthropathy.     He reports approximately 5 years ago and prior to this he had 2 occurrences of uveitis but cannot recall the specifics.  No history of inflammatory bowel disease.  He reports a history of psoriasis in his grandfather and uncle.        6/27/2024:  Patient presents for a follow-up of psoriatic arthritis and psoriasis.  He is currently not on DMARDs.  I reviewed his testing done after the last visit which showed a normal CBC, CMP, ESR, CRP, chronic hepatitis panel and QuantiFERON-TB gold.     After the last visit he did try subcutaneous etanercept 50 mg once weekly for 4 doses but as this caused presumed side effects of brain fog, dizziness and a persistent cough all of which resolved after stopping the etanercept he opted to stay off it.  He has not had any new complaints but continues to experience the significant back pain.  In the interim in March he did undergo a radiofrequency ablation but reports this worsened his symptoms.      2/15/2025:  Patient presents for a follow-up of a history of possible psoriatic arthritis and psoriasis.  He is currently not on DMARDs.  I reviewed his imaging studies done after the last office visit with an MRI of the sacroiliac joints and lumbar spine not showing any evidence of an inflammatory arthritis.  It did show multilevel spondylosis most pronounced at L5-S1 where there is suspected bilateral spondylolysis of L5 and associated moderate narrowing of the neural foramina on the basis of the anterolisthesis.    He is following  with spine and pain management and had a left and right L5 transforaminal epidural steroid injection done in 10/24 and 11/24 and was advised this can be repeated every few months as needed.  It did provide him with some relief.  Other than the chronic low back pain he is not describing any concerning peripheral joint pains, swelling or stiffness.  No psoriasis.       Review of Systems  Constitutional: Negative for weight change, fevers, chills, night sweats, fatigue.  ENT/Mouth: Negative for hearing changes, ear pain, nasal congestion, sinus pain, hoarseness, sore throat, rhinorrhea, swallowing difficulty.   Eyes: Negative for pain, redness, discharge, vision changes.   Cardiovascular: Negative for chest pain, SOB, palpitations.   Respiratory: Negative for cough, sputum, wheezing, dyspnea.   Gastrointestinal: Negative for nausea, vomiting, diarrhea, constipation, pain, heartburn.  Genitourinary: Negative for dysuria, urinary frequency, hematuria.   Musculoskeletal: As per HPI.  Skin: Negative for skin rash, color changes.   Neuro: Negative for weakness, numbness, tingling, loss of consciousness.   Psych: Negative for anxiety, depression.   Heme/Lymph: Negative for easy bruising, bleeding, lymphadenopathy.        Objective   There were no vitals taken for this visit.    Physical Exam  General: Well appearing, well nourished, in no distress. Oriented x 3, normal mood and affect.  Skin: Good turgor, no rash, unusual bruising or prominent lesions.  Hair: Normal texture and distribution.  HEENT:  Head: Normocephalic, atraumatic.  Eyes: Conjunctiva clear, sclera non-icteric, EOM intact.  Nose: No external lesions.  Neck: Supple.  Neurologic: Alert and oriented. No focal neurological deficits appreciated.   Psychiatric: Normal mood and affect.     Visit Time  Total Visit Duration: 21 minutes.

## 2025-02-15 NOTE — ASSESSMENT & PLAN NOTE
Mr. Fam is a 64-year-old male with history significant for psoriatic arthritis, psoriasis, primary generalized osteoarthritis specifically cervical/lumbar region involvement, uveitis and renal cancer s/p left sided partial nephrectomy in 11/22 who presents for a follow-up.  He is currently not on DMARDs.       - Igor presents today for a follow-up of psoriatic arthritis and psoriasis.  He has been seen by multiple rheumatologists thus far and been on medications including methotrexate, sulfasalazine, adalimumab, secukinumab and guselkumab which were discontinued either due to side effects/inefficacy or with his last biologic discontinued due to a diagnosis of renal cancer status post left-sided partial nephrectomy in November 2022.  He overall seems to be fairly stable from a psoriasis and peripheral spondyloarthritis perspective and his main complaints at this time are chronic neck and low back pain/stiffness of which I suspect a majority of symptoms is due to underlying degenerative changes as opposed to active inflammation.  I encouraged him to continue follow-up with spine and pain management as needed but to determine if there may be a component of spondylitis contributing to his symptoms at our initial visit I offered him a trial with subcutaneous etanercept 50 mg once weekly.  He discontinued this prematurely as there were concerns for possible side effects of brain fog, dizziness and cough which stopped after discontinuing the medication.    - At our last visit to determine if there may be a component of inflammatory spondylitis, I did update an MRI of the lumbar spine and sacroiliac joint which did not show evidence of this.  Degenerative findings were noted and I suspect his presentation is consistent with spinal degenerative osteoarthritis.  In view of this he will continue follow-up with spine and pain management.  Neuromodulating medications and interventional procedures can be continued as  required.    - He does not require a rheumatology follow-up as there has been no convincing evidence of an axial/peripheral inflammatory arthritis or psoriasis since he has been seeing me.

## 2025-02-18 DIAGNOSIS — I10 HYPERTENSION, UNSPECIFIED TYPE: ICD-10-CM

## 2025-02-18 RX ORDER — AMLODIPINE BESYLATE 5 MG/1
5 TABLET ORAL DAILY
Qty: 90 TABLET | Refills: 1 | Status: SHIPPED | OUTPATIENT
Start: 2025-02-18

## 2025-02-26 ENCOUNTER — APPOINTMENT (OUTPATIENT)
Dept: LAB | Facility: CLINIC | Age: 65
End: 2025-02-26
Payer: COMMERCIAL

## 2025-02-26 ENCOUNTER — OFFICE VISIT (OUTPATIENT)
Dept: PAIN MEDICINE | Facility: CLINIC | Age: 65
End: 2025-02-26
Payer: COMMERCIAL

## 2025-02-26 VITALS
BODY MASS INDEX: 24.64 KG/M2 | SYSTOLIC BLOOD PRESSURE: 118 MMHG | RESPIRATION RATE: 14 BRPM | HEART RATE: 92 BPM | DIASTOLIC BLOOD PRESSURE: 84 MMHG | WEIGHT: 176 LBS | HEIGHT: 71 IN

## 2025-02-26 DIAGNOSIS — M54.16 LUMBAR RADICULOPATHY: ICD-10-CM

## 2025-02-26 DIAGNOSIS — M46.1 SACROILIITIS (HCC): ICD-10-CM

## 2025-02-26 DIAGNOSIS — M46.1 SACROILIITIS (HCC): Primary | ICD-10-CM

## 2025-02-26 DIAGNOSIS — M51.362 DEGENERATION OF INTERVERTEBRAL DISC OF LUMBAR REGION WITH DISCOGENIC BACK PAIN AND LOWER EXTREMITY PAIN: ICD-10-CM

## 2025-02-26 DIAGNOSIS — F41.9 ANXIETY: ICD-10-CM

## 2025-02-26 LAB
CRP SERPL QL: 1.1 MG/L
ERYTHROCYTE [SEDIMENTATION RATE] IN BLOOD: 17 MM/HOUR (ref 0–19)

## 2025-02-26 PROCEDURE — 36415 COLL VENOUS BLD VENIPUNCTURE: CPT

## 2025-02-26 PROCEDURE — 99214 OFFICE O/P EST MOD 30 MIN: CPT | Performed by: ANESTHESIOLOGY

## 2025-02-26 PROCEDURE — 85652 RBC SED RATE AUTOMATED: CPT

## 2025-02-26 PROCEDURE — 86140 C-REACTIVE PROTEIN: CPT

## 2025-02-26 PROCEDURE — 86225 DNA ANTIBODY NATIVE: CPT

## 2025-02-26 PROCEDURE — 86430 RHEUMATOID FACTOR TEST QUAL: CPT

## 2025-02-26 PROCEDURE — 86038 ANTINUCLEAR ANTIBODIES: CPT

## 2025-02-26 RX ORDER — ALPRAZOLAM 0.5 MG
TABLET ORAL
Qty: 2 TABLET | Refills: 1 | Status: SHIPPED | OUTPATIENT
Start: 2025-02-26

## 2025-02-26 NOTE — PROGRESS NOTES
Assessment:  1. Sacroiliitis (HCC)    2. Lumbar radiculopathy    3. Degeneration of intervertebral disc of lumbar region with discogenic back pain and lower extremity pain    4. Anxiety        Plan:  The patient is experiencing ongoing pain in the lower back over the sacroiliac joints but also with a right radicular component.  He has positive provocative maneuvers with sacroiliac tenderness so at this time I discussed initially proceeding with bilateral sacroiliac joint injections which would be both diagnostic and therapeutic.  He would like to proceed and will be scheduled under fluoroscopic guidance.    In addition, he will be scheduled for an L5 epidural steroid injection 2 weeks afterwards to help with the radicular symptoms but if he is much better following sacroiliac injection I told him to hold off.    I will also order an inflammatory blood work panel now to assess for any current inflammation given his history of psoriatic arthritis.    Complete risks and benefits including bleeding, infection, tissue reaction, nerve injury and allergic reaction were discussed. The approach was demonstrated using models and literature was provided. Verbal and written consent was obtained.    My impressions and treatment recommendations were discussed in detail with the patient who verbalized understanding and had no further questions.  Discharge instructions were provided. I personally saw and examined the patient and I agree with the above discussed plan of care.    Orders Placed This Encounter   Procedures   • FL spine and pain procedure     Standing Status:   Future     Expected Date:   2/26/2025     Expiration Date:   2/26/2029     Reason for Exam::   Bilateral SIJ Injections     Anticoagulant hold needed?:   No   • FL spine and pain procedure     Standing Status:   Future     Expected Date:   2/26/2025     Expiration Date:   2/26/2029     Reason for Exam::   L5 LESI     Anticoagulant hold needed?:   No   •  C-reactive protein     Standing Status:   Future     Number of Occurrences:   1     Expiration Date:   2/26/2026   • Sedimentation rate, automated     Standing Status:   Future     Number of Occurrences:   1     Expiration Date:   2/26/2026   • RF Screen w/ Reflex to Titer     Standing Status:   Future     Number of Occurrences:   1     Expiration Date:   2/26/2026   • CHERYL Screen w/Reflex Cascade     Standing Status:   Future     Number of Occurrences:   1     Expiration Date:   4/26/2026     New Medications Ordered This Visit   Medications   • ALPRAZolam (XANAX) 0.5 mg tablet     Sig: Take 1 tablet 2 hours prior to procedure. OK to repeat 30 minutes prior     Dispense:  2 tablet     Refill:  1       History of Present Illness:  Crispin Fam is a 64 y.o. male who presents for a follow up office visit in regards to Back Pain (Back pain).   The patient has a history of lumbar degenerative disc disease, lumbar spondylosis and lumbar radiculopathy returns for follow-up.  He was last seen on 11/21/2024 at which time he underwent a bilateral L5 transforaminal epidural injection.  He reports that this provided significant relief for 8 to 10 weeks and symptoms have reverted back to preinjection levels of being severe at times rated 10/10 on numeric rating scale.  Symptoms are primarily left more than right and the left low back into the buttock and travels down into the leg posteriorly into the ankles at times with numbness and tingling.  Uses 200 to 400 mg of ibuprofen daily which does help but he does have a history of renal cancer status partial left nephrectomy.    I have personally reviewed and/or updated the patient's past medical history, past surgical history, family history, social history, current medications, allergies, and vital signs today.     Review of Systems   Respiratory:  Negative for shortness of breath.    Cardiovascular:  Negative for chest pain.   Gastrointestinal:  Negative for constipation,  diarrhea, nausea and vomiting.   Musculoskeletal:  Positive for back pain and gait problem. Negative for arthralgias, joint swelling and myalgias.   Skin:  Negative for rash.   Neurological:  Negative for dizziness, seizures and weakness.   All other systems reviewed and are negative.      Patient Active Problem List   Diagnosis   • Exercise-induced asthma   • Attention deficit hyperactivity disorder (ADHD), combined type   • Psoriatic arthritis (Formerly [facility])   • Mixed hyperlipidemia   • Primary insomnia   • Cervical spondylosis   • Controlled substance agreement signed   • DDD (degenerative disc disease), lumbar   • Degenerative disc disease, cervical   • Elbow pain, left   • Food allergy   • Muscle spasms of both lower extremities   • Right upper quadrant abdominal pain   • Vitamin D deficiency   • HTN (hypertension)   • Chronic maxillary sinusitis   • Dysphoric mood   • Dry eye   • Dry mouth   • Memory difficulties   • Recurrent right inguinal hernia   • Muscle cramps   • Tinnitus of both ears   • Wellness examination   • Encounter to discuss test results   • Lipid disorder   • Coronary artery disease involving native coronary artery of native heart without angina pectoris   • COVID-19   • Renal cell carcinoma of left kidney (Formerly [facility])   • Immunocompromised (Formerly [facility])   • Shortness of breath   • Persistent headaches   • Chest pain   • Lumbosacral spondylosis without myelopathy   • Lumbar spondylosis   • Intervertebral disc disorder with radiculopathy of lumbar region   • Primary generalized (osteo)arthritis   • History of renal carcinoma   • Psoriasis       Past Medical History:   Diagnosis Date   • Arthritis    • Asthma    • Cancer (Formerly [facility]) 9/22   • COPD (chronic obstructive pulmonary disease) (Formerly [facility]) 1990   • Headache(784.0) 2019    Significant osteoarthrities in neck   • HL (hearing loss) 2019    mild   • Hyperlipidemia 1995   • Hypertension 2020   • Pneumonia 2019   • Psoriatic arthritis ([facility]ly [facility])    • Septicemia ([facility]ly [facility])    • Stomach  problems        Past Surgical History:   Procedure Laterality Date   • COLONOSCOPY     • DENTAL IMPLANT     • FRACTURE SURGERY  in youth    wrist, leg   • HERNIA REPAIR     • INCISION AND DRAINAGE ABSCESS / HEMATOMA OF BURSA / KNEE / THIGH Left     thigh   • [place] LAPAROSCOPY SURG PARTIAL NEPHRECTOMY Left 11/28/2022    Procedure: ROBOTIC LAPAROSCOPIC PARTIAL NEPHRECTOMY  WITH INTRAOPERATIVE ULTRASOUND INTERPRETATION;  Surgeon: Shane Barbosa MD;  [facility];  Service: Urology   • TX RPR 1ST INGUN HRNA AGE 5 YRS/> REDUCIBLE Right 06/22/2021    Procedure: Inguinal hernia repair with mesh;  Surgeon: Robert Bloch, MD;  Location:  MAIN OR;  Service: General       Family History   Problem Relation Age of Onset   • Coronary artery disease Father    • Heart disease Father    • Heart attack Father         multiple heart attacks and surgeries   • Heart failure Father    • Arthritis Mother    • Hearing loss Mother    • Heart disease Maternal Grandfather    • Heart failure Maternal Grandfather    • Dementia Maternal Grandmother         Alzheimers   • Heart disease Paternal Grandfather    • Heart failure Paternal Grandfather    • Dementia Maternal Aunt    • Dementia Maternal Aunt         Alzheimers       Social History     Occupational History   • Not on file   Tobacco Use   • Smoking status: Never   • Smokeless tobacco: Never   • Tobacco comments:     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   Vaping Use   • Vaping status: Never Used   Substance and Sexual Activity   • Alcohol use: Yes     Alcohol/week: 1.0 standard drink of alcohol     Types: 1 Glasses of wine per week     Comment: More like one glass per month   • Drug use: Never   • Sexual activity: Yes     Partners: Female     Birth control/protection: None     Comment: Monogamous,         Current Outpatient Medications on File Prior to Visit   Medication Sig   • albuterol (PROVENTIL HFA,VENTOLIN HFA) 90 mcg/act inhaler Inhale 2 puffs every 6  "(six) hours as needed for wheezing Pt requests 2 inhalers   • amLODIPine (NORVASC) 5 mg tablet Take 1 tablet (5 mg total) by mouth daily   • aspirin (ECOTRIN LOW STRENGTH) 81 mg EC tablet Take 81 mg by mouth daily   • benzonatate (TESSALON) 200 MG capsule Take 1 capsule (200 mg total) by mouth 3 (three) times a day as needed for cough   • clobetasol (TEMOVATE) 0.05 % cream Apply 0.25 g (0.05 Applications total) topically 2 (two) times a day as needed (rash)   • clotrimazole-betamethasone (LOTRISONE) 1-0.05 % cream Apply topically 2 (two) times a day   • ketoconazole (NIZORAL) 2 % cream Apply topically daily as needed for rash   • methylphenidate (RITALIN) 10 mg tablet TAKE TWO TABLETS IN THE MORNING, ONE TABLET AROUND NOON AND ONE TABLET IN AFTERNOON IF NEEDED Do not start before February 11, 2025.   • rosuvastatin (CRESTOR) 40 MG tablet Take 1 tablet (40 mg total) by mouth daily   • [DISCONTINUED] ALPRAZolam (XANAX) 0.5 mg tablet Take 1 tablet 2 hours prior to procedure. OK to repeat 30 minutes prior   • EPINEPHrine (EPIPEN) 0.3 mg/0.3 mL SOAJ Inject 0.3 mL (0.3 mg total) into a muscle once for 1 dose   • fluticasone (Arnuity Ellipta) 200 MCG/ACT AEPB inhaler Inhale 1 puff daily Rinse mouth after use.   • zaleplon (SONATA) 10 MG capsule Take 1 capsule (10 mg total) by mouth daily at bedtime     No current facility-administered medications on file prior to visit.       No Known Allergies    Physical Exam:    /84 (BP Location: Right arm, Patient Position: Sitting, Cuff Size: Standard)   Pulse 92   Resp 14   Ht 5' 11\" (1.803 m)   Wt 79.8 kg (176 lb)   BMI 24.55 kg/m²     Constitutional:normal, well developed, well nourished, alert, in no distress and non-toxic and no overt pain behavior.  Eyes:anicteric  HEENT:grossly intact  Neck:supple, symmetric, trachea midline and no masses   Pulmonary:even and unlabored  Cardiovascular:No edema or pitting edema present  Skin:Normal without rashes or lesions and well " hydrated  Psychiatric:Mood and affect appropriate  Neurologic:Cranial Nerves II-XII grossly intact  Musculoskeletal:normal    Lumbar Spine Exam  Appearance:  Normal lordosis  Palpation/Tenderness:  right sacroiliac joint tenderness  right piriformis tenderness  Range of Motion:  Flexion:  Moderately limited  with pain  Extension:  Moderately limited  with pain  Motor Strength:  Left hip flexion:  5/5  Left hip extension:  5/5  Right hip flexion:  4/5  Right hip extension:  5/5  Left knee flexion:  5/5  Left knee extension:  5/5  Right knee flexion:  5/5  Right knee extension:  5/5  Left foot dorsiflexion:  5/5  Left foot plantar flexion:  5/5  Right foot dorsiflexion:  5/5  Right foot plantar flexion:  5/5  Special Tests:  Left Wilton's Maneuver:  positive  Right Wilton's Maneuver:  positive  Left Gaenslen's Test:  positive  Right Gaenslen's Test:  positive  Left Sacroiliac Compression: positive  Right Sacroiliac Compression: positive    Imaging    MRI LUMBAR SPINE WITHOUT CONTRAST (7/25/2024)     INDICATION: L40.50: Arthropathic psoriasis, unspecified  M45.0: Ankylosing spondylitis of multiple sites in spine  M46.1: Sacroiliitis, not elsewhere classified  M15.0: Primary generalized (osteo)arthritis.     COMPARISON: 3/6/2024; 7/25/2024     TECHNIQUE:  Multiplanar, multisequence imaging of the lumbar spine was performed. .        IMAGE QUALITY:  Diagnostic     FINDINGS:     VERTEBRAL BODIES:  There are 5 lumbar type vertebral bodies. Grade 1 anterolisthesis of L5 on S1. Right-sided spondylolysis of L5 noted. Probable left-sided spondylolysis as well. Marked facet hypertrophy noted in this region. Type I Modic endplate   changes about the L5-S1 intervertebral disc space as well as the L2-3 intervertebral disc space.     SACRUM: Type I Modic endplate changes superior endplate of S1.     DISTAL CORD AND CONUS:  Normal size and signal within the distal cord and conus. The conus medullaris terminates at the superior  endplate of L2.     PARASPINAL SOFT TISSUES:  Paraspinal soft tissues are unremarkable.     LOWER THORACIC DISC SPACES:  Normal disc height and signal.  No disc herniation, canal stenosis or foraminal narrowing.     LUMBAR DISC SPACES:     L1-L2:  Normal.     L2-L3: There is bilateral facet hypertrophy. There is loss of disc height and signal. There is a left neural foraminal disc protrusion. Moderate narrowing of the left neural foramen. Mild central canal narrowing. Mild right neural foraminal narrowing.     L3-L4: There is loss of disc height and signal. There is a left neural foraminal disc protrusion. There is bilateral facet hypertrophy. Mild left neural foraminal narrowing. Mild narrowing left subarticular zone. Central canal patent. Mild right   subarticular zone narrowing.     L4-L5: There is bilateral facet hypertrophy. There is a right neural foraminal disc protrusion. Moderate right neural foraminal narrowing. Central canal patent. Mild left neural foraminal narrowing.     L5-S1: There is uncovering the intervertebral disc space. There is bilateral facet hypertrophy. Central canal patent. At least moderate narrowing of the left neural foramina secondary to the anterolisthesis.     OTHER FINDINGS:  None.

## 2025-02-27 ENCOUNTER — OFFICE VISIT (OUTPATIENT)
Dept: UROLOGY | Facility: CLINIC | Age: 65
End: 2025-02-27
Payer: COMMERCIAL

## 2025-02-27 ENCOUNTER — RESULTS FOLLOW-UP (OUTPATIENT)
Dept: RADIOLOGY | Facility: CLINIC | Age: 65
End: 2025-02-27

## 2025-02-27 VITALS
DIASTOLIC BLOOD PRESSURE: 84 MMHG | RESPIRATION RATE: 20 BRPM | SYSTOLIC BLOOD PRESSURE: 126 MMHG | HEART RATE: 68 BPM | OXYGEN SATURATION: 98 %

## 2025-02-27 DIAGNOSIS — C64.2 RENAL CELL CARCINOMA OF LEFT KIDNEY (HCC): Primary | ICD-10-CM

## 2025-02-27 DIAGNOSIS — R35.1 BENIGN PROSTATIC HYPERPLASIA WITH NOCTURIA: ICD-10-CM

## 2025-02-27 DIAGNOSIS — N40.1 BENIGN PROSTATIC HYPERPLASIA WITH NOCTURIA: ICD-10-CM

## 2025-02-27 LAB
DSDNA IGG SERPL IA-ACNC: <0.9 IU/ML (ref ?–15)
NUCLEAR IGG SER IA-RTO: 0.2 RATIO (ref ?–1)
POST-VOID RESIDUAL VOLUME, ML POC: 16 ML
RHEUMATOID FACT SER QL LA: NEGATIVE

## 2025-02-27 PROCEDURE — 51798 US URINE CAPACITY MEASURE: CPT | Performed by: UROLOGY

## 2025-02-27 PROCEDURE — 99214 OFFICE O/P EST MOD 30 MIN: CPT | Performed by: UROLOGY

## 2025-02-27 NOTE — ASSESSMENT & PLAN NOTE
S/p left partial nephrectomy   SUSAN  Follow up 1 year with imaging prior    Orders:    POCT Measure PVR    CBC and Platelet; Future    Basic metabolic panel; Future    CT abdomen w wo contrast; Future

## 2025-02-27 NOTE — TELEPHONE ENCOUNTER
Please let patient know that inflammatory blood work markers came back all normal.  Continue with plan for sacroiliac joint injections as already scheduled

## 2025-02-27 NOTE — PROGRESS NOTES
Name: Crispin Fam      : 1960      MRN: [id number]  Encounter Provider: Shane Barbosa MD  Encounter Date: 2025   Encounter department: [facility] UROLOGY [place]  :  Assessment & Plan  Renal cell carcinoma of left kidney (HCC)  S/p left partial nephrectomy   SUSAN  Follow up 1 year with imaging prior    Orders:    POCT Measure PVR    CBC and Platelet; Future    Basic metabolic panel; Future    CT abdomen w wo contrast; Future    Benign prostatic hyperplasia with nocturia  PVR 16 mL, emptying completely  Does not want medications at this time  Orders:    PSA Total, Diagnostic; Future          History of Present Illness   Crispin Fam is a 64 y.o. male who presents for follow up of renal cell carcinoma and LUTS  ECOG 0  Independent interpretation of imaging performed today, no recurrence of RCC    PVR 16 mL    The following portions of the patient's history were reviewed and updated as appropriate: allergies, current medications, past family history, past medical history, past social history, past surgical history and problem list.    AUA SYMPTOM SCORE      Flowsheet Row Most Recent Value   AUA SYMPTOM SCORE    How often have you had a sensation of not emptying your bladder completely after you finished urinating? 5 (P)     How often have you had to urinate again less than two hours after you finished urinating? 5 (P)     How often have you found you stopped and started again several times when you urinate? 5 (P)     How often have you found it difficult to postpone urination? 5 (P)     How often have you had a weak urinary stream? 4 (P)     How often have you had to push or strain to begin urination? 1 (P)     How many times did you most typically get up to urinate from the time you went to bed at night until the time you got up in the morning? 4 (P)     Quality of Life: If you were to spend the rest of your life with your urinary condition just the way it is now, how would  you feel about that? 3 (P)     AUA SYMPTOM SCORE 29 (P)            Review of Systems   Constitutional: Negative.    HENT: Negative.     Eyes: Negative.    Respiratory: Negative.     Cardiovascular: Negative.    Gastrointestinal: Negative.    Endocrine: Negative.    Genitourinary:  Positive for frequency and urgency.   Musculoskeletal: Negative.    Skin: Negative.    Allergic/Immunologic: Negative.    Neurological: Negative.    Hematological: Negative.    Psychiatric/Behavioral: Negative.            Objective   There were no vitals taken for this visit.    Physical Exam  Vitals reviewed.   Constitutional:       General: He is not in acute distress.     Appearance: Normal appearance. He is well-developed. He is not ill-appearing, toxic-appearing or diaphoretic.   HENT:      Head: Normocephalic and atraumatic.      Nose: Nose normal.      Mouth/Throat:      Mouth: Mucous membranes are moist.   Eyes:      General: No scleral icterus.        Right eye: No discharge.         Left eye: No discharge.   Neck:      Thyroid: No thyromegaly.      Trachea: No tracheal deviation.   Pulmonary:      Effort: Pulmonary effort is normal.   Chest:      Chest wall: No tenderness.   Abdominal:      General: There is no distension.      Palpations: There is no mass.      Tenderness: There is no abdominal tenderness. There is no guarding.      Hernia: No hernia is present.   Genitourinary:     Comments: Prostate smooth, no nodules  Musculoskeletal:         General: No deformity or signs of injury. Normal range of motion.      Cervical back: Normal range of motion and neck supple.   Lymphadenopathy:      Cervical: No cervical adenopathy.   Skin:     General: Skin is dry.      Coloration: Skin is not jaundiced or pale.      Findings: No erythema.   Neurological:      Mental Status: He is alert and oriented to person, place, and time.      Cranial Nerves: No cranial nerve deficit.      Motor: No abnormal muscle tone.      Coordination:  Coordination normal.   Psychiatric:         Mood and Affect: Mood normal.         Behavior: Behavior normal.         Thought Content: Thought content normal.         Judgment: Judgment normal.         Results   Lab Results   Component Value Date    PSA 1.07 12/19/2023    PSA 0.6 08/13/2022     Lab Results   Component Value Date    CALCIUM 9.7 01/27/2025    K 4.0 01/27/2025    CO2 28 01/27/2025     01/27/2025    BUN 22 01/27/2025    CREATININE 0.84 01/27/2025     Lab Results   Component Value Date    WBC 7.49 01/27/2025    HGB 15.9 01/27/2025    HCT 46.9 01/27/2025    MCV 92 01/27/2025     01/27/2025       Office Urine Dip  No results found for this or any previous visit (from the past hour).

## 2025-03-04 ENCOUNTER — HOSPITAL ENCOUNTER (OUTPATIENT)
Dept: RADIOLOGY | Facility: CLINIC | Age: 65
Discharge: HOME | End: 2025-03-04
Admitting: ANESTHESIOLOGY
Payer: COMMERCIAL

## 2025-03-04 VITALS
SYSTOLIC BLOOD PRESSURE: 118 MMHG | RESPIRATION RATE: 20 BRPM | OXYGEN SATURATION: 93 % | DIASTOLIC BLOOD PRESSURE: 80 MMHG | HEART RATE: 83 BPM | TEMPERATURE: 98.3 F

## 2025-03-04 DIAGNOSIS — M46.1 SACROILIITIS (HCC): ICD-10-CM

## 2025-03-04 PROCEDURE — 27096 INJECT SACROILIAC JOINT: CPT | Performed by: ANESTHESIOLOGY

## 2025-03-04 RX ORDER — 0.9 % SODIUM CHLORIDE 0.9 %
4 VIAL (ML) INJECTION ONCE
Status: COMPLETED | OUTPATIENT
Start: 2025-03-04 | End: 2025-03-04

## 2025-03-04 RX ORDER — ROPIVACAINE HYDROCHLORIDE 2 MG/ML
7 INJECTION, SOLUTION EPIDURAL; INFILTRATION; PERINEURAL ONCE
Status: COMPLETED | OUTPATIENT
Start: 2025-03-04 | End: 2025-03-04

## 2025-03-04 RX ORDER — METHYLPREDNISOLONE ACETATE 80 MG/ML
80 INJECTION, SUSPENSION INTRA-ARTICULAR; INTRALESIONAL; INTRAMUSCULAR; PARENTERAL; SOFT TISSUE ONCE
Status: COMPLETED | OUTPATIENT
Start: 2025-03-04 | End: 2025-03-04

## 2025-03-04 RX ADMIN — ROPIVACAINE HYDROCHLORIDE 7 ML: 2 INJECTION, SOLUTION EPIDURAL; INFILTRATION at 15:12

## 2025-03-04 RX ADMIN — Medication 4 ML: at 15:10

## 2025-03-04 RX ADMIN — IOHEXOL 2 ML: 300 INJECTION, SOLUTION INTRAVENOUS at 15:12

## 2025-03-04 RX ADMIN — METHYLPREDNISOLONE ACETATE 80 MG: 80 INJECTION, SUSPENSION INTRA-ARTICULAR; INTRALESIONAL; INTRAMUSCULAR; SOFT TISSUE at 15:12

## 2025-03-04 RX ADMIN — LIDOCAINE HYDROCHLORIDE 4 ML: 20 INJECTION, SOLUTION EPIDURAL; INFILTRATION; INTRACAUDAL at 15:10

## 2025-03-04 NOTE — DISCHARGE INSTR - LAB

## 2025-03-04 NOTE — H&P
History of Present Illness: The patient is a 64 y.o. male who presents with complaints of lower back pain and is here today for bilateral sacroiliac joint injections    Past Medical History:   Diagnosis Date    Arthritis     Asthma     Cancer (HCC) 9/22    COPD (chronic obstructive pulmonary disease) (HCC) 1990    Headache(784.0) 2019    Significant osteoarthrities in neck    HL (hearing loss) 2019    mild    Hyperlipidemia 1995    Hypertension 2020    Pneumonia 2019    Psoriatic arthritis (HCC)     Septicemia (HCC)     Stomach problems        Past Surgical History:   Procedure Laterality Date    COLONOSCOPY      DENTAL IMPLANT      FRACTURE SURGERY  in youth    wrist, leg    HERNIA REPAIR      INCISION AND DRAINAGE ABSCESS / HEMATOMA OF BURSA / KNEE / THIGH Left     thigh    [place] LAPAROSCOPY SURG PARTIAL NEPHRECTOMY Left 11/28/2022    Procedure: ROBOTIC LAPAROSCOPIC PARTIAL NEPHRECTOMY  WITH INTRAOPERATIVE ULTRASOUND INTERPRETATION;  Surgeon: Shane Barbosa MD;  [facility];  Service: Urology    NH RPR 1ST INGUN HRNA AGE 4 YRS/> REDUCIBLE Right 06/22/2021    Procedure: Inguinal hernia repair with mesh;  Surgeon: Robert Bloch, MD;  Location: [facility];  Service: General         Current Outpatient Medications:     albuterol (PROVENTIL HFA,VENTOLIN HFA) 90 mcg/act inhaler, Inhale 2 puffs every 6 (six) hours as needed for wheezing Pt requests 2 inhalers, Disp: 18 g, Rfl: 1    ALPRAZolam (XANAX) 0.5 mg tablet, Take 1 tablet 2 hours prior to procedure. OK to repeat 30 minutes prior, Disp: 2 tablet, Rfl: 1    amLODIPine (NORVASC) 5 mg tablet, Take 1 tablet (5 mg total) by mouth daily, Disp: 90 tablet, Rfl: 1    aspirin (ECOTRIN LOW STRENGTH) 81 mg EC tablet, Take 81 mg by mouth daily, Disp: , Rfl:     benzonatate (TESSALON) 200 MG capsule, Take 1 capsule (200 mg total) by mouth 3 (three) times a day as needed for cough, Disp: 20 capsule, Rfl: 0    clobetasol (TEMOVATE) 0.05 % cream, Apply 0.25 g (0.05  Applications total) topically 2 (two) times a day as needed (rash), Disp: 60 g, Rfl: 5    clotrimazole-betamethasone (LOTRISONE) 1-0.05 % cream, Apply topically 2 (two) times a day, Disp: 30 g, Rfl: 5    EPINEPHrine (EPIPEN) 0.3 mg/0.3 mL SOAJ, Inject 0.3 mL (0.3 mg total) into a muscle once for 1 dose, Disp: 1 each, Rfl: 0    fluticasone (Arnuity Ellipta) 200 MCG/ACT AEPB inhaler, Inhale 1 puff daily Rinse mouth after use., Disp: 30 blister, Rfl: 0    ketoconazole (NIZORAL) 2 % cream, Apply topically daily as needed for rash, Disp: 15 g, Rfl: 5    methylphenidate (RITALIN) 10 mg tablet, TAKE TWO TABLETS IN THE MORNING, ONE TABLET AROUND NOON AND ONE TABLET IN AFTERNOON IF NEEDED Do not start before February 11, 2025., Disp: 120 tablet, Rfl: 0    rosuvastatin (CRESTOR) 40 MG tablet, Take 1 tablet (40 mg total) by mouth daily, Disp: 90 tablet, Rfl: 1    zaleplon (SONATA) 10 MG capsule, Take 1 capsule (10 mg total) by mouth daily at bedtime, Disp: 30 capsule, Rfl: 2    Current Facility-Administered Medications:     iohexol (OMNIPAQUE) 300 mg/mL injection 2 mL, 2 mL, Intra-articular, Once, Siddhartha Wade MD    lidocaine (PF) (XYLOCAINE-MPF) 2 % injection 4 mL, 4 mL, Infiltration, Once, Siddhartha Wade MD    methylPREDNISolone acetate (DEPO-MEDROL) injection 80 mg, 80 mg, Intra-articular, Once, Siddhartha Wade MD    ropivacaine (NAROPIN) injection 7 mL, 7 mL, Intra-articular, Once, Siddhartha Wade MD    sodium chloride (PF) 0.9 % injection 4 mL, 4 mL, Infiltration, Once, Siddhartha Wade MD    No Known Allergies    Physical Exam:   Vitals:    03/04/25 1457   BP: 113/78   Pulse: 85   Resp: 20   Temp: 98.3 °F (36.8 °C)   SpO2: 94%     General: Awake, Alert, Oriented x 3, Mood and affect appropriate  Respiratory: Respirations even and unlabored  Cardiovascular: Peripheral pulses intact; no edema  Musculoskeletal Exam: Lower back pain    ASA Score: 3    Patient/Chart Verification  Patient ID Verified: Verbal  Consents  Confirmed: To be obtained in the Procedural area  Interval H&P(within 24 hr) Complete (required for Outpatients and Surgery Admit only): To be obtained in the Procedural area  Allergies Reviewed: Yes  Anticoag/NSAID held?: NA  Currently on antibiotics?: No    Assessment:   1. Sacroiliitis (HCC)        Plan: Bilateral SIJ Injections

## 2025-03-06 DIAGNOSIS — F90.2 ATTENTION DEFICIT HYPERACTIVITY DISORDER (ADHD), COMBINED TYPE: ICD-10-CM

## 2025-03-06 NOTE — TELEPHONE ENCOUNTER
Reason for call:   [x] Refill   [] Prior Auth  [] Other:     Office:   [] PCP/Provider -   [x] Specialty/Provider - [facility]  Authorized By: Vielka Victoria MD    Medication: methylphenidate (RITALIN) 10 mg tablet     Dose/Frequency: TAKE TWO TABLETS IN THE MORNING, ONE TABLET AROUND NOON AND ONE TABLET IN AFTERNOON IF NEEDED Do not start before     Quantity: 120    Pharmacy: [facility]) - [place] - [address]     Local Pharmacy   Does the patient have enough for 3 days?   [x] Yes   [] No - Send as HP to POD    Mail Away Pharmacy   Does the patient have enough for 10 days?   [] Yes   [] No - Send as HP to POD

## 2025-03-07 ENCOUNTER — CLINICAL SUPPORT (OUTPATIENT)
Dept: NUTRITION | Facility: HOSPITAL | Age: 65
End: 2025-03-07
Attending: INTERNAL MEDICINE
Payer: COMMERCIAL

## 2025-03-07 VITALS — WEIGHT: 184 LBS | BODY MASS INDEX: 25.66 KG/M2

## 2025-03-07 DIAGNOSIS — C64.2 RENAL CELL CARCINOMA OF LEFT KIDNEY (HCC): ICD-10-CM

## 2025-03-07 PROCEDURE — 97802 MEDICAL NUTRITION INDIV IN: CPT

## 2025-03-07 RX ORDER — METHYLPHENIDATE HYDROCHLORIDE 10 MG/1
TABLET ORAL
Qty: 120 TABLET | Refills: 0 | Status: SHIPPED | OUTPATIENT
Start: 2025-03-14

## 2025-03-07 NOTE — TELEPHONE ENCOUNTER
PMDP reviewed. Refill was sent to the patient's preferred pharmacy to be picked up on 3/14 or later.

## 2025-03-07 NOTE — TELEPHONE ENCOUNTER
Refill must be reviewed and completed by the office or provider. The refill is unable to be approved or denied by the medication management team.      02/26/2025 02/26/2025 ALPRAZolam (Tablet) 2.0 1 0.5 MG [facility] / 1 [place]   1 [id number] 02/14/2025 02/07/2025 Methylphenidate Hcl (Tablet) 120.0 30 10 MG NA [facility] Commercial Insurance 0 / 0 [place]   1 [id number] 01/13/2025 01/08/2025 Methylphenidate Hcl (Tablet) 120.0 30 10 MG NA [facility] Commercial Insurance 0 / 0 [place]

## 2025-03-07 NOTE — PROGRESS NOTES
" Nutrition Assessment Form    Patient Name: Crispin Fam    YOB: 1960    Sex: Male     Assessment Date: 3/7/2025  Start Time: 8 am Stop Time: 9 am Total Minutes: 60     Data:  Present at session: self   Parent/Patient Concerns/reason for visit: \"I want to reduce risk of cancer returning and I have severe arthritis pain and inflammation\"   Medical Dx/Reason for Referral: C64.2 (ICD-10-CM) - Renal cell carcinoma of left kidney       Past Medical History:   Diagnosis Date    Arthritis     Asthma     Cancer ([facility]) 9/22    COPD (chronic obstructive pulmonary disease) ([facility]) 1990    Headache(784.0) 2019    Significant osteoarthrities in neck    HL (hearing loss) 2019    mild    Hyperlipidemia 1995    Hypertension 2020    Pneumonia 2019    Psoriatic arthritis ([facility])     Septicemia ([facility])     Stomach problems        Current Outpatient Medications   Medication Sig Dispense Refill    albuterol (PROVENTIL HFA,VENTOLIN HFA) 90 mcg/act inhaler Inhale 2 puffs every 6 (six) hours as needed for wheezing Pt requests 2 inhalers 18 g 1    ALPRAZolam (XANAX) 0.5 mg tablet Take 1 tablet 2 hours prior to procedure. OK to repeat 30 minutes prior 2 tablet 1    amLODIPine (NORVASC) 5 mg tablet Take 1 tablet (5 mg total) by mouth daily 90 tablet 1    aspirin (ECOTRIN LOW STRENGTH) 81 mg EC tablet Take 81 mg by mouth daily      benzonatate (TESSALON) 200 MG capsule Take 1 capsule (200 mg total) by mouth 3 (three) times a day as needed for cough 20 capsule 0    clobetasol (TEMOVATE) 0.05 % cream Apply 0.25 g (0.05 Applications total) topically 2 (two) times a day as needed (rash) 60 g 5    clotrimazole-betamethasone (LOTRISONE) 1-0.05 % cream Apply topically 2 (two) times a day 30 g 5    EPINEPHrine (EPIPEN) 0.3 mg/0.3 mL SOAJ Inject 0.3 mL (0.3 mg total) into a muscle once for 1 dose 1 each 0    fluticasone (Arnuity Ellipta) 200 MCG/ACT AEPB inhaler Inhale 1 puff daily Rinse mouth after use. 30 blister 0    ketoconazole " "(NIZORAL) 2 % cream Apply topically daily as needed for rash 15 g 5    methylphenidate (RITALIN) 10 mg tablet TAKE TWO TABLETS IN THE MORNING, ONE TABLET AROUND NOON AND ONE TABLET IN AFTERNOON IF NEEDED Do not start before February 11, 2025. 120 tablet 0    rosuvastatin (CRESTOR) 40 MG tablet Take 1 tablet (40 mg total) by mouth daily 90 tablet 1    zaleplon (SONATA) 10 MG capsule Take 1 capsule (10 mg total) by mouth daily at bedtime 30 capsule 2     No current facility-administered medications for this visit.        Additional Meds/Supplements:    Special Learning Needs/barriers to learning/any new barriers    Height: Ht Readings from Last 5 Encounters:   02/26/25 5' 11\" (1.803 m)   01/07/25 5' 11\" (1.803 m)   12/06/24 5' 11\" (1.803 m)   10/21/24 5' 11\" (1.803 m)   07/26/24 5' 11\" (1.803 m)      Weight: Wt Readings from Last 10 Encounters:   02/26/25 79.8 kg (176 lb)   01/07/25 80 kg (176 lb 6.4 oz)   12/22/24 76.7 kg (169 lb)   12/06/24 79.7 kg (175 lb 9.6 oz)   10/21/24 81.2 kg (179 lb)   07/26/24 79.8 kg (176 lb)   06/27/24 79.8 kg (176 lb)   06/12/24 80.9 kg (178 lb 6.4 oz)   05/13/24 82 kg (180 lb 12.8 oz)   02/15/24 80.7 kg (178 lb)     Estimated body mass index is 24.55 kg/m² as calculated from the following:    Height as of 2/26/25: 5' 11\" (1.803 m).    Weight as of 2/26/25: 79.8 kg (176 lb).   Recent Weight Change: [x]Yes     []No  Amount: 7 lb weight gain X 3 months       Energy Needs: [place]- St. 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 []Yes     [x]No  When:      Why:         Diet Hx:  Breakfast: scrambled eggs with mozzarella or cheddar cheese, nonstick spray and english muffin, handful blueberries  Diet:   Lunch: typically skips  1-2 slices swiss and turkey and handful mixed nuts 1 cup coffee with eben nut syrup and half half   OJ  Skim milk- 32-40 oz a day  Water per day  Drinks electrolyte water when he gigs     Dinner: low appetite around 6 pm   8 pm and on is ravenous  11 om meatball parm, bag of chips, yohoo after a gig          Snacks: AM -   PM -   HS -    Other Notes/ Initial Assessment:  Patient has been cancer free for 2 years  Patient received injections in his back every 4-6 months. Has been told he is not a candidate for surgery. Has severe osteoarthritis.   Patient stated he salts his food at the table    Discussed benefits of following an anti-inflammatory pattern of eating  Discussed foods that are pro-inflammatory- saturated fats, added sugars, refined grains, excessive Na  Discussed anti-inflammatory foods- fruits, vegetables, unsaturated fats, whole grains   Encouraged patient to measure the amount of salt he uses  Encouraged patient to read nutrition facts labels for Na and added sugar and serving size  Discussed benefits of adequate hydration for kidney health    Patient to follow up in April 2025     Updated assessment  "(Follow up note only):     Nutrition Diagnosis:   Food and nutrition related knowledge deficit  related to Lack of prior exposure to accurate nutrition related information as evidenced by No prior knowledge of need for food and nutrition related recommendations       Any change or new dx since previous visit:     Nutrition Diagnosis:   N/a      Medical Nutrition Therapy Intervention:  [x]Individualized Meal Plan 2300 mg Na per day, 2200 kcal per day  []Understanding Lab Values   []Basic Pathophysiology of Disease []Food/Medication Interactions   []Food Diary []Exercise   []Lifestyle/Behavior Modification Techniques []Medication, Mechanism of Action   [x]Label Reading: Na/ added sugae/ serving size []Self Blood Glucose Monitoring   []Weight/BMI Goals: gain/lose/maintain []Other -           Comprehension: []Excellent  []Very Good  [x]Good  []Fair   []Poor    Receptivity: []Excellent  []Very Good  [x]Good  []Fair   []Poor    Expected Compliance: []Excellent  []Very Good  [x]Good  []Fair   []Poor        Goals (initial)/ Progress made on previous goals/new goals:  1.Patient will read nutrition facts labels for added sugar and recommended servings by next follow up   2.Patient will measure how much salt he uses by next follow up   3.Patient will add ~20 oz water per day by next follow up       No follow-ups on file.  Labs:  CMP  Lab Results   Component Value Date    K 4.0 01/27/2025     01/27/2025    CO2 28 01/27/2025    BUN 22 01/27/2025    CREATININE 0.84 01/27/2025    GLUF 97 01/27/2025    CALCIUM 9.7 01/27/2025    AST 21 03/06/2024    ALT 24 03/06/2024    ALKPHOS 60 03/06/2024    EGFR 91 01/27/2025       BMP  Lab Results   Component Value Date    CALCIUM 9.7 01/27/2025    K 4.0 01/27/2025    CO2 28 01/27/2025     01/27/2025    BUN 22 01/27/2025    CREATININE 0.84 01/27/2025       Lipids  No results found for: \"CHOL\"  Lab Results   Component Value Date    HDL 56 11/22/2024    HDL 48 03/06/2024    HDL 42 " "03/03/2023     Lab Results   Component Value Date    LDLCALC 101 (H) 11/22/2024    LDLCALC 96 03/06/2024    LDLCALC 98 03/03/2023     Lab Results   Component Value Date    TRIG 75 11/22/2024    TRIG 114 03/06/2024    TRIG 114 03/03/2023     No results found for: \"CHOLHDL\"    Hemoglobin A1C  Lab Results   Component Value Date    HGBA1C 5.7 (H) 11/22/2024       Fasting Glucose  Lab Results   Component Value Date    GLUF 97 01/27/2025       Insulin     Thyroid  Lab Results   Component Value Date    TSH 2.22 02/27/2020       Hepatic Function Panel  Lab Results   Component Value Date    ALT 24 03/06/2024    AST 21 03/06/2024    ALKPHOS 60 03/06/2024       Celiac Disease Antibody Panel  No results found for: \"ENDOMYSIAL IGA\", \"GLIADIN IGA\", \"GLIADIN IGG\", \"IGA\", \"TISSUE TRANSGLUT AB\", \"TTG IGA\"   Iron  No results found for: \"IRON\", \"TIBC\", \"FERRITIN\"         Rhiannon Pickering RD  [facility]  [address]  [place] [id number]    "

## 2025-03-17 ENCOUNTER — TELEPHONE (OUTPATIENT)
Age: 65
End: 2025-03-17

## 2025-03-17 ENCOUNTER — TELEPHONE (OUTPATIENT)
Dept: RADIOLOGY | Facility: MEDICAL CENTER | Age: 65
End: 2025-03-17

## 2025-03-17 NOTE — TELEPHONE ENCOUNTER
Patient Reports       25-35  %     improvement post injection    Pain Level   pain  is constant but it is on the average  5-6  /10     Patient said he made a call this morning to get a prescription for Xanax for his injection tomorrow

## 2025-03-17 NOTE — TELEPHONE ENCOUNTER
S/w pt and advised he should have a refill of his Xanax as when it was ordered in 2/25, it was ordered with 1 refill. Pt plans to reach out ot [facility] when they open.

## 2025-03-17 NOTE — TELEPHONE ENCOUNTER
Caller: Patient    Doctor: Dr. Hand    Reason for call: Xanax Med for procedure tomorrow   [facility] - [place] - [address]  Call back#:

## 2025-03-18 ENCOUNTER — TELEPHONE (OUTPATIENT)
Dept: PAIN MEDICINE | Facility: CLINIC | Age: 65
End: 2025-03-18

## 2025-03-18 NOTE — TELEPHONE ENCOUNTER
Caller: Patient    Doctor: Dr. Wade    Reason for call: patient calling because he woke up with a terrible cold and thinks he should reschedule his procedure for today.    Please advise.    Call back#: [phone]

## 2025-03-19 ENCOUNTER — TELEMEDICINE (OUTPATIENT)
Dept: PSYCHIATRY | Facility: CLINIC | Age: 65
End: 2025-03-19
Payer: COMMERCIAL

## 2025-03-19 DIAGNOSIS — F43.23 ADJUSTMENT DISORDER WITH MIXED ANXIETY AND DEPRESSED MOOD: ICD-10-CM

## 2025-03-19 DIAGNOSIS — F90.2 ATTENTION DEFICIT HYPERACTIVITY DISORDER (ADHD), COMBINED TYPE: Primary | ICD-10-CM

## 2025-03-19 DIAGNOSIS — F51.01 PRIMARY INSOMNIA: ICD-10-CM

## 2025-03-19 PROCEDURE — 90833 PSYTX W PT W E/M 30 MIN: CPT | Performed by: STUDENT IN AN ORGANIZED HEALTH CARE EDUCATION/TRAINING PROGRAM

## 2025-03-19 PROCEDURE — 99214 OFFICE O/P EST MOD 30 MIN: CPT | Performed by: STUDENT IN AN ORGANIZED HEALTH CARE EDUCATION/TRAINING PROGRAM

## 2025-03-19 RX ORDER — ZALEPLON 10 MG/1
10 CAPSULE ORAL
Qty: 30 CAPSULE | Refills: 2 | Status: SHIPPED | OUTPATIENT
Start: 2025-03-19 | End: 2025-06-17

## 2025-03-19 NOTE — PSYCH
MEDICATION MANAGEMENT NOTE    Name: Crispin Fam      : 1960      MRN: [id number]  Encounter Provider: Vielka Victoria MD  Encounter Date: 3/19/2025   Encounter department: [facility]    Insurance: Payor: T / Plan: [employer] / Product Type: [facility] /      Reason for Visit:   Chief Complaint   Patient presents with    Virtual Regular Visit    Medication Management    ADHD    Anxiety    Depression   :  Assessment & Plan  Attention deficit hyperactivity disorder (ADHD), combined type  - Continue Ritalin 20/10/10 mg for ADHD        Adjustment disorder with mixed anxiety and depressed mood  - Discontinued Lexapro due to sexual side effects and non-compliance; discontinued Cymbalta as the patient did not start and is not willing to initiate at this time  - Continue individual therapy           Treatment Recommendations:  A 64 y.o.  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 The patient presented w/ h/o ADHD since , being on Ritalin 60mg daily since then, w/ marked improvement of attention, concentration and his function since being treated. Denied any excessive anxiety, depressed/manic sxs, A/VH or other psychotic sxs. No h/o substance abuse. Denied SI/HI, intent or plan upon direct inquiry at this time. WALTER-7: 4. His current presentation meets criteria for ADHD, by history. The patient was  "educated about the diagnosis of ADHD and the course of ADHD in adults which may present with decreased intensity of sxs, onesimo after age 50, and particularly after age 65. Psycho-education regarding Ritalin indications, benefits, risks, side effects, and alternative options provided to the patient, and the importance of the compliance with psychiatric treatment reiterated. The patient was educated about the potential long-term side effects of ritalin in older age, onesimo cardiac side effects (given the FH of heart attack in his father at age 36), and was educated about the possibility of tapering off or if becoming symptomatic, cross-tapering to different treatment options. He verbalized understanding and agreed to the proposed regimen. Ritalin dose was decreased from 20 mg TID (60mg total), to 20/20/10mg (50 mg total); and then switched to equivalent dose of Concerta 72 mg po daily to be tapered down as tolerated. The patient did not tolerate switching Ritalin to Concerta due to high BP, and on 11/5/2021 requested to be switched back to Ritalin 20mg/20mg/10mg as last prescribed (tapered down from 20 mg TID). Upon f/u on 12/28/2021, presented w/ well-controlled ADHD sxs, and stable mood and anxiety. The patient was diagnosed with complex cyst in L kidney and is going to have partial nephrectomy concerning for possible malignancy on 11/28/22, and also his internist recommended to discontinue Ritalin for HTN and high risk of cardia events given the positive FH and age. Ritalin was tapered off by 10 mg weekly and was started on Strattera 40 mg po daily for 1 week and then 40 mg po BID for ADHD. Upon f/u, the patient reported relatively controlled ADHD sxs with Strattera 40 mg BID and maintained on the same dose. However, the patient was visited on 3/20/23 as requested an earlier appointment and reported poor control of ADHD sxs with side effect of \"terrible dry mouth\". Presented w/ a persistent pattern of impaired " concentration and difficulty sustaining attention, failure to finish duties on time, having difficulty organizing tasks and activities, losing things, and getting easily distracted by external stimuli, figeting with hands, tapping feet or squirming in seat, and talking fast, interrupting others during the conversation and at times moving around as well as having difficulties to wait. Strattera tapered off and started on Ritalin 10 mg TID which later uptitrated to 20/10/10 mg on 4/27/23. Also, the patient was started on Lexapro 10 mg daily for depression and anxiety in the context of multiple psychosocial stressors. The patient stopped taking Lexapro after 8-10 weeks, and presented with moderately severe depression. Agreed to consider Cymbalta (helping with depression, anxiety and pain), but reportedly did not start the medication as had URI and procedures for pain management and then felt better and is not willing to start it at the moment. Will continue individual therapy and recommended to consider couple's therapy.     - Tapered off Strattera due to side effects, successfully  - Continue Ritalin 20/10/10 mg for ADHD (previously was stable on 20/20/10 mg)  - Continue Sonata 10 mg po nightly PRN for insomnia (has been sleeping well taking Melatonin recently and did not need a refill)  - Discontinued Lexapro due to sexual side effects and non-compliance; discontinued Cymbalta as the patient did not start and is not willing to initiate at this time  - Continue individual therapy  - Educated about healthy life style, risk of falls/sedation and addiction. Patient was receptive to education.  - Medications sent to the patient's pharmacy for 90 day supply    - RTC in 12 weeks  - The patient was educated about 24 hour and weekend coverage for urgent situations accessed by calling [facility] main practice number  - Patient was educated to call National Suicide Prevention Lifeline (6-098-219-TALK  [8887]) for behavioral crisis at anytime or 911 for any safety concerns, or go to nearest ER if the symptoms become overwhelming or unmanageable.  Educated about diagnosis and treatment modalities. Verbalizes understanding and agreement with the treatment plan.  Discussed self monitoring of symptoms, and symptom monitoring tools.  Discussed medications and if treatment adjustment was needed or desired.  Aware of 24 hour and weekend coverage for urgent situations accessed by calling [facility] main practice number  I am scheduling this patient out for greater than 3 months: No    Medications Risks/Benefits:      Risks, Benefits And Possible Side Effects Of Medications:    Risks, benefits, and possible side effects of medications explained to Igor and he (or legal representative) verbalizes understanding and agreement for treatment.    Controlled Medication Discussion:     Crispin has been filling controlled prescriptions on time as prescribed according to [place] Prescription Drug Monitoring Program  Discussed with Crispin the risks of sedation, respiratory depression, impairment of ability to drive and potential for abuse and addiction related to treatment with sedative/hypnotic medications. He understands risk of treatment with sedative / hypnotic medications, agrees to not drive if feels impaired and agrees to take medications as prescribed  Discussed with Crispin Black [place] warning on concurrent use of benzodiazepines and opioid medications including sedation, respiratory depression, coma and death. He understands the risk of treatment with benzodiazepines in addition to opioids and wants to continue taking those medications  PMDP reviewed. Psycho-education regarding stimulants indications, benefits, risks (including risk of addiction, onesimo if taking more than prescribed), side effects (including but not limited to palpitation/arrhythmia, weight loss and increased anxiety), and  "alternative options provided to the patient, and the importance of the compliance with psychiatric treatment reiterated. The patient verbalized understanding and agreed to the proposed regimen.       History of Present Illness     The patient was visited for Virtual Regular Visit, Medication Management, ADHD, Anxiety, and Depression. Presented calm, and cooperative. Reported feeling \"the same\". He noted that he had several URI over past few months (RSV, COVID-19 and bronchitis) and reportedly his grand-son brings new viral infections home. Endorsed good control of his ADHD sxs with current dose of his Ritalin. He continues to feel frustrated by life-related stressors, but reportedly manageable. He is not happy about his life but wants to support his family. Her 96 y/o mother who lives alone has been sick and her health status is declining. He is also involved in the custody christianson with his grandson's father despite not willing to have his own child. He does not have any help from his son or wife. 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 He continues to have marital conflicts and reportedly they have not been intimate for about a year. Denied any changes in sleep, appetite, concentration, energy level, or daily activities.  Denied feelings of anhedonia, hopelessness, helplessness, worthlessness or guilt and appeared to be future oriented.  There was no thought constriction related to death.  Denied SI/HI, intent or plan upon direct inquiry at this time. No intense anxiety sxs, specific phobia or panic attacks reported. Denied AV/H.  Endorsed good compliance with the medications and denied any side effects. Denied smoking cigarettes, binge drinking alcohol or other illicit substance use.    Given this presentation, medications are maintained at the same dosage.  Will continue individual psychotherapy. The patient was educated to call 911 or go to the " nearest emergency room if the symptoms become overwhelming or unable to remain in control. Verbalized understanding and agreed to seek help in case of distress or concern for safety.    Review Of Systems: A review of systems is obtained and is negative except for the pertinent positives listed in HPI/Subjective above.      Current Rating Scores:         Areas of Improvement: reviewed in HPI/Subjective Section and reviewed in Assessment and Plan Section    Past Psychiatric History: (unchanged information from previous note copied and updated)  - No inpatient psychiatric admission since last encounter  - No SA or SIB since last encounter  - No incidence of violent behavior since last encounter    Traumatic History: (unchanged information from previous note copied and updated)  - No new onset of abuse or traumatic events since last encounter      Past Medical History:   Diagnosis Date    Arthritis     Asthma     Cancer (HCC) 9/22    COPD (chronic obstructive pulmonary disease) ([facility]) 1990    Headache(784.0) 2019    Significant osteoarthrities in neck    HL (hearing loss) 2019    mild    Hyperlipidemia 1995    Hypertension 2020    Pneumonia 2019    Psoriatic arthritis (HCC)     Septicemia (HCC)     Stomach problems         Past Surgical History:   Procedure Laterality Date    COLONOSCOPY      DENTAL IMPLANT      FRACTURE SURGERY  in youth    wrist, leg    HERNIA REPAIR      INCISION AND DRAINAGE ABSCESS / HEMATOMA OF BURSA / KNEE / THIGH Left     thigh    TX LAPAROSCOPY SURG PARTIAL NEPHRECTOMY Left 11/28/2022    Procedure: ROBOTIC LAPAROSCOPIC PARTIAL NEPHRECTOMY  WITH INTRAOPERATIVE ULTRASOUND INTERPRETATION;  Surgeon: Shane Barbosa MD;  [facility];  Service: Urology    TX RPR 1ST INGUN HRNA AGE 5 YRS/> REDUCIBLE Right 06/22/2021    Procedure: Inguinal hernia repair with mesh;  Surgeon: Robert Bloch, MD;  Location: [facility];  Service: General     Allergies: No Known Allergies    Current Outpatient  Medications   Medication Sig Dispense Refill    albuterol (PROVENTIL HFA,VENTOLIN HFA) 90 mcg/act inhaler Inhale 2 puffs every 6 (six) hours as needed for wheezing Pt requests 2 inhalers 18 g 1    ALPRAZolam (XANAX) 0.5 mg tablet Take 1 tablet 2 hours prior to procedure. OK to repeat 30 minutes prior 2 tablet 1    amLODIPine (NORVASC) 5 mg tablet Take 1 tablet (5 mg total) by mouth daily 90 tablet 1    aspirin (ECOTRIN LOW STRENGTH) 81 mg EC tablet Take 81 mg by mouth daily      benzonatate (TESSALON) 200 MG capsule Take 1 capsule (200 mg total) by mouth 3 (three) times a day as needed for cough 20 capsule 0    clobetasol (TEMOVATE) 0.05 % cream Apply 0.25 g (0.05 Applications total) topically 2 (two) times a day as needed (rash) 60 g 5    clotrimazole-betamethasone (LOTRISONE) 1-0.05 % cream Apply topically 2 (two) times a day 30 g 5    EPINEPHrine (EPIPEN) 0.3 mg/0.3 mL SOAJ Inject 0.3 mL (0.3 mg total) into a muscle once for 1 dose 1 each 0    fluticasone (Arnuity Ellipta) 200 MCG/ACT AEPB inhaler Inhale 1 puff daily Rinse mouth after use. 30 blister 0    ketoconazole (NIZORAL) 2 % cream Apply topically daily as needed for rash 15 g 5    methylphenidate (RITALIN) 10 mg tablet TAKE TWO TABLETS IN THE MORNING, ONE TABLET AROUND NOON AND ONE TABLET IN AFTERNOON IF NEEDED Do not start before March 14, 2025. 120 tablet 0    rosuvastatin (CRESTOR) 40 MG tablet Take 1 tablet (40 mg total) by mouth daily 90 tablet 1    zaleplon (SONATA) 10 MG capsule Take 1 capsule (10 mg total) by mouth daily at bedtime 30 capsule 2     No current facility-administered medications for this visit.       Substance Abuse History:    Social History     Substance and Sexual Activity   Alcohol Use Yes    Alcohol/week: 1.0 standard drink of alcohol    Types: 1 Glasses of wine per week    Comment: More like one glass per month     Social History     Substance and Sexual Activity   Drug Use Never       Social History:    Social History      Socioeconomic History    Marital status: /Civil Union     Spouse name: Not on file    Number of children: Not on file    Years of education: Not on file    Highest education level: Not on file   Occupational History    Not on file   Tobacco Use    Smoking status: Never    Smokeless tobacco: Never    Tobacco comments:     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   Vaping Use    Vaping status: Never Used   Substance and Sexual Activity    Alcohol use: Yes     Alcohol/week: 1.0 standard drink of alcohol     Types: 1 Glasses of wine per week     Comment: More like one glass per month    Drug use: Never    Sexual activity: Yes     Partners: Female     Birth control/protection: None     Comment: Monogamous,     Other Topics Concern    Not on file   Social History Narrative    Not on file     Social Drivers of Health     Financial Resource Strain: Not on file   Food Insecurity: Not on file   Transportation Needs: Not on file   Physical Activity: Not on file   Stress: Not on file   Social Connections: Unknown (6/18/2024)    Received from [facility]     How often do you feel lonely or isolated from those around you? (Adult - for ages 18 years and over): Not on file   Intimate Partner Violence: Not on file   Housing Stability: Not on file       Family Psychiatric History:     Family History   Problem Relation Age of Onset    Coronary artery disease Father     Heart disease Father     Heart attack Father         multiple heart attacks and surgeries    Heart failure Father     Arthritis Mother     Hearing loss Mother     Heart disease Maternal Grandfather     Heart failure Maternal Grandfather     Dementia Maternal Grandmother         Alzheimers    Heart disease Paternal Grandfather     Heart failure Paternal Grandfather     Dementia Maternal Aunt     Dementia Maternal Aunt         Alzheimers       Medical History Reviewed by provider this encounter:  Tobacco  Allergies  Meds  " Problems  Med Hx  Surg Hx  Fam Hx          Objective   There were no vitals taken for this visit.     Mental Status Evaluation:  Appearance and attitude: appeared as stated age, cooperative and attentive, casually dressed, with good hygiene  Eye contact: good  Motor Function: within normal limits, No PMA/PMR  Gait/station: Not observed  Speech: normal for rate, rhythm, volume, latency, amount  Language: No overt abnormality  Mood/affect: \"fine\" / Affect was constricted but reactive, mood congruent  Thought Processes: sequential and goal-directed  Thought content: denied suicidal ideations or homicidal ideations, no overt delusions elicited, ruminations  Associations: intact associations  Perceptual disturbances: denies Auditory/Visual/Tactile Hallucinations  Orientation: oriented to time, person, place and to the situational context  Cognitive Function: intact  Memory: recent and remote memory grossly intact  Intellect: average  Fund of knowledge: aware of current events, aware of past history, and vocabulary average  Impulse control: good  Insight/judgment: fair/good          Laboratory Results: I have personally reviewed all pertinent laboratory/tests results        Suicide/Homicide Risk Assessment:    Risk of Harm to Self:  The following ratings are based on assessment at the time of the interview  Based on today's assessment, Igor presents the following risk of harm to self: low    Risk of Harm to Others:  The following ratings are based on assessment at the time of the interview  Based on today's assessment, Igor presents the following risk of harm to others: low    The following interventions are recommended: Continue medication management. No other intervention changes indicated at this time.    Psychotherapy Provided:     Individual psychotherapy provided: Yes    Counseling was provided during the session today for 20 minutes.  Psychoeducation provided to the patient and was educated about the " importance of compliance with the medications and psychiatric treatment  Solution Focused Brief Therapy (SFBT) provided  Patient's emotions were validated and specific labeled praise provided.   [place] suggestions were offered in a supportive non-critical way.   Psychodynamic brief therapy and supportive expressive interventions    Treatment Plan:    Completed and signed during the session: Yes - with Igor    Goals: Progress towards Treatment Plan goals - Yes, progressing, as evidenced by subjective findings in HPI/Subjective Section and in Assessment and Plan Section    Depression Follow-up Plan Completed: Yes    Note Share:    This note was shared with patient.    Administrative Statements   Administrative Statements   Encounter provider Vielka Victoria MD    The Patient is located at Home and in the following state in which I hold an active license [place].    The patient was identified by name and date of birth. Crispin Fam was informed that this is a telemedicine visit and that the visit is being conducted through the Epic Embedded platform. He agrees to proceed..  My office door was closed. No one else was in the room.  He acknowledged consent and understanding of privacy and security of the video platform. The patient has agreed to participate and understands they can discontinue the visit at any time.    I have spent a total time of 30 minutes in caring for this patient on the day of the visit/encounter including Patient and family education, Counseling / Coordination of care, and Obtaining or reviewing history  , not including the time spent for establishing the audio/video connection.    Visit Time  Visit Start Time: 1:25 PM  Visit Stop Time: 1:55 PM  Total Visit Duration:  30 minutes    Vielka Victoria MD 03/19/25    This note was completed in part utilizing Dragon dictation Software. Grammatical, translation, syntax errors, random word insertions, spelling mistakes, and incomplete sentences may be an  occasional consequence of this system secondary to software limitations with voice recognition, ambient noise, and hardware issues. If you have any questions or concerns about the content, text, or information contained within the body of this dictation, please contact the provider for clarification.

## 2025-04-03 ENCOUNTER — HOSPITAL ENCOUNTER (OUTPATIENT)
Dept: RADIOLOGY | Facility: CLINIC | Age: 65
Discharge: HOME | End: 2025-04-03
Payer: COMMERCIAL

## 2025-04-03 VITALS
DIASTOLIC BLOOD PRESSURE: 73 MMHG | OXYGEN SATURATION: 93 % | TEMPERATURE: 98.1 F | HEART RATE: 83 BPM | SYSTOLIC BLOOD PRESSURE: 111 MMHG | RESPIRATION RATE: 18 BRPM

## 2025-04-03 DIAGNOSIS — M54.16 LUMBAR RADICULOPATHY: ICD-10-CM

## 2025-04-03 PROCEDURE — 62323 NJX INTERLAMINAR LMBR/SAC: CPT | Performed by: ANESTHESIOLOGY

## 2025-04-03 RX ORDER — BUPIVACAINE HCL/PF 2.5 MG/ML
2 VIAL (ML) INJECTION ONCE
Status: COMPLETED | OUTPATIENT
Start: 2025-04-03 | End: 2025-04-03

## 2025-04-03 RX ORDER — METHYLPREDNISOLONE ACETATE 80 MG/ML
80 INJECTION, SUSPENSION INTRA-ARTICULAR; INTRALESIONAL; INTRAMUSCULAR; PARENTERAL; SOFT TISSUE ONCE
Status: COMPLETED | OUTPATIENT
Start: 2025-04-03 | End: 2025-04-03

## 2025-04-03 RX ADMIN — BUPIVACAINE HYDROCHLORIDE 2 ML: 2.5 INJECTION, SOLUTION EPIDURAL; INFILTRATION; INTRACAUDAL at 14:15

## 2025-04-03 RX ADMIN — IOHEXOL 1 ML: 300 INJECTION, SOLUTION INTRAVENOUS at 14:14

## 2025-04-03 RX ADMIN — METHYLPREDNISOLONE ACETATE 80 MG: 80 INJECTION, SUSPENSION INTRA-ARTICULAR; INTRALESIONAL; INTRAMUSCULAR; SOFT TISSUE at 14:15

## 2025-04-03 NOTE — H&P
History of Present Illness: The patient is a 64 y.o. male who presents with complaints of lower back pain and is here today for a lumbar epidural steroid injection    Past Medical History:   Diagnosis Date    Arthritis     Asthma     Cancer (HCC) 9/22    COPD (chronic obstructive pulmonary disease) (HCC) 1990    Headache(784.0) 2019    Significant osteoarthrities in neck    HL (hearing loss) 2019    mild    Hyperlipidemia 1995    Hypertension 2020    Pneumonia 2019    Psoriatic arthritis (HCC)     Septicemia (HCC)     Stomach problems        Past Surgical History:   Procedure Laterality Date    COLONOSCOPY      DENTAL IMPLANT      FRACTURE SURGERY  in youth    wrist, leg    HERNIA REPAIR      INCISION AND DRAINAGE ABSCESS / HEMATOMA OF BURSA / KNEE / THIGH Left     thigh    OR LAPAROSCOPY SURG PARTIAL NEPHRECTOMY Left 11/28/2022    Procedure: ROBOTIC LAPAROSCOPIC PARTIAL NEPHRECTOMY  WITH INTRAOPERATIVE ULTRASOUND INTERPRETATION;  Surgeon: Shane Barbosa MD;  [facility];  Service: Urology    OR RPR 1ST INGUN HRNA AGE 4 YRS/> REDUCIBLE Right 06/22/2021    Procedure: Inguinal hernia repair with mesh;  Surgeon: Robert Bloch, MD;  Location: [facility];  Service: General         Current Outpatient Medications:     albuterol (PROVENTIL HFA,VENTOLIN HFA) 90 mcg/act inhaler, Inhale 2 puffs every 6 (six) hours as needed for wheezing Pt requests 2 inhalers, Disp: 18 g, Rfl: 1    ALPRAZolam (XANAX) 0.5 mg tablet, Take 1 tablet 2 hours prior to procedure. OK to repeat 30 minutes prior, Disp: 2 tablet, Rfl: 1    amLODIPine (NORVASC) 5 mg tablet, Take 1 tablet (5 mg total) by mouth daily, Disp: 90 tablet, Rfl: 1    aspirin (ECOTRIN LOW STRENGTH) 81 mg EC tablet, Take 81 mg by mouth daily, Disp: , Rfl:     benzonatate (TESSALON) 200 MG capsule, Take 1 capsule (200 mg total) by mouth 3 (three) times a day as needed for cough, Disp: 20 capsule, Rfl: 0    clobetasol (TEMOVATE) 0.05 % cream, Apply 0.25 g (0.05 Applications  total) topically 2 (two) times a day as needed (rash), Disp: 60 g, Rfl: 5    clotrimazole-betamethasone (LOTRISONE) 1-0.05 % cream, Apply topically 2 (two) times a day, Disp: 30 g, Rfl: 5    EPINEPHrine (EPIPEN) 0.3 mg/0.3 mL SOAJ, Inject 0.3 mL (0.3 mg total) into a muscle once for 1 dose, Disp: 1 each, Rfl: 0    fluticasone (Arnuity Ellipta) 200 MCG/ACT AEPB inhaler, Inhale 1 puff daily Rinse mouth after use., Disp: 30 blister, Rfl: 0    ketoconazole (NIZORAL) 2 % cream, Apply topically daily as needed for rash, Disp: 15 g, Rfl: 5    methylphenidate (RITALIN) 10 mg tablet, TAKE TWO TABLETS IN THE MORNING, ONE TABLET AROUND NOON AND ONE TABLET IN AFTERNOON IF NEEDED Do not start before March 14, 2025., Disp: 120 tablet, Rfl: 0    rosuvastatin (CRESTOR) 40 MG tablet, Take 1 tablet (40 mg total) by mouth daily, Disp: 90 tablet, Rfl: 1    zaleplon (SONATA) 10 MG capsule, Take 1 capsule (10 mg total) by mouth daily at bedtime, Disp: 30 capsule, Rfl: 2    Current Facility-Administered Medications:     bupivacaine (PF) (MARCAINE) 0.25 % injection 2 mL, 2 mL, Epidural, Once, Siddhartha Wade MD    iohexol (OMNIPAQUE) 300 mg/mL injection 1 mL, 1 mL, Epidural, Once, Siddhartha Wade MD    methylPREDNISolone acetate (DEPO-MEDROL) injection 80 mg, 80 mg, Epidural, Once, Siddhartha Wade MD    No Known Allergies    Physical Exam:   Vitals:    04/03/25 1402   BP: 111/70   Pulse: 82   Resp: 18   Temp: 98.1 °F (36.7 °C)   SpO2: 94%     General: Awake, Alert, Oriented x 3, Mood and affect appropriate  Respiratory: Respirations even and unlabored  Cardiovascular: Peripheral pulses intact; no edema  Musculoskeletal Exam: Lower back pain    ASA Score: 3    Patient/Chart Verification  Patient ID Verified: Verbal  ID Band Applied: No  H&P( within 30 days) Verified: To be obtained in the Procedural area  Allergies Reviewed: Yes  Anticoag/NSAID held?: No  Currently on antibiotics?: No    Assessment:   1. Lumbar radiculopathy        Plan: L5  LESI

## 2025-04-03 NOTE — DISCHARGE INSTR - LAB
Epidural Steroid Injection   WHAT YOU NEED TO KNOW:   An epidural steroid injection (KYLE) is a procedure to inject steroid medicine into the epidural space. The epidural space is between your spinal cord and vertebrae. Steroids reduce inflammation and fluid buildup in your spine that may be causing pain. You may be given pain medicine along with the steroids.          ACTIVITY  Do not drive or operate machinery today.  No strenuous activity today - bending, lifting, etc.  You may resume normal activites starting tomorrow - start slowly and as tolerated.  You may shower today, but no tub baths or hot tubs.  You may have numbness for several hours from the local anesthetic. Please use caution and common sense, especially with weight-bearing activities.    CARE OF THE INJECTION SITE  If you have soreness or pain, apply ice to the area today (20 minutes on/20 minutes off).  Starting tomorrow, you may use warm, moist heat or ice if needed.  You may have an increase or change in your discomfort for 36-48 hours after your treatment.  Apply ice and continue with any pain medication you have been prescribed.  Notify the [facility] if you have any of the following: redness, drainage, swelling, headache, stiff neck or fever above 100°F.    SPECIAL INSTRUCTIONS  Our office will contact you in approximately 14 days for a progress report.    MEDICATIONS  Continue to take all routine medications.  Our office may have instructed you to hold some medications.    As no general anesthesia was used in today's procedure, you should not experience any side effects related to anesthesia.     If you are diabetic, the steroids used in today's injection may temporarily increase your blood sugar levels after the first few days after your injection. Please keep a close eye on your sugars and alert the doctor who manages your diabetes if your sugars are significantly high from your baseline or you are symptomatic.     If you have a  problem specifically related to your procedure, please call our office at [phone].  Problems not related to your procedure should be directed to your primary care physician.

## 2025-04-07 DIAGNOSIS — F90.2 ATTENTION DEFICIT HYPERACTIVITY DISORDER (ADHD), COMBINED TYPE: ICD-10-CM

## 2025-04-07 NOTE — TELEPHONE ENCOUNTER
Reason for call:   [x] Refill   [] Prior Auth  [] Other:     Office:   [] PCP/Provider -   [x] Specialty/Provider - psych/[place]    Medication: Methylphenidate 10mg    Dose/Frequency: 2 tab am and 1 around noon and 1 in afternoon    Quantity: 120    Pharmacy: [facility] - [place] - [address] [phone]     Local Pharmacy   Does the patient have enough for 3 days?   [x] Yes   [] No - Send as HP to POD

## 2025-04-08 RX ORDER — METHYLPHENIDATE HYDROCHLORIDE 10 MG/1
TABLET ORAL
Qty: 120 TABLET | Refills: 0 | Status: SHIPPED | OUTPATIENT
Start: 2025-04-23

## 2025-04-08 NOTE — TELEPHONE ENCOUNTER
PMDP reviewed. Refill was sent to the patient's preferred pharmacy to be picked up on 4/23 or later.

## 2025-04-08 NOTE — TELEPHONE ENCOUNTER
Refill cannot be delegated    1 [id number] 03/24/2025 03/07/2025 Methylphenidate Hcl (Tablet) 120.0 30 10 MG [facility] Commercial Insurance 0 / 0 [place]   1 [id number] 03/24/2025 03/19/2025 Zaleplon (Capsule) 30.0 30 10 MG NA [facility] Commercial Insurance 0 / 2 [place]   1 [id number] 03/17/2025 02/26/2025 ALPRAZolam (Tablet) 2.0 1 0.5 MG [facility] Commercial Insurance 1 / 1 [place]   1 [id number] 02/26/2025 02/26/2025 ALPRAZolam (Tablet) 2.0 1 0.5 MG NA JARON JOHNSON [facility] Commercial Insurance 0 / 1 [place]   1 [id number] 02/14/2025 02/07/2025 Methylphenidate Hcl (Tablet) 120.0 30 10 MG NA [facility] Commercial Insurance 0 / 0 [place]   1 [id number] 01/13/2025 01/08/2025 Methylphenidate Hcl (Tablet) 120.0 30 10 MG NA [facility] 0 / 0 [place]   1 [id number] 12/11/2024 12/11/2024 Methylphenidate Hcl (Tablet) 120.0 30 10 MG NA AMIR [facility] Commercial Insurance 0 / 0 [place]   1 [id number] 11/11/2024 10/29/2024 Methylphenidate Hcl (Tablet) 120.0 30 10 MG [id number] Commercial Insurance 0 / 0 [place]   1 [id number] 11/08/2024 11/07/2024 ALPRAZolam (Tablet) 2.0 1 0.5 MG NA JARON JOHNSON [id number] Commercial Insurance 0 / 0 [place]   1 [id number] 10/21/2024 10/21/2024 ALPRAZolam (Tablet) 2.0 2 1 MG NA JARON JOHNSON [id number] Commercial Insurance 0 / 0 [place]

## 2025-04-17 ENCOUNTER — TELEPHONE (OUTPATIENT)
Dept: RADIOLOGY | Facility: MEDICAL CENTER | Age: 65
End: 2025-04-17

## 2025-04-17 NOTE — TELEPHONE ENCOUNTER
Caller: robert Costa   Doctor/office: Dr Wade   CB#: [phone]    % of improvement: 35    Pain Scale (1-10): 6

## 2025-04-23 ENCOUNTER — CLINICAL SUPPORT (OUTPATIENT)
Dept: NUTRITION | Facility: HOSPITAL | Age: 65
End: 2025-04-23
Payer: COMMERCIAL

## 2025-04-23 VITALS — WEIGHT: 179 LBS | BODY MASS INDEX: 24.97 KG/M2

## 2025-04-23 DIAGNOSIS — C64.2 RENAL CELL CARCINOMA OF LEFT KIDNEY (HCC): Primary | ICD-10-CM

## 2025-04-23 PROCEDURE — 97803 MED NUTRITION INDIV SUBSEQ: CPT

## 2025-04-23 NOTE — PROGRESS NOTES
" Nutrition Assessment Form    Patient Name: Crispin Fam    YOB: 1960    Sex: Male     Assessment Date: 4/23/2025  Start Time: 12 pm Stop Time: 12:30 pm Total Minutes: 30     Data:  Present at session: self   Parent/Patient Concerns/reason for visit: \"I want to reduce risk of cancer returning and I have severe arthritis pain and inflammation\"   Medical Dx/Reason for Referral: C64.2 (ICD-10-CM) - Renal cell carcinoma of left kidney       Past Medical History:   Diagnosis Date    Arthritis     Asthma     Cancer ([facility]) 9/22    COPD (chronic obstructive pulmonary disease) ([facility]) 1990    Headache(784.0) 2019    Significant osteoarthrities in neck    HL (hearing loss) 2019    mild    Hyperlipidemia 1995    Hypertension 2020    Pneumonia 2019    Psoriatic arthritis ([facility])     Septicemia ([facility])     Stomach problems        Current Outpatient Medications   Medication Sig Dispense Refill    albuterol (PROVENTIL HFA,VENTOLIN HFA) 90 mcg/act inhaler Inhale 2 puffs every 6 (six) hours as needed for wheezing Pt requests 2 inhalers 18 g 1    ALPRAZolam (XANAX) 0.5 mg tablet Take 1 tablet 2 hours prior to procedure. OK to repeat 30 minutes prior 2 tablet 1    amLODIPine (NORVASC) 5 mg tablet Take 1 tablet (5 mg total) by mouth daily 90 tablet 1    aspirin (ECOTRIN LOW STRENGTH) 81 mg EC tablet Take 81 mg by mouth daily      benzonatate (TESSALON) 200 MG capsule Take 1 capsule (200 mg total) by mouth 3 (three) times a day as needed for cough 20 capsule 0    clobetasol (TEMOVATE) 0.05 % cream Apply 0.25 g (0.05 Applications total) topically 2 (two) times a day as needed (rash) 60 g 5    clotrimazole-betamethasone (LOTRISONE) 1-0.05 % cream Apply topically 2 (two) times a day 30 g 5    EPINEPHrine (EPIPEN) 0.3 mg/0.3 mL SOAJ Inject 0.3 mL (0.3 mg total) into a muscle once for 1 dose 1 each 0    fluticasone (Arnuity Ellipta) 200 MCG/ACT AEPB inhaler Inhale 1 puff daily Rinse mouth after use. 30 blister 0    " "ketoconazole (NIZORAL) 2 % cream Apply topically daily as needed for rash 15 g 5    methylphenidate (RITALIN) 10 mg tablet TAKE TWO TABLETS IN THE MORNING, ONE TABLET AROUND NOON AND ONE TABLET IN AFTERNOON IF NEEDED Do not start before April 23, 2025. 120 tablet 0    rosuvastatin (CRESTOR) 40 MG tablet Take 1 tablet (40 mg total) by mouth daily 90 tablet 1    zaleplon (SONATA) 10 MG capsule Take 1 capsule (10 mg total) by mouth daily at bedtime 30 capsule 2     No current facility-administered medications for this visit.        Additional Meds/Supplements:    Special Learning Needs/barriers to learning/any new barriers    Height: Ht Readings from Last 5 Encounters:   02/26/25 5' 11\" (1.803 m)   01/07/25 5' 11\" (1.803 m)   12/06/24 5' 11\" (1.803 m)   10/21/24 5' 11\" (1.803 m)   07/26/24 5' 11\" (1.803 m)      Weight: Wt Readings from Last 10 Encounters:   03/07/25 83.5 kg (184 lb)   02/26/25 79.8 kg (176 lb)   01/07/25 80 kg (176 lb 6.4 oz)   12/22/24 76.7 kg (169 lb)   12/06/24 79.7 kg (175 lb 9.6 oz)   10/21/24 81.2 kg (179 lb)   07/26/24 79.8 kg (176 lb)   06/27/24 79.8 kg (176 lb)   06/12/24 80.9 kg (178 lb 6.4 oz)   05/13/24 82 kg (180 lb 12.8 oz)     Estimated body mass index is 25.66 kg/m² as calculated from the following:    Height as of 2/26/25: 5' 11\" (1.803 m).    Weight as of 3/7/25: 83.5 kg (184 lb).   Recent Weight Change: [x]Yes     []No  Amount: 5 lb weight loss X 6 weeks       Energy Needs: Sampson- St. 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 []Yes     [x]No  When:      Why:         Diet Hx:  Breakfast: scrambled eggs with mozzarella or cheddar cheese, nonstick spray and english muffin, handful blueberries  Diet:   Lunch: typically skips  1-2 slices swiss and turkey and handful mixed nuts 1 cup coffee with eben nut syrup and half half   OJ  Skim milk- 32-40 oz a day  Water per day  Drinks electrolyte water when he gigs     Dinner: low appetite around 6 pm   8 pm and on is ravenous  11 om meatball parm, bag of chips, yohoo after a gig          Snacks: AM -   PM -   HS -    Other Notes/ Initial Assessment:  Patient has been cancer free for 2 years  Patient received injections in his back every 4-6 months. Has been told he is not a candidate for surgery. Has severe osteoarthritis.   Patient stated he salts his food at the table    Discussed benefits of following an anti-inflammatory pattern of eating  Discussed foods that are pro-inflammatory- saturated fats, added sugars, refined grains, excessive Na  Discussed anti-inflammatory foods- fruits, vegetables, unsaturated fats, whole grains   Encouraged patient to measure the amount of salt he uses  Encouraged patient to read nutrition facts labels for Na and added sugar and serving size  Discussed benefits of adequate hydration for kidney health    Patient to follow up in April 2025  "    4/23/25  Patient reports he has increased water intake but he is also waking up to use the bathroom multiple times during the night  Has been reading nutrition facts labels for added sugar  Has reduced \"junk food\"- cut back on candy \"by half\", reduced sugary cereal to 1 bowl a week, only having donuts after a gig, drinking less coke, choosing drinkable yogurt, having fruit popsickles  Patient noted his BP has been elevated lately but has not been keeping track of Na intake    RD encouraged positive changes patient has made to decrease added sugar intake  Encouraged drinking at least half of water for the day by noon and the reat by no later than 6 pm to see if patient is able to stay asleep  RD encouraged patient to be mindful of sources of Na and to choose low Na foods     Patient will follow up PRN     Updated assessment (Follow up note only):     Nutrition Diagnosis:   Food and nutrition related knowledge deficit  related to Lack of prior exposure to accurate nutrition related information as evidenced by No prior knowledge of need for food and nutrition related recommendations       Any change or new dx since previous visit:     Nutrition Diagnosis:   N/a      Medical Nutrition Therapy Intervention:  [x]Individualized Meal Plan 2300 mg Na per day, 2200 kcal per day  []Understanding Lab Values   []Basic Pathophysiology of Disease []Food/Medication Interactions   []Food Diary []Exercise   []Lifestyle/Behavior Modification Techniques []Medication, Mechanism of Action   [x]Label Reading: Na/ added sugae/ serving size []Self Blood Glucose Monitoring   []Weight/BMI Goals: gain/lose/maintain []Other -           Comprehension: []Excellent  []Very Good  [x]Good  []Fair   []Poor    Receptivity: []Excellent  []Very Good  [x]Good  []Fair   []Poor    Expected Compliance: []Excellent  []Very Good  [x]Good  []Fair   []Poor        Goals (initial)/ Progress made on previous goals/new goals:  1.Patient will read nutrition " "facts labels for added sugar and recommended servings by next follow up- MET/ ONGOING   2.Patient will measure how much salt he uses by next follow up- NOT MET   3.Patient will add ~20 oz water per day by next follow up- MET/ ONGOING       No follow-ups on file.  Labs:  CMP  Lab Results   Component Value Date    K 4.0 01/27/2025     01/27/2025    CO2 28 01/27/2025    BUN 22 01/27/2025    CREATININE 0.84 01/27/2025    GLUF 97 01/27/2025    CALCIUM 9.7 01/27/2025    AST 21 03/06/2024    ALT 24 03/06/2024    ALKPHOS 60 03/06/2024    EGFR 91 01/27/2025       BMP  Lab Results   Component Value Date    CALCIUM 9.7 01/27/2025    K 4.0 01/27/2025    CO2 28 01/27/2025     01/27/2025    BUN 22 01/27/2025    CREATININE 0.84 01/27/2025       Lipids  No results found for: \"CHOL\"  Lab Results   Component Value Date    HDL 56 11/22/2024    HDL 48 03/06/2024    HDL 42 03/03/2023     Lab Results   Component Value Date    LDLCALC 101 (H) 11/22/2024    LDLCALC 96 03/06/2024    LDLCALC 98 03/03/2023     Lab Results   Component Value Date    TRIG 75 11/22/2024    TRIG 114 03/06/2024    TRIG 114 03/03/2023     No results found for: \"CHOLHDL\"    Hemoglobin A1C  Lab Results   Component Value Date    HGBA1C 5.7 (H) 11/22/2024       Fasting Glucose  Lab Results   Component Value Date    GLUF 97 01/27/2025       Insulin     Thyroid  Lab Results   Component Value Date    TSH 2.22 02/27/2020       Hepatic Function Panel  Lab Results   Component Value Date    ALT 24 03/06/2024    AST 21 03/06/2024    ALKPHOS 60 03/06/2024       Celiac Disease Antibody Panel  No results found for: \"ENDOMYSIAL IGA\", \"GLIADIN IGA\", \"GLIADIN IGG\", \"IGA\", \"TISSUE TRANSGLUT AB\", \"TTG IGA\"   Iron  No results found for: \"IRON\", \"TIBC\", \"FERRITIN\"         Rhiannon Kanetski, RD  [facility] CLINICAL NUTRITION SERVICES  [address]  [place] [address]    "

## 2025-05-06 ENCOUNTER — PATIENT MESSAGE (OUTPATIENT)
Dept: PAIN MEDICINE | Facility: CLINIC | Age: 65
End: 2025-05-06

## 2025-05-12 NOTE — PATIENT COMMUNICATION
Caller: pt    Doctor: Dr. vang    Reason for call: pt can barely walk his pain level is 8-9. Please advise.    Call back#: [phone]

## 2025-05-13 ENCOUNTER — TELEPHONE (OUTPATIENT)
Dept: RADIOLOGY | Facility: CLINIC | Age: 65
End: 2025-05-13

## 2025-05-13 ENCOUNTER — VBI (OUTPATIENT)
Dept: ADMINISTRATIVE | Facility: OTHER | Age: 65
End: 2025-05-13

## 2025-05-13 DIAGNOSIS — M54.16 LUMBAR RADICULOPATHY: Primary | ICD-10-CM

## 2025-05-13 DIAGNOSIS — Z12.11 COLON CANCER SCREENING: Primary | ICD-10-CM

## 2025-05-13 RX ORDER — METHYLPREDNISOLONE 4 MG/1
TABLET ORAL
Qty: 21 TABLET | Refills: 0 | Status: SHIPPED | OUTPATIENT
Start: 2025-05-13

## 2025-05-13 RX ORDER — GABAPENTIN 100 MG/1
CAPSULE ORAL
Qty: 60 CAPSULE | Refills: 0 | Status: SHIPPED | OUTPATIENT
Start: 2025-05-13

## 2025-05-13 NOTE — TELEPHONE ENCOUNTER
Please let patient know I am sending a prescription for a Medrol Dosepak to take due to the exacerbation of pain that he is experiencing with left-sided sciatic symptoms.    Want to start taking gabapentin 100 mg at bedtime so I sent prescription for that.    He should call with an update by Friday on how he is doing

## 2025-05-13 NOTE — TELEPHONE ENCOUNTER
05/13/25 11:14 AM    The patient was called and agreed to complete a Cologuard test. The order has been signed and sent to the provider for co-sign.    Thank you.  Ale Tobar  [facility]

## 2025-05-20 ENCOUNTER — OFFICE VISIT (OUTPATIENT)
Dept: INTERNAL MEDICINE CLINIC | Facility: CLINIC | Age: 65
End: 2025-05-20

## 2025-05-20 VITALS
OXYGEN SATURATION: 97 % | SYSTOLIC BLOOD PRESSURE: 118 MMHG | BODY MASS INDEX: 24.97 KG/M2 | WEIGHT: 179 LBS | HEART RATE: 83 BPM | DIASTOLIC BLOOD PRESSURE: 80 MMHG

## 2025-05-20 DIAGNOSIS — I10 HYPERTENSION, UNSPECIFIED TYPE: ICD-10-CM

## 2025-05-20 DIAGNOSIS — I10 PRIMARY HYPERTENSION: Primary | ICD-10-CM

## 2025-05-20 DIAGNOSIS — C64.2 RENAL CELL CARCINOMA OF LEFT KIDNEY (HCC): ICD-10-CM

## 2025-05-20 DIAGNOSIS — E78.2 MIXED HYPERLIPIDEMIA: ICD-10-CM

## 2025-05-20 DIAGNOSIS — F51.01 PRIMARY INSOMNIA: ICD-10-CM

## 2025-05-20 DIAGNOSIS — Z71.84 ENCOUNTER FOR COUNSELING FOR TRAVEL: ICD-10-CM

## 2025-05-20 DIAGNOSIS — Z00.00 WELLNESS EXAMINATION: ICD-10-CM

## 2025-05-20 RX ORDER — AMLODIPINE BESYLATE 5 MG/1
5 TABLET ORAL DAILY
Qty: 100 TABLET | Refills: 3 | Status: SHIPPED | OUTPATIENT
Start: 2025-05-20

## 2025-05-20 RX ORDER — AZITHROMYCIN 250 MG/1
TABLET, FILM COATED ORAL
Qty: 6 TABLET | Refills: 0 | Status: SHIPPED | OUTPATIENT
Start: 2025-05-20 | End: 2025-05-25

## 2025-05-20 NOTE — ASSESSMENT & PLAN NOTE
Pt was getting some elevated blood pressure readings when pt was having more pain.  Blood pressure in office is great.  Continue blood pressure med

## 2025-05-20 NOTE — ASSESSMENT & PLAN NOTE
Continue rosuvastatin along with healthy diet  Orders:  •  Lipid Panel with Direct LDL reflex; Future  •  Comprehensive metabolic panel; Future

## 2025-05-20 NOTE — PROGRESS NOTES
Name: Crispin Fam      : 1960      MRN: [id number]  Encounter Provider: Demetrio Rios MD  Encounter Date: 2025   Encounter department: [facility]  :  Assessment & Plan  Wellness examination  Discussed preventative health, cancer screening, immunizations, and safety issues.  Patient had Cologuard 2022 with recommendations to recheck again in 3 years, patient could get colonoscopy at that time, but if he does not want to get colonoscopy then Cologuard.  Patient had both Shingrix shots.  Patient had Tdap vaccination 2017.  Patient had Pneumovax 23, I recommend Prevnar 20, then he would be good on pneumonia shots.  I recommend yearly flu shot.       Primary hypertension  Pt was getting some elevated blood pressure readings when pt was having more pain.  Blood pressure in office is great.  Continue blood pressure med       Renal cell carcinoma of left kidney (HCC)  Pt had nephrectomy on left 2022       Hypertension, unspecified type  Pt was getting some elevated blood pressure readings when pt was having more pain.  Blood pressure in office is great.  Continue blood pressure med  Orders:  •  amLODIPine (NORVASC) 5 mg tablet; Take 1 tablet (5 mg total) by mouth daily    Mixed hyperlipidemia  Continue rosuvastatin along with healthy diet  Orders:  •  Lipid Panel with Direct LDL reflex; Future  •  Comprehensive metabolic panel; Future    Primary insomnia  Continue zaleplon as needed         Encounter for counseling for travel    Orders:  •  azithromycin (Zithromax) 250 mg tablet; Take 2 tablets (500 mg total) by mouth daily for 1 day, THEN 1 tablet (250 mg total) daily for 4 days.           History of Present Illness   Wellness: Patient gets routine dental care, smoking cigarettes, eats a healthy diet, exercises.      Review of Systems   Constitutional:  Negative for chills, fatigue and fever.   HENT:  Negative for congestion, nosebleeds, postnasal drip,  sore throat and trouble swallowing.    Eyes:  Negative for pain.   Respiratory:  Negative for cough, chest tightness, shortness of breath and wheezing.    Cardiovascular:  Negative for chest pain, palpitations and leg swelling.   Gastrointestinal:  Negative for abdominal pain, constipation, diarrhea, nausea and vomiting.   Endocrine: Negative for polydipsia and polyuria.   Genitourinary:  Negative for dysuria, flank pain and hematuria.   Musculoskeletal:  Positive for back pain. Negative for arthralgias.   Skin:  Negative for rash.   Neurological:  Negative for dizziness, tremors, light-headedness and headaches.   Hematological:  Does not bruise/bleed easily.   Psychiatric/Behavioral:  Negative for confusion and dysphoric mood. The patient is not nervous/anxious.        Objective   /80 (BP Location: Left arm, Patient Position: Sitting, Cuff Size: Standard)   Pulse 83   Wt 81.2 kg (179 lb)   SpO2 97%   BMI 24.97 kg/m²      Physical Exam  Vitals reviewed.   Constitutional:       General: He is not in acute distress.     Appearance: Normal appearance. He is well-developed.   HENT:      Head: Normocephalic and atraumatic.      Right Ear: External ear normal.      Left Ear: External ear normal.      Nose: Nose normal.     Eyes:      General: No scleral icterus.     Conjunctiva/sclera: Conjunctivae normal.     Neck:      Trachea: No tracheal deviation.     Cardiovascular:      Rate and Rhythm: Normal rate and regular rhythm.      Heart sounds: Normal heart sounds. No murmur heard.  Pulmonary:      Effort: Pulmonary effort is normal. No respiratory distress.      Breath sounds: Normal breath sounds. No wheezing or rales.   Abdominal:      General: Bowel sounds are normal.      Palpations: Abdomen is soft. There is no mass.      Tenderness: There is no abdominal tenderness. There is no guarding.   Genitourinary:     Comments: Patient had testicular exam and hernia exam with urology    Musculoskeletal:       Cervical back: Normal range of motion and neck supple.      Right lower leg: No edema.      Left lower leg: No edema.   Lymphadenopathy:      Cervical: No cervical adenopathy.     Skin:     Coloration: Skin is not jaundiced or pale.     Neurological:      General: No focal deficit present.      Mental Status: He is alert and oriented to person, place, and time.     Psychiatric:         Mood and Affect: Mood normal.         Behavior: Behavior normal.         Thought Content: Thought content normal.         Judgment: Judgment normal.         Answers submitted by the patient for this visit:  Annual Physical (Submitted on 5/15/2025)  Diet/Nutrition choices: well balanced diet, limited junk food, low fat diet  Exercise choices: vigorous cardiovascular exercise, strength training exercises, 3-4 times a week on average  Sleep choices: sleeps poorly, 4-6 hours of sleep on average  Hearing choices: tinnitus  Vision choices: wears glasses, wears contacts  Dental choices: regular dental visits, brushes teeth three times daily  Do you currently have an OB/GYN provider that you routinely follow with?: No  Any history of sexual transmitted disease/infection?: No  Urinary symptoms: urinary frequency, nocturia, post-void dribbling  Do you have an advance directive/living will?: Yes  Do you have a durable power of  (POA)?: Yes

## 2025-05-20 NOTE — ASSESSMENT & PLAN NOTE
Discussed preventative health, cancer screening, immunizations, and safety issues.  Patient had Cologuard June 4, 2022 with recommendations to recheck again in 3 years, patient could get colonoscopy at that time, but if he does not want to get colonoscopy then Cologuard.  Patient had both Shingrix shots.  Patient had Tdap vaccination June 26, 2017.  Patient had Pneumovax 23, I recommend Prevnar 20, then he would be good on pneumonia shots.  I recommend yearly flu shot.

## 2025-05-20 NOTE — PATIENT INSTRUCTIONS
Problem List Items Addressed This Visit          Cardiovascular and Mediastinum    HTN (hypertension) - Primary    Pt was getting some elevated blood pressure readings when pt was having more pain.  Blood pressure in office is great.  Continue blood pressure med         Relevant Medications    amLODIPine (NORVASC) 5 mg tablet       Genitourinary    Renal cell carcinoma of left kidney (HCC)    Pt had nephrectomy on left Nov 2022            Neurology/Sleep    Primary insomnia    Continue zalpeplon            Other    Mixed hyperlipidemia    Continue rosuvastatin along with healthy diet         Relevant Orders    Lipid Panel with Direct LDL reflex    Comprehensive metabolic panel    Wellness examination    Discussed preventative health, cancer screening, immunizations, and safety issues.  Patient had Cologuard June 4, 2022 with recommendations to recheck again in 3 years, patient could get colonoscopy at that time, but if he does not want to get colonoscopy then Cologuard.  Patient had both Shingrix shots.  Patient had Tdap vaccination June 26, 2017.  Patient had Pneumovax 23, I recommend Prevnar 20, then he would be good on pneumonia shots.  I recommend yearly flu shot.          Other Visit Diagnoses         Encounter for counseling for travel        Relevant Medications    azithromycin (Zithromax) 250 mg tablet

## 2025-05-20 NOTE — ASSESSMENT & PLAN NOTE
Pt was getting some elevated blood pressure readings when pt was having more pain.  Blood pressure in office is great.  Continue blood pressure med  Orders:  •  amLODIPine (NORVASC) 5 mg tablet; Take 1 tablet (5 mg total) by mouth daily

## 2025-05-21 DIAGNOSIS — F90.2 ATTENTION DEFICIT HYPERACTIVITY DISORDER (ADHD), COMBINED TYPE: ICD-10-CM

## 2025-05-21 RX ORDER — METHYLPHENIDATE HYDROCHLORIDE 10 MG/1
TABLET ORAL
Qty: 120 TABLET | Refills: 0 | Status: SHIPPED | OUTPATIENT
Start: 2025-05-22

## 2025-05-21 NOTE — TELEPHONE ENCOUNTER
Reason for call:   [x] Refill   [] Prior Auth  [] Other:     Office:   [] PCP/Provider -   [x] Specialty/Provider - Psych    Medication: Methylphenidate    Dose/Frequency:  10 mg    Quantity: 120 tablets    Pharmacy: [facility] - [place] - [address] [facility]   Does the patient have enough for 3 days?   [] Yes   [x] No - Send as HP to POD    Mail Away Pharmacy   Does the patient have enough for 10 days?   [] Yes   [] No - Send as HP to POD

## 2025-05-21 NOTE — TELEPHONE ENCOUNTER
04/23/2025 04/08/2025 Methylphenidate Hcl (Tablet) 120.0 30 10 MG NA AMIR [facility] Commercial Insurance 0 / 0 PA       1 [id number] ** 03/24/2025 03/07/2025 Methylphenidate Hcl (Tablet) 120.0 30 10 MG NA [facility] 0 / 0 PA    1 [id number] ** 03/24/2025 03/19/2025 Zaleplon (Capsule) 30.0 30 10 MG NA AMIR [facility] Commercial Insurance 0 / 2 PA    1 [id number] ** 03/17/2025 02/26/2025 ALPRAZolam (Tablet) 2.0 1 0.5 MG NA JARON [facility] Commercial Insurance 1 / 1 PA    1 [id number] ** 02/26/2025 02/26/2025 ALPRAZolam (Tablet) 2.0 1 0.5 MG NA [facility] Commercial Insurance 0 / 1 PA    1 [id number] ** 02/14/2025 02/07/2025 Methylphenidate Hcl (Tablet) 120.0 30 10 MG NA AMIR [facility]

## 2025-05-22 ENCOUNTER — APPOINTMENT (OUTPATIENT)
Dept: LAB | Facility: HOSPITAL | Age: 65
End: 2025-05-22
Attending: INTERNAL MEDICINE
Payer: COMMERCIAL

## 2025-05-22 ENCOUNTER — RESULTS FOLLOW-UP (OUTPATIENT)
Dept: INTERNAL MEDICINE CLINIC | Facility: CLINIC | Age: 65
End: 2025-05-22

## 2025-05-22 DIAGNOSIS — E78.2 MIXED HYPERLIPIDEMIA: ICD-10-CM

## 2025-05-22 LAB
ALBUMIN SERPL BCG-MCNC: 4.4 G/DL (ref 3.5–5)
ALP SERPL-CCNC: 61 U/L (ref 34–104)
ALT SERPL W P-5'-P-CCNC: 36 U/L (ref 7–52)
ANION GAP SERPL CALCULATED.3IONS-SCNC: 8 MMOL/L (ref 4–13)
AST SERPL W P-5'-P-CCNC: 22 U/L (ref 13–39)
BILIRUB SERPL-MCNC: 0.83 MG/DL (ref 0.2–1)
BUN SERPL-MCNC: 20 MG/DL (ref 5–25)
CALCIUM SERPL-MCNC: 9.4 MG/DL (ref 8.4–10.2)
CHLORIDE SERPL-SCNC: 101 MMOL/L (ref 96–108)
CHOLEST SERPL-MCNC: 179 MG/DL (ref ?–200)
CO2 SERPL-SCNC: 31 MMOL/L (ref 21–32)
CREAT SERPL-MCNC: 0.85 MG/DL (ref 0.6–1.3)
GFR SERPL CREATININE-BSD FRML MDRD: 91 ML/MIN/1.73SQ M
GLUCOSE P FAST SERPL-MCNC: 87 MG/DL (ref 65–99)
HDLC SERPL-MCNC: 47 MG/DL
LDLC SERPL CALC-MCNC: 91 MG/DL (ref 0–100)
POTASSIUM SERPL-SCNC: 3.7 MMOL/L (ref 3.5–5.3)
PROT SERPL-MCNC: 7.1 G/DL (ref 6.4–8.4)
SODIUM SERPL-SCNC: 140 MMOL/L (ref 135–147)
TRIGL SERPL-MCNC: 207 MG/DL (ref ?–150)

## 2025-05-22 PROCEDURE — 80061 LIPID PANEL: CPT

## 2025-05-22 PROCEDURE — 36415 COLL VENOUS BLD VENIPUNCTURE: CPT

## 2025-05-22 PROCEDURE — 80053 COMPREHEN METABOLIC PANEL: CPT

## 2025-05-28 NOTE — PATIENT COMMUNICATION
Caller: Patient     Doctor: Dr. Wade     Reason for call: Patient calling to update provider that Pain is back up to 7 or 8 . Gabapentin is not helping and making patient dizzy at times and he is in and out of the bathroom a lot . Even in the increased dose the gabapentin is not helping     Patient had both sides in low back injectied. Right side is great and no pain but all the pain is now in the left .     Would like to speak to clinical to discuss next steps     Call back#: [phone]

## 2025-06-05 ENCOUNTER — TELEPHONE (OUTPATIENT)
Age: 65
End: 2025-06-05

## 2025-06-05 NOTE — TELEPHONE ENCOUNTER
Patient is calling regarding cancelling an appointment.    Date/Time: 6/11 @ 1:30    Reason: patient    Patient was rescheduled: YES [x] NO []  If yes, when was Patient reschedule for: 7/16 @ 1:30    Patient requesting call back to reschedule: YES [] NO [x]

## 2025-06-06 ENCOUNTER — HOSPITAL ENCOUNTER (OUTPATIENT)
Dept: RADIOLOGY | Facility: CLINIC | Age: 65
End: 2025-06-06
Attending: ANESTHESIOLOGY
Payer: COMMERCIAL

## 2025-06-06 VITALS
RESPIRATION RATE: 20 BRPM | HEART RATE: 78 BPM | DIASTOLIC BLOOD PRESSURE: 75 MMHG | OXYGEN SATURATION: 93 % | SYSTOLIC BLOOD PRESSURE: 107 MMHG | TEMPERATURE: 97.6 F

## 2025-06-06 DIAGNOSIS — M51.16 INTERVERTEBRAL DISC DISORDER WITH RADICULOPATHY OF LUMBAR REGION: ICD-10-CM

## 2025-06-06 PROCEDURE — 64484 NJX AA&/STRD TFRM EPI L/S EA: CPT | Performed by: ANESTHESIOLOGY

## 2025-06-06 PROCEDURE — 64483 NJX AA&/STRD TFRM EPI L/S 1: CPT | Performed by: ANESTHESIOLOGY

## 2025-06-06 RX ORDER — 0.9 % SODIUM CHLORIDE 0.9 %
4 VIAL (ML) INJECTION ONCE
Status: COMPLETED | OUTPATIENT
Start: 2025-06-06 | End: 2025-06-06

## 2025-06-06 RX ORDER — BUPIVACAINE HCL/PF 2.5 MG/ML
2 VIAL (ML) INJECTION ONCE
Status: COMPLETED | OUTPATIENT
Start: 2025-06-06 | End: 2025-06-06

## 2025-06-06 RX ORDER — METHYLPREDNISOLONE ACETATE 80 MG/ML
80 INJECTION, SUSPENSION INTRA-ARTICULAR; INTRALESIONAL; INTRAMUSCULAR; PARENTERAL; SOFT TISSUE ONCE
Status: COMPLETED | OUTPATIENT
Start: 2025-06-06 | End: 2025-06-06

## 2025-06-06 RX ADMIN — METHYLPREDNISOLONE ACETATE 80 MG: 80 INJECTION, SUSPENSION INTRA-ARTICULAR; INTRALESIONAL; INTRAMUSCULAR; SOFT TISSUE at 08:36

## 2025-06-06 RX ADMIN — LIDOCAINE HYDROCHLORIDE 4 ML: 20 INJECTION, SOLUTION EPIDURAL; INFILTRATION; INTRACAUDAL at 08:34

## 2025-06-06 RX ADMIN — IOHEXOL 1 ML: 300 INJECTION, SOLUTION INTRAVENOUS at 08:36

## 2025-06-06 RX ADMIN — BUPIVACAINE HYDROCHLORIDE 2 ML: 2.5 INJECTION, SOLUTION EPIDURAL; INFILTRATION; INTRACAUDAL at 08:36

## 2025-06-06 RX ADMIN — Medication 4 ML: at 08:34

## 2025-06-06 NOTE — DISCHARGE INSTR - LAB
Epidural Steroid Injection   WHAT YOU NEED TO KNOW:   An epidural steroid injection (KYLE) is a procedure to inject steroid medicine into the epidural space. The epidural space is between your spinal cord and vertebrae. Steroids reduce inflammation and fluid buildup in your spine that may be causing pain. You may be given pain medicine along with the steroids.          ACTIVITY  Do not drive or operate machinery today.  No strenuous activity today - bending, lifting, etc.  You may resume normal activites starting tomorrow - start slowly and as tolerated.  You may shower today, but no tub baths or hot tubs.  You may have numbness for several hours from the local anesthetic. Please use caution and common sense, especially with weight-bearing activities.    CARE OF THE INJECTION SITE  If you have soreness or pain, apply ice to the area today (20 minutes on/20 minutes off).  Starting tomorrow, you may use warm, moist heat or ice if needed.  You may have an increase or change in your discomfort for 36-48 hours after your treatment.  Apply ice and continue with any pain medication you have been prescribed.  Notify the [facility] if you have any of the following: redness, drainage, swelling, headache, stiff neck or fever above 100°F.    SPECIAL INSTRUCTIONS  Our office will contact you in approximately 14 days for a progress report.    MEDICATIONS  Continue to take all routine medications.  Our office may have instructed you to hold some medications.    As no general anesthesia was used in today's procedure, you should not experience any side effects related to anesthesia.     If you are diabetic, the steroids used in today's injection may temporarily increase your blood sugar levels after the first few days after your injection. Please keep a close eye on your sugars and alert the doctor who manages your diabetes if your sugars are significantly high from your baseline or you are symptomatic.     If you have a  problem specifically related to your procedure, please call our office at [phone].  Problems not related to your procedure should be directed to your primary care physician.

## 2025-06-06 NOTE — H&P
History of Present Illness: The patient is a 65 y.o. male who presents with complaints of left lower back and leg pain and is here today for left-sided L5-S1 transforaminal epidural steroid injection    Past Medical History[1]    Past Surgical History[2]    Current Medications[3]    Allergies[4]    Physical Exam:   Vitals:    06/06/25 0813   BP: 113/80   Pulse: 70   Resp: 20   Temp: 97.6 °F (36.4 °C)   SpO2: 97%     General: Awake, Alert, Oriented x 3, Mood and affect appropriate  Respiratory: Respirations even and unlabored  Cardiovascular: Peripheral pulses intact; no edema  Musculoskeletal Exam: Left lower back and leg pain    ASA Score: 3    Patient/Chart Verification  Patient ID Verified: Verbal  ID Band Applied: No  Consents Confirmed: To be obtained in the Procedural area  Interval H&P(within 24 hr) Complete (required for Outpatients and Surgery Admit only): To be obtained in the Procedural area  Allergies Reviewed: Yes  Anticoag/NSAID held?: NA  Currently on antibiotics?: No    Assessment:   1. Intervertebral disc disorder with radiculopathy of lumbar region        Plan: left L5-S1 TFESI         [1]   Past Medical History:  Diagnosis Date    Arthritis     Asthma     Cancer ([facility]) 9/22    COPD (chronic obstructive pulmonary disease) ([facility]) 1990    Headache(784.0) 2019    Significant osteoarthrities in neck    HL (hearing loss) 2019    mild    Hyperlipidemia 1995    Hypertension 2020    Pneumonia 2019    Psoriatic arthritis ([facility])     Septicemia ([facility])     Stomach problems    [2]   Past Surgical History:  Procedure Laterality Date    COLONOSCOPY      DENTAL IMPLANT      FRACTURE SURGERY  in youth    wrist, leg    HERNIA REPAIR      INCISION AND DRAINAGE ABSCESS / HEMATOMA OF BURSA / KNEE / THIGH Left     thigh    NH LAPAROSCOPY SURG PARTIAL NEPHRECTOMY Left 11/28/2022    Procedure: ROBOTIC LAPAROSCOPIC PARTIAL NEPHRECTOMY  WITH INTRAOPERATIVE ULTRASOUND INTERPRETATION;  Surgeon: Shane Barbosa MD;  Location: [facility]  Main OR;  Service: Urology    PA RPR 1ST INGUN HRNA AGE 5 YRS/> REDUCIBLE Right 06/22/2021    Procedure: Inguinal hernia repair with mesh;  Surgeon: Robert Bloch, MD;  Location:  MAIN OR;  Service: General   [3]   Current Outpatient Medications:     ALPRAZolam (XANAX) 0.5 mg tablet, Take 1 tablet 2 hours prior to procedure. OK to repeat 30 minutes prior, Disp: 2 tablet, Rfl: 0    albuterol (PROVENTIL HFA,VENTOLIN HFA) 90 mcg/act inhaler, Inhale 2 puffs every 6 (six) hours as needed for wheezing Pt requests 2 inhalers, Disp: 18 g, Rfl: 1    amLODIPine (NORVASC) 5 mg tablet, Take 1 tablet (5 mg total) by mouth daily, Disp: 100 tablet, Rfl: 3    aspirin (ECOTRIN LOW STRENGTH) 81 mg EC tablet, Take 81 mg by mouth in the morning., Disp: , Rfl:     clobetasol (TEMOVATE) 0.05 % cream, Apply 0.25 g (0.05 Applications total) topically 2 (two) times a day as needed (rash), Disp: 60 g, Rfl: 5    clotrimazole-betamethasone (LOTRISONE) 1-0.05 % cream, Apply topically 2 (two) times a day, Disp: 30 g, Rfl: 5    EPINEPHrine (EPIPEN) 0.3 mg/0.3 mL SOAJ, Inject 0.3 mL (0.3 mg total) into a muscle once for 1 dose, Disp: 1 each, Rfl: 0    fluticasone (Arnuity Ellipta) 200 MCG/ACT AEPB inhaler, Inhale 1 puff daily Rinse mouth after use., Disp: 30 blister, Rfl: 0    gabapentin (NEURONTIN) 100 mg capsule, Take 1 tablet at bedtime.  May increase to 2 tablets after 3 days, Disp: 60 capsule, Rfl: 0    ketoconazole (NIZORAL) 2 % cream, Apply topically daily as needed for rash, Disp: 15 g, Rfl: 5    methylphenidate (RITALIN) 10 mg tablet, TAKE TWO TABLETS IN THE MORNING, ONE TABLET AROUND NOON AND ONE TABLET IN AFTERNOON IF NEEDED Do not start before May 22, 2025., Disp: 120 tablet, Rfl: 0    methylPREDNISolone 4 MG tablet therapy pack, Use as directed on package, Disp: 21 tablet, Rfl: 0    rosuvastatin (CRESTOR) 40 MG tablet, Take 1 tablet (40 mg total) by mouth daily, Disp: 90 tablet, Rfl: 1    zaleplon (SONATA) 10 MG capsule, Take 1  capsule (10 mg total) by mouth daily at bedtime, Disp: 30 capsule, Rfl: 2    Current Facility-Administered Medications:     bupivacaine (PF) (MARCAINE) 0.25 % injection 2 mL, 2 mL, Epidural, Once, Siddhartha Wade MD    iohexol (OMNIPAQUE) 300 mg/mL injection 1 mL, 1 mL, Epidural, Once, Siddhartha Wade MD    lidocaine (PF) (XYLOCAINE-MPF) 2 % injection 4 mL, 4 mL, Infiltration, Once, Siddhartha Wade MD    methylPREDNISolone acetate (DEPO-MEDROL) injection 80 mg, 80 mg, Epidural, Once, Siddhartha Wade MD    sodium chloride (PF) 0.9 % injection 4 mL, 4 mL, Infiltration, Once, Siddhartha Wade MD  [4] No Known Allergies

## 2025-06-20 ENCOUNTER — TELEPHONE (OUTPATIENT)
Dept: RADIOLOGY | Facility: MEDICAL CENTER | Age: 65
End: 2025-06-20

## 2025-06-20 NOTE — PROGRESS NOTES
Cardiology and Heart Failure Clinic Note    Crispin Fam 65 y.o. male   MRN: [id number]  Encounter: [id number]        Assessment / Plan:    # Cardio-oncology pertinent history  Renal cell carcinoma  Dx 2023  S/p partial left nephrectomy  (no chemotherapy)    # Coronary calcification  Fam hx of CAD (Father had first myocardial infarction at 36)  Calcium score 2022 -   246  Stress echo 2024 -  no ischemia  On ASA 81  On statin     # HTN  Norvasc 5  BP at goal    # HLD  History of hyperlipidemia on statin therapy since he was 34   Crestor 40  Last lipids:   TG 75 --> 207.      --> 91  Lifestyle changes to improve TG  Zetia to improve LDL    # Dizziness episodes  Sounds like could be dehydration   Try improving hydration   Doesn't sound like arrhthymia but if continues could check monitor    # exercise-induced asthma   noted    # psoriatic arthritis   Noted.  A CVD risk enhancer.        Today's Plan Summary:  See above assessment/plan for full details of today's plan.  Briefly,     Add zetia              Reason For Visit / Chief Complaint:  F/u - New to me.  Prior cardiologist Dr Edwards is moving.   HTN, HLD, coronary calcium.    HPI:   Crispin Fam is a 65 y.o.  male with history as noted in the problem list and further detailed in the above assessment and plan.    F/u - New to me.  Prior cardiologist Dr Edwards is moving.  HTN, HLD, coronary calcium.    As above, the patient has a history of renal cell carcinoma status post partial nephrectomy.  The patient has hypertension, hyperlipidemia, and coronary calcification.    Today, the patient reports  -  dizzy spells.  Started a few weeks ago.   Can occur sitting or standing.   No syncope.   No palpitations.  Muscle cramps for past 3 years.    No chest pain.   Some FOWLER (has exercise-induced asthma so this is baseline).    No edema.      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  Under a lot of stress at  Controlled on last labs - recheck labs after resuming medications for 8-12 weeks.  Orders:    TSH with reflex to Free T4 if abnormal; Future     home - marriage, daughter living with him and he is helping to raise the grandchild.  Lot of stress with all that.    Never smoker.  Rare ETOH.   No drugs.            Cardiac Imaging personally reviewed:  EKG 6-23-25  NSR   Holter or event monitor    Echo    JHONNY    Cardiac MRI    Stress testing ESE - 2024  •  94% MPHR and 10.9 METS.   •  No ischemia       Coronary CTA or [facility]    RHC    CPET              Patient Active Problem List    Diagnosis Date Noted   • Sacroiliitis (HCC) 03/04/2025   • Primary generalized (osteo)arthritis 02/14/2025   • History of renal carcinoma 02/14/2025   • Psoriasis 02/14/2025   • Intervertebral disc disorder with radiculopathy of lumbar region 10/23/2024   • Lumbar spondylosis 11/30/2023   • Lumbosacral spondylosis without myelopathy 11/02/2023   • Shortness of breath 11/29/2022   • Persistent headaches 11/29/2022   • Chest pain 11/29/2022   • COVID-19 10/27/2022   • Renal cell carcinoma of left kidney (HCC) 10/27/2022   • Immunocompromised (HCC) 10/27/2022   • Encounter to discuss test results 09/21/2022   • Lipid disorder 09/07/2022   • Coronary artery disease involving native coronary artery of native heart without angina pectoris 09/07/2022   • Muscle cramps 08/04/2022   • Tinnitus of both ears 08/04/2022   • Wellness examination 08/04/2022   • Recurrent right inguinal hernia 07/22/2021   • Dysphoric mood 02/11/2021   • Dry eye 02/11/2021   • Dry mouth 02/11/2021   • Memory difficulties 02/11/2021   • Chronic maxillary sinusitis 12/16/2020   • HTN (hypertension) 10/09/2020   • Exercise-induced asthma 08/19/2020   • Controlled substance agreement signed 07/19/2020   • Food allergy 07/14/2020   • Right upper quadrant abdominal pain 07/14/2020   • Primary insomnia 07/30/2019   • Muscle spasms of both lower extremities 07/30/2019   • Elbow pain, left 04/16/2019   • Vitamin D deficiency 12/09/2015   • Psoriatic arthritis (HCC) 09/18/2015   • Attention deficit hyperactivity disorder (ADHD),  combined type 09/10/2015   • Mixed hyperlipidemia 09/10/2015   • Degenerative disc disease, cervical 05/06/2015   • Cervical spondylosis 03/17/2015   • DDD (degenerative disc disease), lumbar 03/17/2015       Past Medical History[1]    Allergies[2]    Current Outpatient Medications   Medication Instructions   • albuterol (PROVENTIL HFA,VENTOLIN HFA) 90 mcg/act inhaler 2 puffs, Inhalation, Every 6 hours PRN, Pt requests 2 inhalers   • ALPRAZolam (XANAX) 0.5 mg tablet Take 1 tablet 2 hours prior to procedure. OK to repeat 30 minutes prior   • amLODIPine (NORVASC) 5 mg, Oral, Daily   • aspirin (ECOTRIN LOW STRENGTH) 81 mg, Daily   • clobetasol (TEMOVATE) 0.05 % cream 0.05 Applications, Topical, 2 times daily PRN   • clotrimazole-betamethasone (LOTRISONE) 1-0.05 % cream Topical, 2 times daily   • EPINEPHrine (EPIPEN) 0.3 mg, Intramuscular, Once   • ezetimibe (ZETIA) 10 mg, Oral, Daily   • fluticasone (Arnuity Ellipta) 200 MCG/ACT AEPB inhaler 1 puff, Inhalation, Daily, Rinse mouth after use.   • ketoconazole (NIZORAL) 2 % cream Topical, Daily PRN   • methylphenidate (RITALIN) 10 mg tablet TAKE TWO TABLETS IN THE MORNING, ONE TABLET AROUND NOON AND ONE TABLET IN AFTERNOON IF NEEDED   • methylPREDNISolone 4 MG tablet therapy pack Use as directed on package   • rosuvastatin (CRESTOR) 40 mg, Oral, Daily   • zaleplon (SONATA) 10 mg, Oral, Daily at bedtime       Social History     Socioeconomic History   • Marital status: /Civil Union     Spouse name: Not on file   • Number of children: Not on file   • Years of education: Not on file   • Highest education level: Not on file   Occupational History   • Not on file   Tobacco Use   • Smoking status: Never   • Smokeless tobacco: Never   • Tobacco comments:     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   Vaping Use   • Vaping status: Never Used   Substance and Sexual Activity   • Alcohol use: Yes     Alcohol/week: 1.0 standard drink of alcohol     Types: 1 Glasses  "of wine per week     Comment: More like one glass per month   • Drug use: Never   • Sexual activity: Yes     Partners: Female     Birth control/protection: None     Comment: Monogamous,     Other Topics Concern   • Not on file   Social History Narrative   • Not on file     Social Drivers of Health     Financial Resource Strain: Not on file   Food Insecurity: No Food Insecurity (5/20/2025)    Nursing - Inadequate Food Risk Classification    • Worried About Running Out of Food in the Last Year: Never true    • Ran Out of Food in the Last Year: Never true    • Ran Out of Food in the Last Year: Not on file   Transportation Needs: No Transportation Needs (5/20/2025)    PRAPARE - Transportation    • Lack of Transportation (Medical): No    • Lack of Transportation (Non-Medical): No   Physical Activity: Not on file   Stress: Not on file   Social Connections: Unknown (6/18/2024)    Received from [employer]    Social Connections    • How often do you feel lonely or isolated from those around you? (Adult - for ages 18 years and over): Not on file   Intimate Partner Violence: Not on file   Housing Stability: Low Risk  (5/20/2025)    Housing Stability Vital Sign    • Unable to Pay for Housing in the Last Year: No    • Number of Times Moved in the Last Year: 0    • Homeless in the Last Year: No       Family History[3]    Physical Exam:  Blood pressure 128/82, pulse 78, height 5' 11\" (1.803 m), weight 81.2 kg (179 lb), SpO2 96%.  Body mass index is 24.97 kg/m².  Wt Readings from Last 3 Encounters:   06/23/25 81.2 kg (179 lb)   05/20/25 81.2 kg (179 lb)   04/23/25 81.2 kg (179 lb)     Physical Exam  Vitals reviewed.   Constitutional:       General: He is not in acute distress.     Appearance: He is not toxic-appearing.   HENT:      Head: Normocephalic and atraumatic.     Eyes:      General: No scleral icterus.     Conjunctiva/sclera: Conjunctivae normal.     Neck:      Comments: No JVP elevation  Cardiovascular:      Rate and " Rhythm: Normal rate and regular rhythm.      Heart sounds: No murmur heard.     No friction rub. No gallop.   Pulmonary:      Breath sounds: Normal breath sounds. No wheezing, rhonchi or rales.     Musculoskeletal:      Right lower leg: No edema.      Left lower leg: No edema.     Neurological:      Mental Status: He is alert.         Labs & Results:  Lab Results   Component Value Date    SODIUM 140 05/22/2025    K 3.7 05/22/2025     05/22/2025    CO2 31 05/22/2025    BUN 20 05/22/2025    CREATININE 0.85 05/22/2025    GLUC 93 05/17/2023    CALCIUM 9.4 05/22/2025       Time Statement:  Follow up visit -  but New to me.  Extensive chart review, history, and physical exam performed.  I have spent a total time of 42 minutes in caring for this patient on the day of the visit/encounter including Diagnostic results, Prognosis, Risks and benefits of tx options, Instructions for management, Patient and family education, Importance of tx compliance, Risk factor reductions, Impressions, Counseling / Coordination of care, Documenting in the medical record, Reviewing/placing orders in the medical record (including tests, medications, and/or procedures), and Obtaining or reviewing history  .      Thank you for the opportunity to participate in the care of this patient.    Crispin Vargas MD, [facility]  Advanced Heart Failure and Transplant Cardiologist  Director of Cardio-Oncology  [facility]         [1]  Past Medical History:  Diagnosis Date   • Arthritis    • Asthma    • Cancer (HCC) 9/22   • COPD (chronic obstructive pulmonary disease) ([facility]) 1990   • Headache(784.0) 2019    Significant osteoarthrities in neck   • HL (hearing loss) 2019    mild   • Hyperlipidemia 1995   • Hypertension 2020   • Pneumonia 2019   • Psoriatic arthritis (HCC)    • Septicemia ([facility])    • Stomach problems    [2]  No Known Allergies[3]  Family History  Problem Relation Name Age of Onset   • Coronary artery disease Father  Oc Fam    • Heart disease Father Oc Fam    • Heart attack Father Oc Fam         multiple heart attacks and surgeries   • Heart failure Father Oc Fam    • Arthritis Mother Ellen Fam    • Hearing loss Mother Ellen Fam    • Heart disease Maternal Grandfather Rick Ashleyodette    • Heart failure Maternal Grandfather Rick Ashleyodette    • Dementia Maternal Grandmother Fabby Suero   • Heart disease Paternal Grandfather Nicholas Fam    • Heart failure Paternal Grandfather Nicholas Fam    • Dementia Maternal Aunt Fiorella Posada    • Dementia Maternal Aunt Fiorella Posada         Alzheimers

## 2025-06-23 ENCOUNTER — OFFICE VISIT (OUTPATIENT)
Dept: CARDIOLOGY CLINIC | Facility: CLINIC | Age: 65
End: 2025-06-23
Payer: COMMERCIAL

## 2025-06-23 VITALS
HEART RATE: 78 BPM | HEIGHT: 71 IN | DIASTOLIC BLOOD PRESSURE: 82 MMHG | WEIGHT: 179 LBS | OXYGEN SATURATION: 96 % | BODY MASS INDEX: 25.06 KG/M2 | SYSTOLIC BLOOD PRESSURE: 128 MMHG

## 2025-06-23 DIAGNOSIS — I10 PRIMARY HYPERTENSION: ICD-10-CM

## 2025-06-23 DIAGNOSIS — L40.50 PSORIATIC ARTHRITIS (HCC): ICD-10-CM

## 2025-06-23 DIAGNOSIS — R42 DIZZINESS: ICD-10-CM

## 2025-06-23 DIAGNOSIS — E78.2 MIXED HYPERLIPIDEMIA: Primary | ICD-10-CM

## 2025-06-23 DIAGNOSIS — I25.10 CORONARY ARTERY CALCIFICATION: ICD-10-CM

## 2025-06-23 PROCEDURE — 99215 OFFICE O/P EST HI 40 MIN: CPT | Performed by: INTERNAL MEDICINE

## 2025-06-23 PROCEDURE — 93000 ELECTROCARDIOGRAM COMPLETE: CPT | Performed by: INTERNAL MEDICINE

## 2025-06-23 RX ORDER — EZETIMIBE 10 MG/1
10 TABLET ORAL DAILY
Qty: 90 TABLET | Refills: 3 | Status: SHIPPED | OUTPATIENT
Start: 2025-06-23

## 2025-06-25 DIAGNOSIS — F90.2 ATTENTION DEFICIT HYPERACTIVITY DISORDER (ADHD), COMBINED TYPE: ICD-10-CM

## 2025-06-25 NOTE — TELEPHONE ENCOUNTER
Reason for call:   [x] Refill   [] Prior Auth  [] Other:     Office:   [] PCP/Provider -   [x] Specialty/Provider -     Medication: methylphenidate (RITALIN) 10 mg tablet    Dose/Frequency: Sig: TAKE TWO TABLETS IN THE MORNING, ONE TABLET AROUND NOON AND ONE TABLET IN AFTERNOON IF NEEDED Do not start before May 22, 2025.      Quantity: 120    [facility] [address]  [address] [facility] [address]  Phone: [phone]  Fax: [phone]    Local Pharmacy   Does the patient have enough for 3 days?   [x] Yes   [] No - Send as HP to POD    Mail Away Pharmacy   Does the patient have enough for 10 days?   [] Yes   [] No - Send as HP to POD

## 2025-06-26 RX ORDER — METHYLPHENIDATE HYDROCHLORIDE 10 MG/1
TABLET ORAL
Qty: 120 TABLET | Refills: 0 | Status: SHIPPED | OUTPATIENT
Start: 2025-06-26

## 2025-06-26 NOTE — TELEPHONE ENCOUNTER
Refill cannot be delegated    1 [id number] ** 05/30/2025 05/30/2025 ALPRAZolam (Tablet) 2.0 1 0.5 MG NA [facility]ARON [facility] Commercial Insurance 0 / 0 PA   1 [id number] ** 05/22/2025 05/21/2025 Methylphenidate Hcl (Tablet) 120.0 30 10 MG NA AMIR [facility] Commercial Insurance 0 / 0 PA   1 [id number] ** 04/23/2025 04/08/2025 Methylphenidate Hcl (Tablet) 120.0 30 10 [facility] Commercial Insurance 0 / 0 PA   1 [id number] ** 03/24/2025 03/07/2025 Methylphenidate Hcl (Tablet) 120.0 30 10 MG [facility] Commercial Insurance 0 / 0 PA   1 [id number] ** 03/24/2025 03/19/2025 Zaleplon (Capsule) 30.0 30 10 MG NA AMIR [facility] Commercial Insurance 0 / 2 PA   1 [id number] ** 03/17/2025 02/26/2025 ALPRAZolam (Tablet) 2.0 1 0.5 MG NA [facility]ON [facility] Commercial Insurance 1 / 1 PA   1 [id number] ** 02/26/2025 02/26/2025 ALPRAZolam (Tablet) 2.0 1 0.5 MG NA [facility]ON [facility] Commercial Insurance 0 / 1 PA   1 [id number] ** 02/14/2025 02/07/2025 Methylphenidate Hcl (Tablet) 120.0 30 10 MG NA AMIR [facility] Commercial Insurance 0 / 0 PA   1 [id number] ** 01/13/2025 01/08/2025 Methylphenidate Hcl (Tablet) 120.0 30 10 MG NA AMIR [facility] Commercial Insurance 0 / 0 PA   1 [id number] ** 12/11/2024 12/11/2024 Methylphenidate Hcl (Tablet) 120.0 30 10 MG NA [facility] Commercial Insurance 0 / 0 PA

## 2025-07-02 NOTE — TELEPHONE ENCOUNTER
Received a fax from [employer] with an initiated PA request for the Atomoxetine 40 mg caps, key  [id number]  Completed the already initiated Atomoxetine 40 mg cap PA via [employer] and faxed it, with latest office visit notes, to [facility], marking it as "URGENT "    Reviewed the Atomoxetine 40 mg cap PA request and process with Wellington Gray  Will call him back when a decision is reached  For your review  Pt will increase bed mobility to supervision by d/c

## 2025-07-11 ENCOUNTER — DOCUMENTATION (OUTPATIENT)
Dept: CARDIOLOGY CLINIC | Facility: CLINIC | Age: 65
End: 2025-07-11

## 2025-07-11 DIAGNOSIS — I25.10 CORONARY ARTERY DISEASE INVOLVING NATIVE CORONARY ARTERY OF NATIVE HEART WITHOUT ANGINA PECTORIS: ICD-10-CM

## 2025-07-11 DIAGNOSIS — E78.2 MIXED HYPERLIPIDEMIA: ICD-10-CM

## 2025-07-11 RX ORDER — ROSUVASTATIN CALCIUM 40 MG/1
40 TABLET, COATED ORAL DAILY
Qty: 90 TABLET | Refills: 1 | Status: SHIPPED | OUTPATIENT
Start: 2025-07-11

## 2025-07-11 NOTE — PROGRESS NOTES
Chart update:   message from patient    Good morning. I've stopped taking the Zetia. Caused a lot of fatigue that I've never before experienced, and diarrhea. Stuck with it for 8 days, can't seem to handle it.

## 2025-07-16 ENCOUNTER — TELEMEDICINE (OUTPATIENT)
Dept: PSYCHIATRY | Facility: CLINIC | Age: 65
End: 2025-07-16
Payer: COMMERCIAL

## 2025-07-16 DIAGNOSIS — F90.2 ATTENTION DEFICIT HYPERACTIVITY DISORDER (ADHD), COMBINED TYPE: Primary | ICD-10-CM

## 2025-07-16 DIAGNOSIS — F43.23 ADJUSTMENT DISORDER WITH MIXED ANXIETY AND DEPRESSED MOOD: ICD-10-CM

## 2025-07-16 DIAGNOSIS — F51.01 PRIMARY INSOMNIA: ICD-10-CM

## 2025-07-16 PROBLEM — R07.9 CHEST PAIN: Status: RESOLVED | Noted: 2022-11-29 | Resolved: 2025-07-16

## 2025-07-16 PROBLEM — R51.9 PERSISTENT HEADACHES: Status: RESOLVED | Noted: 2022-11-29 | Resolved: 2025-07-16

## 2025-07-16 PROBLEM — M25.522 ELBOW PAIN, LEFT: Status: RESOLVED | Noted: 2019-04-16 | Resolved: 2025-07-16

## 2025-07-16 PROBLEM — D84.9 IMMUNOCOMPROMISED (HCC): Status: RESOLVED | Noted: 2022-10-27 | Resolved: 2025-07-16

## 2025-07-16 PROCEDURE — 98006 SYNCH AUDIO-VIDEO EST MOD 30: CPT | Performed by: STUDENT IN AN ORGANIZED HEALTH CARE EDUCATION/TRAINING PROGRAM

## 2025-07-16 PROCEDURE — 90833 PSYTX W PT W E/M 30 MIN: CPT | Performed by: STUDENT IN AN ORGANIZED HEALTH CARE EDUCATION/TRAINING PROGRAM

## 2025-07-16 RX ORDER — METHYLPHENIDATE HYDROCHLORIDE 10 MG/1
TABLET ORAL
Qty: 120 TABLET | Refills: 0 | Status: SHIPPED | OUTPATIENT
Start: 2025-07-25

## 2025-07-16 NOTE — PSYCH
MEDICATION MANAGEMENT NOTE    Name: Crispin Fam      : 1960      MRN: [id number]  Encounter Provider: Vielka Victoria MD  Encounter Date: 2025   Encounter department: [facility]    Insurance: Payor: T / Plan: [employer] / Product Type: [facility] /      Reason for Visit:   Chief Complaint   Patient presents with    Virtual Regular Visit    Medication Management    ADHD    Depression    Anxiety    Insomnia   :  Assessment & Plan  Attention deficit hyperactivity disorder (ADHD), combined type    Orders:    methylphenidate (RITALIN) 10 mg tablet; TAKE TWO TABLETS IN THE MORNING, ONE TABLET AROUND NOON AND ONE TABLET IN AFTERNOON IF NEEDED Do not start before 2025.    Adjustment disorder with mixed anxiety and depressed mood  - Continue individual therapy       Primary insomnia  - Continue Melatonin and Sonata PRN           Treatment Recommendations:  A 65 y.o.  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 The patient presented w/ h/o ADHD since , being on Ritalin 60mg daily since then, w/ marked improvement of attention, concentration and his function since being treated. Denied any excessive anxiety, depressed/manic sxs, A/VH or other psychotic sxs. No h/o substance abuse. Denied SI/HI, intent or plan upon direct inquiry at this time. WALTER-7: 4. His current presentation meets criteria  "for ADHD, by history. The patient was educated about the diagnosis of ADHD and the course of ADHD in adults which may present with decreased intensity of sxs, onesimo after age 50, and particularly after age 65. Psycho-education regarding Ritalin indications, benefits, risks, side effects, and alternative options provided to the patient, and the importance of the compliance with psychiatric treatment reiterated. The patient was educated about the potential long-term side effects of ritalin in older age, onesimo cardiac side effects (given the FH of heart attack in his father at age 36), and was educated about the possibility of tapering off or if becoming symptomatic, cross-tapering to different treatment options. He verbalized understanding and agreed to the proposed regimen. Ritalin dose was decreased from 20 mg TID (60mg total), to 20/20/10mg (50 mg total); and then switched to equivalent dose of Concerta 72 mg po daily to be tapered down as tolerated. The patient did not tolerate switching Ritalin to Concerta due to high BP, and on 11/5/2021 requested to be switched back to Ritalin 20mg/20mg/10mg as last prescribed (tapered down from 20 mg TID). Upon f/u on 12/28/2021, presented w/ well-controlled ADHD sxs, and stable mood and anxiety. The patient was diagnosed with complex cyst in L kidney and is going to have partial nephrectomy concerning for possible malignancy on 11/28/22, and also his internist recommended to discontinue Ritalin for HTN and high risk of cardia events given the positive FH and age. Ritalin was tapered off by 10 mg weekly and was started on Strattera 40 mg po daily for 1 week and then 40 mg po BID for ADHD. Upon f/u, the patient reported relatively controlled ADHD sxs with Strattera 40 mg BID and maintained on the same dose. However, the patient was visited on 3/20/23 as requested an earlier appointment and reported poor control of ADHD sxs with side effect of \"terrible dry mouth\". Presented w/ a " persistent pattern of impaired concentration and difficulty sustaining attention, failure to finish duties on time, having difficulty organizing tasks and activities, losing things, and getting easily distracted by external stimuli, figeting with hands, tapping feet or squirming in seat, and talking fast, interrupting others during the conversation and at times moving around as well as having difficulties to wait. Strattera tapered off and started on Ritalin 10 mg TID which later uptitrated to 20/10/10 mg on 4/27/23. Also, the patient was started on Lexapro 10 mg daily for depression and anxiety in the context of multiple psychosocial stressors. The patient stopped taking Lexapro after 8-10 weeks, and presented with moderately severe depression. Agreed to consider Cymbalta (helping with depression, anxiety and pain), but reportedly did not start the medication as had URI and procedures for pain management and then felt better and is not willing to start it at the moment. Will continue individual therapy and recommended to consider couple's therapy.     - Tapered off Strattera due to side effects, successfully  - Continue Ritalin 20/10/10 mg for ADHD (previously was stable on 20/20/10 mg)  - Continue Sonata 10 mg po nightly PRN for insomnia (has been sleeping well taking Melatonin recently and did not need a refill)  - Discontinued Lexapro due to sexual side effects and non-compliance; discontinued Cymbalta as the patient did not start and is not willing to initiate at this time  - Continue individual therapy  - Educated about healthy life style, risk of falls/sedation and addiction. Patient was receptive to education.  - Medications sent to the patient's pharmacy for 30 day supply   - RTC in 12 weeks  - The patient was educated about 24 hour and weekend coverage for urgent situations accessed by calling [facility] main practice number  - Patient was educated to call National Suicide Prevention  [facility] (9-436-589-TALK [[phone]]) for behavioral crisis at anytime or 911 for any safety concerns, or go to nearest ER if the symptoms become overwhelming or unmanageable.  Educated about diagnosis and treatment modalities. Verbalizes understanding and agreement with the treatment plan.  Discussed self monitoring of symptoms, and symptom monitoring tools.  Discussed medications and if treatment adjustment was needed or desired.  Aware of 24 hour and weekend coverage for urgent situations accessed by calling [facility] main practice number  I am scheduling this patient out for greater than 3 months: No    Medications Risks/Benefits:      Risks, Benefits And Possible Side Effects Of Medications:    Risks, benefits, and possible side effects of medications explained to Igor and he (or legal representative) verbalizes understanding and agreement for treatment.    Controlled Medication Discussion:     Crispin has been filling controlled prescriptions on time as prescribed according to [place] Prescription Drug Monitoring Program  PMDP reviewed. Psycho-education regarding stimulants indications, benefits, risks (including risk of addiction, onesimo if taking more than prescribed), side effects (including but not limited to palpitation/arrhythmia, weight loss and increased anxiety), and alternative options provided to the patient, and the importance of the compliance with psychiatric treatment reiterated. The patient verbalized understanding and agreed to the proposed regimen.       History of Present Illness     The patient was visited for Virtual Regular Visit, Medication Management, ADHD, Depression, Anxiety, and Insomnia. Presented calm, and cooperative. Reported feeling ok. His 94 y/o mother is doing better, but continues to be overwhelmed with psychosocial stressors. She stopped therapy about a year ago, but reached out to her two weeks ago and restarted individual therapy. He noted that he has  biggest worry is his physical health, and recently had two spinal injection, and also was concerned about her hyperlipidemia, etc. He continues to report marital conflicts and has been frustrated, but tried to do his best. He noted that his grandson is very attached to him, and despite struggles with different psychosocial stressors he tries to do his best for him. 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 He is concerned about her grandson if he dies, and noted that his father  when he was 70 y/o. It cost him ~$20k for getting the custody and child support. His second grandkid is 7 month old.  Denied any changes in sleep, appetite, concentration, energy level, or daily activities.  Denied feelings of anhedonia, hopelessness, helplessness, worthlessness or guilt and appeared to be future oriented.  There was no thought constriction related to death.  Denied SI/HI, intent or plan upon direct inquiry at this time. No intense anxiety sxs, specific phobia or panic attacks reported. Endorsed good control of ADHD sxs with the current regimen. Denied AV/H.  Endorsed good compliance with the medications and denied any side effects. Denied smoking cigarettes, binge drinking alcohol or other illicit substance use.    Given this presentation, medications are maintained at the same dosage.  PMDP reviewed. Psycho-education regarding stimulants indications, benefits, risks (including risk of addiction, onesimo if taking more than prescribed), side effects (including but not limited to palpitation/arrhythmia, weight loss and increased anxiety), and alternative options provided to the patient, and the importance of the compliance with psychiatric treatment reiterated. The patient verbalized understanding and agreed to the proposed regimen.  Will continue individual psychotherapy. The patient was educated to call 911 or go to the nearest emergency room if the symptoms become overwhelming or unable to remain in control.  Verbalized understanding and agreed to seek help in case of distress or concern for safety.    Review Of Systems: A review of systems is obtained and is negative except for the pertinent positives listed in HPI/Subjective above.      Current Rating Scores:         Areas of Improvement: reviewed in HPI/Subjective Section and reviewed in Assessment and Plan Section    Past Medical History[1]  Past Surgical History[2]  Allergies: Allergies[3]    Current Outpatient Medications   Medication Instructions    albuterol (PROVENTIL HFA,VENTOLIN HFA) 90 mcg/act inhaler 2 puffs, Inhalation, Every 6 hours PRN, Pt requests 2 inhalers    ALPRAZolam (XANAX) 0.5 mg tablet Take 1 tablet 2 hours prior to procedure. OK to repeat 30 minutes prior    amLODIPine (NORVASC) 5 mg, Oral, Daily    aspirin (ECOTRIN LOW STRENGTH) 81 mg, Daily    clobetasol (TEMOVATE) 0.05 % cream 0.05 Applications, Topical, 2 times daily PRN    clotrimazole-betamethasone (LOTRISONE) 1-0.05 % cream Topical, 2 times daily    EPINEPHrine (EPIPEN) 0.3 mg, Intramuscular, Once    fluticasone (Arnuity Ellipta) 200 MCG/ACT AEPB inhaler 1 puff, Inhalation, Daily, Rinse mouth after use.    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   Vaping Use    Vaping status: Never Used   Substance Use Topics    Alcohol use: Yes     Alcohol/week: 1.0 standard drink of alcohol     Types: 1 Glasses of wine per week     Comment: More like one  "glass per month    Drug use: Never          Social History:    Social History     Socioeconomic History    Marital status: /Civil Union     Spouse name: Not on file    Number of children: Not on file    Years of education: Not on file    Highest education level: Not on file   Occupational History    Not on file   Other Topics Concern    Not on file   Social History Narrative    Not on file        Family Psychiatric History:     Family History[4]    Medical History Reviewed by provider this encounter:  Tobacco  Allergies  Meds  Problems  Med Hx  Surg Hx  Fam Hx          Objective   There were no vitals taken for this visit.     Mental Status Evaluation:  Appearance and attitude: appeared as stated age, cooperative and attentive, casually dressed, with good hygiene  Eye contact: good  Motor Function: within normal limits, No PMA/PMR  Gait/station: Not observed  Speech: normal for rate, rhythm, volume, latency, amount  Language: No overt abnormality  Mood/affect: \"ok\" / Affect was euthymic, reactive, in full range, normal intensity and mood congruent  Thought Processes: sequential and goal-directed  Thought content: denied suicidal ideations or homicidal ideations, no overt delusions elicited, negative thoughts, ruminations  Associations: intact associations  Perceptual disturbances: denies Auditory/Visual/Tactile Hallucinations  Orientation: oriented to time, person, place and to the situational context  Cognitive Function: intact  Attention/Concentration: attention span and concentration are age appropriate  Memory: recent and remote memory grossly intact  Intellect: average  Fund of knowledge: aware of current events, aware of past history, and vocabulary average  Impulse control: good  Insight/judgment: fair/good          Laboratory Results: I have personally reviewed all pertinent laboratory/tests results    Last Visit Labs:   No visits with results within 1 Month(s) from this visit.   Latest known " visit with results is:   Appointment on 05/22/2025   Component Date Value    Cholesterol 05/22/2025 179     Triglycerides 05/22/2025 207 (H)     HDL, Direct 05/22/2025 47     LDL Calculated 05/22/2025 91     Sodium 05/22/2025 140     Potassium 05/22/2025 3.7     Chloride 05/22/2025 101     CO2 05/22/2025 31     ANION GAP 05/22/2025 8     BUN 05/22/2025 20     Creatinine 05/22/2025 0.85     Glucose, Fasting 05/22/2025 87     Calcium 05/22/2025 9.4     AST 05/22/2025 22     ALT 05/22/2025 36     Alkaline Phosphatase 05/22/2025 61     Total Protein 05/22/2025 7.1     Albumin 05/22/2025 4.4     Total Bilirubin 05/22/2025 0.83     eGFR 05/22/2025 91        Suicide/Homicide Risk Assessment:    Risk of Harm to Self:  The following ratings are based on assessment at the time of the interview  Based on today's assessment, Igor presents the following risk of harm to self: low    Risk of Harm to Others:  The following ratings are based on assessment at the time of the interview  Based on today's assessment, Igor presents the following risk of harm to others: low    The following interventions are recommended: Continue medication management. No other intervention changes indicated at this time.    Psychotherapy Provided:     Individual psychotherapy provided: Yes    Counseling was provided during the session today for 16 minutes.  Psychoeducation provided to the patient and was educated about the importance of compliance with the medications and psychiatric treatment  Supportive psychotherapy provided to the patient  Solution Focused Brief Therapy (SFBT) provided  Patient's emotions were validated and specific labeled praise provided.   [place] suggestions were offered in a supportive non-critical way.     Treatment Plan:    Completed and signed during the session: Not applicable - Treatment Plan not due at this session.    Goals: Progress towards Treatment Plan goals - Yes, progressing, as evidenced by subjective findings in  HPI/Subjective Section and in Assessment and Plan Section    Depression Follow-up Plan Completed: Yes    Note Share:    This note was shared with patient.    Administrative Statements   Administrative Statements   Encounter provider Vielka Victoria MD    The Patient is located at Home and in the following state in which I hold an active license [place].    The patient was identified by name and date of birth. Crispin Fam was informed that this is a telemedicine visit and that the visit is being conducted through the Epic Embedded platform. He agrees to proceed..  My office door was closed. No one else was in the room.  He acknowledged consent and understanding of privacy and security of the video platform. The patient has agreed to participate and understands they can discontinue the visit at any time.    I have spent a total time of 30 minutes in caring for this patient on the day of the visit/encounter including Risks and benefits of tx options, Counseling / Coordination of care, and Obtaining or reviewing history  , not including the time spent for establishing the audio/video connection.    Visit Time  Visit Start Time: 1:20 PM  Visit Stop Time: 1:50 PM  Total Visit Duration: 30 minutes        Vielka Victoria MD 07/16/25         [1]   Past Medical History:  Diagnosis Date    Arthritis     Asthma     Cancer (HCC) 9/22    COPD (chronic obstructive pulmonary disease) ([facility]) 1990    Headache(784.0) 2019    Significant osteoarthrities in neck    HL (hearing loss) 2019    mild    Hyperlipidemia 1995    Hypertension 2020    Pneumonia 2019    Psoriatic arthritis (HCC)     Septicemia ([facility])     Stomach problems    [2]   Past Surgical History:  Procedure Laterality Date    COLONOSCOPY      DENTAL IMPLANT      FRACTURE SURGERY  in youth    wrist, leg    HERNIA REPAIR      INCISION AND DRAINAGE ABSCESS / HEMATOMA OF BURSA / KNEE / THIGH Left     thigh    [place] LAPAROSCOPY SURG PARTIAL NEPHRECTOMY Left 11/28/2022    Procedure:  ROBOTIC LAPAROSCOPIC PARTIAL NEPHRECTOMY  WITH INTRAOPERATIVE ULTRASOUND INTERPRETATION;  Surgeon: Shane Barbosa MD;  Location: [facility];  Service: Urology    MT RPR 1ST INGUN HRNA AGE 5 YRS/> REDUCIBLE Right 06/22/2021    Procedure: Inguinal hernia repair with mesh;  Surgeon: Robert Bloch, MD;  Location: [facility];  Service: General   [3] No Known Allergies  [4]   Family History  Problem Relation Name Age of Onset    Coronary artery disease Father Oc Principato     Heart disease Father Oc Principato     Heart attack Father Oc Principato         multiple heart attacks and surgeries    Heart failure Father Oc Principato     Arthritis Mother Ellen Principato     Hearing loss Mother Ellen Principato     Heart disease Maternal Grandfather Rick Porrello     Heart failure Maternal Grandfather Rick Porrello     Dementia Maternal Grandmother Fabby Porrello         Alzheimers    Heart disease Paternal Grandfather Nicholas Principato     Heart failure Paternal Grandfather Nicholas Principato     Dementia Maternal Aunt Fiorella Bricelyn     Dementia Maternal Aunt Fiorella Bricelyn         Alzheimers

## 2025-07-16 NOTE — ASSESSMENT & PLAN NOTE
Orders:    methylphenidate (RITALIN) 10 mg tablet; TAKE TWO TABLETS IN THE MORNING, ONE TABLET AROUND NOON AND ONE TABLET IN AFTERNOON IF NEEDED Do not start before July 25, 2025.

## 2025-07-21 ENCOUNTER — TELEPHONE (OUTPATIENT)
Dept: PAIN MEDICINE | Facility: CLINIC | Age: 65
End: 2025-07-21

## 2025-07-21 NOTE — TELEPHONE ENCOUNTER
S/w Pt. Pt requested repeat injection.  Injection type: Bilateral Sacroiliac joint injection   Last injection date: 03/04/25  Last office visit:  02/26/25    Where is pain located:  B/L LB, R > L  Is the pain the same area as before: Yes  How much % of relief did the last injection provide: 100% of Rt LB pain  Duration of relief from last injection: 3 months  Is the pain effecting your ADLs:  Yes    Please advise if able to repeat injection at this time.

## 2025-07-22 DIAGNOSIS — F41.9 ANXIETY: ICD-10-CM

## 2025-07-22 LAB — COLOGUARD RESULT REPORTABLE: NEGATIVE

## 2025-07-22 RX ORDER — ALPRAZOLAM 0.5 MG
TABLET ORAL
Qty: 2 TABLET | Refills: 0 | Status: SHIPPED | OUTPATIENT
Start: 2025-07-22

## 2025-07-22 NOTE — TELEPHONE ENCOUNTER
Can you please order something for anxiety for the patient prior to his procedure on 7/29?    Thank you!

## 2025-07-22 NOTE — TELEPHONE ENCOUNTER
Patient has been scheduled for their procedure, please see [employer] message for additional details.

## 2025-07-22 NOTE — TELEPHONE ENCOUNTER
S/w pt and advised of the same, nurse advised for pt to have a  to and from procedure. Pt verbalized understanding and appreciative of call.

## 2025-07-29 ENCOUNTER — HOSPITAL ENCOUNTER (OUTPATIENT)
Dept: RADIOLOGY | Facility: CLINIC | Age: 65
Discharge: HOME | End: 2025-07-29
Attending: ANESTHESIOLOGY
Payer: COMMERCIAL

## 2025-07-29 VITALS
OXYGEN SATURATION: 94 % | TEMPERATURE: 98.3 F | RESPIRATION RATE: 20 BRPM | SYSTOLIC BLOOD PRESSURE: 115 MMHG | HEART RATE: 79 BPM | DIASTOLIC BLOOD PRESSURE: 73 MMHG

## 2025-07-29 DIAGNOSIS — M46.1 SACROILIITIS (HCC): ICD-10-CM

## 2025-07-29 PROCEDURE — 27096 INJECT SACROILIAC JOINT: CPT | Performed by: ANESTHESIOLOGY

## 2025-07-29 RX ORDER — 0.9 % SODIUM CHLORIDE 0.9 %
4 VIAL (ML) INJECTION ONCE
Status: COMPLETED | OUTPATIENT
Start: 2025-07-29 | End: 2025-07-29

## 2025-07-29 RX ORDER — METHYLPREDNISOLONE ACETATE 80 MG/ML
80 INJECTION, SUSPENSION INTRA-ARTICULAR; INTRALESIONAL; INTRAMUSCULAR; PARENTERAL; SOFT TISSUE ONCE
Status: COMPLETED | OUTPATIENT
Start: 2025-07-29 | End: 2025-07-29

## 2025-07-29 RX ORDER — ROPIVACAINE HYDROCHLORIDE 2 MG/ML
7 INJECTION, SOLUTION EPIDURAL; INFILTRATION; PERINEURAL ONCE
Status: COMPLETED | OUTPATIENT
Start: 2025-07-29 | End: 2025-07-29

## 2025-07-29 RX ADMIN — ROPIVACAINE HYDROCHLORIDE 7 ML: 2 INJECTION, SOLUTION EPIDURAL; INFILTRATION at 09:38

## 2025-07-29 RX ADMIN — Medication 4 ML: at 09:36

## 2025-07-29 RX ADMIN — IOHEXOL 2 ML: 300 INJECTION, SOLUTION INTRAVENOUS at 09:38

## 2025-07-29 RX ADMIN — LIDOCAINE HYDROCHLORIDE 4 ML: 20 INJECTION, SOLUTION EPIDURAL; INFILTRATION; INTRACAUDAL at 09:36

## 2025-07-29 RX ADMIN — METHYLPREDNISOLONE ACETATE 80 MG: 80 INJECTION, SUSPENSION INTRA-ARTICULAR; INTRALESIONAL; INTRAMUSCULAR; SOFT TISSUE at 09:38

## 2025-08-11 ENCOUNTER — OFFICE VISIT (OUTPATIENT)
Dept: INTERNAL MEDICINE CLINIC | Facility: CLINIC | Age: 65
End: 2025-08-11
Payer: COMMERCIAL

## 2025-08-12 ENCOUNTER — TELEPHONE (OUTPATIENT)
Dept: PAIN MEDICINE | Facility: CLINIC | Age: 65
End: 2025-08-12

## 2025-08-21 DIAGNOSIS — I10 HYPERTENSION, UNSPECIFIED TYPE: ICD-10-CM

## 2025-08-21 DIAGNOSIS — F90.2 ATTENTION DEFICIT HYPERACTIVITY DISORDER (ADHD), COMBINED TYPE: ICD-10-CM

## 2025-08-22 RX ORDER — METHYLPHENIDATE HYDROCHLORIDE 10 MG/1
TABLET ORAL
Qty: 120 TABLET | Refills: 0 | Status: SHIPPED | OUTPATIENT
Start: 2025-08-22

## 2025-08-22 RX ORDER — AMLODIPINE BESYLATE 5 MG/1
5 TABLET ORAL DAILY
Qty: 100 TABLET | Refills: 1 | Status: SHIPPED | OUTPATIENT
Start: 2025-08-22

## (undated) DEVICE — CHLORAPREP HI-LITE 26ML ORANGE

## (undated) DEVICE — SUT VLOC 90 3-0 CV-23 9 IN VLOCM0844

## (undated) DEVICE — SUT SILK 0 30 IN A306H

## (undated) DEVICE — ENDOPATH PNEUMONEEDLE INSUFFLATION NEEDLES WITH LUER LOCK CONNECTORS 120MM: Brand: ENDOPATH

## (undated) DEVICE — SYRINGE 10ML LL

## (undated) DEVICE — LIGHT HANDLE COVER SLEEVE DISP BLUE STELLAR

## (undated) DEVICE — DRAPE SHEET THREE QUARTER

## (undated) DEVICE — STRL PENROSE DRAIN 18" X 1/4": Brand: CARDINAL HEALTH

## (undated) DEVICE — SUT VICRYL 2-0 REEL 54 IN J286G

## (undated) DEVICE — ADHESIVE SKIN HIGH VISCOSITY EXOFIN 1ML

## (undated) DEVICE — HEM-O-LOK CLIP CARTRIDGE LARGE DA VINCI SI/XI

## (undated) DEVICE — IRRIG ENDO FLO TUBING

## (undated) DEVICE — 3M™ DURAPORE™ SURGICAL TAPE 1538-3, 3 INCH X 10 YARD (7,5CM X 9,1M), 4 ROLLS/BOX: Brand: 3M™ DURAPORE™

## (undated) DEVICE — BETHLEHEM UNIVERSAL MINOR GEN: Brand: CARDINAL HEALTH

## (undated) DEVICE — SURGIFLO ENDOSCOPIC APPICATOR: Brand: ETHICON

## (undated) DEVICE — VIAL DECANTER

## (undated) DEVICE — INTENDED FOR TISSUE SEPARATION, AND OTHER PROCEDURES THAT REQUIRE A SHARP SURGICAL BLADE TO PUNCTURE OR CUT.: Brand: BARD-PARKER SAFETY BLADES SIZE 11, STERILE

## (undated) DEVICE — CANNULA SEAL

## (undated) DEVICE — JP CHANNEL DRAIN 19FR, FULL FLUTES: Brand: JACKSON-PRATT

## (undated) DEVICE — MONOPOLAR CURVED SCISSORS: Brand: ENDOWRIST

## (undated) DEVICE — GLOVE SRG BIOGEL ECLIPSE 7.5

## (undated) DEVICE — VISUALIZATION SYSTEM: Brand: CLEARIFY

## (undated) DEVICE — ASTOUND STANDARD SURGICAL GOWN, XL: Brand: CONVERTORS

## (undated) DEVICE — GLOVE PI ULTRA TOUCH SZ.7.5

## (undated) DEVICE — 3M™ IOBAN™ 2 ANTIMICROBIAL INCISE DRAPE 6650EZ: Brand: IOBAN™ 2

## (undated) DEVICE — SUT MONOCRYL 4-0 PS-2 18 IN Y496G

## (undated) DEVICE — SUT MONOCRYL 4-0 PS-2 27 IN Y426H

## (undated) DEVICE — ULNAR NERVE PROTECTOR FOAM POSITIONER: Brand: CARDINAL HEALTH

## (undated) DEVICE — DRAPE EQUIPMENT RF WAND

## (undated) DEVICE — PADS GROUND

## (undated) DEVICE — SUT VICRYL 2-0 SH 27 IN UNDYED J417H

## (undated) DEVICE — SUREFORM 45 RELOAD WHITE: Brand: SUREFORM

## (undated) DEVICE — SUT ETHILON 3-0 FS-1 18 IN 663G

## (undated) DEVICE — SUT STRATAFIX SPIRAL PDS 2-0 CT-1 45 CM SXPP1B411

## (undated) DEVICE — TUBING SUCTION 5MM X 12 FT

## (undated) DEVICE — TISSUE RETRIEVAL SYSTEM: Brand: INZII RETRIEVAL SYSTEM

## (undated) DEVICE — PROGRASP FORCEPS: Brand: ENDOWRIST

## (undated) DEVICE — SUT PROLENE 2-0 CT-2 30 IN 8411H

## (undated) DEVICE — HEMOSTATIC MATRIX SURGIFLO 8ML W/THROMBIN

## (undated) DEVICE — DRAIN SPONGES,6 PLY: Brand: EXCILON

## (undated) DEVICE — LARGE NEEDLE DRIVER: Brand: ENDOWRIST;DAVINCI SI

## (undated) DEVICE — BLADELESS OBTURATOR: Brand: WECK VISTA

## (undated) DEVICE — COLUMN DRAPE

## (undated) DEVICE — PENCIL ELECTROSURG E-Z CLEAN -0035H

## (undated) DEVICE — FENESTRATED BIPOLAR FORCEPS: Brand: ENDOWRIST

## (undated) DEVICE — ARM DRAPE

## (undated) DEVICE — TOWEL SURG XR DETECT GREEN STRL RFD

## (undated) DEVICE — GLOVE INDICATOR PI UNDERGLOVE SZ 8 BLUE

## (undated) DEVICE — TIBURON TRANSVERSE LAPAROTOMY SHEET: Brand: CONVERTORS

## (undated) DEVICE — NEEDLE 25G X 1 1/2

## (undated) DEVICE — BETHLEHEM MAJOR GENERAL PACK: Brand: CARDINAL HEALTH

## (undated) DEVICE — INTENDED FOR TISSUE SEPARATION, AND OTHER PROCEDURES THAT REQUIRE A SHARP SURGICAL BLADE TO PUNCTURE OR CUT.: Brand: BARD-PARKER SAFETY BLADES SIZE 15, STERILE

## (undated) DEVICE — TIP COVER ACCESSORY

## (undated) DEVICE — AIRSEAL TUBE SMOKE EVAC LUMENX3 FILTERED

## (undated) DEVICE — TROCAR PORT ACCESS 12 X120MM W/BLDLS OPTICAL TIP AIRSEAL

## (undated) DEVICE — SUT VICRYL 0 UR-6 27 IN J603H

## (undated) DEVICE — SUT VICRYL 3-0 SH 27 IN J416H

## (undated) DEVICE — SURGICEL 4 X 8

## (undated) DEVICE — TRAY FOLEY 16FR URIMETER SILICONE SURESTEP

## (undated) DEVICE — PLUMEPEN PRO 10FT

## (undated) DEVICE — JACKSON-PRATT 100CC BULB RESERVOIR: Brand: CARDINAL HEALTH